# Patient Record
Sex: FEMALE | Race: WHITE | NOT HISPANIC OR LATINO | Employment: PART TIME | URBAN - NONMETROPOLITAN AREA
[De-identification: names, ages, dates, MRNs, and addresses within clinical notes are randomized per-mention and may not be internally consistent; named-entity substitution may affect disease eponyms.]

---

## 2017-01-18 ENCOUNTER — AMBULATORY - GICH (OUTPATIENT)
Dept: SCHEDULING | Facility: OTHER | Age: 37
End: 2017-01-18

## 2017-05-23 ENCOUNTER — AMBULATORY - GICH (OUTPATIENT)
Dept: OBGYN | Facility: OTHER | Age: 37
End: 2017-05-23

## 2017-05-23 DIAGNOSIS — R30.0 DYSURIA: ICD-10-CM

## 2017-05-24 ENCOUNTER — AMBULATORY - GICH (OUTPATIENT)
Dept: LAB | Facility: OTHER | Age: 37
End: 2017-05-24

## 2017-05-24 ENCOUNTER — COMMUNICATION - GICH (OUTPATIENT)
Dept: OBGYN | Facility: OTHER | Age: 37
End: 2017-05-24

## 2017-05-24 DIAGNOSIS — R30.0 DYSURIA: ICD-10-CM

## 2017-05-24 LAB
BILIRUB UR QL: NEGATIVE
CLARITY, URINE: CLEAR CLARITY
COLOR UR: YELLOW COLOR
GLUCOSE URINE: NEGATIVE MG/DL
KETONES UR QL: NEGATIVE MG/DL
LEUKOCYTE ESTERASE URINE: NEGATIVE
NITRITE UR QL STRIP: NEGATIVE
OCCULT BLOOD,URINE - HISTORICAL: NEGATIVE
PH UR: 5 [PH]
PROTEIN QUALITATIVE,URINE - HISTORICAL: NEGATIVE MG/DL
SP GR UR STRIP: <=1.005
UROBILINOGEN,QUALITATIVE - HISTORICAL: NORMAL EU/DL

## 2017-08-25 ENCOUNTER — COMMUNICATION - GICH (OUTPATIENT)
Dept: OBGYN | Facility: OTHER | Age: 37
End: 2017-08-25

## 2017-10-27 ENCOUNTER — AMBULATORY - GICH (OUTPATIENT)
Dept: FAMILY MEDICINE | Facility: OTHER | Age: 37
End: 2017-10-27

## 2017-10-27 DIAGNOSIS — Z23 ENCOUNTER FOR IMMUNIZATION: ICD-10-CM

## 2017-12-14 ENCOUNTER — HISTORY (OUTPATIENT)
Dept: FAMILY MEDICINE | Facility: OTHER | Age: 37
End: 2017-12-14

## 2017-12-14 ENCOUNTER — HOSPITAL ENCOUNTER (OUTPATIENT)
Dept: RADIOLOGY | Facility: OTHER | Age: 37
End: 2017-12-14
Attending: FAMILY MEDICINE

## 2017-12-14 ENCOUNTER — OFFICE VISIT - GICH (OUTPATIENT)
Dept: FAMILY MEDICINE | Facility: OTHER | Age: 37
End: 2017-12-14

## 2017-12-14 DIAGNOSIS — Z13.1 ENCOUNTER FOR SCREENING FOR DIABETES MELLITUS: ICD-10-CM

## 2017-12-14 DIAGNOSIS — E03.9 HYPOTHYROIDISM: ICD-10-CM

## 2017-12-14 DIAGNOSIS — Z12.4 ENCOUNTER FOR SCREENING FOR MALIGNANT NEOPLASM OF CERVIX: ICD-10-CM

## 2017-12-14 DIAGNOSIS — M51.369 OTHER INTERVERTEBRAL DISC DEGENERATION, LUMBAR REGION: ICD-10-CM

## 2017-12-14 DIAGNOSIS — Z13.220 ENCOUNTER FOR SCREENING FOR LIPOID DISORDERS: ICD-10-CM

## 2017-12-14 DIAGNOSIS — Z00.00 ENCOUNTER FOR GENERAL ADULT MEDICAL EXAMINATION WITHOUT ABNORMAL FINDINGS: ICD-10-CM

## 2017-12-14 LAB
CHOL/HDL RATIO - HISTORICAL: 3.3
CHOLESTEROL TOTAL: 165 MG/DL
GLUCOSE SERPL-MCNC: 85 MG/DL (ref 70–105)
HCG UR QL: NEGATIVE
HDLC SERPL-MCNC: 50 MG/DL (ref 23–92)
LDLC SERPL CALC-MCNC: 103 MG/DL
NON-HDL CHOLESTEROL - HISTORICAL: 115 MG/DL
PROVIDER ORDERDED STATUS - HISTORICAL: ABNORMAL
TRIGL SERPL-MCNC: 58 MG/DL
TSH - HISTORICAL: 1.81 UIU/ML (ref 0.34–5.6)

## 2017-12-20 ENCOUNTER — COMMUNICATION - GICH (OUTPATIENT)
Dept: FAMILY MEDICINE | Facility: OTHER | Age: 37
End: 2017-12-20

## 2017-12-20 DIAGNOSIS — M54.9 DORSALGIA: ICD-10-CM

## 2017-12-20 DIAGNOSIS — M54.10 RADICULOPATHY: ICD-10-CM

## 2017-12-28 NOTE — TELEPHONE ENCOUNTER
Patient Information     Patient Name MRN Maricarmen Arteaga [id number] Female 1980      Telephone Encounter by Mony De La Fuente RN at 2017 11:38 AM     Author:  Mony De La Fuente RN Service:  (none) Author Type:  NURS- Registered Nurse     Filed:  2017 11:39 AM Encounter Date:  2017 Status:  Signed     :  Mony De La Fuente RN (NURS- Registered Nurse)            Patient was calling to ask if Dr Woodrow Hurtado MD, FACOG takes on patients as a primary provider. Patient was informed that Dr Woodrow Hurtado MD, FACOG does not manage acute illnesses or chronic issues outside of OB/Gyn issues generally. Patient informed that she can continue to be seen for an annual physical exam by Dr Woodrow Hurtado MD, FACOG however.     Patient states understanding of this.  Mony De La Fuente RN............. 2017 11:39 AM

## 2017-12-28 NOTE — TELEPHONE ENCOUNTER
Patient Information     Patient Name MRN Maricarmen Arteaga [id number] Female 1980      Telephone Encounter by Mony De La Fuente RN at 2017  9:58 AM     Author:  Mony De La Fuente RN Service:  (none) Author Type:  NURS- Registered Nurse     Filed:  2017 10:01 AM Encounter Date:  2017 Status:  Signed     :  Mony De La Fuente RN (NURS- Registered Nurse)            Left message to call back  ....................Mony De La Fuente RN  2017   10:01 AM

## 2018-01-02 LAB — HPV RESULTS - HISTORICAL: NEGATIVE

## 2018-01-05 NOTE — TELEPHONE ENCOUNTER
Patient Information     Patient Name MRN Sex Maricarmen Hoffmann [id number] Female 1980      Telephone Encounter by Fitz Mooney LPN at 2017  1:14 PM     Author:  Fitz Mooney LPN Service:  (none) Author Type:  NURS- Licensed Practical Nurse     Filed:  2017  1:14 PM Encounter Date:  2017 Status:  Signed     :  Fitz Mooney LPN (NURS- Licensed Practical Nurse)            Called patient with results.  Fitz Mooney LPN ..............2017 1:13 PM

## 2018-01-05 NOTE — TELEPHONE ENCOUNTER
Patient Information     Patient Name MRN Maricarmen Arteaga [id number] Female 1980      Telephone Encounter by Fitz Mooney LPN at 2017  1:14 PM     Author:  Fitz Mooney LPN Service:  (none) Author Type:  NURS- Licensed Practical Nurse     Filed:  2017  1:14 PM Encounter Date:  2017 Status:  Signed     :  Fitz Mooney LPN (NURS- Licensed Practical Nurse)            ----- Message from Woodrow Hurtado MD sent at 2017 11:58 AM CDT -----  Urine is normal

## 2018-01-07 ENCOUNTER — COMMUNICATION - GICH (OUTPATIENT)
Dept: FAMILY MEDICINE | Facility: OTHER | Age: 38
End: 2018-01-07

## 2018-01-07 DIAGNOSIS — E03.9 HYPOTHYROIDISM: ICD-10-CM

## 2018-01-12 ENCOUNTER — AMBULATORY - GICH (OUTPATIENT)
Dept: SCHEDULING | Facility: OTHER | Age: 38
End: 2018-01-12

## 2018-02-02 ENCOUNTER — DOCUMENTATION ONLY (OUTPATIENT)
Dept: FAMILY MEDICINE | Facility: OTHER | Age: 38
End: 2018-02-02

## 2018-02-02 PROBLEM — F98.8 ATTENTION DEFICIT DISORDER: Status: ACTIVE | Noted: 2018-02-02

## 2018-02-02 RX ORDER — LEVOTHYROXINE SODIUM 75 UG/1
TABLET ORAL
COMMUNITY
Start: 2018-01-09 | End: 2019-01-02

## 2018-02-06 ENCOUNTER — HISTORY (OUTPATIENT)
Dept: FAMILY MEDICINE | Facility: OTHER | Age: 38
End: 2018-02-06

## 2018-02-06 ENCOUNTER — PRENATAL OFFICE VISIT - GICH (OUTPATIENT)
Dept: FAMILY MEDICINE | Facility: OTHER | Age: 38
End: 2018-02-06

## 2018-02-06 DIAGNOSIS — E03.9 HYPOTHYROIDISM: ICD-10-CM

## 2018-02-06 DIAGNOSIS — M47.817 SPONDYLOSIS OF LUMBOSACRAL REGION WITHOUT MYELOPATHY OR RADICULOPATHY: ICD-10-CM

## 2018-02-06 DIAGNOSIS — O09.529 SUPERVISION OF HIGH-RISK PREGNANCY OF ELDERLY MULTIGRAVIDA: Primary | ICD-10-CM

## 2018-02-06 DIAGNOSIS — O09.529 SUPERVISION OF ELDERLY MULTIGRAVIDA: ICD-10-CM

## 2018-02-06 LAB
BILIRUB UR QL: NEGATIVE
CLARITY, URINE: CLEAR CLARITY
COLOR UR: YELLOW COLOR
GLUCOSE URINE: NEGATIVE MG/DL
KETONES UR QL: NEGATIVE MG/DL
LEUKOCYTE ESTERASE URINE: NEGATIVE
NITRITE UR QL STRIP: NEGATIVE
OCCULT BLOOD,URINE - HISTORICAL: NEGATIVE
PH UR: 6.5 [PH]
PROTEIN QUALITATIVE,URINE - HISTORICAL: NEGATIVE MG/DL
SP GR UR STRIP: 1.01
UROBILINOGEN,QUALITATIVE - HISTORICAL: NORMAL EU/DL

## 2018-02-07 DIAGNOSIS — O09.529 ENCOUNTER FOR SUPERVISION OF HIGH-RISK PREGNANCY WITH ELDERLY MULTIGRAVIDA: Primary | ICD-10-CM

## 2018-02-07 DIAGNOSIS — M47.817 LUMBOSACRAL SPONDYLOSIS WITHOUT MYELOPATHY: ICD-10-CM

## 2018-02-07 LAB — CULTURE - HISTORICAL: NORMAL

## 2018-02-09 VITALS
BODY MASS INDEX: 27.29 KG/M2 | HEART RATE: 76 BPM | WEIGHT: 169.8 LBS | SYSTOLIC BLOOD PRESSURE: 124 MMHG | DIASTOLIC BLOOD PRESSURE: 90 MMHG | HEIGHT: 66 IN

## 2018-02-09 VITALS
WEIGHT: 172 LBS | HEIGHT: 66 IN | SYSTOLIC BLOOD PRESSURE: 112 MMHG | BODY MASS INDEX: 27.64 KG/M2 | HEART RATE: 68 BPM | DIASTOLIC BLOOD PRESSURE: 84 MMHG

## 2018-02-11 ASSESSMENT — PATIENT HEALTH QUESTIONNAIRE - PHQ9: SUM OF ALL RESPONSES TO PHQ QUESTIONS 1-9: 11

## 2018-02-12 NOTE — TELEPHONE ENCOUNTER
Patient Information     Patient Name MRMaricarmen Gallegos [id number] Female 1980      Telephone Encounter by Airam Espinosa at 2017  1:07 PM     Author:  Airam Espinosa Service:  (none) Author Type:  (none)     Filed:  2017  1:15 PM Encounter Date:  2017 Status:  Signed     :  Airam Espinosa is wanting a referral to see Dr. Minor here is [place]. Please place referral.   Airam Espinosa LPN............................... 2017 1:14 PM

## 2018-02-12 NOTE — TELEPHONE ENCOUNTER
Patient Information     Patient Name MRMaricarmen Gallegos [id number] Female 1980      Telephone Encounter by Airam Espinosa at 2017  2:19 PM     Author:  Airam Espinosa Service:  (none) Author Type:  (none)     Filed:  2017  2:22 PM Encounter Date:  2017 Status:  Signed     :  Airam Espinosa            Left message for Maricarmen to return call. Wondering where she would like referral to PT for.  Airam Espinosa LPN............................... 2017 2:20 PM

## 2018-02-12 NOTE — NURSING NOTE
Patient Information     Patient Name MRMaricarmen Gallegos [id number] Female 1980      Nursing Note by Airam Espinosa at 2017  7:45 AM     Author:  Airam Espinosa Service:  (none) Author Type:  (none)     Filed:  2017  8:11 AM Encounter Date:  2017 Status:  Signed     :  Airam Espinosa            Patient presents today for physical.  Airam Espinosa LPN............................... 2017 7:59 AM

## 2018-02-12 NOTE — ADDENDUM NOTE
Patient Information     Patient Name MRN Maricarmen Arteaga [id number] Female 1980      Addendum Note by Avril Meyers DO at 2017 11:06 AM     Author:  Avril Meyers DO Service:  (none) Author Type:  PHYS- Osteopathic     Filed:  2017 11:06 AM Encounter Date:  2017 Status:  Signed     :  Avril Meyers DO (PHYS- Osteopathic)       Addended by: AVRIL MEYERS on: 2017 11:06 AM        Modules accepted: Orders

## 2018-02-12 NOTE — ADDENDUM NOTE
Patient Information     Patient Name MRN Maricarmen Arteaga [id number] Female 1980      Addendum Note by Vicki Wood at 2017 12:19 PM     Author:  Vicki Wood Service:  (none) Author Type:  (none)     Filed:  2017 12:19 PM Encounter Date:  2017 Status:  Signed     :  Vicki Wood       Addended by: VICKI WOOD on: 2017 12:19 PM        Modules accepted: Orders

## 2018-02-12 NOTE — PROGRESS NOTES
Patient Information     Patient Name MRN Sex Maricarmen Hoffmann [id number] Female 1980      Progress Notes by Avril Sanders DO at 2017  7:45 AM     Author:  Avril Sanders DO  Service:  (none) Author Type:  PHYS- Osteopathic     Filed:  2017 11:06 AM  Encounter Date:  2017 Status:  Addendum     :  Avril Sanders DO (PHYS- Osteopathic)        Related Notes: Original Note by Avril Sanders DO (PHYS- Osteopathic) filed at 2017 10:12 AM            Nursing Notes:   Airam Espinosa  2017  8:11 AM  Signed  Patient presents today for physical.  Airam Espinosa LPN............................... 2017 7:59 AM      ANNUAL PHYSICAL - FEMALE    HPI: Maricarmen Ornelas is a 37 y.o. female who presents for a yearly exam.  Concerns include:   1. Arthritis.  She has had problems with her lumbar spine for a few years.  Started in ~ after she was crouched down and had a ~4 year old topple over her from the side.  She completed Chiro treatments, and eventually did okay.  She had an xray at that time, showing DDD lumbar disc and facet joints.  She is interested in a new one to compare.  She does aerobic and weight training.  Tries to stay active; but having some numbness/tingling in L lower lateral leg.  Would not want much for intervention; but would like to know if anything has progressed in back.  Also having some dull achy pain radiating out from knees after she has been active; notices more if she is laying in bed.  Same with ankles.    No LMP recorded.   Contraception: OCPs  Risk for STI?: No  Last pap:  ([facility]), normal.  Any hx of abnormal paps:  No  FH of early CA?: No  Cholesterol/DM concerns/screening: Due  Tobacco?: No  Calcium intake: No  DEXA: NA  Last mammo: NA  Colonoscopy: NA  Immunizations: Tdap 2014, flu 10/27/2017.    Patient Active Problem List      Diagnosis Date Noted     Lumbosacral spondylosis without myelopathy 03/10/2015      METHICILLIN RESISTANT STAPHYLOCOCCUS AUREUS INFECTION 2011     ABSCESS, VULVA 2011     ARTHRALGIA 2011     FATIGUE 2011     ABSCESS 2011     ENC FOR INSERTION INTRAUTERINE CONTRACEPT DEVICE 2011     CYSTITIS 2010     HYPOTHYROIDISM 2010     ADD      Past Medical History:     Diagnosis  Date     Hx of pregnancy           Thyroid disorder     hypo      Past Surgical History:      Procedure  Laterality Date     APPENDECTOMY       Social History     Social History        Marital status:       Spouse name: N/A     Number of children:  N/A     Years of education:  N/A     Occupational History      Not on file.     Social History Main Topics        Smoking status:  Never Smoker     Smokeless tobacco:  Never Used     Alcohol use  1.2 oz/week     2 Cans of beer per week      Drug use:  No     Sexual activity:  Not on file     Other Topics  Concern     Not on file      Social History Narrative         ~Ex- Kevan Maynard    ~Sons: Franco Linn    ~Father: Jose;  Mother: Bronwyn    ~Siblings: Sister Nydia, younger brother     Family History       Problem   Relation Age of Onset     Alcohol/Drug  Maternal Grandmother      Alcohol abuse       Cancer  Maternal Grandmother       lung cancer       Other  Maternal Grandfather      Alzheimer's disease       Diabetes  Paternal Grandmother      type 1       Diabetes  Maternal Uncle      type 2       Heart Disease  No Family History        Current Outpatient Prescriptions       Medication  Sig Dispense Refill     levothyroxine (SYNTHROID) 75 mcg tablet Take 1 tablet by mouth once daily.  0     No current facility-administered medications for this visit.      Medications have been reviewed by me and are current to the best of my knowledge and ability.     REVIEW OF SYSTEMS:  Refer to HPI; all other systems reviewed and negative.    PHYSICAL EXAM:  /90 (Cuff Site: Right Arm, Position: Sitting, Cuff Size:  "Adult Regular)  Pulse 76  Ht 1.67 m (5' 5.75\")  Wt 77 kg (169 lb 12.8 oz)  BMI 27.62 kg/m2  CONSTITUTIONAL:  Alert, cooperative, NAD.  EYES: No scleral icterus.  PERRLA.  Conjunctiva clear.  ENT/MOUTH: External ears and nose normal.  TMs normal.  Moist mucous membranes. Oropharynx clear.    ENDO: No thyromegaly or thyroid nodules.  LYMPH:  No cervical or supraclavicular LA.    BREASTS: No skin abnormalities, no erythema.  No discrete masses.  No nipple discharge, no axillary, supra- or infraclavicular LA.   CARDIOVASCULAR: Regular, S1, S2.  No S3 or S4.  No murmur/gallop/rub.  No peripheral edema.  RESPIRATORY: CTA bilaterally, no wheezes, rhonchi or rales.  GI: Bowel sounds wnl.  Soft, nontender, nondistended.  No masses or HSM.  No rebound or guarding.  : Vulva: normal, no lesions or discharge  Urethral meatus: normal size and location, no lesions or discharge  Urethra: no tenderness or masses  Bladder: no fullness or tenderness  Vagina: normal appearance, no abnormal discharge; mild spotting noted from os, no lesions.  No evidence of cystocele or rectocele.  Cervix: nabothian cyst at 2-3:00; no abnormal discharge, no cervical motion tenderness  Uterus: normal size and position, mobile, non-tender  Adnexa: no palpable masses bilaterally. No cervical motion tenderness.  Pap smear obtained: yes  MSKEL: Grossly normal ROM.  No clubbing.  Knees have normal ligamentous exam b/l.  No effusion, bruising.  Mild crepitance in L knee with ROM.  INTEGUMENTARY:  Warm, dry.  No rash noted on exposed skin.  NEUROLOGIC: Facies symmetric.  Grossly normal movement and tone.  No tremor.  PSYCHIATRIC: Affect normal.  Speech fluent.      PHQ Depression Screening 12/29/2016 12/14/2017   Date of PHQ exam (doc flow) 12/29/2016 12/14/2017   1. Lack of interest/pleasure 0 - Not at all 0 - Not at all   2. Feeling down/depressed 0 - Not at all 0 - Not at all   PHQ-2 TOTAL SCORE 0 0   3. Trouble sleeping - -   4. Decreased energy - - "   5. Appetite change - -   6. Feelings of failure - -   7. Trouble concentrating - -   8. Activity level - -   9. Hurting yourself - -   PHQ-9 TOTAL SCORE - -   PHQ-9 Severity Level - -   Functional Impairment - -   Some recent data might be hidden       Results for orders placed or performed in visit on 12/14/17      Urine pregnancy test (HCG), qualitative      Result  Value Ref Range    PREGNANCY,URINE           Negative Negative   LIPID PANEL      Result  Value Ref Range    CHOLESTEROL,TOTAL 165 <200 mg/dL    TRIGLYCERIDES 58 <150 mg/dL    HDL CHOLESTEROL 50 23 - 92 mg/dL    NON-HDL CHOLESTEROL 115 <145 mg/dl    CHOL/HDL RATIO            3.30 <4.50                    LDL CHOLESTEROL 103 (H) <100 mg/dL    PROVIDER ORDERED STATUS RANDOM    GLUCOSE, FASTING      Result  Value Ref Range    GLUCOSE 85 70 - 105 mg/dL   TSH      Result  Value Ref Range    TSH 1.81 0.34 - 5.60 uIU/mL       ASSESSMENT/PLAN:    ICD-10-CM    1. Annual physical exam Z00.00 Urine pregnancy test (HCG), qualitative      Urine pregnancy test (HCG), qualitative      GYN THIN PREP PAP SCREEN IMAGED   2. Hypothyroidism, unspecified type E03.9 TSH      TSH   3. DDD (degenerative disc disease), lumbar M51.36 XR LUMBAR SPINE W OBLIQUES   4. Lipid screening Z13.220 LIPID PANEL      LIPID PANEL   5. Diabetes mellitus screening Z13.1 GLUCOSE, FASTING      GLUCOSE, FASTING   6. Cervical cancer screening Z12.4 GYN THIN PREP PAP SCREEN IMAGED     Relevant cancer screening discussed.    Counseled on healthy diet, Calcium and vitamin D intake, and exercise.    DDD, lumbar, chronic, waxes and wanes:  Repeat Xrays today - show worsening disc disease from 2015.  Patient will be notified.  She would like u preg first as had a condom break ~1 week ago; and due to get period today or tomorrow.  Discussed lifestyle modifications of pool exercise, elliptical, etc. Compared to running/jumping exercises;  Reviewed NSAID use along with ice for anti-inflammatory  effects.    Hypothyroidism, chronic: due to check TSH level today.  Aware that if it remains in the 2-3 range, we may consider a dosage increase.  If not, will continue with levothyroxine 75mcg daily.    Screening labs obtained as ordered above.    NONA MEYERS, DO

## 2018-02-13 NOTE — NURSING NOTE
Patient Information     Patient Name MRN Maricarmen Arteaga [id number] Female 1980      Nursing Note by Mary Jones at 2018  2:00 PM     Author:  Mary Jones Service:  (none) Author Type:  (none)     Filed:  2018  2:27 PM Encounter Date:  2018 Status:  Signed     :  Mary Jones            Patient presents to the clinic today for her initial ob px. Her lmp is either 17 or 17.    Mary Jones LPN.................. 2018 2:17 PM

## 2018-02-13 NOTE — PROGRESS NOTES
Patient Information     Patient Name MRN Sex     Maricarmen Ornelas [id number] Female 1980      Progress Notes by Analy Dawson MD at 2018  2:22 PM     Author:  Analy Dawson MD Service:  (none) Author Type:  Physician     Filed:  2018  3:08 AM Encounter Date:  2018 Status:  Signed     :  Analy Dawson MD (Physician)              PRENATAL EXAM - OB    Maricarmen Ornelas is a 37 y.o. female, G 3, P 2, here today for a prenatal exam.    Has hypothyroidism and had recent TSH done -   TSH (uIU/mL)    Date Value   2017 1.81      :  1980  Race/Ethnicity:  /White        Marital Status:   Caodaism:  None  Occupation:  outside work - part time   -   [facility]:  [facility]    's Provider:  provider selected, [facility] or [facility]      Education (last grade completed):  College     /Father of baby:  Jeffrey Watkins   Partner's Occupation:  [employer]   Contact Phone #:  [phone]    MENSTRUAL HISTORY  LMP:  Patient's last menstrual period was 2017.  Cycle Regularity:  regular, every 28-30 days  Flow:  moderate      Date Reliability:  definite  Had negative UPT in December     MEDICAL HISTORY  OB History                    Para  Term     AB  Living     3   2         2     SAB   TAB  Ectopic  Multiple   Live Births                                         # Outcome Date  GA  Lbr Renny/2nd  Weight Sex  Delivery Anes PTL Lv   3 Current                2 Para 10/16/10       M        1 Para                                     Past Medical History:     Diagnosis  Date     Encounter for supervision of high-risk pregnancy with elderly multigravida 2018     Hx of pregnancy           Thyroid disorder     hypo        Past Surgical History:      Procedure  Laterality Date     APPENDECTOMY         Family History:  Family History       Problem   Relation Age of Onset     Alcohol/Drug   "Maternal Grandmother      Alcohol abuse       Cancer  Maternal Grandmother       lung cancer       Other  Maternal Grandfather      Alzheimer's disease       Diabetes  Paternal Grandmother      type 1       Diabetes  Maternal Uncle      type 2       No Known Problems  Sister      No Known Problems  Son      No Known Problems  Son      Heart Disease  No Family History        Social History:  Social History      Substance Use Topics        Smoking status:  Never Smoker     Smokeless tobacco:  Never Used     Alcohol use  1.2 oz/week     2 Cans of beer per week        RISK FACTORS  Exercise Times/wk:  3  Seat Belt Use: 100%  Alcohol/day: once since LMP  Meds/Drugs/ETOH Since LMP:  No  Birth Control Method: condoms  High Risk Behavior: none    INFECTION HISTORY  Current Drug Use:  none  HIV Risk Evaluation:  low risk  Hepatitis B Risk Evaluation: low risk  Hepatitis B Immunized: yes  TB Exposure: yes    Personal History of Genital Herpes: yes  Partner History of Genital Herpes: yes  Rash/Viral Illness Since LMP: No  History of STD: no history of STD's    REVIEW OF SYSTEMS   Intense mood symptoms since finding out she was pregnant with negative self talk, anxiety.  Has been seeing a counselor.  States that \"on paper\" her life is good.   supportive.  This was not planned pregnancy.    PHYSICAL EXAM  /84 (Cuff Site: Right Arm, Position: Sitting, Cuff Size: Adult Regular)  Pulse 68  Ht 1.67 m (5' 5.75\")  Wt 78 kg (172 lb)  LMP 12/18/2017  Breastfeeding? No  BMI 27.97 kg/m2  General Appearance:  Alert, appropriate appearance for age. No acute distress  HEENT Exam:  Grossly normal.  Neck / Thyroid Exam:  Supple, no masses, nodes or enlargement.  Chest/Respiratory Exam: Normal chest wall and respirations. Clear to auscultation.  Breast Exam: No dimpling, nipple retraction or discharge. No masses or nodes.  Cardiovascular Exam: Regular rate and rhythm. S1, S2, no murmur, click, gallop, or " rubs.  Gastrointestinal Exam: Soft, non-tender, no masses or organomegaly.  Lymphatic Exam: Non-palpable nodes in neck, clavicular, axillary, or inguinal regions.  Musculoskeletal Exam: Back is straight and non-tender, full ROM of upper and lower extremities.  Skin: no rash or abnormalities  Neurologic Exam: Normal gait and speech, no tremor.  Psychiatric Exam: tearful    ASSESSMENT    1. Encounter for supervision of high-risk pregnancy with elderly multigravida    2. Lumbosacral spondylosis without myelopathy    3. Hypothyroidism, unspecified type        PLAN  1.  Ultrasound ordered.  2.  Discussed first trimester screening due to AMA - she accepts.  3.  PT for ongoing back pain symptoms.  4.  TSH testing each trimester.  5.  Discussed exercise, stress factors, mental health follow up.  Pt has declined medications.      Follow up next month with labs being done today.  Analy Holbrook MD

## 2018-02-14 ENCOUNTER — TELEPHONE (OUTPATIENT)
Dept: FAMILY MEDICINE | Facility: OTHER | Age: 38
End: 2018-02-14

## 2018-02-14 ENCOUNTER — HISTORY (OUTPATIENT)
Dept: OBGYN | Facility: OTHER | Age: 38
End: 2018-02-14

## 2018-02-14 NOTE — TELEPHONE ENCOUNTER
Patient is calling as she is wondering about which vitamin helps with nausea in pregnancy. Patient advised she can try Vitamin B6 25 mg four times daily with unisom at night to help with nausea. Also advised patient to eat small meal prior to getting out of bed in the morning. Patient agrees to try this and will follow up if nausea continues.  Mony De La Fuente RN............. 2/14/2018 3:37 PM

## 2018-02-16 ENCOUNTER — HOSPITAL ENCOUNTER (OUTPATIENT)
Dept: ULTRASOUND IMAGING | Facility: OTHER | Age: 38
Discharge: HOME | End: 2018-02-16
Attending: FAMILY MEDICINE | Admitting: FAMILY MEDICINE
Payer: COMMERCIAL

## 2018-02-16 DIAGNOSIS — O09.529 ENCOUNTER FOR SUPERVISION OF HIGH-RISK PREGNANCY WITH ELDERLY MULTIGRAVIDA: ICD-10-CM

## 2018-02-16 PROBLEM — O30.009 TWIN PREGNANCY: Status: ACTIVE | Noted: 2018-01-01

## 2018-02-16 PROCEDURE — 76802 OB US < 14 WKS ADDL FETUS: CPT

## 2018-02-26 ENCOUNTER — TELEPHONE (OUTPATIENT)
Dept: FAMILY MEDICINE | Facility: OTHER | Age: 38
End: 2018-02-26

## 2018-02-26 ENCOUNTER — HOSPITAL ENCOUNTER (OUTPATIENT)
Dept: PHYSICAL THERAPY | Facility: OTHER | Age: 38
Setting detail: THERAPIES SERIES
End: 2018-02-26
Attending: FAMILY MEDICINE
Payer: COMMERCIAL

## 2018-02-26 PROCEDURE — 97112 NEUROMUSCULAR REEDUCATION: CPT | Mod: GP

## 2018-02-26 PROCEDURE — 97110 THERAPEUTIC EXERCISES: CPT | Mod: GP

## 2018-02-26 PROCEDURE — 40000185 ZZHC STATISTIC PT OUTPT VISIT

## 2018-02-26 PROCEDURE — 97161 PT EVAL LOW COMPLEX 20 MIN: CPT | Mod: GP

## 2018-02-26 NOTE — TELEPHONE ENCOUNTER
It looks like she is seeing Roxana Holbrook MD on 3/5/18 for OB visit.  Did you mean to send this message to her?  Marietta Cartagena

## 2018-02-26 NOTE — PROGRESS NOTES
02/26/18 1400   General Information   Type of Visit Initial OP Ortho PT Evaluation   Start of Care Date 02/26/18   Referring Physician Roxana Holbrook MD   Orders Evaluate and Treat   Orders Comment high risk pregnancy due to AMA   Insurance Type [employer]   Medical Diagnosis lumbosacral spondylosis   Surgical/Medical history reviewed Yes   Precautions/Limitations other (see comments)  (twin pregnancy)   Presentation and Etiology   Pertinent history of current problem (include personal factors and/or comorbidities that impact the POC) Chronic history of back pain. Recent films showed worsening of DDD especially at L4-5. Was seeing another PT locally; declined to treat her further after learning she was pregnant. Wants to continue with exercise and would like guidance about what is safe. Prefers to do impact aerobics, jumping jacks, run 1-2 miles per week.    Impairments A. Pain;D. Decreased ROM;F. Decreased strength and endurance   Functional Limitations perform desired leisure / sports activities   Symptom Location low back   Chronicity Chronic   Pain rating (0-10 point scale) (did not rate pain)   Pain/symptoms exacerbated by M. Other   Pain exacerbation comment exercise, movement   Pain/symptoms eased by K. Other   Pain eased by comment exercise, movement   Current / Previous Interventions   Diagnostic Tests: (see EHR for xrays)   Prior Level of Function   Prior Level of Function-Mobility pain with exercise, movement   Current Level of Function   Patient role/employment history A. Employed   Fall Risk Screen   Fall screen completed by PT   Have you fallen 2 or more times in the past year? No   Have you fallen and had an injury in the past year? No   Is patient a fall risk? No   Lumbar Spine/SI Objective Findings   Observation limited trunk rotation wiht gait.   Posture increased lordosis   Flexion ROM 90, discomfort   Pelvic Screen + FFT left, left superior pubic shear, +JANNETH left, FRS left L5, FRS  left T12   Hamstring Flexibility left tight   Hip Flexor Flexibility iliopsoas tight left   Lumbar/SI Special Tests Comments oblique MMT left 3+/5, right 4/5   Palpation limited tissue loading through left pelvis, right shoulder. General listening to anterior left pelvis   Planned Therapy Interventions   Planned Therapy Interventions joint mobilization;manual therapy;neuromuscular re-education;strengthening;stretching   Planned Modality Interventions   Planned Modality Interventions Cryotherapy   Clinical Impression   Criteria for Skilled Therapeutic Interventions Met yes, treatment indicated   PT Diagnosis chronic low back pain, pubic shear, lumbar segmental dysfunctions, myofascial tightness   Functional limitations due to impairments loss of motion, muscle weakness, pain   Clinical Presentation Stable/Uncomplicated   Clinical Decision Making (Complexity) Low complexity   Therapy Frequency 2 times/Week   Predicted Duration of Therapy Intervention (days/wks) 6 months   Risk & Benefits of therapy have been explained Yes   Patient, Family & other staff in agreement with plan of care Yes   ORTHO GOALS   PT Ortho Eval Goals 1;2;3   Ortho Goal 1   Goal Identifier MMT   Goal Description improve facilitation of abdominal muscle to stabilize lumbar spine and pelvis during mobility   Target Date 08/26/18   Ortho Goal 2   Goal Identifier joint mechanics   Goal Description maintain normal joint mechanics of lumbar spine and pelvis during pregnancy to reduce back pain   Target Date 08/26/18   Ortho Goal 3   Goal Identifier exercise   Goal Description Patient to continue with and tolerated exercise during course of pregnancy   Target Date 08/26/18   Total Evaluation Time   Total Evaluation Time 20

## 2018-02-26 NOTE — TELEPHONE ENCOUNTER
Maricarmen Ornelas has an outpatient lab appointment this Friday 3/2/18. Lab has no orders for this appointment. Would Dr. Cartagena like to place lab orders for this patient's lab only appointment?  Thank you

## 2018-02-27 ENCOUNTER — TELEPHONE (OUTPATIENT)
Dept: FAMILY MEDICINE | Facility: OTHER | Age: 38
End: 2018-02-27

## 2018-02-27 NOTE — TELEPHONE ENCOUNTER
Let patient know that this test is a send out about 3-5 days for results to be done. Dia Bedoya LPN .......................2/27/2018  9:03 AM

## 2018-02-27 NOTE — TELEPHONE ENCOUNTER
I put the harmony order back in my outbox for you to bring to Roxana Holbrook MD.  :)  Marietta Cartagena

## 2018-02-28 ENCOUNTER — HOSPITAL ENCOUNTER (OUTPATIENT)
Dept: PHYSICAL THERAPY | Facility: OTHER | Age: 38
Setting detail: THERAPIES SERIES
End: 2018-02-28
Attending: FAMILY MEDICINE
Payer: COMMERCIAL

## 2018-02-28 PROCEDURE — 97110 THERAPEUTIC EXERCISES: CPT | Mod: GP

## 2018-02-28 PROCEDURE — 40000185 ZZHC STATISTIC PT OUTPT VISIT

## 2018-03-02 DIAGNOSIS — O09.529 SUPERVISION OF HIGH-RISK PREGNANCY OF ELDERLY MULTIGRAVIDA: ICD-10-CM

## 2018-03-02 DIAGNOSIS — Z33.1 PREGNANT STATE, INCIDENTAL: Primary | ICD-10-CM

## 2018-03-02 LAB
BASOPHILS # BLD AUTO: 0.1 10E9/L (ref 0–0.2)
BASOPHILS NFR BLD AUTO: 0.7 %
DIFFERENTIAL METHOD BLD: NORMAL
EOSINOPHIL # BLD AUTO: 0.1 10E9/L (ref 0–0.7)
EOSINOPHIL NFR BLD AUTO: 1.2 %
ERYTHROCYTE [DISTWIDTH] IN BLOOD BY AUTOMATED COUNT: 12.8 % (ref 10–15)
HCT VFR BLD AUTO: 37.9 % (ref 35–47)
HGB BLD-MCNC: 13 G/DL (ref 11.7–15.7)
IMM GRANULOCYTES # BLD: 0 10E9/L (ref 0–0.4)
IMM GRANULOCYTES NFR BLD: 0.4 %
LYMPHOCYTES # BLD AUTO: 1.9 10E9/L (ref 0.8–5.3)
LYMPHOCYTES NFR BLD AUTO: 23.1 %
MCH RBC QN AUTO: 29.8 PG (ref 26.5–33)
MCHC RBC AUTO-ENTMCNC: 34.3 G/DL (ref 31.5–36.5)
MCV RBC AUTO: 87 FL (ref 78–100)
MONOCYTES # BLD AUTO: 0.5 10E9/L (ref 0–1.3)
MONOCYTES NFR BLD AUTO: 5.7 %
NEUTROPHILS # BLD AUTO: 5.7 10E9/L (ref 1.6–8.3)
NEUTROPHILS NFR BLD AUTO: 68.9 %
PLATELET # BLD AUTO: 344 10E9/L (ref 150–450)
RBC # BLD AUTO: 4.36 10E12/L (ref 3.8–5.2)
WBC # BLD AUTO: 8.2 10E9/L (ref 4–11)

## 2018-03-02 PROCEDURE — 87340 HEPATITIS B SURFACE AG IA: CPT | Performed by: FAMILY MEDICINE

## 2018-03-02 PROCEDURE — 36415 COLL VENOUS BLD VENIPUNCTURE: CPT | Performed by: FAMILY MEDICINE

## 2018-03-02 PROCEDURE — 86780 TREPONEMA PALLIDUM: CPT | Performed by: FAMILY MEDICINE

## 2018-03-02 PROCEDURE — 85025 COMPLETE CBC W/AUTO DIFF WBC: CPT | Performed by: FAMILY MEDICINE

## 2018-03-02 PROCEDURE — 86762 RUBELLA ANTIBODY: CPT | Performed by: FAMILY MEDICINE

## 2018-03-02 RX ORDER — BUPROPION HYDROCHLORIDE 150 MG/1
TABLET ORAL
Refills: 2 | COMMUNITY
Start: 2017-04-07 | End: 2018-03-05

## 2018-03-03 LAB — T PALLIDUM IGG+IGM SER QL: NEGATIVE

## 2018-03-05 ENCOUNTER — PRENATAL OFFICE VISIT (OUTPATIENT)
Dept: FAMILY MEDICINE | Facility: OTHER | Age: 38
End: 2018-03-05
Attending: FAMILY MEDICINE
Payer: COMMERCIAL

## 2018-03-05 VITALS
HEIGHT: 66 IN | HEART RATE: 72 BPM | DIASTOLIC BLOOD PRESSURE: 72 MMHG | BODY MASS INDEX: 28.28 KG/M2 | WEIGHT: 176 LBS | SYSTOLIC BLOOD PRESSURE: 114 MMHG

## 2018-03-05 DIAGNOSIS — O30.001 TWIN GESTATION IN FIRST TRIMESTER, UNSPECIFIED MULTIPLE GESTATION TYPE: Primary | ICD-10-CM

## 2018-03-05 DIAGNOSIS — E03.9 HYPOTHYROIDISM, UNSPECIFIED TYPE: ICD-10-CM

## 2018-03-05 LAB
HBV SURFACE AG SERPL QL IA: NONREACTIVE
RUBV IGG SERPL IA-ACNC: 30 IU/ML

## 2018-03-05 PROCEDURE — 99207 ZZC OB VISIT-NO CHARGE - GICH ONLY: CPT | Performed by: FAMILY MEDICINE

## 2018-03-05 ASSESSMENT — PAIN SCALES - GENERAL: PAINLEVEL: NO PAIN (0)

## 2018-03-05 NOTE — PROGRESS NOTES
Patient risk factors:  Age over 35   New father of baby   Twin gestation   Thyroid disease - last TSH  was within normal limits   Datin weeks ultrasound   Breast feeding  Blood type:  Labs done 3/2 - uncertain if all OB labs completed then.    Patient in with  with questions regarding:   Canones panel - wish to have this done.   Follow up if Canones panel results abnormal   Follow up with OBGYN   Nutrition   Activity   Route/method of delivery   Procedure if PTL/delivery.  Follow up referral to OBGYN for high risk co-management.  Discussed nutrition and weight gain with twin pregnancy.  TSH each trimester.  Discussed vaginal delivery if appropriate.  Briefly discussed follow-up ultrasounds, biophysical profiles, and third trimester management.  Discussed transfer to a center with NICU if less than 36 weeks gestation.  No activity restrictions indicated at this time.    Follow up in a month.  Analy Holbrook MD

## 2018-03-05 NOTE — NURSING NOTE
Patient presents to the clinic today for a ob check.    Mary Jones LPN.................. 3/5/2018 10:03 AM

## 2018-03-05 NOTE — MR AVS SNAPSHOT
After Visit Summary   3/5/2018    Maricarmen Ornelas    MRN: [id number]           Patient Information     Date Of Birth          1980        Visit Information        Provider Department      3/5/2018 10:00 AM Analy Trimble MD [facility]        Today's Diagnoses     Twin gestation in first trimester, unspecified multiple gestation type    -  1    Hypothyroidism, unspecified type           Follow-ups after your visit        Additional Services     OB/GYN REFERRAL       Your provider has referred you to:  Woodrow Hurtado MD     Please be aware that coverage of these services is subject to the terms and limitations of your health insurance plan.  Call member services at your health plan with any benefit or coverage questions.      Please bring the following with you to your appointment:    (1) Any X-Rays, CTs or MRIs which have been performed.  Contact the facility where they were done to arrange for  prior to your scheduled appointment.   (2) List of current medications   (3) This referral request   (4) Any documents/labs given to you for this referral                  Your next 10 appointments already scheduled     Mar 12, 2018 10:15 AM CDT   Treatment with Rose L Iketad, PT   [facility] ([facility])    [address] [address]   [phone]            Mar 14, 2018 10:00 AM CDT   (Arrive by 9:45 AM)   Treatment with Rose L Orstad, [facility] ([facility])    [address] [address]   [phone]            Mar 19, 2018 10:15 AM CDT   Treatment with Rose L Iketad, PT   [facility] ([facility])    [address] [address]   [phone]            Apr 04, 2018 10:30 AM CDT   ESTABLISHED PRENATAL with Analy Bang MD   [facility]  "([facility])    [address]  [place] [address]   [phone]            2018 11:15 AM CDT   [facility] Prenatal with Woodrow [facility]ado [facility]   [facility]    [address]  [place] [address]   [phone]              Future tests that were ordered for you today     Open Future Orders        Priority Expected Expires Ordered    US OB > 14 Weeks Complete, Multiple Routine  3/5/2019 3/5/2018            Who to contact     If you have questions or need follow up information about today's clinic visit or your schedule please contact [facility] directly at [phone].  Normal or non-critical lab and imaging results will be communicated to you by CorasWorkshart, letter or phone within 4 business days after the clinic has received the results. If you do not hear from us within 7 days, please contact the clinic through CorasWorkshart or phone. If you have a critical or abnormal lab result, we will notify you by phone as soon as possible.  Submit refill requests through ozuke or call your pharmacy and they will forward the refill request to us. Please allow 3 business days for your refill to be completed.          Additional Information About Your Visit        ozuke Information     ozuke lets you send messages to your doctor, view your test results, renew your prescriptions, schedule appointments and more. To sign up, go to [email or web address] . Click on \"Log in\" on the left side of the screen, which will take you to the Welcome page. Then click on \"Sign up Now\" on the right side of the page.     You will be asked to enter the access code listed below, as well as some personal information. Please follow the directions to create your username and password.     Your access code is: [email or web address]  Expires: 6/3/2018 12:39 PM     Your access code will  in 90 days. If you need help or a new code, " "please call your [facility] clinic or [phone].        Care EveryWhere ID     This is your Care EveryWhere ID. This could be used by other organizations to access your [facility] medical records  [id number]        Your Vitals Were     Pulse Height Last Period Breastfeeding? BMI (Body Mass Index)       72 5' 6\" (1.676 m) 12/17/2017 No 28.41 kg/m2        Blood Pressure from Last 3 Encounters:   03/05/18 114/72   02/06/18 112/84   12/14/17 124/90    Weight from Last 3 Encounters:   03/05/18 176 lb (79.8 kg)   02/06/18 172 lb (78 kg)   12/14/17 169 lb 12.8 oz (77 kg)              We Performed the Following     OB/GYN REFERRAL          Today's Medication Changes          These changes are accurate as of 3/5/18 12:39 PM.  If you have any questions, ask your nurse or doctor.               Stop taking these medicines if you haven't already. Please contact your care team if you have questions.     buPROPion 150 MG 24 hr tablet   Commonly known as:  WELLBUTRIN XL   Stopped by:  Analy Trimble MD                    Primary Care Provider Fax #    Physician No Ref-Primary [phone]       No address on file        Equal Access to Services     HAYDEE NICE : Cassi Clifford, waaxda lumckennaadaha, qaybta kaalmada adeej, joey amin. So [facility] [phone].    ATENCIÓN: Si habla español, tiene a jiang disposición servicios gratuitos de asistencia lingüística. Llame al [phone].    We comply with applicable federal civil rights laws and [place] laws. We do not discriminate on the basis of race, color, national origin, age, disability, sex, sexual orientation, or gender identity.            Thank you!     Thank you for choosing [facility] AND [facility]  for your care. Our goal is always to provide you with excellent care. Hearing back from our patients is one way we can continue to improve our services. Please take a few minutes to complete the written survey that " you may receive in the mail after your visit with us. Thank you!             Your Updated Medication List - Protect others around you: Learn how to safely use, store and throw away your medicines at www.disposemymeds.org.          This list is accurate as of 3/5/18 12:39 PM.  Always use your most recent med list.                   Brand Name Dispense Instructions for use Diagnosis    [facility] PRENATAL 28-0.8 MG Tabs      Take 1 tablet by mouth        SYNTHROID 75 MCG tablet   Generic drug:  levothyroxine

## 2018-03-12 ENCOUNTER — TELEPHONE (OUTPATIENT)
Dept: FAMILY MEDICINE | Facility: OTHER | Age: 38
End: 2018-03-12

## 2018-03-12 ENCOUNTER — HOSPITAL ENCOUNTER (OUTPATIENT)
Dept: PHYSICAL THERAPY | Facility: OTHER | Age: 38
Setting detail: THERAPIES SERIES
End: 2018-03-12
Attending: FAMILY MEDICINE
Payer: COMMERCIAL

## 2018-03-12 PROCEDURE — 97110 THERAPEUTIC EXERCISES: CPT | Mod: GP

## 2018-03-12 PROCEDURE — 40000185 ZZHC STATISTIC PT OUTPT VISIT

## 2018-03-12 NOTE — TELEPHONE ENCOUNTER
Patient is calling requesting her [place] lab results. She is very anxious, and has not heard anything back yet. There is 2 scanned in docs, one says negative for NTD, down syndrome, and trisomy 18. On the scanned doc it said to call and notify patient. She is wondering if this is the harmony panel that was done? She states it should also state fetal sex but, I am not seeing that. She is requesting another doc to review results.     Mary Jones LPN.................. 3/12/2018 2:11 PM

## 2018-03-12 NOTE — TELEPHONE ENCOUNTER
Test negative for down syndrome and other tested conditions. Unable to determine fetal sex when pregnant with twins. Pily Rich

## 2018-03-14 ENCOUNTER — HOSPITAL ENCOUNTER (OUTPATIENT)
Dept: PHYSICAL THERAPY | Facility: OTHER | Age: 38
Setting detail: THERAPIES SERIES
End: 2018-03-14
Attending: FAMILY MEDICINE
Payer: COMMERCIAL

## 2018-03-14 PROCEDURE — 97112 NEUROMUSCULAR REEDUCATION: CPT | Mod: GP

## 2018-03-14 PROCEDURE — 40000185 ZZHC STATISTIC PT OUTPT VISIT

## 2018-03-19 ENCOUNTER — HOSPITAL ENCOUNTER (OUTPATIENT)
Dept: PHYSICAL THERAPY | Facility: OTHER | Age: 38
Setting detail: THERAPIES SERIES
End: 2018-03-19
Attending: FAMILY MEDICINE
Payer: COMMERCIAL

## 2018-03-19 PROCEDURE — 97112 NEUROMUSCULAR REEDUCATION: CPT | Mod: GP

## 2018-03-19 PROCEDURE — 40000185 ZZHC STATISTIC PT OUTPT VISIT

## 2018-03-19 PROCEDURE — 97110 THERAPEUTIC EXERCISES: CPT | Mod: GP

## 2018-03-27 ENCOUNTER — NURSE TRIAGE (OUTPATIENT)
Dept: FAMILY MEDICINE | Facility: OTHER | Age: 38
End: 2018-03-27

## 2018-03-27 ENCOUNTER — PRENATAL OFFICE VISIT (OUTPATIENT)
Dept: OBGYN | Facility: OTHER | Age: 38
End: 2018-03-27
Attending: OBSTETRICS & GYNECOLOGY
Payer: COMMERCIAL

## 2018-03-27 ENCOUNTER — HOSPITAL ENCOUNTER (OUTPATIENT)
Dept: ULTRASOUND IMAGING | Facility: OTHER | Age: 38
Discharge: HOME | End: 2018-03-27
Attending: OBSTETRICS & GYNECOLOGY | Admitting: OBSTETRICS & GYNECOLOGY
Payer: COMMERCIAL

## 2018-03-27 VITALS
BODY MASS INDEX: 29.04 KG/M2 | HEART RATE: 80 BPM | WEIGHT: 180.7 LBS | DIASTOLIC BLOOD PRESSURE: 70 MMHG | HEIGHT: 66 IN | SYSTOLIC BLOOD PRESSURE: 110 MMHG

## 2018-03-27 DIAGNOSIS — R10.9 CRAMPING COMPLICATING PREGNANCY, ANTEPARTUM: ICD-10-CM

## 2018-03-27 DIAGNOSIS — E03.9 HYPOTHYROID IN PREGNANCY, ANTEPARTUM, SECOND TRIMESTER: Primary | ICD-10-CM

## 2018-03-27 DIAGNOSIS — O99.282 HYPOTHYROID IN PREGNANCY, ANTEPARTUM, SECOND TRIMESTER: Primary | ICD-10-CM

## 2018-03-27 DIAGNOSIS — O30.042 DICHORIONIC DIAMNIOTIC TWIN PREGNANCY IN SECOND TRIMESTER: ICD-10-CM

## 2018-03-27 DIAGNOSIS — O26.899 CRAMPING COMPLICATING PREGNANCY, ANTEPARTUM: ICD-10-CM

## 2018-03-27 LAB
ABO + RH BLD: NORMAL
ABO + RH BLD: NORMAL
ALBUMIN UR-MCNC: NEGATIVE MG/DL
APPEARANCE UR: CLEAR
BILIRUB UR QL STRIP: NEGATIVE
BLD GP AB SCN SERPL QL: NORMAL
BLOOD BANK CMNT PATIENT-IMP: NORMAL
COLOR UR AUTO: YELLOW
GLUCOSE UR STRIP-MCNC: NEGATIVE MG/DL
HGB UR QL STRIP: NEGATIVE
KETONES UR STRIP-MCNC: NEGATIVE MG/DL
LEUKOCYTE ESTERASE UR QL STRIP: NEGATIVE
NITRATE UR QL: NEGATIVE
PH UR STRIP: 6 PH (ref 5–7)
SOURCE: NORMAL
SP GR UR STRIP: <1.005 (ref 1–1.03)
SPECIMEN EXP DATE BLD: NORMAL
TSH SERPL DL<=0.05 MIU/L-ACNC: 1.78 IU/ML (ref 0.34–5.6)
UROBILINOGEN UR STRIP-ACNC: 0.2 EU/DL (ref 0.2–1)

## 2018-03-27 PROCEDURE — 81003 URINALYSIS AUTO W/O SCOPE: CPT | Performed by: OBSTETRICS & GYNECOLOGY

## 2018-03-27 PROCEDURE — 86901 BLOOD TYPING SEROLOGIC RH(D): CPT | Performed by: OBSTETRICS & GYNECOLOGY

## 2018-03-27 PROCEDURE — 86900 BLOOD TYPING SEROLOGIC ABO: CPT | Performed by: OBSTETRICS & GYNECOLOGY

## 2018-03-27 PROCEDURE — 87389 HIV-1 AG W/HIV-1&-2 AB AG IA: CPT | Performed by: OBSTETRICS & GYNECOLOGY

## 2018-03-27 PROCEDURE — 99203 OFFICE O/P NEW LOW 30 MIN: CPT | Performed by: OBSTETRICS & GYNECOLOGY

## 2018-03-27 PROCEDURE — 86850 RBC ANTIBODY SCREEN: CPT | Performed by: OBSTETRICS & GYNECOLOGY

## 2018-03-27 PROCEDURE — 76801 OB US < 14 WKS SINGLE FETUS: CPT

## 2018-03-27 PROCEDURE — 84443 ASSAY THYROID STIM HORMONE: CPT | Performed by: OBSTETRICS & GYNECOLOGY

## 2018-03-27 PROCEDURE — 36415 COLL VENOUS BLD VENIPUNCTURE: CPT | Performed by: OBSTETRICS & GYNECOLOGY

## 2018-03-27 ASSESSMENT — PAIN SCALES - GENERAL: PAINLEVEL: NO PAIN (0)

## 2018-03-27 NOTE — PROGRESS NOTES
"OB Clinic Visit:    Problem list:  1.   2. Current Di/Di twin pregnancy  3. AMA, normal [place] screen  4. Hypothyroid, on Synthroid 75 mcg daily  5. H/O ADD    Estimated Date of Delivery: Sep 26, 2018  13w6d    S/ Maricarmen c/o extreme fatique and pelvic cramping.  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  Starting , she noted some pelvic cramping but no bleeding.  She denies any gushes of fluid but has some discharge.  She is planning a family trip to [place] by air tomorrow and wanted to make sure everything is ok.    O/ /70 (BP Location: Right arm, Patient Position: Sitting, Cuff Size: Adult Large)  Pulse 80  Ht 1.676 m (5' 6\")  Wt 82 kg (180 lb 11.2 oz)  LMP 2017  BMI 29.17 kg/m2   Pelvic deferred for now    A/ 36 y/o  with di/di twin pregnancy at 13w 6d and pelvic cramping.  Discussed increased risks assoc. with twin pregnancies.  Advised less strenuous exercise with decreased pelvic strain.    P/ UA, TSH with reflex T4, vaginal u/s for cervical length today.  We'll contact her with the results.     "

## 2018-03-27 NOTE — NURSING NOTE
"Patient presents to the clinic for some cramping and discharge. Patient states she has felt \"rotten\" and not good for the last 3-4 days, and that is when the cramping started, patient denies any bleeding. Patient is 13w6d with twins.  Fitz Mooney ..............3/27/2018 9:44 AM    "

## 2018-03-27 NOTE — TELEPHONE ENCOUNTER
"Patient calls today complaining of \"menstrual-like\" cramps for the past few days. She also notes increased thick, white vaginal discharge that \"may have an odor.\" Patient advised to be seen and accepted appointment with Dr. Venkatesh Burton MD, FACOG at 0930 today.    Kell Gibson RN...................3/27/2018 8:17 AM    Reason for Disposition    [1] Intermittent lower abdominal pain (e.g., cramping) AND [2] present > 24 hours    Unusual vaginal discharge (e.g., bad smelling, yellow, green, or foamy-white)    Additional Information    Negative: Passed out (i.e., lost consciousness, collapsed and was not responding)    Negative: Shock suspected (e.g., cold/pale/clammy skin, too weak to stand, low BP, rapid pulse)    Negative: Difficult to awaken or acting confused  (e.g., disoriented, slurred speech)    Negative: Sounds like a life-threatening emergency to the triager    Negative: Followed an abdomen (stomach) injury    Negative: [1] Abdominal pain AND [2] pregnant > 20 weeks    Negative: MODERATE-SEVERE abdominal pain (e.g., interferes with normal activities, awakens from sleep)    Negative: [1] SEVERE vaginal bleeding (i.e., soaking 2 pads / hour, large blood clots) AND [2] present 2 or more hours    Negative: [1] MODERATE vaginal bleeding (i.e., soaking 1 pad / hour; clots) AND [2] present > 6 hours    Negative: [1] MODERATE vaginal bleeding (i.e., soaking 1 pad / hour; clots) AND [2] pregnant > 12 weeks    Negative: Passed tissue (e.g., gray-white)    Negative: [1] Vomiting AND [2] contains red blood or black (\"coffee ground\") material  (Exception: few red streaks in vomit that only happened once)    Negative: Shoulder pain    Negative: Lightheadedness or dizziness (e.g., feels like passing out)    Negative: Patient sounds very sick or weak to the triager    Negative: [1] Constant abdominal pain AND [2] present > 2 hours    Negative: Blood in urine (red, pink, or tea-colored)    Negative: Fever > 100.4 " "F (38.0 C)    Negative: White of the eyes have turned yellow (i.e., jaundice)    Negative: Has IUD    Answer Assessment - Initial Assessment Questions  1. LOCATION: \"Where does it hurt?\"       Pelvic cramping  2. RADIATION: \"Does the pain shoot anywhere else?\" (e.g., chest, back, shoulder)      no  3. ONSET: \"When did the pain begin?\" (e.g., minutes, hours or days ago)       Past 3 days  4. ONSET: \"Gradual or sudden onset?\"      gradual  5. PATTERN \"Does the pain come and go, or has it been constant since it started?\"       Comes and goes  6. SEVERITY: \"How bad is the pain?\" \"What does it keep you from doing?\"  (e.g., Scale 1-10; mild, moderate, or severe)    - MILD (1-3): doesn't interfere with normal activities, abdomen soft and not tender to touch     - MODERATE (4-7): interferes with normal activities or awakens from sleep, tender to touch     - SEVERE (8-10): excruciating pain, doubled over, unable to do any normal activities      4/10 at worst  7. RECURRENT SYMPTOM: \"Have you ever had this type of abdominal pain before?\" If so, ask: \"When was the last time?\" and \"What happened that time?\"       unsure  8. CAUSE: \"What do you think is causing the abdominal pain?\"      unknown  9. RELIEVING/AGGRAVATING FACTORS: \"What makes it better or worse?\" (e.g., antacids, bowel movement, movement)      Nothing noted  10. OTHER SYMPTOMS: \"Has there been any vaginal bleeding, fever, vomiting, diarrhea, or urine problems?\"        Fatigue, increased discharge  11. BASILIO: \"What date are you expecting to deliver?\"        09/26/18    Protocols used: PREGNANCY - ABDOMINAL PAIN LESS THAN 20 WEEKS EGA-ADULT-AH    "

## 2018-03-28 LAB — HIV 1+2 AB+HIV1 P24 AG SERPL QL IA: NONREACTIVE

## 2018-04-04 ENCOUNTER — PRENATAL OFFICE VISIT (OUTPATIENT)
Dept: FAMILY MEDICINE | Facility: OTHER | Age: 38
End: 2018-04-04
Attending: FAMILY MEDICINE
Payer: COMMERCIAL

## 2018-04-04 VITALS
HEART RATE: 76 BPM | WEIGHT: 183 LBS | SYSTOLIC BLOOD PRESSURE: 124 MMHG | HEIGHT: 66 IN | BODY MASS INDEX: 29.41 KG/M2 | DIASTOLIC BLOOD PRESSURE: 70 MMHG

## 2018-04-04 DIAGNOSIS — F41.9 ANXIETY: ICD-10-CM

## 2018-04-04 DIAGNOSIS — O30.042 DICHORIONIC DIAMNIOTIC TWIN PREGNANCY IN SECOND TRIMESTER: Primary | ICD-10-CM

## 2018-04-04 PROCEDURE — 99207 ZZC OB VISIT-NO CHARGE - GICH ONLY: CPT | Performed by: FAMILY MEDICINE

## 2018-04-04 PROCEDURE — 99213 OFFICE O/P EST LOW 20 MIN: CPT | Performed by: FAMILY MEDICINE

## 2018-04-04 ASSESSMENT — PAIN SCALES - GENERAL: PAINLEVEL: NO PAIN (0)

## 2018-04-04 NOTE — PROGRESS NOTES
"Patient in with her  today. Her main concern is anxiety. She had been exercising to help these symptoms, stopped last week cramping symptoms.  She's concerned that she's going to do something to \"mess up someone's life\".  She says because of this, she now feels afraid to move much at all.  She is having thoughts again of not wanting to have been pregnant, concerns about parenting her children if she needs to go on bedrest.  She states her ultrasound last week was reassuring.  Starting to feel movement.  Twin pregnancy, Di/DI.  Normal [place] Panel.  Last TSH within normal limits.  Next recheck at about 28 weeks.  Reviewed OBGYN consultation.  URI symptoms today - discussed symptomatic care.  Follow up one week.  Analy Holbrook MD       "

## 2018-04-04 NOTE — NURSING NOTE
Patient presents to the clinic today for a ob check, she is 15w0. She states the was having some cramping, which has now subsided. She does complain of head cold x2 weeks, and not sleeping well. Patient is tearful, and states she has been feeling depressed. 2 baby step coupons given.     Mary Jones LPN.................. 4/4/2018 10:40 AM

## 2018-04-04 NOTE — MR AVS SNAPSHOT
After Visit Summary   4/4/2018    Maricarmen Ornelas    MRN: [id number]           Patient Information     Date Of Birth          1980        Visit Information        Provider Department      4/4/2018 10:30 AM Analy Trimble MD [facility]        Today's Diagnoses     Dichorionic diamniotic twin pregnancy in second trimester    -  1    Anxiety          Care Instructions    Trust your arms.              Follow-ups after your visit        Your next 10 appointments already scheduled     Apr 13, 2018  9:30 AM CDT   [facility]    [address]  [place] [address]   [phone]            Apr 19, 2018 12:45 PM CDT   New Prenatal with Woodrow Hurtado MD   [facility]    [address]  [place] [address]   [phone]            May 02, 2018 11:00 AM CDT   ESTABLISHED PRENATAL with Analy Bang MD   [facility]    [address]  [place] [address]   [phone]            May 09, 2018  8:30 AM CDT   (Arrive by 8:15 AM)   US OB 2/3 TRIMESTER COMP TWINS with GHUS2   [facility]    [address]  [place] [address]   [phone]           Please bring a list of your medicines (including vitamins, minerals and over-the-counter drugs). Also, tell your doctor about any allergies you may have. Wear comfortable clothes and leave your valuables at home.  If you re less than 20 weeks drink four 8-ounce glasses of fluid an hour before your exam. If you need to empty your bladder before your exam, try to release only a little urine. Then, drink another glass of fluid.  You may have up to two family members in the exam room. If you bring a  "small child, an adult must be there to care for him or her.  Please call the Imaging Department at your exam site with any questions.              Who to contact     If you have questions or need follow up information about today's clinic visit or your schedule please contact [facility] AND [facility] directly at [phone].  Normal or non-critical lab and imaging results will be communicated to you by MyChart, letter or phone within 4 business days after the clinic has received the results. If you do not hear from us within 7 days, please contact the clinic through Subtechhart or phone. If you have a critical or abnormal lab result, we will notify you by phone as soon as possible.  Submit refill requests through VesselVanguard or call your pharmacy and they will forward the refill request to us. Please allow 3 business days for your refill to be completed.          Additional Information About Your Visit        MyChart Information     VesselVanguard lets you send messages to your doctor, view your test results, renew your prescriptions, schedule appointments and more. To sign up, go to [email or web address] . Click on \"Log in\" on the left side of the screen, which will take you to the Welcome page. Then click on \"Sign up Now\" on the right side of the page.     You will be asked to enter the access code listed below, as well as some personal information. Please follow the directions to create your username and password.     Your access code is: [email or web address]  Expires: 6/3/2018  1:39 PM     Your access code will  in 90 days. If you need help or a new code, please call your [facility] clinic or [phone].        Care EveryWhere ID     This is your Care EveryWhere ID. This could be used by other organizations to access your [facility] medical records  [id number]        Your Vitals Were     Pulse Height Last Period Breastfeeding? BMI (Body Mass Index)       76 5' 6\" (1.676 m) 2017 No 29.54 kg/m2        Blood " Pressure from Last 3 Encounters:   04/04/18 124/70   03/27/18 110/70   03/05/18 114/72    Weight from Last 3 Encounters:   04/04/18 183 lb (83 kg)   03/27/18 180 lb 11.2 oz (82 kg)   03/05/18 176 lb (79.8 kg)              Today, you had the following     No orders found for display       Primary Care Provider Fax #    Physician No Ref-Primary [phone]       No address on file        Equal Access to Services     HAYDEE NICE : Hadii aad ku hadasho Soomaali, waaxda luqadaha, qaybta kaalmada adeegyada, waxay maryin bekah chavez . So [facility] [phone].    ATENCIÓN: Si habla español, tiene a jiang disposición servicios gratuitos de asistencia lingüística. LlVan [facility] [phone].    We comply with applicable federal civil rights laws and [place] laws. We do not discriminate on the basis of race, color, national origin, age, disability, sex, sexual orientation, or gender identity.            Thank you!     Thank you for choosing [facility] AND [facility]  for your care. Our goal is always to provide you with excellent care. Hearing back from our patients is one way we can continue to improve our services. Please take a few minutes to complete the written survey that you may receive in the mail after your visit with us. Thank you!             Your Updated Medication List - Protect others around you: Learn how to safely use, store and throw away your medicines at www.disposemymeds.org.          This list is accurate as of 4/4/18  1:56 PM.  Always use your most recent med list.                   Brand Name Dispense Instructions for use Diagnosis    CVS PRENATAL 28-0.8 MG Tabs      Take 1 tablet by mouth        SYNTHROID 75 MCG tablet   Generic drug:  levothyroxine

## 2018-04-13 ENCOUNTER — PRENATAL OFFICE VISIT (OUTPATIENT)
Dept: FAMILY MEDICINE | Facility: OTHER | Age: 38
End: 2018-04-13
Attending: FAMILY MEDICINE
Payer: COMMERCIAL

## 2018-04-13 VITALS
WEIGHT: 186.4 LBS | HEART RATE: 88 BPM | SYSTOLIC BLOOD PRESSURE: 124 MMHG | DIASTOLIC BLOOD PRESSURE: 78 MMHG | BODY MASS INDEX: 30.09 KG/M2

## 2018-04-13 DIAGNOSIS — F41.9 ANXIETY: ICD-10-CM

## 2018-04-13 DIAGNOSIS — E03.9 ACQUIRED HYPOTHYROIDISM: ICD-10-CM

## 2018-04-13 DIAGNOSIS — O30.042 DICHORIONIC DIAMNIOTIC TWIN PREGNANCY IN SECOND TRIMESTER: Primary | ICD-10-CM

## 2018-04-13 PROCEDURE — 99207 ZZC OB VISIT-NO CHARGE - GICH ONLY: CPT | Performed by: FAMILY MEDICINE

## 2018-04-13 ASSESSMENT — ANXIETY QUESTIONNAIRES
5. BEING SO RESTLESS THAT IT IS HARD TO SIT STILL: NOT AT ALL
2. NOT BEING ABLE TO STOP OR CONTROL WORRYING: SEVERAL DAYS
4. TROUBLE RELAXING: SEVERAL DAYS
GAD7 TOTAL SCORE: 5
3. WORRYING TOO MUCH ABOUT DIFFERENT THINGS: SEVERAL DAYS
6. BECOMING EASILY ANNOYED OR IRRITABLE: SEVERAL DAYS
7. FEELING AFRAID AS IF SOMETHING AWFUL MIGHT HAPPEN: NOT AT ALL
IF YOU CHECKED OFF ANY PROBLEMS ON THIS QUESTIONNAIRE, HOW DIFFICULT HAVE THESE PROBLEMS MADE IT FOR YOU TO DO YOUR WORK, TAKE CARE OF THINGS AT HOME, OR GET ALONG WITH OTHER PEOPLE: SOMEWHAT DIFFICULT
1. FEELING NERVOUS, ANXIOUS, OR ON EDGE: SEVERAL DAYS

## 2018-04-13 ASSESSMENT — PAIN SCALES - GENERAL: PAINLEVEL: NO PAIN (0)

## 2018-04-13 NOTE — PROGRESS NOTES
Follow up to anxiety concerns of last week.  This has improved.  She will still find herself seeming to change her mind.  Also will feel irritable - almost as if she intentionally wants to pick a fight with her .    Has been swimming most every day since last visit.  That has helped her physically and emotionally feel better.    +FM  Gets headaches when she bends over/forward.  Has URI last week.    Has OB follow up next week.      ICD-10-CM    1. Dichorionic diamniotic twin pregnancy in second trimester O30.042 US OB > 14 Weeks Complete, Multiple   2. Anxiety F41.9    3. Acquired hypothyroidism E03.9      1.  Follow up ultrasound is scheduled.  2.  OBGYN consult next week.  At this visit, anticipate establishing plan for future visits and ultrasound monitoring.  3.  TSH each trimester  4.  Anxiety improved with patient finding exercise that is tolerable.    Analy Holbrook MD

## 2018-04-13 NOTE — MR AVS SNAPSHOT
After Visit Summary   4/13/2018    Maricarmen Ornelas    MRN: [id number]           Patient Information     Date Of Birth          1980        Visit Information        Provider Department      4/13/2018 1:30 PM Analy Trimble MD [facility]        Today's Diagnoses     Dichorionic diamniotic twin pregnancy in second trimester    -  1    Anxiety        Acquired hypothyroidism           Follow-ups after your visit        Your next 10 appointments already scheduled     Apr 18, 2018  1:45 PM CDT   (Arrive by 1:30 PM)   US OB 2/3 TRIMESTER COMP TWINS with GHUS2   [facility]    [address]  [place] [address]   [phone]           Please bring a list of your medicines (including vitamins, minerals and over-the-counter drugs). Also, tell your doctor about any allergies you may have. Wear comfortable clothes and leave your valuables at home.  If you re less than 20 weeks drink four 8-ounce glasses of fluid an hour before your exam. If you need to empty your bladder before your exam, try to release only a little urine. Then, drink another glass of fluid.  You may have up to two family members in the exam room. If you bring a small child, an adult must be there to care for him or her.  Please call the Imaging Department at your exam site with any questions.            Apr 19, 2018 12:45 PM CDT   New Prenatal with Woodrow Hurtado MD   [facility]    [address]  [place] [address]   [phone]            May 02, 2018 11:00 AM CDT   ESTABLISHED PRENATAL with Analy Bang MD   [facility] ([facility]    [address]  [place] [address]   [phone]            May 11, 2018 12:00 PM CDT   (Arrive by 11:45 AM)    OB 2/3 TRIMESTER COMP TWINS with GHUSSP   [facility]    [address]  [place] [id number] [phone]           Please bring a list of your medicines (including vitamins, minerals and over-the-counter drugs). Also, tell your doctor about any allergies you may have. Wear comfortable clothes and leave your valuables at home.  If you re less than 20 weeks drink four 8-ounce glasses of fluid an hour before your exam. If you need to empty your bladder before your exam, try to release only a little urine. Then, drink another glass of fluid.  You may have up to two family members in the exam room. If you bring a small child, an adult must be there to care for him or her.  Please call the Imaging Department at your exam site with any questions.              Future tests that were ordered for you today     Open Future Orders        Priority Expected Expires Ordered    US OB > 14 Weeks Complete, Multiple Routine  4/13/2019 4/13/2018            Who to contact     If you have questions or need follow up information about today's clinic visit or your schedule please contact [facility] directly at [phone].  Normal or non-critical lab and imaging results will be communicated to you by [employer]hart, letter or phone within 4 business days after the clinic has received the results. If you do not hear from us within 7 days, please contact the clinic through [employer] or phone. If you have a critical or abnormal lab result, we will notify you by phone as soon as possible.  Submit refill requests through [employer] or call your pharmacy and they will forward the refill request to us. Please allow 3 business days for your refill to be completed.          Additional Information About Your Visit        [employer] Information     [employer] lets you send messages to your doctor, view your test results, renew your prescriptions, schedule appointments and more. To sign up, go to [email or web address] .  "Click on \"Log in\" on the left side of the screen, which will take you to the Welcome page. Then click on \"Sign up Now\" on the right side of the page.     You will be asked to enter the access code listed below, as well as some personal information. Please follow the directions to create your username and password.     Your access code is: [email or web address]  Expires: 6/3/2018  1:39 PM     Your access code will  in 90 days. If you need help or a new code, please call your [place] clinic or [phone].        Care EveryWhere ID     This is your Care EveryWhere ID. This could be used by other organizations to access your [place] medical records  [id number]        Your Vitals Were     Pulse Last Period Breastfeeding? BMI (Body Mass Index)          88 2017 No 30.09 kg/m2         Blood Pressure from Last 3 Encounters:   18 124/78   18 124/70   18 110/70    Weight from Last 3 Encounters:   18 186 lb 6.4 oz (84.6 kg)   18 183 lb (83 kg)   18 180 lb 11.2 oz (82 kg)               Primary Care Provider Fax #    Physician No Ref-Primary [phone]       No address on file        Equal Access to Services     HAYDEE NICE : Hadii aad ku hadasho Soomaali, waaxda luqadaha, qaybta kaalmada adeegyada, joey chavez . So [facility] [phone].    ATENCIÓN: Si habla español, tiene a jiang disposición servicios gratuitos de asistencia lingüística. Llame al [phone].    We comply with applicable federal civil rights laws and [place] laws. We do not discriminate on the basis of race, color, national origin, age, disability, sex, sexual orientation, or gender identity.            Thank you!     Thank you for choosing [facility] AND [facility]  for your care. Our goal is always to provide you with excellent care. Hearing back from our patients is one way we can continue to improve our services. Please take a few minutes to complete the written survey that you " may receive in the mail after your visit with us. Thank you!             Your Updated Medication List - Protect others around you: Learn how to safely use, store and throw away your medicines at www.disposemymeds.org.          This list is accurate as of 4/13/18  3:40 PM.  Always use your most recent med list.                   Brand Name Dispense Instructions for use Diagnosis    CVS PRENATAL 28-0.8 MG Tabs      Take 1 tablet by mouth        SYNTHROID 75 MCG tablet   Generic drug:  levothyroxine

## 2018-04-14 ASSESSMENT — ANXIETY QUESTIONNAIRES: GAD7 TOTAL SCORE: 5

## 2018-04-18 ENCOUNTER — HOSPITAL ENCOUNTER (OUTPATIENT)
Dept: ULTRASOUND IMAGING | Facility: OTHER | Age: 38
Discharge: HOME | End: 2018-04-18
Attending: FAMILY MEDICINE | Admitting: FAMILY MEDICINE
Payer: COMMERCIAL

## 2018-04-18 DIAGNOSIS — O30.042 DICHORIONIC DIAMNIOTIC TWIN PREGNANCY IN SECOND TRIMESTER: ICD-10-CM

## 2018-04-18 PROCEDURE — 76815 OB US LIMITED FETUS(S): CPT

## 2018-04-19 ENCOUNTER — PRENATAL OFFICE VISIT (OUTPATIENT)
Dept: OBGYN | Facility: OTHER | Age: 38
End: 2018-04-19
Attending: FAMILY MEDICINE
Payer: COMMERCIAL

## 2018-04-19 VITALS
BODY MASS INDEX: 31.1 KG/M2 | DIASTOLIC BLOOD PRESSURE: 76 MMHG | HEART RATE: 86 BPM | WEIGHT: 192.7 LBS | SYSTOLIC BLOOD PRESSURE: 124 MMHG

## 2018-04-19 DIAGNOSIS — E03.9 ACQUIRED HYPOTHYROIDISM: ICD-10-CM

## 2018-04-19 DIAGNOSIS — O09.522 ELDERLY MULTIGRAVIDA IN SECOND TRIMESTER: ICD-10-CM

## 2018-04-19 DIAGNOSIS — O30.042 DICHORIONIC DIAMNIOTIC TWIN PREGNANCY IN SECOND TRIMESTER: Primary | ICD-10-CM

## 2018-04-19 PROCEDURE — 99207 ZZC OB VISIT-NO CHARGE - GICH ONLY: CPT | Performed by: OBSTETRICS & GYNECOLOGY

## 2018-04-19 ASSESSMENT — PAIN SCALES - GENERAL: PAINLEVEL: NO PAIN (0)

## 2018-04-19 NOTE — MR AVS SNAPSHOT
After Visit Summary   4/19/2018    Maricarmen Ornelas    MRN: [id number]           Patient Information     Date Of Birth          1980        Visit Information        Provider Department      4/19/2018 12:45 PM Woodrow Hurtado MD [facility]        Today's Diagnoses     Dichorionic diamniotic twin pregnancy in second trimester    -  1    Acquired hypothyroidism        Elderly multigravida in second trimester           Follow-ups after your visit        Additional Services     PERINATOLOGY REFERRAL       Your provider has referred you to Perinatology Services (please use [facility] Referral if referring to [facility]).    Please be aware that coverage of these services is subject to the terms and limitations of your health insurance plan.  Call member services at your health plan with any benefit or coverage questions.      Please bring the following with you to your appointment:    (1) Any X-Rays, CTs or MRIs which have been performed.  Contact the facility where they were done to arrange for  prior to your scheduled appointment.  Any new CT, MRI or other procedures ordered by your specialist must be performed at a [facility] facility or coordinated by your clinic's referral office.    (2) List of current medications   (3) This referral request   (4) Any documents/labs given to you for this referral                  Follow-up notes from your care team     Return in about 4 weeks (around 5/17/2018).      Your next 10 appointments already scheduled     May 02, 2018 11:00 AM CDT   ESTABLISHED PRENATAL with Analy Bang MD   [facility]    [address]  [facility] [facility] [address]   [phone]            May 11, 2018 12:00 PM CDT   (Arrive by 11:45 AM)   US OB 2/3 TRIMESTER COMP TWINS with NORA   [facility]    [address]  [place] [address] [phone]           Please bring a list of your medicines (including vitamins, minerals and over-the-counter drugs). Also, tell your doctor about any allergies you may have. Wear comfortable clothes and leave your valuables at home.  If you re less than 20 weeks drink four 8-ounce glasses of fluid an hour before your exam. If you need to empty your bladder before your exam, try to release only a little urine. Then, drink another glass of fluid.  You may have up to two family members in the exam room. If you bring a small child, an adult must be there to care for him or her.  Please call the Imaging Department at your exam site with any questions.            May 17, 2018  1:30 PM CDT   ESTABLISHED PRENATAL with Woodrow Hurtado MD   [facility]    [address]  [place] [id number] [phone]              Future tests that were ordered for you today     Open Future Orders        Priority Expected Expires Ordered    Thyrotropin GH Routine  4/19/2019 4/19/2018    US OB Limited One Or More Fetuses Routine  4/13/2019 4/13/2018            Who to contact     If you have questions or need follow up information about today's clinic visit or your schedule please contact [facility] directly at [phone].  Normal or non-critical lab and imaging results will be communicated to you by MyChart, letter or phone within 4 business days after the clinic has received the results. If you do not hear from us within 7 days, please contact the clinic through MyChart or phone. If you have a critical or abnormal lab result, we will notify you by phone as soon as possible.  Submit refill requests through [employer] or call your pharmacy and they will forward the refill request to us. Please allow 3 business days for your refill to be completed.          Additional Information About Your Visit        [employer]  "Information     [employer] lets you send messages to your doctor, view your test results, renew your prescriptions, schedule appointments and more. To sign up, go to [email or web address] . Click on \"Log in\" on the left side of the screen, which will take you to the Welcome page. Then click on \"Sign up Now\" on the right side of the page.     You will be asked to enter the access code listed below, as well as some personal information. Please follow the directions to create your username and password.     Your access code is: [email or web address]  Expires: 6/3/2018  1:39 PM     Your access code will  in 90 days. If you need help or a new code, please call your [place] clinic or [phone].        Care EveryWhere ID     This is your Care EveryWhere ID. This could be used by other organizations to access your [facility] medical records  [id number]        Your Vitals Were     Pulse Last Period Breastfeeding? BMI (Body Mass Index)          86 2017 No 31.1 kg/m2         Blood Pressure from Last 3 Encounters:   18 124/76   18 124/78   18 124/70    Weight from Last 3 Encounters:   18 87.4 kg (192 lb 11.2 oz)   18 84.6 kg (186 lb 6.4 oz)   18 83 kg (183 lb)              We Performed the Following     PERINATOLOGY REFERRAL        Primary Care Provider Fax #    Physician No Ref-Primary [phone]       No address on file        Equal Access to Services     [facility] : Hadii aad ku hadasho Soomaali, waaxda luqadaha, qaybta kaalmada adeegyada, joey chavez . So [facility] [phone].    ATENCIÓN: Si habla español, tiene a jiang disposición servicios gratuitos de asistencia lingüística. Llame al [phone].    We comply with applicable federal civil rights laws and [place] laws. We do not discriminate on the basis of race, color, national origin, age, disability, sex, sexual orientation, or gender identity.            Thank you!     Thank you for choosing " [facility] AND [facility]  for your care. Our goal is always to provide you with excellent care. Hearing back from our patients is one way we can continue to improve our services. Please take a few minutes to complete the written survey that you may receive in the mail after your visit with us. Thank you!             Your Updated Medication List - Protect others around you: Learn how to safely use, store and throw away your medicines at www.disposemymeds.org.          This list is accurate as of 4/19/18  3:01 PM.  Always use your most recent med list.                   Brand Name Dispense Instructions for use Diagnosis    CVS PRENATAL 28-0.8 MG Tabs      Take 1 tablet by mouth        SYNTHROID 75 MCG tablet   Generic drug:  levothyroxine

## 2018-04-19 NOTE — PROGRESS NOTES
CC: Recheck OB visit at 17w1d    HPI: Maricarmen Ornelas presents for a routine OB visit now at 17w1d  She is pregnant with dichorionic twins, spontaneously conceived. She is mildly hypothyroid on synthroid at 75 mcg daily. She is feeling some soreness with exercise. She is anxious with this pregnancy. She is 38 years old. She denies bleeding or leaking fluid today. She is feeling fetal movement.    Obstetric History       T2      L2     SAB0   TAB0   Ectopic0   Multiple0   Live Births2       # Outcome Date GA Lbr Renny/2nd Weight Sex Delivery Anes PTL Lv   3 Current            2 Term 10/16/10 40w0d   M  EPI N SMOOTH      Name: William   1 Term  40w0d   M  None N SMOOTH      Name: Kaveh        Current Outpatient Prescriptions   Medication     levothyroxine (SYNTHROID) 75 MCG tablet     Prenatal Vit-Fe Fumarate-FA (CVS PRENATAL) 28-0.8 MG TABS     No current facility-administered medications for this visit.        O: /76 (BP Location: Right arm)  Pulse 86  Wt 87.4 kg (192 lb 11.2 oz)  LMP 2017  Breastfeeding? No  BMI 31.1 kg/m2  Body mass index is 31.1 kg/(m^2).  See OB flow sheet  EXAM:  NAD  Abdomen: Gravid, 24 weeks size.  Bedside US demonstrates a viable twin dichorionic pregnancy. One boy and one girl. The girl is currently Twin A.  Normal AFV for both babies.      Results for orders placed or performed during the hospital encounter of 18   US OB Limited One Or More Fetuses    Narrative    MULTIPLE OB ULTRASOUND REPORT     Clinical:  Follow-up twin pregnancy  Gestation Number:  2   Membrane:  Yes  Presentation:    A  Cephalic, and to the left    B  Cephalic, and to  the right  Lie:    A  Transverse  B  Transverse  Cardiac Activity:      A  Regular, 144 bpm  B  Regular, 169 bpm  Placenta:    A  Anterior    B  Posterior  Adnexa:    Not Visualized  Cervix:    4.7 cm in length  Amniotic Fluid:    A  Normal, deepest pocket 5.5 cm  B  Normal,  deepest pocket 4.7  cm  Movement:    A  Yes  B  Yes    Estimated gestational age by LMP: 17 weeks 0 days      Impression    Impression: Dichorionic diamniotic twin pregnancy with normal amniotic  fluid and fetal heart rate in both twins.    DEWAYNE BROOKS MD       A/P: (O30.042) Dichorionic diamniotic twin pregnancy in second trimester  (primary encounter diagnosis)  Comment:   Plan: Discussed appropriate weight gain, route of delivery, increased risks of PTD, hypertension, diabetes, poor fetal outcome in comparison to cheng pregnancy, increased risk of congenital heart disease from twinning.  Plan for targeted US and MFM consult in [place], recheck in one month, plan on monthly US for growth and fluid.  Delivery 37-38 weeks. Discussed possible vaginal delivery vs planned .    (E03.9) Acquired hypothyroidism  Comment:   Plan: Recheck TSH at a month from her last per patient request.    Problem List: Dichorionic Twins, AMA    Recheck in 4 weeks    Woodrow Hurtado MD FACOG  12:58 PM 2018

## 2018-04-20 ENCOUNTER — TELEPHONE (OUTPATIENT)
Dept: FAMILY MEDICINE | Facility: OTHER | Age: 38
End: 2018-04-20

## 2018-05-02 ENCOUNTER — PRENATAL OFFICE VISIT (OUTPATIENT)
Dept: FAMILY MEDICINE | Facility: OTHER | Age: 38
End: 2018-05-02
Attending: FAMILY MEDICINE
Payer: COMMERCIAL

## 2018-05-02 VITALS
SYSTOLIC BLOOD PRESSURE: 136 MMHG | DIASTOLIC BLOOD PRESSURE: 78 MMHG | HEART RATE: 76 BPM | HEIGHT: 66 IN | WEIGHT: 193 LBS | BODY MASS INDEX: 31.02 KG/M2

## 2018-05-02 DIAGNOSIS — O30.042 DICHORIONIC DIAMNIOTIC TWIN PREGNANCY IN SECOND TRIMESTER: Primary | ICD-10-CM

## 2018-05-02 DIAGNOSIS — E03.9 ACQUIRED HYPOTHYROIDISM: ICD-10-CM

## 2018-05-02 PROBLEM — O30.009 TWIN PREGNANCY: Status: RESOLVED | Noted: 2018-01-01 | Resolved: 2018-05-02

## 2018-05-02 PROCEDURE — 99207 ZZC OB VISIT-NO CHARGE - GICH ONLY: CPT | Performed by: FAMILY MEDICINE

## 2018-05-02 ASSESSMENT — PAIN SCALES - GENERAL: PAINLEVEL: NO PAIN (0)

## 2018-05-02 NOTE — MR AVS SNAPSHOT
After Visit Summary   5/2/2018    Maricarmen Ornelas    MRN: [id number]           Patient Information     Date Of Birth          1980        Visit Information        Provider Department      5/2/2018 11:00 AM Analy Trimble MD [facility]        Today's Diagnoses     Dichorionic diamniotic twin pregnancy in second trimester    -  1    Acquired hypothyroidism           Follow-ups after your visit        Your next 10 appointments already scheduled     May 17, 2018  1:00 PM CDT   LAB with  LAB   [facility]    [address] [facility] [address]   [phone]           Please do not eat 10-12 hours before your appointment if you are coming in fasting for labs on lipids, cholesterol, or glucose (sugar). This does not apply to pregnant women. Water, hot tea and black coffee (with nothing added) are okay. Do not drink other fluids, diet soda or chew gum.            May 17, 2018  1:30 PM CDT   ESTABLISHED PRENATAL with Woodrow Hurtado MD   [facility]    [address]  [place] [address]   [phone]              Who to contact     If you have questions or need follow up information about today's clinic visit or your schedule please contact [facility] directly at [phone].  Normal or non-critical lab and imaging results will be communicated to you by MyChart, letter or phone within 4 business days after the clinic has received the results. If you do not hear from us within 7 days, please contact the clinic through MyChart or phone. If you have a critical or abnormal lab result, we will notify you by phone as soon as possible.  Submit refill requests through [employer] or call your pharmacy and they will forward the refill request to us. Please allow 3 business days for your refill to be completed.           "Additional Information About Your Visit        MyChart Information     [employer] lets you send messages to your doctor, view your test results, renew your prescriptions, schedule appointments and more. To sign up, go to [email or web address] . Click on \"Log in\" on the left side of the screen, which will take you to the Welcome page. Then click on \"Sign up Now\" on the right side of the page.     You will be asked to enter the access code listed below, as well as some personal information. Please follow the directions to create your username and password.     Your access code is: [email or web address]  Expires: 6/3/2018  1:39 PM     Your access code will  in 90 days. If you need help or a new code, please call your [facility] clinic or [phone].        Care EveryWhere ID     This is your Care EveryWhere ID. This could be used by other organizations to access your [facility] medical records  [id number]        Your Vitals Were     Pulse Height Last Period Breastfeeding? BMI (Body Mass Index)       76 5' 6\" (1.676 m) 2017 No 31.15 kg/m2        Blood Pressure from Last 3 Encounters:   18 136/78   18 124/76   18 124/78    Weight from Last 3 Encounters:   18 193 lb (87.5 kg)   18 192 lb 11.2 oz (87.4 kg)   18 186 lb 6.4 oz (84.6 kg)              Today, you had the following     No orders found for display       Primary Care Provider Fax #    Physician No Ref-Primary [phone]       No address on file        Equal Access to Services     HAYDEE NICE : Hadii lizbeth Clifford, sarah lewis, joey mccray. So [facility] [phone].    ATENCIÓN: Si habla español, tiene a jiang disposición servicios gratuitos de asistencia lingüística. Llame al [phone].    We comply with applicable federal civil rights laws and [place] laws. We do not discriminate on the basis of race, color, national origin, age, disability, sex, sexual " orientation, or gender identity.            Thank you!     Thank you for choosing [facility] AND [facility]  for your care. Our goal is always to provide you with excellent care. Hearing back from our patients is one way we can continue to improve our services. Please take a few minutes to complete the written survey that you may receive in the mail after your visit with us. Thank you!             Your Updated Medication List - Protect others around you: Learn how to safely use, store and throw away your medicines at www.disposemymeds.org.          This list is accurate as of 5/2/18  1:49 PM.  Always use your most recent med list.                   Brand Name Dispense Instructions for use Diagnosis    CVS PRENATAL 28-0.8 MG Tabs      Take 1 tablet by mouth        SYNTHROID 75 MCG tablet   Generic drug:  levothyroxine

## 2018-05-02 NOTE — PROGRESS NOTES
PROBLEM LIST:  AMA    Di/Di twin pregnancy    Hypothyroidism - TSH qtrimester    Anxiety disorder - improved  Swimming most every day now.  Has MFM consult on May 14th and then follow up with Woodrow Hurtado MD on the 17th.    Has TSH recheck scheduled in 2 weeks.    +FM.  Anxiety and sleep improved.    Some right hip/LBP.  Analy Holbrook MD

## 2018-05-02 NOTE — NURSING NOTE
Patient presents to the clinic today for a ob check. She is 19w0d.  Mary Jones LPN.................. 5/2/2018 11:08 AM

## 2018-05-14 ENCOUNTER — TRANSFERRED RECORDS (OUTPATIENT)
Dept: HEALTH INFORMATION MANAGEMENT | Facility: OTHER | Age: 38
End: 2018-05-14

## 2018-05-16 ENCOUNTER — HOSPITAL ENCOUNTER (OUTPATIENT)
Dept: PHYSICAL THERAPY | Facility: OTHER | Age: 38
Setting detail: THERAPIES SERIES
End: 2018-05-16
Attending: FAMILY MEDICINE
Payer: COMMERCIAL

## 2018-05-16 PROCEDURE — 40000185 ZZHC STATISTIC PT OUTPT VISIT

## 2018-05-16 PROCEDURE — 97112 NEUROMUSCULAR REEDUCATION: CPT | Mod: GP

## 2018-05-17 DIAGNOSIS — E03.9 ACQUIRED HYPOTHYROIDISM: ICD-10-CM

## 2018-05-17 LAB — TSH SERPL DL<=0.05 MIU/L-ACNC: 2.21 IU/ML (ref 0.34–5.6)

## 2018-05-17 PROCEDURE — 84443 ASSAY THYROID STIM HORMONE: CPT | Performed by: OBSTETRICS & GYNECOLOGY

## 2018-05-17 PROCEDURE — 36415 COLL VENOUS BLD VENIPUNCTURE: CPT | Performed by: OBSTETRICS & GYNECOLOGY

## 2018-05-18 ENCOUNTER — PRENATAL OFFICE VISIT (OUTPATIENT)
Dept: FAMILY MEDICINE | Facility: OTHER | Age: 38
End: 2018-05-18
Attending: FAMILY MEDICINE
Payer: COMMERCIAL

## 2018-05-18 VITALS
DIASTOLIC BLOOD PRESSURE: 88 MMHG | HEART RATE: 80 BPM | HEIGHT: 66 IN | WEIGHT: 200.2 LBS | SYSTOLIC BLOOD PRESSURE: 128 MMHG | BODY MASS INDEX: 32.17 KG/M2

## 2018-05-18 DIAGNOSIS — E03.9 ACQUIRED HYPOTHYROIDISM: ICD-10-CM

## 2018-05-18 DIAGNOSIS — O30.042 DICHORIONIC DIAMNIOTIC TWIN PREGNANCY IN SECOND TRIMESTER: Primary | ICD-10-CM

## 2018-05-18 PROCEDURE — 99207 ZZC OB VISIT-NO CHARGE - GICH ONLY: CPT | Performed by: FAMILY MEDICINE

## 2018-05-18 NOTE — MR AVS SNAPSHOT
"              After Visit Summary   5/18/2018    Maricarmen Ornelas    MRN: [id number]           Patient Information     Date Of Birth          1980        Visit Information        Provider Department      5/18/2018 3:15 PM Analy Trimble MD [facility]        Today's Diagnoses     Dichorionic diamniotic twin pregnancy in second trimester    -  1    Acquired hypothyroidism          Care Instructions    High:  Above 140/90    Low blood pressure that can cause symptoms - typically under 90/40              Follow-ups after your visit        Who to contact     If you have questions or need follow up information about today's clinic visit or your schedule please contact [facility] directly at [phone].  Normal or non-critical lab and imaging results will be communicated to you by Tracsishart, letter or phone within 4 business days after the clinic has received the results. If you do not hear from us within 7 days, please contact the clinic through Tracsishart or phone. If you have a critical or abnormal lab result, we will notify you by phone as soon as possible.  Submit refill requests through Corso12 or call your pharmacy and they will forward the refill request to us. Please allow 3 business days for your refill to be completed.          Additional Information About Your Visit        MyChart Information     Corso12 lets you send messages to your doctor, view your test results, renew your prescriptions, schedule appointments and more. To sign up, go to [email or web address] . Click on \"Log in\" on the left side of the screen, which will take you to the Welcome page. Then click on \"Sign up Now\" on the right side of the page.     You will be asked to enter the access code listed below, as well as some personal information. Please follow the directions to create your username and password.     Your access code is: [email or web address]  Expires: 6/3/2018  1:39 PM     Your access " "code will  in 90 days. If you need help or a new code, please call your [place] clinic or [phone].        Care EveryWhere ID     This is your Care EveryWhere ID. This could be used by other organizations to access your [place] medical records  [id number]        Your Vitals Were     Pulse Height Last Period Breastfeeding? BMI (Body Mass Index)       80 5' 6\" (1.676 m) 2017 No 32.31 kg/m2        Blood Pressure from Last 3 Encounters:   18 128/88   18 136/78   18 124/76    Weight from Last 3 Encounters:   18 200 lb 3.2 oz (90.8 kg)   18 193 lb (87.5 kg)   18 192 lb 11.2 oz (87.4 kg)              Today, you had the following     No orders found for display         Today's Medication Changes          These changes are accurate as of 18  4:43 PM.  If you have any questions, ask your nurse or doctor.               Start taking these medicines.        Dose/Directions    order for DME   Used for:  Dichorionic diamniotic twin pregnancy in second trimester   Started by:  Analy Trimble MD        Equipment being ordered: Blood pressure cuff   Quantity:  1 Device   Refills:  0            Where to get your medicines      Some of these will need a paper prescription and others can be bought over the counter.  Ask your nurse if you have questions.     Bring a paper prescription for each of these medications     order for DME                Primary Care Provider Fax #    Physician No Ref-Primary [phone]       No address on file        Equal Access to Services     DOV NICE AH: Hadii lizbeth ku hadasho Soomaali, waaxda luqadaha, qaybta kaalmada adeegyada, joey amin. So [facility] [phone].    ATENCIÓN: Si habla español, tiene a jiang disposición servicios gratuitos de asistencia lingüística. Llame al [phone].    We comply with applicable federal civil rights laws and [place] laws. We do not discriminate on the basis of race, " color, national origin, age, disability, sex, sexual orientation, or gender identity.            Thank you!     Thank you for choosing [facility] AND [facility]  for your care. Our goal is always to provide you with excellent care. Hearing back from our patients is one way we can continue to improve our services. Please take a few minutes to complete the written survey that you may receive in the mail after your visit with us. Thank you!             Your Updated Medication List - Protect others around you: Learn how to safely use, store and throw away your medicines at www.disposemymeds.org.          This list is accurate as of 5/18/18  4:43 PM.  Always use your most recent med list.                   Brand Name Dispense Instructions for use Diagnosis    CVS PRENATAL 28-0.8 MG Tabs      Take 1 tablet by mouth        order for DME     1 Device    Equipment being ordered: Blood pressure cuff    Dichorionic diamniotic twin pregnancy in second trimester       SYNTHROID 75 MCG tablet   Generic drug:  levothyroxine

## 2018-05-18 NOTE — NURSING NOTE
Patient presents to the clinic today for a ob check, she is 21w2d. She complains of headaches.     Mary Jones LPN.................. 5/18/2018 3:16 PM

## 2018-05-18 NOTE — PROGRESS NOTES
1.  She's been waking up in the middle of the night with headache in the back of her head.  This is different than her sinus symptoms.  Her neck doesn't feel great, but nothing our of the ordinary with her symptoms. Able to go back to sleep.  No nausea.  2.  No vision change.  No edema.  Having CTS bilaterally.    3.  Reviewed normal physiologic changes in mom, self cares.  4.  Discussed current development.  5.  GTT and hgb at 24-28 weeks.  6.    Lab Results   Component Value Date    ABO A 03/27/2018    RH Pos 03/27/2018       ICD-10-CM    1. Dichorionic diamniotic twin pregnancy in second trimester O30.042 order for DME   2. Acquired hypothyroidism E03.9      Patient is concerned that her headaches are related to preeclampsia.  Given current blood pressure findings, I do not think that is the case.  She does wish to have blood pressure cuff at home.  We discussed indications for which to use this.    Patient had thyroid testing done yesterday, TSH 2.2.  Recheck TSH at 28 weeks gestational age.    She has follow-up at [facility] in 2 weeks for completion of her anomaly screen.  She is then to have follow-up growth ultrasounds done every 4 weeks.    Analy Holbrook MD, FAAFP  5/18/2018

## 2018-05-25 ENCOUNTER — PRENATAL OFFICE VISIT (OUTPATIENT)
Dept: FAMILY MEDICINE | Facility: OTHER | Age: 38
End: 2018-05-25
Attending: OBSTETRICS & GYNECOLOGY
Payer: COMMERCIAL

## 2018-05-25 ENCOUNTER — HOSPITAL ENCOUNTER (OUTPATIENT)
Dept: ULTRASOUND IMAGING | Facility: OTHER | Age: 38
Discharge: HOME | End: 2018-05-25
Attending: FAMILY MEDICINE | Admitting: FAMILY MEDICINE
Payer: COMMERCIAL

## 2018-05-25 ENCOUNTER — TELEPHONE (OUTPATIENT)
Dept: FAMILY MEDICINE | Facility: OTHER | Age: 38
End: 2018-05-25

## 2018-05-25 VITALS
HEIGHT: 66 IN | BODY MASS INDEX: 32.11 KG/M2 | HEART RATE: 76 BPM | WEIGHT: 199.8 LBS | SYSTOLIC BLOOD PRESSURE: 120 MMHG | DIASTOLIC BLOOD PRESSURE: 78 MMHG

## 2018-05-25 DIAGNOSIS — O30.042 DICHORIONIC DIAMNIOTIC TWIN PREGNANCY IN SECOND TRIMESTER: Primary | ICD-10-CM

## 2018-05-25 DIAGNOSIS — R00.2 AWARENESS OF HEART BEAT: ICD-10-CM

## 2018-05-25 DIAGNOSIS — O30.042 DICHORIONIC DIAMNIOTIC TWIN PREGNANCY IN SECOND TRIMESTER: ICD-10-CM

## 2018-05-25 DIAGNOSIS — O99.012 ANEMIA AFFECTING PREGNANCY IN SECOND TRIMESTER: ICD-10-CM

## 2018-05-25 LAB — HGB BLD-MCNC: 10.8 G/DL (ref 11.7–15.7)

## 2018-05-25 PROCEDURE — 93000 ELECTROCARDIOGRAM COMPLETE: CPT | Performed by: INTERNAL MEDICINE

## 2018-05-25 PROCEDURE — 99207 ZZC OB VISIT-NO CHARGE - GICH ONLY: CPT | Performed by: FAMILY MEDICINE

## 2018-05-25 PROCEDURE — 36415 COLL VENOUS BLD VENIPUNCTURE: CPT | Performed by: FAMILY MEDICINE

## 2018-05-25 PROCEDURE — 99213 OFFICE O/P EST LOW 20 MIN: CPT | Mod: 25 | Performed by: FAMILY MEDICINE

## 2018-05-25 PROCEDURE — 76819 FETAL BIOPHYS PROFIL W/O NST: CPT | Mod: 59

## 2018-05-25 PROCEDURE — 85018 HEMOGLOBIN: CPT | Performed by: FAMILY MEDICINE

## 2018-05-25 RX ORDER — FERROUS GLUCONATE 324(38)MG
324 TABLET ORAL
Qty: 100 TABLET | Refills: 3 | Status: SHIPPED | OUTPATIENT
Start: 2018-05-25 | End: 2018-06-29

## 2018-05-25 ASSESSMENT — PAIN SCALES - GENERAL: PAINLEVEL: NO PAIN (0)

## 2018-05-25 NOTE — MR AVS SNAPSHOT
After Visit Summary   5/25/2018    Maricarmen Ornelas    MRN: [id number]           Patient Information     Date Of Birth          1980        Visit Information        Provider Department      5/25/2018 3:00 PM Analy Trimble MD [facility]        Today's Diagnoses     Dichorionic diamniotic twin pregnancy in second trimester    -  1    Awareness of heart beat        Anemia affecting pregnancy in second trimester           Follow-ups after your visit        Your next 10 appointments already scheduled     May 31, 2018  4:00 PM CDT   [facility]    [address]  [place] [address]   [phone]            Jun 11, 2018  3:45 PM CDT   ESTABLISHED PRENATAL with Wood[facility]ado [facility]   [facility]    [address]  [place] [address]   [phone]            Jun 28, 2018  9:15 AM CDT   ESTABLISHED PRENATAL with [facility]ado [facility]   [facility]    [address]  [place] [address]   [phone]            Jul 09, 2018  8:30 AM CDT   ESTABLISHED PRENATAL with Analy Bang MD   [facility]    [address]  [place] [address]   [phone]              Future tests that were ordered for you today     Open Future Orders        Priority Expected Expires Ordered    Echocardiogram Complete Routine  5/26/2019 5/26/2018    US OB Fetal Biophy Prf wo NonStress Twins Twins Routine  5/25/2019 5/25/2018            Who to contact     If you have questions or need follow up information about today's clinic visit or your schedule please contact [facility] directly at [phone].  Normal or non-critical lab and imaging results will be  "communicated to you by MyChart, letter or phone within 4 business days after the clinic has received the results. If you do not hear from us within 7 days, please contact the clinic through [employer] or phone. If you have a critical or abnormal lab result, we will notify you by phone as soon as possible.  Submit refill requests through HItviews or call your pharmacy and they will forward the refill request to us. Please allow 3 business days for your refill to be completed.          Additional Information About Your Visit        HItviews Information     HItviews lets you send messages to your doctor, view your test results, renew your prescriptions, schedule appointments and more. To sign up, go to [email or web address] . Click on \"Log in\" on the left side of the screen, which will take you to the Welcome page. Then click on \"Sign up Now\" on the right side of the page.     You will be asked to enter the access code listed below, as well as some personal information. Please follow the directions to create your username and password.     Your access code is: [email or web address]  Expires: 6/3/2018  1:39 PM     Your access code will  in 90 days. If you need help or a new code, please call your [place] clinic or [phone].        Care EveryWhere ID     This is your Care EveryWhere ID. This could be used by other organizations to access your [place] medical records  [id number]        Your Vitals Were     Pulse Height Last Period BMI (Body Mass Index)          76 1.676 m (5' 6\") 2017 32.25 kg/m2         Blood Pressure from Last 3 Encounters:   18 120/78   18 128/88   18 136/78    Weight from Last 3 Encounters:   18 90.6 kg (199 lb 12.8 oz)   18 90.8 kg (200 lb 3.2 oz)   18 87.5 kg (193 lb)              We Performed the Following     EKG 12-lead, tracing only     Hemoglobin          Today's Medication Changes          These changes are accurate as of 18 11:59 PM.  If you " have any questions, ask your nurse or doctor.               Start taking these medicines.        Dose/Directions    ferrous gluconate 324 (38 Fe) MG tablet   Commonly known as:  FERGON   Used for:  Anemia affecting pregnancy in second trimester   Started by:  Analy Trimble MD        Dose:  324 mg   Take 1 tablet (324 mg) by mouth daily (with breakfast)   Quantity:  100 tablet   Refills:  3            Where to get your medicines      These medications were sent to [id number] - [place], [place] - [address]  [address], [facility] [address]     Phone:  [phone]     ferrous gluconate 324 (38 Fe) MG tablet                Primary Care Provider Fax #    Physician No Ref-Primary [phone]       No address on file        Equal Access to Services     HAYDEE NICE : Cassi jovel Sojuliette, waaxda luqadaha, qaybta kaalmada adegeorginayagraeme, joey chavez . So [facility] [phone].    ATENCIÓN: Si habla español, tiene a jiang disposición servicios gratuitos de asistencia lingüística. Llame al [phone].    We comply with applicable federal civil rights laws and [place] laws. We do not discriminate on the basis of race, color, national origin, age, disability, sex, sexual orientation, or gender identity.            Thank you!     Thank you for choosing [facility] AND [facility]  for your care. Our goal is always to provide you with excellent care. Hearing back from our patients is one way we can continue to improve our services. Please take a few minutes to complete the written survey that you may receive in the mail after your visit with us. Thank you!             Your Updated Medication List - Protect others around you: Learn how to safely use, store and throw away your medicines at www.disposemymeds.org.          This list is accurate as of 5/25/18 11:59 PM.  Always use your most recent med list.                   Brand  Name Dispense Instructions for use Diagnosis    CVS PRENATAL 28-0.8 MG Tabs      Take 1 tablet by mouth        ferrous gluconate 324 (38 Fe) MG tablet    FERGON    100 tablet    Take 1 tablet (324 mg) by mouth daily (with breakfast)    Anemia affecting pregnancy in second trimester       order for DME     1 Device    Equipment being ordered: Blood pressure cuff    Dichorionic diamniotic twin pregnancy in second trimester       SYNTHROID 75 MCG tablet   Generic drug:  levothyroxine

## 2018-05-25 NOTE — TELEPHONE ENCOUNTER
Patient called stating he is having some issues and would like to speak with [facility]'s nurse.  She is currently pregnant with twins.     Denita Rodriguez on 5/25/2018 at 12:58 PM

## 2018-05-25 NOTE — PROGRESS NOTES
"She is getting a thumping type of chest pain.  Onset last weekend.  Feels like when you stand too close to a speaker.  It happens often.  She has felt more lightheaded over the past 4 days.  Her center of gravity has been off too.  Concerned that twin A isn't moving as much.  She has been having anxious thoughts.  Recent TSH normal.  /78 (BP Location: Right arm, Patient Position: Sitting, Cuff Size: Adult Regular)  Pulse 76  Ht 1.676 m (5' 6\")  Wt 90.6 kg (199 lb 12.8 oz)  LMP 12/17/2017  BMI 32.25 kg/m2   BPP - 6/8 with no fetal breathing  CV:  Increased HR with inspiration, no murmur    EKG - NSR  Results for orders placed or performed in visit on 05/25/18   Hemoglobin   Result Value Ref Range    Hemoglobin 10.8 (L) 11.7 - 15.7 g/dL       ICD-10-CM    1. Dichorionic diamniotic twin pregnancy in second trimester O30.042 Hemoglobin   2. Awareness of heart beat R00.2 Hemoglobin     EKG 12-lead, tracing only   3. Anemia affecting pregnancy in second trimester O99.012 ferrous gluconate (FERGON) 324 (38 Fe) MG tablet     Start iron replacement.  Increased awareness of HR likely combination of anemia plus physiologic changes of pregnancy.  Echocardiogram is ordered too.    Follow up pending that.  Analy Holbrook MD   "

## 2018-05-25 NOTE — TELEPHONE ENCOUNTER
Notified Maricarmen and she stated that she will be coming in at 3:05 pm today for an ultrasound.     Philip Interiano, CHETAN 05/25/18 2:21 PM

## 2018-05-25 NOTE — TELEPHONE ENCOUNTER
She has been having a racing heart for about 3 days now.  Thumping and weird feeling, she has been staying away from the caffeine and has been staying hydrated. Her sister had checked her over once and told her to stay away from caffeine to drink a lot of fluid. Also has noticed a decrease movement from the Girl (Left Baby) which she could have moved too. But is nervous and would like like some advise on what to do.   Magali Arciniega LPN ..........5/25/2018 1:10 PM

## 2018-05-25 NOTE — NURSING NOTE
Patient presents to the clinic today for prenatal care.    Mary Jones LPN.................. 5/25/2018 3:46 PM

## 2018-05-29 ENCOUNTER — TRANSFERRED RECORDS (OUTPATIENT)
Dept: HEALTH INFORMATION MANAGEMENT | Facility: OTHER | Age: 38
End: 2018-05-29

## 2018-05-31 ENCOUNTER — PRENATAL OFFICE VISIT (OUTPATIENT)
Dept: OBGYN | Facility: OTHER | Age: 38
End: 2018-05-31
Attending: OBSTETRICS & GYNECOLOGY
Payer: COMMERCIAL

## 2018-05-31 VITALS
BODY MASS INDEX: 32.67 KG/M2 | SYSTOLIC BLOOD PRESSURE: 118 MMHG | WEIGHT: 202.4 LBS | HEART RATE: 72 BPM | DIASTOLIC BLOOD PRESSURE: 68 MMHG

## 2018-05-31 DIAGNOSIS — O09.522 ELDERLY MULTIGRAVIDA IN SECOND TRIMESTER: ICD-10-CM

## 2018-05-31 DIAGNOSIS — E03.9 ACQUIRED HYPOTHYROIDISM: ICD-10-CM

## 2018-05-31 DIAGNOSIS — O30.042 DICHORIONIC DIAMNIOTIC TWIN PREGNANCY IN SECOND TRIMESTER: Primary | ICD-10-CM

## 2018-05-31 PROCEDURE — 99207 ZZC OB VISIT-NO CHARGE - GICH ONLY: CPT | Performed by: OBSTETRICS & GYNECOLOGY

## 2018-05-31 ASSESSMENT — PAIN SCALES - GENERAL: PAINLEVEL: NO PAIN (0)

## 2018-05-31 NOTE — PROGRESS NOTES
CC: Recheck OB visit at 23w1d    HPI: Maricarmen Ornelas presents for a routine OB visit now at 23w1d  She has no concerns. Denies cramping, bleeding, normal fetal movement. She was in last week with decreased fetal movement. US showed continued viability. MFM US last week shows no known fetal anomalies. She has increased her iron intake since last week with noted improvement in her energy levels. She had a TSH last month that was normal.    Obstetric History       T2      L2     SAB0   TAB0   Ectopic0   Multiple0   Live Births2       # Outcome Date GA Lbr Renny/2nd Weight Sex Delivery Anes PTL Lv   3 Current            2 Term 10/16/10 40w0d   M  EPI N SMOOTH      Name: William   1 Term  40w0d   M  None N SMOOTH      Name: Kaveh        Current Outpatient Prescriptions   Medication     ferrous gluconate (FERGON) 324 (38 Fe) MG tablet     levothyroxine (SYNTHROID) 75 MCG tablet     order for DME     Prenatal Vit-Fe Fumarate-FA (CVS PRENATAL) 28-0.8 MG TABS     No current facility-administered medications for this visit.          O: /68 (BP Location: Right arm)  Pulse 72  Wt 91.8 kg (202 lb 6.4 oz)  LMP 2017  BMI 32.67 kg/m2  Body mass index is 32.67 kg/(m^2).  See OB flow sheet  EXAM:  NAD      A/P: (O30.042) Dichorionic diamniotic twin pregnancy in second trimester  (primary encounter diagnosis)  Comment:   Plan: US OB Limited One Or More Fetuses            (E03.9) Acquired hypothyroidism  Comment:   Plan:     Recheck in 2 weeks, Growth US monthly (mid- next), TSH in two weeks, weekly NST's starting at 32 weeks.    Problem List:   Dichorionic Twins,   AMA    Woodrow Hurtado MD FACOG  4:02 PM 2018

## 2018-05-31 NOTE — MR AVS SNAPSHOT
After Visit Summary   5/31/2018    Maricarmen Ornelas    MRN: [id number]           Patient Information     Date Of Birth          1980        Visit Information        Provider Department      5/31/2018 3:45 PM Woodrow Hurtado MD [facility]        Today's Diagnoses     Dichorionic diamniotic twin pregnancy in second trimester    -  1    Acquired hypothyroidism        Elderly multigravida in second trimester           Follow-ups after your visit        Follow-up notes from your care team     Return in about 2 weeks (around 6/14/2018).      Your next 10 appointments already scheduled     Jun 11, 2018  3:45 PM CDT   [facility]    [address]  [place] [address]   [phone]            Jun 28, 2018  9:15 AM CDT   ESTABLISHED PRENATAL with Woodrow [facility]ado [facility]   [facility]    [address]  [place] [address]   [phone]            Jul 09, 2018  8:30 AM CDT   ESTABL[facility] PRENATAL with Analy Bang [facility]   [facility]    [address]  [place] [address]   [phone]              Future tests that were ordered for you today     Open Future Orders        Priority Expected Expires Ordered    TSH Routine 6/14/2018 6/28/2018 5/31/2018    US OB Limited One Or More Fetuses Routine 6/14/2018 5/31/2019 5/31/2018            Who to contact     If you have questions or need follow up information about today's clinic visit or your schedule please contact [facility] directly at [phone].  Normal or non-critical lab and imaging results will be communicated to you by MyChart, letter or phone within 4 business days after the clinic has received the results. If you do not hear from us within 7 days, please  "contact the clinic through Airu or phone. If you have a critical or abnormal lab result, we will notify you by phone as soon as possible.  Submit refill requests through Airu or call your pharmacy and they will forward the refill request to us. Please allow 3 business days for your refill to be completed.          Additional Information About Your Visit        BetterDoctorharXlumena Information     Airu lets you send messages to your doctor, view your test results, renew your prescriptions, schedule appointments and more. To sign up, go to [email or web address] . Click on \"Log in\" on the left side of the screen, which will take you to the Welcome page. Then click on \"Sign up Now\" on the right side of the page.     You will be asked to enter the access code listed below, as well as some personal information. Please follow the directions to create your username and password.     Your access code is: [email or web address]  Expires: 6/3/2018  1:39 PM     Your access code will  in 90 days. If you need help or a new code, please call your [place] clinic or [phone].        Care EveryWhere ID     This is your Care EveryWhere ID. This could be used by other organizations to access your [place] medical records  [id number]        Your Vitals Were     Pulse Last Period BMI (Body Mass Index)             72 2017 32.67 kg/m2          Blood Pressure from Last 3 Encounters:   18 118/68   18 120/78   18 128/88    Weight from Last 3 Encounters:   18 91.8 kg (202 lb 6.4 oz)   18 90.6 kg (199 lb 12.8 oz)   18 90.8 kg (200 lb 3.2 oz)               Primary Care Provider Fax #    Physician No Ref-Primary [phone]       No address on file        Equal Access to Services     HAYDEE NICE : Cassi Clifford, wadaniel lewis, qaybta kaalmagraeme arriola, joey amin. So [facility] [place] [phone].    ATENCIÓN: Si habkalpesh ann, tiene a jiang disposición servicios " efrem de asistencia lingüística. Cheng evans [phone].    We comply with applicable federal civil rights laws and [place] laws. We do not discriminate on the basis of race, color, national origin, age, disability, sex, sexual orientation, or gender identity.            Thank you!     Thank you for choosing [facility] [place] AND [facility]  for your care. Our goal is always to provide you with excellent care. Hearing back from our patients is one way we can continue to improve our services. Please take a few minutes to complete the written survey that you may receive in the mail after your visit with us. Thank you!             Your Updated Medication List - Protect others around you: Learn how to safely use, store and throw away your medicines at www.disposemymeds.org.          This list is accurate as of 5/31/18  5:00 PM.  Always use your most recent med list.                   Brand Name Dispense Instructions for use Diagnosis    CVS PRENATAL 28-0.8 MG Tabs      Take 1 tablet by mouth        ferrous gluconate 324 (38 Fe) MG tablet    FERGON    100 tablet    Take 1 tablet (324 mg) by mouth daily (with breakfast)    Anemia affecting pregnancy in second trimester       order for DME     1 Device    Equipment being ordered: Blood pressure cuff    Dichorionic diamniotic twin pregnancy in second trimester       SYNTHROID 75 MCG tablet   Generic drug:  levothyroxine

## 2018-06-04 ENCOUNTER — TELEPHONE (OUTPATIENT)
Dept: FAMILY MEDICINE | Facility: OTHER | Age: 38
End: 2018-06-04

## 2018-06-04 DIAGNOSIS — O30.042 DICHORIONIC DIAMNIOTIC TWIN PREGNANCY IN SECOND TRIMESTER: ICD-10-CM

## 2018-06-04 DIAGNOSIS — O09.522 ELDERLY MULTIGRAVIDA IN SECOND TRIMESTER: Primary | ICD-10-CM

## 2018-06-08 ENCOUNTER — HOSPITAL ENCOUNTER (OUTPATIENT)
Dept: PHYSICAL THERAPY | Facility: OTHER | Age: 38
Setting detail: THERAPIES SERIES
End: 2018-06-08
Attending: FAMILY MEDICINE
Payer: COMMERCIAL

## 2018-06-08 PROCEDURE — 97110 THERAPEUTIC EXERCISES: CPT | Mod: GP

## 2018-06-08 PROCEDURE — 97140 MANUAL THERAPY 1/> REGIONS: CPT | Mod: GP

## 2018-06-08 PROCEDURE — 40000185 ZZHC STATISTIC PT OUTPT VISIT

## 2018-06-11 ENCOUNTER — PRENATAL OFFICE VISIT (OUTPATIENT)
Dept: OBGYN | Facility: OTHER | Age: 38
End: 2018-06-11
Attending: FAMILY MEDICINE
Payer: COMMERCIAL

## 2018-06-11 ENCOUNTER — HOSPITAL ENCOUNTER (OUTPATIENT)
Dept: ULTRASOUND IMAGING | Facility: OTHER | Age: 38
Discharge: HOME | End: 2018-06-11
Attending: OBSTETRICS & GYNECOLOGY | Admitting: OBSTETRICS & GYNECOLOGY
Payer: COMMERCIAL

## 2018-06-11 VITALS
BODY MASS INDEX: 33.27 KG/M2 | DIASTOLIC BLOOD PRESSURE: 78 MMHG | WEIGHT: 206.1 LBS | HEART RATE: 82 BPM | SYSTOLIC BLOOD PRESSURE: 108 MMHG

## 2018-06-11 DIAGNOSIS — R73.09 IMPAIRED GLUCOSE TOLERANCE TEST: Primary | ICD-10-CM

## 2018-06-11 DIAGNOSIS — O30.042 DICHORIONIC DIAMNIOTIC TWIN PREGNANCY IN SECOND TRIMESTER: Primary | ICD-10-CM

## 2018-06-11 DIAGNOSIS — G56.00 CARPAL TUNNEL SYNDROME DURING PREGNANCY: ICD-10-CM

## 2018-06-11 DIAGNOSIS — O26.899 CARPAL TUNNEL SYNDROME DURING PREGNANCY: ICD-10-CM

## 2018-06-11 DIAGNOSIS — O30.042 DICHORIONIC DIAMNIOTIC TWIN PREGNANCY IN SECOND TRIMESTER: ICD-10-CM

## 2018-06-11 LAB — TSH SERPL DL<=0.05 MIU/L-ACNC: 2.08 IU/ML (ref 0.34–5.6)

## 2018-06-11 PROCEDURE — 84443 ASSAY THYROID STIM HORMONE: CPT | Performed by: OBSTETRICS & GYNECOLOGY

## 2018-06-11 PROCEDURE — 76816 OB US FOLLOW-UP PER FETUS: CPT

## 2018-06-11 PROCEDURE — 99207 ZZC OB VISIT-NO CHARGE - GICH ONLY: CPT | Performed by: OBSTETRICS & GYNECOLOGY

## 2018-06-11 PROCEDURE — 36415 COLL VENOUS BLD VENIPUNCTURE: CPT | Performed by: OBSTETRICS & GYNECOLOGY

## 2018-06-11 ASSESSMENT — PAIN SCALES - GENERAL: PAINLEVEL: NO PAIN (0)

## 2018-06-11 NOTE — PROGRESS NOTES
CC: Recheck OB visit at 24w5d    HPI: Maricarmen Ornelas presents for a routine OB visit now at 24w5d  She has issues with her hands going numb at night and with activity. US was done today and shows good growth and fluid. Denies contractions, bleeding, leaking fluid. She has several questions regarding benefits of vaginal versus  birth for twins. She was present with her  for this discussion as well.    Obstetric History       T2      L2     SAB0   TAB0   Ectopic0   Multiple0   Live Births2       # Outcome Date GA Lbr Renny/2nd Weight Sex Delivery Anes PTL Lv   3 Current            2 Term 10/16/10 40w0d   M  EPI N SMOOTH      Name: William   1 Term  40w0d   M  None N SMOOTH      Name: Kaveh        Current Outpatient Prescriptions   Medication     ferrous gluconate (FERGON) 324 (38 Fe) MG tablet     levothyroxine (SYNTHROID) 75 MCG tablet     order for DME     order for DME     Prenatal Vit-Fe Fumarate-FA ([facility] PRENATAL) 28-0.8 MG TABS     No current facility-administered medications for this visit.          O: LMP 2017  There is no height or weight on file to calculate BMI.  See OB flow sheet  EXAM:  NAD  Pos Phalen's and Tinel's tests bilaterally    Results for orders placed or performed during the hospital encounter of 18   US OB Ltd One Or More Fetus FU/Repeat    Narrative    MULTIPLE OB ULTRASOUND REPORT     Clinical:  Twin pregnancy. Evaluate estimated fetal weight and ZAINAB.  Gestation Number:  2   Membrane:  Yes  Presentation:    A  Cephalic    B  Variable  Lie:      Position:   A  Longitudinal    Left  B  Oblique variable    Right  Cardiac Activity:      Heart rate:   A  Regular    146 BPM  B  Regular    139 BPM  Placenta:  2  A  Anterior    B  Posterior  Adnexa:    Not Visualized  Cervix:    5.4 cm in length  Amniotic Fluid:      Deepest pocket:   A  Normal    5.2 cm.  B  Normal    5.9 cm.  Movement:    A  Yes  B  Yes      Baby A  Baby B  Measurements       BPD  24 weeks 4 days   25 weeks 0 days   HC  24 weeks 3 days  24 weeks 2 days   AC  24 weeks 6 days  25 weeks 5 days  FL  24 weeks 4 days  24 weeks 3 days  Estimated Fetal Weight  719 grams  760 grams  HC/AC  1.10  1.05  Gestational age by current study   24 weeks 5 days  24 weeks 6 days  Gestational Age by LMP/first ultrasound:  EDC by LMP/first ultrasound:   24 weeks 5 days    2018  24 weeks 5 days    2018  US EDC (Current Study)  2018  % WT for EGA  (Based on First Study)  51 %  58 %    Structural Survey:      Head  Not assessed  Not assessed  Spine  Not assessed  Not assessed  Stomach  Unremarkable  Unremarkable  Kidneys  Unremarkable  Unremarkable  Bladder  Unremarkable  Unremarkable  3 Vessel Cord  Unremarkable  Unremarkable  Cord Insertion  Not assessed  Not assessed  4 Chamber Heart  Unremarkable  Unremarkable  Ant. Abd Wall  Not assessed  Not assessed  Diaphragm  Not assessed  Not assessed  Situs  Not assessed  Not assessed      Impression    Impression: Twin living intrauterine pregnancy. Each of the fetuses  demonstrates satisfactory interval growth. Based on the current  measurements, the estimated fetal weight of fetus a is at the 51st  percentile and the estimated fetal weight of fetus B is at the 58th  percentile.    Amniotic fluid volume appears to be within normal limits.    NILSON ARZATE MD       A/P: (O30.042) Dichorionic diamniotic twin pregnancy in second trimester  (primary encounter diagnosis)  Comment:   Plan: Hemoglobin, Glucose tolerance, gest screen, 1         hour, Treponema Abs w Reflex to RPR and Titer,         CANCELED: Glucose tolerance, gest screen, 1         hour, CANCELED: Hemoglobin, CANCELED: Treponema        Abs w Reflex to RPR and Titer            (O26.899,  G56.00) Carpal tunnel syndrome during pregnancy  Comment:   Plan: WRIST COCK-UP NON-MOLDED          Recheck in 2 weeks  Discusses current literature and risk of about 40-45% for  if  labor is tried. 60% of dichorionic twins deliver by . Further discussion will be had but she is leaning toward a scheduled .  Discussed weight gain and ways to prevent excessive gain with goal of 35 lbs for her pregnancy. She is supplementing iron and folate. Recommend extra calcium and Vitamin D as well. She has history of DDD.  Screen for GDM ordered including hgb and treponemal test. Tdap after 27 weeks.    Problem List:   Dichorionic Twins, monthly growth scans  AMA- weekly NST's after 32 weeks.    Woodrow Hurtado MD FACOG  3:43 PM 2018

## 2018-06-11 NOTE — MR AVS SNAPSHOT
After Visit Summary   6/11/2018    Maricarmen Ornelas    MRN: [id number]           Patient Information     Date Of Birth          1980        Visit Information        Provider Department      6/11/2018 3:45 PM Woodrow Hurtado MD [facility]        Today's Diagnoses     Dichorionic diamniotic twin pregnancy in second trimester    -  1    Carpal tunnel syndrome during pregnancy           Follow-ups after your visit        Follow-up notes from your care team     Return in about 2 weeks (around 6/25/2018).      Your next 10 appointments already scheduled     Jun 20, 2018  1:00 PM CDT   Treatment with Rsoe Avalos, ANH   [facility] ([facility]    [address]  [place] [address]   [phone]            Jun 27, 2018  2:00 PM CDT   ESTABLISHED PRENATAL with Analy Bang MD   [facility] ([facility])    [address]  [place] [address]   [phone]            Jul 09, 2018  8:30 AM CDT   ESTABLISHED PRENATAL with Analy Bang MD   [facility] ([facility])    [address]  [place] [address]   [phone]            Jul 12, 2018  1:00 PM CDT   (Arrive by 12:45 PM)   US OB LIMITED ONE OR MORE FETUSES with GHUSSP   [facility] ([facility])    [address]  [place] [address]   [phone]           Please bring a list of your medicines (including vitamins, minerals and over-the-counter drugs). Also, tell your doctor about any allergies you may have. Wear comfortable clothes and leave your valuables at home.  If you re less than 20 weeks drink four 8-ounce glasses of fluid an hour before your exam. If you need to empty your bladder before your exam, try to release only a little urine. Then, drink another glass of fluid.  You may  have up to two family members in the exam room. If you bring a small child, an adult must be there to care for him or her.  Please call the Imaging Department at your exam site with any questions.              Future tests that were ordered for you today     Open Standing Orders        Priority Remaining Interval Expires Ordered    US OB Twins Follow Up Repeat Routine 3/3 monthly 6/11/2019 6/11/2018          Open Future Orders        Priority Expected Expires Ordered    Hemoglobin Routine  6/11/2019 6/11/2018    Glucose tolerance, gest screen, 1 hour Routine  6/11/2019 6/11/2018    Treponema Abs w Reflex to RPR and Titer Routine  6/11/2019 6/11/2018    US OB Ltd One Or More Fetus FU/Repeat Routine 6/14/2018 5/31/2019 5/31/2018            Who to contact     If you have questions or need follow up information about today's clinic visit or your schedule please contact [facility] directly at [phone].  Normal or non-critical lab and imaging results will be communicated to you by MyChart, letter or phone within 4 business days after the clinic has received the results. If you do not hear from us within 7 days, please contact the clinic through [employer] or phone. If you have a critical or abnormal lab result, we will notify you by phone as soon as possible.  Submit refill requests through op5 or call your pharmacy and they will forward the refill request to us. Please allow 3 business days for your refill to be completed.          Additional Information About Your Visit        Care EveryWhere ID     This is your Care EveryWhere ID. This could be used by other organizations to access your [place] medical records  [id number]        Your Vitals Were     Pulse Last Period BMI (Body Mass Index)             82 12/17/2017 33.27 kg/m2          Blood Pressure from Last 3 Encounters:   06/11/18 108/78   05/31/18 118/68   05/25/18 120/78    Weight from Last 3 Encounters:   06/11/18 93.5 kg (206 lb 1.6  oz)   05/31/18 91.8 kg (202 lb 6.4 oz)   05/25/18 90.6 kg (199 lb 12.8 oz)              We Performed the Following     WRIST COCK-UP NON-MOLDED        Primary Care Provider Fax #    Physician No Ref-Primary [phone]       No address on file        Equal Access to Services     HAYDEE NICE : Hadii aad ku hadasho Soomaali, waaxda luqadaha, qaybta kaalmada adegeorginayada, waxbhavesh jesús pbjasmina jamesgeorgina funez la'maxwelljasmina . So [facility] [phone].    ATENCIÓN: Si habla español, tiene a jiang disposición servicios gratuitos de asistencia lingüística. Llame al [phone].    We comply with applicable federal civil rights laws and [place] laws. We do not discriminate on the basis of race, color, national origin, age, disability, sex, sexual orientation, or gender identity.            Thank you!     Thank you for choosing [facility]  for your care. Our goal is always to provide you with excellent care. Hearing back from our patients is one way we can continue to improve our services. Please take a few minutes to complete the written survey that you may receive in the mail after your visit with us. Thank you!             Your Updated Medication List - Protect others around you: Learn how to safely use, store and throw away your medicines at www.disposemymeds.org.          This list is accurate as of 6/11/18  4:42 PM.  Always use your most recent med list.                   Brand Name Dispense Instructions for use Diagnosis    [facility]S PRENATAL 28-0.8 MG Tabs      Take 1 tablet by mouth        ferrous gluconate 324 (38 Fe) MG tablet    FERGON    100 tablet    Take 1 tablet (324 mg) by mouth daily (with breakfast)    Anemia affecting pregnancy in second trimester       order for DME     1 Device    Equipment being ordered: Blood pressure cuff    Dichorionic diamniotic twin pregnancy in second trimester       order for DME     1 Device    Equipment being ordered: maternity support belt    Elderly multigravida in second  trimester, Dichorionic diamniotic twin pregnancy in second trimester       SYNTHROID 75 MCG tablet   Generic drug:  levothyroxine

## 2018-06-20 ENCOUNTER — HOSPITAL ENCOUNTER (OUTPATIENT)
Dept: PHYSICAL THERAPY | Facility: OTHER | Age: 38
Setting detail: THERAPIES SERIES
End: 2018-06-20
Attending: FAMILY MEDICINE
Payer: COMMERCIAL

## 2018-06-20 PROCEDURE — 40000185 ZZHC STATISTIC PT OUTPT VISIT

## 2018-06-20 PROCEDURE — 97140 MANUAL THERAPY 1/> REGIONS: CPT | Mod: GP

## 2018-06-27 ENCOUNTER — PRENATAL OFFICE VISIT (OUTPATIENT)
Dept: FAMILY MEDICINE | Facility: OTHER | Age: 38
End: 2018-06-27
Attending: FAMILY MEDICINE
Payer: COMMERCIAL

## 2018-06-27 VITALS
HEART RATE: 80 BPM | HEIGHT: 66 IN | BODY MASS INDEX: 33.91 KG/M2 | WEIGHT: 211 LBS | DIASTOLIC BLOOD PRESSURE: 64 MMHG | SYSTOLIC BLOOD PRESSURE: 130 MMHG

## 2018-06-27 DIAGNOSIS — E03.9 ACQUIRED HYPOTHYROIDISM: ICD-10-CM

## 2018-06-27 DIAGNOSIS — R73.09 ABNORMAL GLUCOSE TOLERANCE TEST: ICD-10-CM

## 2018-06-27 DIAGNOSIS — G56.03 BILATERAL CARPAL TUNNEL SYNDROME: ICD-10-CM

## 2018-06-27 DIAGNOSIS — O30.042 DICHORIONIC DIAMNIOTIC TWIN PREGNANCY IN SECOND TRIMESTER: Primary | ICD-10-CM

## 2018-06-27 DIAGNOSIS — F43.22 ADJUSTMENT DISORDER WITH ANXIOUS MOOD: ICD-10-CM

## 2018-06-27 DIAGNOSIS — O09.522 ELDERLY MULTIGRAVIDA IN SECOND TRIMESTER: ICD-10-CM

## 2018-06-27 LAB
CREAT UR-MCNC: 21 MG/DL
GLUCOSE 1H P 50 G GLC PO SERPL-MCNC: 144 MG/DL (ref 60–129)
HGB BLD-MCNC: 10.5 G/DL (ref 11.7–15.7)

## 2018-06-27 PROCEDURE — 86780 TREPONEMA PALLIDUM: CPT | Performed by: OBSTETRICS & GYNECOLOGY

## 2018-06-27 PROCEDURE — 82950 GLUCOSE TEST: CPT | Performed by: OBSTETRICS & GYNECOLOGY

## 2018-06-27 PROCEDURE — 90715 TDAP VACCINE 7 YRS/> IM: CPT | Performed by: FAMILY MEDICINE

## 2018-06-27 PROCEDURE — 90471 IMMUNIZATION ADMIN: CPT | Performed by: FAMILY MEDICINE

## 2018-06-27 PROCEDURE — 84156 ASSAY OF PROTEIN URINE: CPT | Performed by: FAMILY MEDICINE

## 2018-06-27 PROCEDURE — 99213 OFFICE O/P EST LOW 20 MIN: CPT | Mod: 25 | Performed by: FAMILY MEDICINE

## 2018-06-27 PROCEDURE — 99207 ZZC OB VISIT-NO CHARGE - GICH ONLY: CPT | Performed by: FAMILY MEDICINE

## 2018-06-27 PROCEDURE — 85018 HEMOGLOBIN: CPT | Performed by: OBSTETRICS & GYNECOLOGY

## 2018-06-27 PROCEDURE — 36415 COLL VENOUS BLD VENIPUNCTURE: CPT | Performed by: OBSTETRICS & GYNECOLOGY

## 2018-06-27 ASSESSMENT — PAIN SCALES - GENERAL: PAINLEVEL: NO PAIN (0)

## 2018-06-27 NOTE — NURSING NOTE
Patient presents to the clinic today for a ob check, she is 27w0d    Mary Jones LPN.................. 6/27/2018 2:01 PM

## 2018-06-27 NOTE — MR AVS SNAPSHOT
After Visit Summary   6/27/2018    Maricarmen Ornelas    MRN: [id number]           Patient Information     Date Of Birth          1980        Visit Information        Provider Department      6/27/2018 2:00 PM Analy Trimble MD [facility]        Today's Diagnoses     Dichorionic diamniotic twin pregnancy in second trimester    -  1    Elderly multigravida in second trimester        Acquired hypothyroidism        Bilateral carpal tunnel syndrome        Adjustment disorder with anxious mood        Abnormal glucose tolerance test           Follow-ups after your visit        Your next 10 appointments already scheduled     Jul 03, 2018  7:00 AM CDT   LAB with [facility] LAB   [facility]    [address] [facility] [address]   [phone]           Please do not eat 10-12 hours before your appointment if you are coming in fasting for labs on lipids, cholesterol, or glucose (sugar). This does not apply to pregnant women. Water, hot tea and black coffee (with nothing added) are okay. Do not drink other fluids, diet soda or chew gum.            Jul 09, 2018  8:30 AM CDT   ESTABLISHED PRENATAL with Analy Bang MD   [facility]    [address]  [place] [address]   [phone]            Jul 12, 2018  1:00 PM CDT   (Arrive by 12:45 PM)   US OB LIMITED ONE OR MORE FETUSES with GHUSSP   [facility]    [address]  [place] [address]   [phone]           Please bring a list of your medicines (including vitamins, minerals and over-the-counter drugs). Also, tell your doctor about any allergies you may have. Wear comfortable clothes and leave your valuables at home.  If you re less than 20 weeks drink four 8-ounce glasses of fluid an hour before your exam. If you  need to empty your bladder before your exam, try to release only a little urine. Then, drink another glass of fluid.  You may have up to two family members in the exam room. If you bring a small child, an adult must be there to care for him or her.  Please call the Imaging Department at your exam site with any questions.            Jul 23, 2018  9:30 AM CDT   ESTABLISHED PRENATAL with [facility]    [address]  [facility] [address]   [phone]            Aug 10, 2018 12:45 PM CDT   (Arrive by 12:30 PM)   US OB TWINS FOLLOW UP REPEAT with [address] ([facility])    [address] [facility] [address] [phone]           Please bring a list of your medicines (including vitamins, minerals and over-the-counter drugs). Also, tell your doctor about any allergies you may have. Wear comfortable clothes and leave your valuables at home.  If you re less than 20 weeks drink four 8-ounce glasses of fluid an hour before your exam. If you need to empty your bladder before your exam, try to release only a little urine. Then, drink another glass of fluid.  You may have up to two family members in the exam room. If you bring a small child, an adult must be there to care for him or her.  Please call the Imaging Department at your exam site with any questions.              Who to contact     If you have questions or need follow up information about today's clinic visit or your schedule please contact [facility] directly at [phone].  Normal or non-critical lab and imaging results will be communicated to you by MyChart, letter or phone within 4 business days after the clinic has received the results. If you do not hear from us within 7 days, please contact the clinic through MyChart or phone. If you have a critical or abnormal lab result, we will  "notify you by phone as soon as possible.  Submit refill requests through [employer] or call your pharmacy and they will forward the refill request to us. Please allow 3 business days for your refill to be completed.          Additional Information About Your Visit        Care EveryWhere ID     This is your Care EveryWhere ID. This could be used by other organizations to access your [place] medical records  [id number]        Your Vitals Were     Pulse Height Last Period Breastfeeding? BMI (Body Mass Index)       80 5' 6\" (1.676 m) 12/17/2017 No 34.06 kg/m2        Blood Pressure from Last 3 Encounters:   06/27/18 130/64   06/11/18 108/78   05/31/18 118/68    Weight from Last 3 Encounters:   06/27/18 211 lb (95.7 kg)   06/11/18 206 lb 1.6 oz (93.5 kg)   05/31/18 202 lb 6.4 oz (91.8 kg)              We Performed the Following     Creatinine urine calculation only     GH IMM-  TDAP VACCINE (BOOSTRIX )     Glucose tolerance, gest screen, 1 hour     Glucose tolerance, gest std 100 gm, 3 hr     Hemoglobin     Protein Random Urine with Creat Ratio GH     Treponema Abs w Reflex to RPR and Titer        Primary Care Provider Fax #    Physician No Ref-Primary [phone]       No address on file        Equal Access to Services     HAYDEE NICE : Hadii lizbeth ku hadasho Soomaali, waaxda luqadaha, qaybta kaalmada adeegyada, waxay jesús amin. So [facility] [phone].    ATENCIÓN: Si habla español, tiene a jiang disposición servicios gratuitos de asistencia lingüística. [email or web address] al [phone].    We comply with applicable federal civil rights laws and [place] laws. We do not discriminate on the basis of race, color, national origin, age, disability, sex, sexual orientation, or gender identity.            Thank you!     Thank you for choosing [facility]  for your care. Our goal is always to provide you with excellent care. Hearing back from our patients is one way we can continue to improve our " services. Please take a few minutes to complete the written survey that you may receive in the mail after your visit with us. Thank you!             Your Updated Medication List - Protect others around you: Learn how to safely use, store and throw away your medicines at www.disposemymeds.org.          This list is accurate as of 6/27/18 11:59 PM.  Always use your most recent med list.                   Brand Name Dispense Instructions for use Diagnosis    CVS PRENATAL 28-0.8 MG Tabs      Take 1 tablet by mouth        order for DME     1 Device    Equipment being ordered: Blood pressure cuff    Dichorionic diamniotic twin pregnancy in second trimester       order for DME     1 Device    Equipment being ordered: maternity support belt    Elderly multigravida in second trimester, Dichorionic diamniotic twin pregnancy in second trimester       SYNTHROID 75 MCG tablet   Generic drug:  levothyroxine

## 2018-06-27 NOTE — PROGRESS NOTES
Problem list:  Patient with di/di twin pregnancy.  Advanced maternal age.  Hypothyroidism  Anxiety disorder  Carpal Tunnel    Has noted decreased movement the past 3 days.  NST obtained today.    Bilateral arm/hand pain at night.  She has braces available.  1-3rd fingers go numb 60% of the time.  She is concerned that she will develop permanent disability in her hands.  Increased blood pressure is noted today.  This is not accompanied by headaches.  She is due today for Boostrix, diabetes testing.  She had a recent follow-up TSH, this is normal.  Voices negative thoughts about pregnancy, babies, physical condition, parenting.  Feels very overwhelmed.  Not connecting with her current therapist. Discussion with and without her  present regarding safety for herself over the next 10+ weeks.    Results for orders placed or performed in visit on 06/27/18   Hemoglobin   Result Value Ref Range    Hemoglobin 10.5 (L) 11.7 - 15.7 g/dL   Glucose tolerance, gest screen, 1 hour   Result Value Ref Range    Glu Gest Screen 1hr 50g 144 (H) 60 - 129 mg/dL   Treponema Abs w Reflex to RPR and Titer   Result Value Ref Range    Treponema Antibodies Nonreactive NR^Nonreactive   Creatinine urine calculation only   Result Value Ref Range    Creatinine Urine 21 mg/dL   Protein Random Urine with Creat Ratio GH   Result Value Ref Range    Total Protein Random Urine 6 1 - 14 mg/dL    Protein Total UR MG/G CR 0.29 mg/g[creat]       Analy Holbrook MD    Glucose test results - 144.  Scheduled for 3 hour testing.

## 2018-06-28 LAB
ALBUMIN MFR UR ELPH: 6 MG/DL (ref 1–14)
PROT/CREAT 24H UR: 0.29 MG/G{CREAT}

## 2018-06-29 ENCOUNTER — TELEPHONE (OUTPATIENT)
Dept: OBGYN | Facility: OTHER | Age: 38
End: 2018-06-29

## 2018-06-29 DIAGNOSIS — O99.012 ANEMIA AFFECTING PREGNANCY IN SECOND TRIMESTER: ICD-10-CM

## 2018-06-29 LAB — T PALLIDUM AB SER QL: NONREACTIVE

## 2018-06-29 RX ORDER — FERROUS GLUCONATE 324(38)MG
324 TABLET ORAL 2 TIMES DAILY WITH MEALS
Qty: 100 TABLET | Refills: 3 | Status: SHIPPED | OUTPATIENT
Start: 2018-06-29 | End: 2018-10-02

## 2018-06-29 NOTE — TELEPHONE ENCOUNTER
This RN returned pt's telephone call.  Pt is requesting an additional Rx for iron due to Dr. FRANTZ Hurtado wanting her to increase her frequency to BID.  This RN reordered ferrous gluconate 324 mg po BID with meals.  Prescription approved per [facility] Refill Protocol. Tracey Oconnell RN on 6/29/2018 at 11:32 AM

## 2018-07-01 PROBLEM — R73.09 ABNORMAL GLUCOSE TOLERANCE TEST: Status: ACTIVE | Noted: 2018-07-01

## 2018-07-01 PROBLEM — F43.22 ADJUSTMENT DISORDER WITH ANXIOUS MOOD: Status: ACTIVE | Noted: 2018-07-01

## 2018-07-01 PROBLEM — G56.03 BILATERAL CARPAL TUNNEL SYNDROME: Status: ACTIVE | Noted: 2018-07-01

## 2018-07-03 DIAGNOSIS — R73.09 IMPAIRED GLUCOSE TOLERANCE TEST: ICD-10-CM

## 2018-07-03 LAB
GLUCOSE 1H P 100 G GLC PO SERPL-MCNC: 166 MG/DL (ref 60–179)
GLUCOSE 2H P 100 G GLC PO SERPL-MCNC: 137 MG/DL (ref 60–154)
GLUCOSE 3H P 100 G GLC PO SERPL-MCNC: 93 MG/DL (ref 60–139)
GLUCOSE P FAST SERPL-MCNC: 85 MG/DL (ref 60–94)

## 2018-07-03 PROCEDURE — 82952 GTT-ADDED SAMPLES: CPT | Performed by: OBSTETRICS & GYNECOLOGY

## 2018-07-03 PROCEDURE — 82951 GLUCOSE TOLERANCE TEST (GTT): CPT | Performed by: OBSTETRICS & GYNECOLOGY

## 2018-07-03 PROCEDURE — 36415 COLL VENOUS BLD VENIPUNCTURE: CPT | Performed by: OBSTETRICS & GYNECOLOGY

## 2018-07-05 ENCOUNTER — TELEPHONE (OUTPATIENT)
Dept: OBGYN | Facility: OTHER | Age: 38
End: 2018-07-05

## 2018-07-09 ENCOUNTER — PRENATAL OFFICE VISIT (OUTPATIENT)
Dept: FAMILY MEDICINE | Facility: OTHER | Age: 38
End: 2018-07-09
Payer: COMMERCIAL

## 2018-07-09 VITALS
WEIGHT: 216 LBS | HEIGHT: 66 IN | DIASTOLIC BLOOD PRESSURE: 80 MMHG | BODY MASS INDEX: 34.72 KG/M2 | SYSTOLIC BLOOD PRESSURE: 132 MMHG | HEART RATE: 84 BPM

## 2018-07-09 DIAGNOSIS — O09.523 ELDERLY MULTIGRAVIDA, THIRD TRIMESTER: Primary | ICD-10-CM

## 2018-07-09 DIAGNOSIS — O30.043 DICHORIONIC DIAMNIOTIC TWIN PREGNANCY IN THIRD TRIMESTER: ICD-10-CM

## 2018-07-09 DIAGNOSIS — G56.03 BILATERAL CARPAL TUNNEL SYNDROME: ICD-10-CM

## 2018-07-09 PROBLEM — O09.522 ELDERLY MULTIGRAVIDA IN SECOND TRIMESTER: Status: RESOLVED | Noted: 2018-05-31 | Resolved: 2018-07-09

## 2018-07-09 PROCEDURE — 99207 ZZC OB VISIT-NO CHARGE - GICH ONLY: CPT | Performed by: FAMILY MEDICINE

## 2018-07-09 ASSESSMENT — PAIN SCALES - GENERAL: PAINLEVEL: NO PAIN (0)

## 2018-07-09 NOTE — NURSING NOTE
Patient presents to the clinic today for a ob check, she is 28w5d. 2 babystep coupons given.     Mary Jones LPN.................. 7/9/2018 8:30 AM

## 2018-07-09 NOTE — PROGRESS NOTES
Three hour glucose test normal.  Has started iron replacement.  Hand paresthesias - sharp stinging in the morning.    Using unisom for sleep.  Mood better/stable.  Has ultrasound scheduled this week.    Problem list:  DI/Di twins   Advanced maternal age   Anxiety disorder   Hypothyroidism   Anxiety disorder   CTS/hand paresthesias    Growth ultrasound is scheduled.  NST starting at 32 weeks.  Continue with OBGYN follow up in particular in regards to delivery planning.  Analy Holbrook MD

## 2018-07-09 NOTE — MR AVS SNAPSHOT
After Visit Summary   7/9/2018    Maricarmen Ornelas    MRN: [id number]           Patient Information     Date Of Birth          1980        Visit Information        Provider Department      7/9/2018 8:30 AM Analy Trimble MD [facility]        Today's Diagnoses     Elderly multigravida, third trimester    -  1    Bilateral carpal tunnel syndrome        Dichorionic diamniotic twin pregnancy in third trimester           Follow-ups after your visit        Your next 10 appointments already scheduled     Jul 10, 2018  3:00 PM CDT   Treatment with Rose Avalos, PT   [facility] ([facility])    [address]  [place] [address]   [phone]            Jul 12, 2018 10:15 AM CDT   (Arrive by 10:00 AM)   US OB LIMITED ONE OR MORE FETUSES with NORA   [facility] ([facility]    [address]  [place] [address]   [phone]           Please bring a list of your medicines (including vitamins, minerals and over-the-counter drugs). Also, tell your doctor about any allergies you may have. Wear comfortable clothes and leave your valuables at home.  If you re less than 20 weeks drink four 8-ounce glasses of fluid an hour before your exam. If you need to empty your bladder before your exam, try to release only a little urine. Then, drink another glass of fluid.  You may have up to two family members in the exam room. If you bring a small child, an adult must be there to care for him or her.  Please call the Imaging Department at your exam site with any questions.            Jul 23, 2018  9:30 AM CDT   ESTABLISHED PRENATAL with Woodrow Hurtado MD   [facility]    [address]  [place] [address] [phone]            Jul 25, 2018  1:00 PM CDT   Treatment with Rose Avalos, ANH   [facility] ([facility])    [address]   [place] [address]   [phone]            Aug 06, 2018  1:45 PM CDT   Treatment with Rose Avalos, ANH   [facility] ([facility])    [address]   [place] [address]   [phone]            Aug 10, 2018 12:45 PM CDT   (Arrive by 12:30 PM)   US OB TWINS FOLLOW UP REPEAT with GHUS1   [facility]    [address]  [place] [address]   [phone]           Please bring a list of your medicines (including vitamins, minerals and over-the-counter drugs). Also, tell your doctor about any allergies you may have. Wear comfortable clothes and leave your valuables at home.  If you re less than 20 weeks drink four 8-ounce glasses of fluid an hour before your exam. If you need to empty your bladder before your exam, try to release only a little urine. Then, drink another glass of fluid.  You may have up to two family members in the exam room. If you bring a small child, an adult must be there to care for him or her.  Please call the Imaging Department at your exam site with any questions.              Who to contact     If you have questions or need follow up information about today's clinic visit or your schedule please contact [facility] directly at [phone].  Normal or non-critical lab and imaging results will be communicated to you by MyChart, letter or phone within 4 business days after the clinic has received the results. If you do not hear from us within 7 days, please contact the clinic through MyChart or phone. If you have a critical or abnormal lab result, we will notify you by phone as soon as possible.  Submit refill requests through [employer] or call your pharmacy and they will forward the refill request to us. Please allow 3 business days for your refill to be completed.     "      Additional Information About Your Visit        Care EveryWhere ID     This is your Care EveryWhere ID. This could be used by other organizations to access your [place] medical records  [id number]        Your Vitals Were     Pulse Height Last Period Breastfeeding? BMI (Body Mass Index)       84 5' 6\" (1.676 m) 12/17/2017 No 34.86 kg/m2        Blood Pressure from Last 3 Encounters:   07/09/18 132/80   06/27/18 130/64   06/11/18 108/78    Weight from Last 3 Encounters:   07/09/18 216 lb (98 kg)   06/27/18 211 lb (95.7 kg)   06/11/18 206 lb 1.6 oz (93.5 kg)              Today, you had the following     No orders found for display       Primary Care Provider Fax #    Physician No Ref-Primary [phone]       No address on file        Equal Access to Services     DOV NICE : Hadwally Clifford, sarah lewis, veronique arriola, joey chavez . So [facility] [phone].    ATENCIÓN: Si habla español, tiene a jiang disposición servicios gratuitos de asistencia lingüística. Cheng al [phone].    We comply with applicable federal civil rights laws and [place] laws. We do not discriminate on the basis of race, color, national origin, age, disability, sex, sexual orientation, or gender identity.            Thank you!     Thank you for choosing [facility] [place] AND [facility]  for your care. Our goal is always to provide you with excellent care. Hearing back from our patients is one way we can continue to improve our services. Please take a few minutes to complete the written survey that you may receive in the mail after your visit with us. Thank you!             Your Updated Medication List - Protect others around you: Learn how to safely use, store and throw away your medicines at www.disposemymeds.org.          This list is accurate as of 7/9/18 10:24 AM.  Always use your most recent med list.                   Brand Name Dispense Instructions for use Diagnosis    " [facility] PRENATAL 28-0.8 MG Tabs      Take 1 tablet by mouth        ferrous gluconate 324 (38 Fe) MG tablet    FERGON    100 tablet    Take 1 tablet (324 mg) by mouth 2 times daily (with meals)    Anemia affecting pregnancy in second trimester       order for DME     1 Device    Equipment being ordered: Blood pressure cuff    Dichorionic diamniotic twin pregnancy in second trimester       order for DME     1 Device    Equipment being ordered: maternity support belt    Elderly multigravida in second trimester, Dichorionic diamniotic twin pregnancy in second trimester       SYNTHROID 75 MCG tablet   Generic drug:  levothyroxine

## 2018-07-10 ENCOUNTER — HOSPITAL ENCOUNTER (OUTPATIENT)
Dept: PHYSICAL THERAPY | Facility: OTHER | Age: 38
Setting detail: THERAPIES SERIES
End: 2018-07-10
Attending: FAMILY MEDICINE
Payer: COMMERCIAL

## 2018-07-10 PROCEDURE — 40000185 ZZHC STATISTIC PT OUTPT VISIT

## 2018-07-10 PROCEDURE — 97110 THERAPEUTIC EXERCISES: CPT | Mod: GP

## 2018-07-10 PROCEDURE — 97140 MANUAL THERAPY 1/> REGIONS: CPT | Mod: GP

## 2018-07-12 ENCOUNTER — HOSPITAL ENCOUNTER (OUTPATIENT)
Dept: ULTRASOUND IMAGING | Facility: OTHER | Age: 38
Discharge: HOME | End: 2018-07-12
Attending: OBSTETRICS & GYNECOLOGY | Admitting: OBSTETRICS & GYNECOLOGY
Payer: COMMERCIAL

## 2018-07-12 DIAGNOSIS — O30.042 DICHORIONIC DIAMNIOTIC TWIN PREGNANCY IN SECOND TRIMESTER: ICD-10-CM

## 2018-07-12 PROCEDURE — 76816 OB US FOLLOW-UP PER FETUS: CPT

## 2018-07-16 ENCOUNTER — TELEPHONE (OUTPATIENT)
Dept: OBGYN | Facility: OTHER | Age: 38
End: 2018-07-16

## 2018-07-16 ENCOUNTER — NURSE TRIAGE (OUTPATIENT)
Dept: OBGYN | Facility: OTHER | Age: 38
End: 2018-07-16

## 2018-07-16 NOTE — TELEPHONE ENCOUNTER
"Pt telephoned this RN with the following S&S.  Reports for two months she has had pain, numbness, and tingling to bilateral hands, wrists, and arms.  Reports S&S have worsened over the past week, and \"swelling\" is now present to her entire body.  Reports S&S have now spread to her bilateral feet.  Reports she is unable to hold objects, and is unable to write.  Reports she is receiving PT Q week, is doing the prescribed exercises, and is wearing wrist braces at [facility].  Reports she believes above are not helping with the S&S. Pt advised and instructed of care advice.  Pt verbalizes understanding.  States she has taken Tylenol for pain, yet it is ineffective.  Pt given appt for tomorrow with Dr. MOMO Holbrook.  Tracey Oconnell RN on 7/16/2018 at 9:24 AM    Reason for Disposition    [1] MODERATE pain (e.g., interferes with normal activities) AND [2] present > 3 days    Additional Information    Negative: Shock suspected (e.g., cold/pale/clammy skin, too weak to stand, low BP, rapid pulse)    Negative: Difficult to awaken or acting confused  (e.g., disoriented, slurred speech)    Negative: Sounds like a life-threatening emergency to the triager    Negative: Chest pain    Negative: Arm pains with exertion (e.g., walking)    Negative: Muscle aches from influenza (flu) suspected    Negative: Sickle cell pain crisis (sickle cell attack) suspected    Negative: Muscle aches from heat exposure suspected    Negative: Lyme disease suspected (e.g., bull's eye rash or tick bite / exposure in past month)    Negative: Pain only in back    Negative: Pain in one arm OR arm pains caused by recent vigorous activity (e.g., sports, lifting, overuse)    Negative: Pain in one leg OR leg pains caused by recent vigorous activity (e.g. sports, lifting, overuse)    Negative: Rash over large area or most of the body (widespread or generalized)    Negative: Dark (cola colored) or red-colored urine    Negative: [1] Drinking very little AND [2] " "dehydration suspected (e.g., no urine > 12 hours, very dry mouth, very lightheaded)    Negative: Patient sounds very sick or weak to the triager    Negative: [1] SEVERE pain (e.g., excruciating, unable to do any normal activities) AND [2] not improved 2 hours after pain medicine    Negative: [1] SEVERE pain AND [2] taking a statin medicine (a lipid or cholesterol lowering drug)    Negative: Fever > 104 F (40 C)    Negative: [1] Fever > 101 F (38.3 C) AND [2] age > 60    Negative: [1] Fever > 101 F (38.3 C) AND [2] bedridden (e.g., nursing home patient, CVA, chronic illness, recovering from surgery)    Negative: [1] Fever > 100.5 F (38.1 C) AND [2] indwelling urinary catheter (e.g., Walsh, Coude)    Negative: [1] Fever > 100.5 F (38.1 C) AND [2] diabetes mellitus or weak immune system (e.g., HIV positive, cancer chemo, splenectomy, chronic steroids)    Negative: Fever present > 3 days (72 hours)    Negative: [1] Muscle aches are unexplained AND [2] occur within 1 month of a tick bite    Negative: Diabetes mellitus or weak immune system (e.g., HIV positive, cancer chemo, splenectomy, chronic steroids)    Answer Assessment - Initial Assessment Questions  1. ONSET: \"When did the muscle aches or body pains start?\"       Reports onset of numbness,tingling, and pain to bilateral hands, wrists, and arms began approx. 2 months prior.  Reports S&S have worsened in the past week.  2. LOCATION: \"What part of your body is hurting?\" (e.g., entire body, arms, legs)      Bilateral hands, wrists, and arms.  3. SEVERITY: \"How bad is the pain?\" (Scale 1-10; or mild, moderate, severe)    - MILD (1-3): doesn't interfere with normal activities     - MODERATE (4-7): interferes with normal activities or awakens from sleep     - SEVERE (8-10):  excruciating pain, unable to do any normal activities       Reports pain at 7/10.    4. CAUSE: \"What do you think is causing the pains?\"      Reports she believes the pain is caused by a pinched " "nerve in her neck.  5. FEVER: \"Have you been having fever?\"      No  6. OTHER SYMPTOMS: \"Do you have any other symptoms?\" (e.g., chest pain, weakness, rash, cold or flu symptoms, weight loss)      Reports \"swelling all over\".  7. PREGNANCY: \"Is there any chance you are pregnant?\" \"When was your last menstrual period?\"      Yes, [facility] 9-24-18.  8. TRAVEL: \"Have you traveled out of the country in the last month?\" (e.g., travel history, exposures)      Denies.    Protocols used: MUSCLE ACHES AND BODY PAIN-ADULT-AH    "

## 2018-07-16 NOTE — TELEPHONE ENCOUNTER
Pt 30 wks pregnant, states pain and numbness in arms is getting worse, hurts to do anything, it is affecting her work.

## 2018-07-17 ENCOUNTER — PRENATAL OFFICE VISIT (OUTPATIENT)
Dept: FAMILY MEDICINE | Facility: OTHER | Age: 38
End: 2018-07-17
Attending: FAMILY MEDICINE
Payer: COMMERCIAL

## 2018-07-17 VITALS
DIASTOLIC BLOOD PRESSURE: 80 MMHG | WEIGHT: 217 LBS | BODY MASS INDEX: 34.87 KG/M2 | SYSTOLIC BLOOD PRESSURE: 124 MMHG | HEIGHT: 66 IN | HEART RATE: 88 BPM

## 2018-07-17 DIAGNOSIS — R26.89 BALANCE PROBLEMS: ICD-10-CM

## 2018-07-17 DIAGNOSIS — G56.03 BILATERAL CARPAL TUNNEL SYNDROME: ICD-10-CM

## 2018-07-17 DIAGNOSIS — F43.22 ADJUSTMENT DISORDER WITH ANXIOUS MOOD: ICD-10-CM

## 2018-07-17 DIAGNOSIS — M54.50 ACUTE BILATERAL LOW BACK PAIN WITHOUT SCIATICA: ICD-10-CM

## 2018-07-17 DIAGNOSIS — O30.043 TWIN PREGNANCY, DICHORIONIC/DIAMNIOTIC, THIRD TRIMESTER: Primary | ICD-10-CM

## 2018-07-17 DIAGNOSIS — E03.9 ACQUIRED HYPOTHYROIDISM: ICD-10-CM

## 2018-07-17 PROBLEM — R73.09 ABNORMAL GLUCOSE TOLERANCE TEST: Status: RESOLVED | Noted: 2018-07-01 | Resolved: 2018-07-17

## 2018-07-17 PROCEDURE — 99207 ZZC OB VISIT-NO CHARGE - GICH ONLY: CPT | Performed by: FAMILY MEDICINE

## 2018-07-17 PROCEDURE — 99212 OFFICE O/P EST SF 10 MIN: CPT | Performed by: FAMILY MEDICINE

## 2018-07-17 NOTE — MR AVS SNAPSHOT
After Visit Summary   7/17/2018    Maricarmen Ornelas    MRN: [id number]           Patient Information     Date Of Birth          1980        Visit Information        Provider Department      7/17/2018 3:30 PM Analy Trimble MD [facility]        Today's Diagnoses     Twin pregnancy, dichorionic/diamniotic, third trimester    -  1    Bilateral carpal tunnel syndrome        Adjustment disorder with anxious mood        Balance problems        Acute bilateral low back pain without sciatica        Acquired hypothyroidism           Follow-ups after your visit        Additional Services     ORTHOPEDICS ADULT REFERRAL       Please schedule with Dr Jiménez this week if possible for carpal tunnel injection    Please bring the following to your appointment:    >>   Any x-rays, CTs or MRIs which have been performed.  Contact the facility where they were done to arrange for  prior to your scheduled appointment.    >>   List of current medications   >>   This referral request   >>   Any documents/labs given to you for this referral            PHYSICAL THERAPY REFERRAL       *This therapy referral will be filtered to a centralized scheduling office at [facility] and the patient will receive a call to schedule an appointment at a [facility] location most convenient for them. *     [facility] provides Physical Therapy evaluation and treatment and many specialty services across the [facility] system.  If requesting a specialty program, please choose from the list below.    If you have not heard from the scheduling office within 2 business days, please call [phone] for all locations, with the exception of [place], please call [phone] and [facility], please call [phone]  Treatment: Evaluation & Treatment  Special Instructions/Modalities:   Special Programs: requests Mony Avalos    Please be aware that coverage of these  "services is subject to the terms and limitations of your health insurance plan.  Call member services at your health plan with any benefit or coverage questions.      **Note to Provider:  If you are referring outside of [place] for the therapy appointment, please list the name of the location in the \"special instructions\" above, print the referral and give to the patient to schedule the appointment.                  Your next 10 appointments already scheduled     Jul 23, 2018  9:30 AM CDT   ESTABLISHED PRENATAL with Woodrow Hurtado MD   [facility] ([facility])    [address] [facility] [address]   [phone]            Jul 25, 2018  1:00 PM CDT   Treatment with Rose L Orstad, PT   [facility] ([facility])    [address] [place] [address]   [phone]            Aug 06, 2018  1:45 PM CDT   Treatment with Rose L Orstad, PT   [facility] ([facility])    [address] [address]   [phone]            Aug 10, 2018 12:45 PM CDT   (Arrive by 12:30 PM)   US OB TWINS FOLLOW UP REPEAT with US1   [facility] ([facility])    [address] [facility] [address]   [phone]           Please bring a list of your medicines (including vitamins, minerals and over-the-counter drugs). Also, tell your doctor about any allergies you may have. Wear comfortable clothes and leave your valuables at home.  Drink four 8-ounce glasses of fluid an hour before your exam. If you need to empty your bladder before your exam, try to release only a little urine. Then, drink another glass of fluid.  You may have up to two family members in the exam room. If you bring a small child, an adult must be there to care for him or her. No video or camera photography during the procedure.  Please call the " "Imaging Department at your exam site with any questions.              Who to contact     If you have questions or need follow up information about today's clinic visit or your schedule please contact [facility] directly at [phone].  Normal or non-critical lab and imaging results will be communicated to you by MyChart, letter or phone within 4 business days after the clinic has received the results. If you do not hear from us within 7 days, please contact the clinic through MyChart or phone. If you have a critical or abnormal lab result, we will notify you by phone as soon as possible.  Submit refill requests through [employer] or call your pharmacy and they will forward the refill request to us. Please allow 3 business days for your refill to be completed.          Additional Information About Your Visit        Care EveryWhere ID     This is your Care EveryWhere ID. This could be used by other organizations to access your [place] medical records  [id number]        Your Vitals Were     Pulse Height Last Period Breastfeeding? BMI (Body Mass Index)       88 5' 6\" (1.676 m) 12/17/2017 No 35.02 kg/m2        Blood Pressure from Last 3 Encounters:   07/17/18 124/80   07/09/18 132/80   06/27/18 130/64    Weight from Last 3 Encounters:   07/17/18 217 lb (98.4 kg)   07/09/18 216 lb (98 kg)   06/27/18 211 lb (95.7 kg)              We Performed the Following     ORTHOPEDICS ADULT REFERRAL     PHYSICAL THERAPY REFERRAL        Primary Care Provider Fax #    Physician No Ref-Primary [phone]       No address on file        Equal Access to Services     HAYDEE NICE : Hadii aad ku hadasho Sosouleymaneali, waaxda luqadaha, qaybta kaalmada flako, joey chavez . So [facility] [phone].    ATENCIÓN: Si habla español, tiene a jiang disposición servicios gratuitos de asistencia lingüística. Llame al [phone].    We comply with applicable federal civil rights laws and [place] laws. " We do not discriminate on the basis of race, color, national origin, age, disability, sex, sexual orientation, or gender identity.            Thank you!     Thank you for choosing [facility] AND [facility]  for your care. Our goal is always to provide you with excellent care. Hearing back from our patients is one way we can continue to improve our services. Please take a few minutes to complete the written survey that you may receive in the mail after your visit with us. Thank you!             Your Updated Medication List - Protect others around you: Learn how to safely use, store and throw away your medicines at www.disposemymeds.org.          This list is accurate as of 7/17/18  5:38 PM.  Always use your most recent med list.                   Brand Name Dispense Instructions for use Diagnosis    [facility]S PRENATAL 28-0.8 MG Tabs      Take 1 tablet by mouth        ferrous gluconate 324 (38 Fe) MG tablet    FERGON    100 tablet    Take 1 tablet (324 mg) by mouth 2 times daily (with meals)    Anemia affecting pregnancy in second trimester       order for DME     1 Device    Equipment being ordered: Blood pressure cuff    Dichorionic diamniotic twin pregnancy in second trimester       order for DME     1 Device    Equipment being ordered: maternity support belt    Elderly multigravida in second trimester, Dichorionic diamniotic twin pregnancy in second trimester       SYNTHROID 75 MCG tablet   Generic drug:  levothyroxine

## 2018-07-17 NOTE — NURSING NOTE
Patient presents to the clinic today for a ob check, she is 29w6d. She complains of bilateral numbness in her hands and arms.    Mary Jones LPN.................. 7/17/2018 3:35 PM

## 2018-07-17 NOTE — PROGRESS NOTES
Problem:  Di/Di twins   AMA   Anxiety disorder   Hypothyroidism    CTS/hand paresthesias      Nursing Notes:   HenriettabrockMary, LPN  7/17/2018  3:35 PM  Signed  Patient presents to the clinic today for a ob check, she is 29w6d. She complains of bilateral numbness in her hands and arms.    Mary Jones CHETAN.................. 7/17/2018 3:35 PM      Her main concern is for her CTS - bilateral.  At night this will wake her up.  She has taken to sleeping in a recliner, but that makes her back feel worse.  With driving, has to do this one handed due to hand numbness and pain.  With trying to paint, she either needs to stop due to numbness.    She is also noticing more problems with lower back pain.  She has more problems with balance.  She is not trusting herself with activity such as going downstairs.  At times, she even questions her safety with driving with her hands going to sleep.    Babies have been more active.  She states with her last ultrasound, baby A was breech and baby B was transverse.  She is scheduled for weekly nonstress tests starting in 2 weeks.    We discussed injections for carpal tunnel.  There is limited data in regards to efficacy in pregnancy.  However, there does not seem to be an increased incidence of side effects.  Therefore, patient is very interested in proceeding with this.  She has been consistent with wearing her splints.    Patient is also interested in referral to physical therapy for her back pain and balance, body mechanics assessment.    Analy Holbrook MD

## 2018-07-20 ENCOUNTER — TRANSFERRED RECORDS (OUTPATIENT)
Dept: HEALTH INFORMATION MANAGEMENT | Facility: OTHER | Age: 38
End: 2018-07-20

## 2018-07-20 ENCOUNTER — TELEPHONE (OUTPATIENT)
Dept: FAMILY MEDICINE | Facility: OTHER | Age: 38
End: 2018-07-20

## 2018-07-20 ENCOUNTER — HOSPITAL ENCOUNTER (OUTPATIENT)
Facility: OTHER | Age: 38
Discharge: HOME | End: 2018-07-20
Attending: OBSTETRICS & GYNECOLOGY | Admitting: OBSTETRICS & GYNECOLOGY
Payer: COMMERCIAL

## 2018-07-20 VITALS — DIASTOLIC BLOOD PRESSURE: 84 MMHG | TEMPERATURE: 98 F | SYSTOLIC BLOOD PRESSURE: 129 MMHG | RESPIRATION RATE: 18 BRPM

## 2018-07-20 PROBLEM — Z36.89 ENCOUNTER FOR TRIAGE IN PREGNANT PATIENT: Status: ACTIVE | Noted: 2018-07-20

## 2018-07-20 LAB
ALBUMIN SERPL-MCNC: 3.2 G/DL (ref 3.5–5.7)
ALP SERPL-CCNC: 103 U/L (ref 34–104)
ALT SERPL W P-5'-P-CCNC: 15 U/L (ref 7–52)
AST SERPL W P-5'-P-CCNC: 18 U/L (ref 13–39)
BASOPHILS # BLD AUTO: 0 10E9/L (ref 0–0.2)
BASOPHILS NFR BLD AUTO: 0.3 %
BILIRUB DIRECT SERPL-MCNC: 0.1 MG/DL (ref 0–0.2)
BILIRUB SERPL-MCNC: 0.2 MG/DL (ref 0.3–1)
CREAT SERPL-MCNC: 0.62 MG/DL (ref 0.6–1.2)
DIFFERENTIAL METHOD BLD: ABNORMAL
EOSINOPHIL # BLD AUTO: 0.2 10E9/L (ref 0–0.7)
EOSINOPHIL NFR BLD AUTO: 1.8 %
ERYTHROCYTE [DISTWIDTH] IN BLOOD BY AUTOMATED COUNT: 17.3 % (ref 10–15)
FIBRONECTIN FETAL VAG QL: NEGATIVE
GFR SERPL CREATININE-BSD FRML MDRD: >90 ML/MIN/1.7M2
HCT VFR BLD AUTO: 35.7 % (ref 35–47)
HGB BLD-MCNC: 11.3 G/DL (ref 11.7–15.7)
IMM GRANULOCYTES # BLD: 0.1 10E9/L (ref 0–0.4)
IMM GRANULOCYTES NFR BLD: 0.7 %
LYMPHOCYTES # BLD AUTO: 1.7 10E9/L (ref 0.8–5.3)
LYMPHOCYTES NFR BLD AUTO: 18.2 %
MCH RBC QN AUTO: 27.6 PG (ref 26.5–33)
MCHC RBC AUTO-ENTMCNC: 31.7 G/DL (ref 31.5–36.5)
MCV RBC AUTO: 87 FL (ref 78–100)
MONOCYTES # BLD AUTO: 0.7 10E9/L (ref 0–1.3)
MONOCYTES NFR BLD AUTO: 8 %
NEUTROPHILS # BLD AUTO: 6.5 10E9/L (ref 1.6–8.3)
NEUTROPHILS NFR BLD AUTO: 71 %
PLATELET # BLD AUTO: 281 10E9/L (ref 150–450)
PROT SERPL-MCNC: 6.4 G/DL (ref 6.4–8.9)
RBC # BLD AUTO: 4.09 10E12/L (ref 3.8–5.2)
WBC # BLD AUTO: 9.1 10E9/L (ref 4–11)

## 2018-07-20 PROCEDURE — 36415 COLL VENOUS BLD VENIPUNCTURE: CPT | Performed by: OBSTETRICS & GYNECOLOGY

## 2018-07-20 PROCEDURE — 84156 ASSAY OF PROTEIN URINE: CPT | Performed by: OBSTETRICS & GYNECOLOGY

## 2018-07-20 PROCEDURE — 82731 ASSAY OF FETAL FIBRONECTIN: CPT | Performed by: OBSTETRICS & GYNECOLOGY

## 2018-07-20 PROCEDURE — 82565 ASSAY OF CREATININE: CPT | Performed by: OBSTETRICS & GYNECOLOGY

## 2018-07-20 PROCEDURE — 85025 COMPLETE CBC W/AUTO DIFF WBC: CPT | Performed by: OBSTETRICS & GYNECOLOGY

## 2018-07-20 PROCEDURE — G0463 HOSPITAL OUTPT CLINIC VISIT: HCPCS | Mod: 25

## 2018-07-20 PROCEDURE — 80076 HEPATIC FUNCTION PANEL: CPT | Performed by: OBSTETRICS & GYNECOLOGY

## 2018-07-20 PROCEDURE — G0463 HOSPITAL OUTPT CLINIC VISIT: HCPCS

## 2018-07-20 PROCEDURE — 99214 OFFICE O/P EST MOD 30 MIN: CPT | Performed by: OBSTETRICS & GYNECOLOGY

## 2018-07-20 NOTE — IP AVS SNAPSHOT
MRN:[id number]                      After Visit Summary   7/20/2018    Maricarmen Ornelas    MRN: [id number]           Thank you!     Thank you for choosing [facility] for your care. Our goal is always to provide you with excellent care. Hearing back from our patients is one way we can continue to improve our services. Please take a few minutes to complete the written survey that you may receive in the mail after you visit with us. Thank you!        Patient Information     Date Of Birth          1980        About your hospital stay     You were admitted on:  July 20, 2018 You last received care in the:  [facility]    You were discharged on:  July 20, 2018       Who to Call     For medical emergencies, please call 911.  For non-urgent questions about your medical care, please call your primary care provider or clinic, None          Attending Provider     Provider Specialty    Woodrow Hurtado MD OB/Gyn       Primary Care Provider Fax #    Physician No Ref-Primary [phone]      Your next 10 appointments already scheduled     Jul 23, 2018  9:30 AM CDT   ESTABL[facility]ENAT[facility] with Woodrow [facility]ado [facility]   [facility]    [address]  [place] [address]   [phone]            Jul 25, 2018  1:00 PM CDT   Treatment with Rose L Orstad, PT   [facility]    [address] [address]   [phone]            Aug 06, 2018  1:45 PM CDT   Treatment with Rose L Orstad, PT   [facility]    [address] [address]   [phone]            Aug 10, 2018 12:45 PM CDT   (Arrive by 12:30 PM)    OB TWINS FOLLOW UP REPEAT with [address] ([facility])    [address]  [place] [address]   [phone]            Please bring a list of your medicines (including vitamins, minerals and over-the-counter drugs). Also, tell your doctor about any allergies you may have. Wear comfortable clothes and leave your valuables at home.  Drink four 8-ounce glasses of fluid an hour before your exam. If you need to empty your bladder before your exam, try to release only a little urine. Then, drink another glass of fluid.  You may have up to two family members in the exam room. If you bring a small child, an adult must be there to care for him or her. No video or camera photography during the procedure.  Please call the Imaging Department at your exam site with any questions.              Further instructions from your care team       Discharge Instructions for Undelivered Patients    Diet:    Drink 8 to 12 glasses of liquids (milk, juice, water) every day.    You may eat meals and snacks..    Activity:    Rest the pelvic area. No sex. Do not stimulate breasts or nipples.    Stay on bed rest or partial bed rest. This means: No exercising, lay low everyday    Call your doctor if your baby is moving less than usual.    Call your provider if you notice:    Swelling in your face or increased swelling in your hands or legs.    Headaches that are not relieved by Tylenol (acetaminophen).    Changes in your vision (blurring; seeing spots or stars).    Nausea (sick to your stomach) and vomiting (throwing up).    Weight gain of 5 pounds per week.    Heartburn that doesn't go away.    Signs of bladder infection: pain when you urinate (use the toilet), needing to go more often or more urgently.    The bag of lee (membranes) breaks, or you notice leaking in your underwear.    Bright red blood in your underwear.    Abdominal (lower belly) or stomach pain.    For first baby: Contractions (tightenings) less than 5 minutes apart for one hour or more.    For Second (plus) baby: Contractions (tightenings) less than 10 minutes apart and getting  stronger.    Increase or change in vaginal discharge (note the color and amount).    Other:     Follow up with your provider:  Keep scheduled appointments      Pending Results     Date and Time Order Name Status Description    7/20/2018 1727 Protein Random Urine with Creat Ratio GH In process             Admission Information     Date & Time Provider Department Dept. Phone    7/20/2018 Woodrow Hurtado MD [facility] [phone]      Your Vitals Were     Blood Pressure Respirations Last Period             126/84 18 12/17/2017         Care EveryWhere ID     This is your Care EveryWhere ID. This could be used by other organizations to access your [facility] medical records  [id number]        Equal Access to Services     [facility] : Cassi Clifford, sarah lewis, veronique arriola, joey chavez . So [facility] [phone].    ATENCIÓN: Si habla español, tiene a jiang disposición servicios gratuitos de asistencia lingüística. Llame al [phone].    We comply with applicable federal civil rights laws and [place] laws. We do not discriminate on the basis of race, color, national origin, age, disability, sex, sexual orientation, or gender identity.               Review of your medicines      UNREVIEWED medicines. Ask your doctor about these medicines        Dose / Directions    [facility] PRENATAL 28-0.8 MG Tabs        Dose:  1 tablet   Take 1 tablet by mouth   Refills:  0       ferrous gluconate 324 (38 Fe) MG tablet   Commonly known as:  FERGON   Used for:  Anemia affecting pregnancy in second trimester        Dose:  324 mg   Take 1 tablet (324 mg) by mouth 2 times daily (with meals)   Quantity:  100 tablet   Refills:  3       SYNTHROID 75 MCG tablet   Generic drug:  levothyroxine        Refills:  0         CONTINUE these medicines which have NOT CHANGED        Dose / Directions    order for DME   Used for:  Dichorionic diamniotic twin pregnancy in second  trimester        Equipment being ordered: Blood pressure cuff   Quantity:  1 Device   Refills:  0       order for DME   Used for:  Elderly multigravida in second trimester, Dichorionic diamniotic twin pregnancy in second trimester        Equipment being ordered: maternity support belt   Quantity:  1 Device   Refills:  0                Protect others around you: Learn how to safely use, store and throw away your medicines at www.disposemymeds.org.             Medication List: This is a list of all your medications and when to take them. Check marks below indicate your daily home schedule. Keep this list as a reference.      Medications           Morning Afternoon Evening Bedtime As Needed    [facility] PRENATAL 28-0.8 MG Tabs   Take 1 tablet by mouth                                ferrous gluconate 324 (38 Fe) MG tablet   Commonly known as:  FERGON   Take 1 tablet (324 mg) by mouth 2 times daily (with meals)                                order for DME   Equipment being ordered: Blood pressure cuff                                order for DME   Equipment being ordered: maternity support belt                                SYNTHROID 75 MCG tablet   Generic drug:  levothyroxine                                          More Information        Understanding Preeclampsia  Preeclampsia is pregnancy-related hypertension that develops after 20 weeks' gestation. It can lead to health risks for you and your baby. No one knows what causes preeclampsia. But it is known that the only cure is delivery.     Your blood pressure will be monitored regularly throughout your pregnancy to help check for preeclampsia.   Signs and symptoms  A common sign of preeclampsia is high blood pressure. Other signs and symptoms may include:    Rapid weight gain    Protein in your urine    Headache    Abdominal pain on your right side    Vision problems (flashes or spots)    Edema (swelling) in your face or hands (this also commonly happens near the end of  normal pregnancies, even without preeclampsia)  Tests you may have  Your healthcare provider will want to check your blood pressure throughout your pregnancy. If your blood pressure is high, you may have the following tests:    Urine tests to look for protein    Blood tests to confirm preeclampsia    Fetal monitoring to ensure that your baby is healthy  Treating preeclampsia  A daily low dose of aspirin may be prescribed to those at risk for preeclampsia. Preeclampsia almost always ends soon after you give birth. Until then, your healthcare provider can help manage your condition. If your symptoms are mild, you may need bed rest at home. If your symptoms are severe, you will be hospitalized. Hospital treatment includes:    Complete bed rest to help control blood pressure    Magnesium IV (intravenous) drip during labor to prevent seizures    Induced labor or surgical delivery by  section  When to call your healthcare provider  Call your healthcare provider if swelling, weight gain, or other symptoms come on quickly or are severe. Some cases of preeclampsia are more severe than others. Your signs and symptoms also may change or worsen as you get closer to your due date.  Who s at risk?  Preeclampsia can happen in any pregnant woman. Factors that increase the risk include:    Previous pregnancies. Preeclampsia, intrauterine growth retardation (IUGR),  birth, placental abruption, or fetal death    Medical history of mother. Diabetes, high blood pressure, obesity, kidney disease, autoimmune disease (for example lupus), or family history of preeclampsia    Current pregnancy. First pregnancy, multiple fetuses, over the age of 40 years, or in vitro fertilization  Dangers of preeclampsia  If not treated, preeclampsia can cause problems for you and your baby. The placenta (organ that nourishes your baby) may tear away from the uterine wall. This can lead to fetal distress (the baby is at risk for health  problems) and premature delivery. Preeclampsia can also cause these health problems:    Kidney failure or other organ damage    Seizures    Stroke  Once you give birth  In most cases, preeclampsia goes away on its own soon after you give birth. Within days of delivery, your blood pressure, swelling, and other signs should decrease.  Date Last Reviewed: 6/1/2016 2000-2017 [employer]. [address] [place] [address]. All rights reserved. This information is not intended as a substitute for professional medical care. Always follow your healthcare professional's instructions.                Pregnancy: Your Third Trimester Changes  As the baby grows, your body changes too. You may also see signs that your body is getting ready for labor. Be patient. Within a few more weeks, your baby will be born.    How you are changing  Your body is preparing for the birth of your baby. Some of the most common changes are listed below. If you have any questions or concerns, ask your healthcare provider:    You ll gain more weight from fluids, extra blood, and fat deposits.    Your breasts will grow as your body gets ready to feed the baby. They may be more tender. You may also notice a slight yellow or white discharge from the nipples.    Discharge from your vagina may increase. This is normal.    You might see some skin color changes on your forehead, cheeks, or nose. Most of these will go away after you deliver.  How your baby is growing    Month 7  Your baby can open and close his or her eyes, and weighs around 4 pounds. If born prematurely (too early), your baby would likely survive with special care.   Month 8  Your baby is building up body fat, and weighs around 6 pounds.    Month 9  Your baby weighs nearly 7 pounds and is about 18 to 20 inches long. In other words, any day now...   Date Last Reviewed: 8/16/2015 2000-2017 [employer]. [address] [place] [id number]. All  rights reserved. This information is not intended as a substitute for professional medical care. Always follow your healthcare professional's instructions.                Understanding  Labor  Going into labor before your 37th week of pregnancy is called  labor.  labor can cause your baby to be born too soon. This can lead to a number of health problems that may affect your baby.     Before labor, the cervix is thick and closed.       In  labor, the cervix begins to efface (thin) and dilate (open).      Symptoms of  labor  If you believe you re having  labor, get medical help right away. Contractions alone don t mean you re in  labor. What matters more are changes in your cervix (the lower end of the uterus). Symptoms of  labor include:    Four or more contractions per hour    Strong contractions    Constant menstrual-like cramping    Low-back pain    Mucous or bloody vaginal discharge    Bleeding or spotting in the second or third trimester  Evaluating  labor  Your healthcare provider will try to find out whether you re in  labor or whether you re just having contractions. He or she may watch you for a few hours. The following tests may be done:    Pelvic exam to see if your cervix has effaced (thinned) and dilated (opened)    Uterine activity monitoring to detect contractions    Fetal monitoring to check the health of your baby    Ultrasound to check your baby s size and position    Amniocentesis to check how mature your baby s lungs are  Caring for yourself at home  If you have  contractions, but your cervix is still thick and closed, your healthcare provider may ask you to do the following at home:    Drink plenty of water.    Do fewer activities.    Rest in bed on your side.    Avoid intercourse and nipple stimulation.  When to call your healthcare provider  Call your healthcare provider if you notice any of these:    Four or more  contractions per hour    Bag of water breaks    Bleeding or spotting   If you need hospital care   labor often requires that you have hospital care and complete bed rest. You may have an IV (intravenous) line to get fluids. You may be given pills or injections to help prevent contractions. Finally, you may receive medicine (corticosteroids) that helps your baby s lungs mature more rapidly.  Are you at risk?  Any pregnant woman can have  labor. It may start for no reason. But these risk factors can increase your chances:    Past  labor or past early birth    Smoking, drug, or alcohol use during pregnancy    Multiple fetuses (twins or more)    Problems with the shape of the uterus    Bleeding during the pregnancy  The dangers of  birth  A baby born too soon may have health problems. This is because the baby didn t have enough time to mature. Some of the risks for your baby include:    Not breastfeeding or feeding well    Having immature lungs    Bleeding in the brain    Dying  Reaching term  Your goal is to get as close to term as you can before giving birth. The closer you get to term, the higher your chance of having a healthy baby. Work with your healthcare provider. Together, you can take steps that may keep you from giving birth too early.  Date Last Reviewed: 2016-2017 [employer]. [address] [place] [address], [place] [address]. All rights reserved. This information is not intended as a substitute for professional medical care. Always follow your healthcare professional's instructions.                Discharge Instructions for High Blood Pressure (Hypertension)  You have been diagnosed with high blood pressure (also called hypertension). This means the force of blood against your artery walls is too strong. It also means your heart is working hard to move blood. High blood pressure usually has no symptoms, but over time, it can cause serious health problems. High  blood pressure raises your risk for heart attack, stroke, heart failure, kidney disease, and blindness. With help from your doctor, you can manage your blood pressure and protect your health.  Blood pressure measurements are given as 2 numbers. Systolic blood pressure is the upper number. This is the pressure when the heart contracts. Diastolic blood pressure is the lower number. This is the pressure when the heart relaxes between beats.  Blood pressure is categorized as normal, elevated, or stage 1 or stage 2 high blood pressure:    Normal blood pressure is systolic of less than 120 and diastolic of less than 80 (120/80)    Elevated blood pressure is systolic of 120 to 129 and diastolic less than 80    Stage 1 high blood pressure is systolic is 130 to 139 or diastolic between 80 to 89    Stage 2 high blood pressure is when systolic is 140 or higher or the diastolic is 90 or higher  Taking medicine    Learn to take your own blood pressure. Keep a record of your results. Ask your doctor which readings mean that you need medical attention.    Take your blood pressure medicine exactly as directed. Don t skip doses. Missing doses can cause your blood pressure to get out of control.    If you do miss a dose (or doses) check with your healthcare provider about what to do.    Don't take medicines that contain heart stimulants, including over-the-counter medicines. Check for warnings about high blood pressure on the label. Ask the pharmacist before purchasing something you haven't used before    Check with your doctor or pharmacist before taking a decongestant. Some decongestants can worsen high blood pressure.  Lifestyle changes    Maintain a healthy weight. Get help to lose any extra pounds.    Cut back on salt.  ? Limit canned, dried, packaged, and fast foods.  ? Don t add salt to your food at the table.  ? Season foods with herbs instead of salt when you cook.  ? Request no added salt when you go to a restaurant.  ? The  "American Heart Association (AHA) says the \"ideal\" amount of sodium is no more than 1,500 mg a day.  But because Americans eat so much salt, you can make a positive change by cutting back to even 2,400 mg of sodium a day.     Follow the DASH (Dietary Approaches to Stop Hypertension) eating plan. This plan recommends vegetables, fruits, whole gains, and other heart healthy foods.    Begin an exercise program. Ask your healthcare provider how to get started. The AHA recommends aerobic exercise 3 to 4 times a week for an average of 40 minutes at a time, with your provider's approval. Simple activities like walking or gardening can help.    Break the smoking habit. Enroll in a stop-smoking program to improve your chances of success. Ask your healthcare provider about programs and medicines to help you stop smoking.    Limit drinks that contain caffeine such as coffee, black or green tea, and cola to 2 per day.    Never take stimulants such as amphetamines or cocaine. These drugs can be deadly for someone with high blood pressure.    Control your stress. Learn ways to manage stress.    Limit alcohol to no more than 1 drink a day for women and 2 drinks a day for men.  Follow-up care  Make a follow-up appointment as directed.  When to call your healthcare provider  Call your healthcare provider right away if you have any of the following:    Chest pain or shortness of breath (call 911)    Moderate to severe headache    Weakness in the muscles of your face, arms, or legs    Trouble speaking    Extreme drowsiness    Confusion    Fainting or dizziness    Pulsating or rushing sound in your ears    Unexplained nosebleed    Weakness, tingling, or numbness of your face, arms, or legs    Change in vision    Blood pressure measured at home that is greater than 180/110   Date Last Reviewed: 4/27/2016 2000-2017 [employer]. [address] [place] [address], [place] [address]. All rights reserved. This information is not " intended as a substitute for professional medical care. Always follow your healthcare professional's instructions.

## 2018-07-20 NOTE — IP AVS SNAPSHOT
[facility]    [address]    [place] [address]    Phone:  [phone]    Fax:  [phone]                                       After Visit Summary   7/20/2018    Maricarmen Ornelas    MRN: [id number]           After Visit Summary Signature Page     I have received my discharge instructions, and my questions have been answered. I have discussed any challenges I see with this plan with the nurse or doctor.    ..........................................................................................................................................  Patient/Patient Representative Signature      ..........................................................................................................................................  Patient Representative Print Name and Relationship to Patient    ..................................................               ................................................  Date                                            Time    ..........................................................................................................................................  Reviewed by Signature/Title    ...................................................              ..............................................  Date                                                            Time

## 2018-07-20 NOTE — TELEPHONE ENCOUNTER
----- Message from Analy Bang MD sent at 7/17/2018  5:38 PM CDT -----  Please call Maricarmen - I couldn't get her in to see Dr Ballesteros for injections for over a week, so I sent a referral for her to see Dr. Jiménez, hopefully this week.  Analy Holbrook MD

## 2018-07-20 NOTE — PROGRESS NOTES
38 year old  at 30 2/7 weeks gestation with a twin pregnancy to [facility]. Patient states that she was getting cortisone injections in her wrists. Her BP was elevated after the procedure so she came to OB to be evaluated. EFM/EUM applied will continue to monitor and notify Dr. Hurtado.

## 2018-07-20 NOTE — TELEPHONE ENCOUNTER
Patient notified of below. She states she is seeing Dr Jiménez today.    Mary Jones LPN.................. 7/20/2018 2:31 PM

## 2018-07-20 NOTE — TELEPHONE ENCOUNTER
Patient received injections today. Her BP was 157/97.    Mary Jones LPN.................. 7/20/2018 4:13 PM

## 2018-07-21 NOTE — H&P
[facility]    History and Physical  Obstetrics and Gynecology     Date of Admission:  2018    Assessment & Plan   Maricarmen Ornelas is a 38 year old female who presents with contractions and high blood pressure with dichorionic twins at 30w2d    ASSESSMENT:   IUP @ 30w2d for observation.  NST reactive.  Category  I for both twins    PLAN:   Observation  Home rest, recheck BP on Monday in clinic.  Return to hospital with painful contractions, signs of labor, bleeding, leaking, signs of severe preeclampsia including headache, RUQ pain, or seizure.  Likely mild preeclampsia.  Woodrow Hurtado    History of Present Illness   Maricarmen Ornelas is a 38 year old female  30w2d  Estimated Date of Delivery: Sep 26, 2018 is calculated from Patient's last menstrual period was 2017. [facility]. She was noted to have elevated BP in clinic when getting a shot for carpel tunnel. She has dichorionic twins. She had normalized BP's at rest in [facility]. Lab workup shows some proteinuria, but normal plts, liver panel, and creatinine. She had also noted some right side pain, better at rest.    PRENATAL COURSE  Prenatal course was complicated by Twins and AMA.      Recent Labs   Lab Test  18   1025   ABO  A   RH  Pos   AS  Neg     Rhogam not indicated   Recent Labs   Lab Test  18   1025  18   0838   HEPBANG   --   Nonreactive   HIAGAB  Nonreactive   --    RUQIGG   --   30       Past Medical History    I have reviewed this patient's medical history and updated it with pertinent information if needed.   Past Medical History:   Diagnosis Date     Disorder of thyroid     hypo     Supervision of elderly multigravida     2018     Twin pregnancy 2018    EDC 2018       Past Surgical History   I have reviewed this patient's surgical history and updated it with pertinent information if needed.  Past Surgical History:   Procedure Laterality Date     APPENDECTOMY  OPEN      9/03       Prior to Admission Medications   Prior to Admission Medications   Prescriptions Last Dose Informant Patient Reported? Taking?   Prenatal Vit-Fe Fumarate-FA ([facility] PR[facility]) 28-0.8 MG TABS 7/19/2018 at 1200  Yes Yes   Sig: Take 1 tablet by mouth   ferrous gluconate (FERGON) 324 (38 Fe) MG tablet 7/20/2018 at 0900  No Yes   Sig: Take 1 tablet (324 mg) by mouth 2 times daily (with meals)   levothyroxine (SYNTHROID) 75 MCG tablet 7/19/2018 at 2200  Yes Yes   order for DME More than a month at Unknown time  No No   Sig: Equipment being ordered: Blood pressure cuff   order for DME   No No   Sig: Equipment being ordered: maternity support belt      Facility-Administered Medications: None     Allergies   No Known Allergies    Social History   I have reviewed this patient's social history and updated it with pertinent information if needed. Maricarmen Ornelas  reports that she has never smoked. She has never used smokeless tobacco. She reports that she drinks about 1.2 oz of alcohol per week  She reports that she does not use illicit drugs.    Family History   I have reviewed this patient's family history and updated it with pertinent information if needed.   Family History   Problem Relation Age of Onset     Substance Abuse Maternal Grandmother      Alcohol/Drug,Alcohol abuse     Cancer Maternal Grandmother      Cancer, lung cancer     Other - See Comments Maternal Grandfather      Alzheimer's disease     Diabetes Paternal Grandmother      Diabetes,type 1     No Known Problems Sister      No Known Problems Son      No Known Problems Son      Diabetes Maternal Uncle      Diabetes,type 2     HEART DISEASE No family hx of      Heart Disease       Immunization History   Immunizations are up to date    Physical Exam       BP: 126/84     Resp: 18        Vital Signs with Ranges  Resp:  [18] 18  BP: (122-143)/(71-84) 126/84    Abdomen: gravid, NT  Cervical Exam: Closed, long and high.     Fetal Heart Tones: 140's-150  baseline, moderate variablility, + accels, no decels and Category I for both twins.  TOCO:   external monitor and frequency q 3-5 minutes, mild.    Constitutional: healthy, alert, active and no distress   Respiratory: No increased work of breathing, good air exchange, clear to auscultation bilaterally, no crackles or wheezing  Cardiovascular: Normal apical impulse, regular rate and rhythm, normal S1 and S2, no S3 or S4, and no murmur noted

## 2018-07-21 NOTE — PROGRESS NOTES
Patient is resting in bed, drinking fluids well and keeping bladder empty. She is feeling the tightening, but has no pain at all. She reports that she has had contractions for last 2 months and some that have really been painful, but she is having no pain now.

## 2018-07-21 NOTE — PROGRESS NOTES
Patient discharged to home on modified bedrest and informed she needs to lay low, No exercising and running around. Discharge instructions reviewed with patient by Dr. Hurtado and nurse. Gave her information on Pre-eclampsia, HTN and  labor. Questions answered. She will follow-up on Monday as already planned. Will call or come in if needed

## 2018-07-21 NOTE — DISCHARGE INSTRUCTIONS
Discharge Instructions for Undelivered Patients    Diet:    Drink 8 to 12 glasses of liquids (milk, juice, water) every day.    You may eat meals and snacks..    Activity:    Rest the pelvic area. No sex. Do not stimulate breasts or nipples.    Stay on bed rest or partial bed rest. This means: No exercising, lay low everyday    Call your doctor if your baby is moving less than usual.    Call your provider if you notice:    Swelling in your face or increased swelling in your hands or legs.    Headaches that are not relieved by Tylenol (acetaminophen).    Changes in your vision (blurring; seeing spots or stars).    Nausea (sick to your stomach) and vomiting (throwing up).    Weight gain of 5 pounds per week.    Heartburn that doesn't go away.    Signs of bladder infection: pain when you urinate (use the toilet), needing to go more often or more urgently.    The bag of lee (membranes) breaks, or you notice leaking in your underwear.    Bright red blood in your underwear.    Abdominal (lower belly) or stomach pain.    For first baby: Contractions (tightenings) less than 5 minutes apart for one hour or more.    For Second (plus) baby: Contractions (tightenings) less than 10 minutes apart and getting stronger.    Increase or change in vaginal discharge (note the color and amount).    Other:     Follow up with your provider:  Keep scheduled appointments

## 2018-07-23 ENCOUNTER — PRENATAL OFFICE VISIT (OUTPATIENT)
Dept: OBGYN | Facility: OTHER | Age: 38
End: 2018-07-23
Attending: OBSTETRICS & GYNECOLOGY
Payer: COMMERCIAL

## 2018-07-23 VITALS
WEIGHT: 216.3 LBS | SYSTOLIC BLOOD PRESSURE: 126 MMHG | DIASTOLIC BLOOD PRESSURE: 74 MMHG | BODY MASS INDEX: 34.91 KG/M2 | HEART RATE: 98 BPM

## 2018-07-23 DIAGNOSIS — E03.9 HYPOTHYROIDISM, UNSPECIFIED TYPE: ICD-10-CM

## 2018-07-23 DIAGNOSIS — O16.3 HYPERTENSION AFFECTING PREGNANCY IN THIRD TRIMESTER: Primary | ICD-10-CM

## 2018-07-23 DIAGNOSIS — O30.043 DICHORIONIC DIAMNIOTIC TWIN PREGNANCY IN THIRD TRIMESTER: ICD-10-CM

## 2018-07-23 LAB
ALBUMIN MFR UR ELPH: 17 MG/DL (ref 1–14)
ALBUMIN MFR UR ELPH: 5 MG/DL (ref 1–14)
ALBUMIN UR-MCNC: NEGATIVE MG/DL
CREAT UR-MCNC: 40 MG/DL
CREAT UR-MCNC: 56 MG/DL
CREAT UR-MCNC: NORMAL MG/DL
PROT/CREAT 24H UR: 0.13 MG/G{CREAT}
PROT/CREAT 24H UR: 0.3 MG/G{CREAT}

## 2018-07-23 PROCEDURE — 99207 ZZC OB VISIT-NO CHARGE - GICH ONLY: CPT | Performed by: OBSTETRICS & GYNECOLOGY

## 2018-07-23 PROCEDURE — 82570 ASSAY OF URINE CREATININE: CPT | Performed by: OBSTETRICS & GYNECOLOGY

## 2018-07-23 PROCEDURE — 84156 ASSAY OF PROTEIN URINE: CPT | Performed by: OBSTETRICS & GYNECOLOGY

## 2018-07-23 PROCEDURE — 81003 URINALYSIS AUTO W/O SCOPE: CPT | Performed by: OBSTETRICS & GYNECOLOGY

## 2018-07-23 RX ORDER — LORATADINE 10 MG/1
10 TABLET ORAL DAILY
Qty: 90 TABLET | Refills: 3 | COMMUNITY
Start: 2018-07-23 | End: 2018-09-20

## 2018-07-23 ASSESSMENT — PAIN SCALES - GENERAL: PAINLEVEL: NO PAIN (0)

## 2018-07-23 NOTE — PROGRESS NOTES
CC: Recheck OB visit at 30w5d    HPI: Maricarmen Ornelas presents for a routine OB visit now at 30w5d  She has no concerns. Denies cramping, bleeding, normal fetal movement. No headache or RUQ pain. Contractions at times, but they go away with fluid and rest.    Obstetric History       T2      L2     SAB0   TAB0   Ectopic0   Multiple0   Live Births2       # Outcome Date GA Lbr Renny/2nd Weight Sex Delivery Anes PTL Lv   3 Current            2 Term 10/16/10 40w0d   M  EPI N SMOOTH      Name: William   1 Term  40w0d   M  None N SMOOTH      Name: Kaveh        Current Outpatient Prescriptions   Medication     ferrous gluconate (FERGON) 324 (38 Fe) MG tablet     levothyroxine (SYNTHROID) 75 MCG tablet     loratadine (CLARITIN) 10 MG tablet     order for DME     order for DME     Prenatal Vit-Fe Fumarate-FA ([facility] PRENATAL) 28-0.8 MG TABS     No current facility-administered medications for this visit.          O: /74 (BP Location: Right arm)  Pulse 98  Wt 98.1 kg (216 lb 4.8 oz)  LMP 2017  BMI 34.91 kg/m2  Body mass index is 34.91 kg/(m^2).  See OB flow sheet  EXAM:  NAD  BPP performed in office showing normal movement breathing and fluid and normal tone for both babies.        Results for orders placed or performed during the hospital encounter of 18   Protein Random Urine with Creat Ratio GH   Result Value Ref Range    Total Protein Random Urine 17 (H) 1 - 14 mg/dL    Protein Total UR MG/G CR PENDING mg/g[creat]   Creatinine urine calculation only   Result Value Ref Range    Creatinine Urine 56 mg/dL   CBC with platelets differential   Result Value Ref Range    WBC 9.1 4.0 - 11.0 10e9/L    RBC Count 4.09 3.8 - 5.2 10e12/L    Hemoglobin 11.3 (L) 11.7 - 15.7 g/dL    Hematocrit 35.7 35.0 - 47.0 %    MCV 87 78 - 100 fl    MCH 27.6 26.5 - 33.0 pg    MCHC 31.7 31.5 - 36.5 g/dL    RDW 17.3 (H) 10.0 - 15.0 %    Platelet Count 281 150 - 450 10e9/L    Diff Method Automated Method     %  Neutrophils 71.0 %    % Lymphocytes 18.2 %    % Monocytes 8.0 %    % Eosinophils 1.8 %    % Basophils 0.3 %    % Immature Granulocytes 0.7 %    Absolute Neutrophil 6.5 1.6 - 8.3 10e9/L    Absolute Lymphocytes 1.7 0.8 - 5.3 10e9/L    Absolute Monocytes 0.7 0.0 - 1.3 10e9/L    Absolute Eosinophils 0.2 0.0 - 0.7 10e9/L    Absolute Basophils 0.0 0.0 - 0.2 10e9/L    Abs Immature Granulocytes 0.1 0 - 0.4 10e9/L   Hepatic panel   Result Value Ref Range    Bilirubin Direct 0.1 0.0 - 0.2 mg/dL    Bilirubin Total 0.2 (L) 0.3 - 1.0 mg/dL    Albumin 3.2 (L) 3.5 - 5.7 g/dL    Protein Total 6.4 6.4 - 8.9 g/dL    Alkaline Phosphatase 103 34 - 104 U/L    ALT 15 7 - 52 U/L    AST 18 13 - 39 U/L   Creatinine   Result Value Ref Range    Creatinine 0.62 0.60 - 1.20 mg/dL    GFR Estimate >90 >60 mL/min/1.7m2    GFR Estimate If Black >90 >60 mL/min/1.7m2   Fetal fibronectin   Result Value Ref Range    Fetal Fibronectin Negative        A/P: (O16.3) Hypertension affecting pregnancy in third trimester  (primary encounter diagnosis)    (O30.043) Dichorionic diamniotic twin pregnancy in third trimester  Comment:   Plan: US OB Fetal Biophy Prf wo NonStress Twins Twins       Weekly BPP, deliver at 37 weeks or with severe features.      Recheck in 1 weeks      Problem List:   Dichorionic Twins, monthly growth scans  AMA- weekly NST's after 32 weeks.  Preeclampsia    Woodrow Hurtado MD FACOG  9:56 AM 7/23/2018

## 2018-07-23 NOTE — PROGRESS NOTES
Patient Information     Patient Name  Maricarmen Ornelas MRN  [id number] Sex  Female   1980      Letter by Avril Meyers DO at      Author:  Avril Meyers DO Service:  (none) Author Type:  (none)    Filed:   Encounter Date:  2017 Status:  (Other)           Maricarmen Ornelas  [address] [address]          January 3, 2018    Dear Ms. Ornelas:    The result from the Pap and HPV (Human Papilloma Virus) test(s) you had done at your recent clinic visit came back as normal.     We recommend that you have an adult physical exam each year; however your next pap smear would be due in 5 years.    If you have any further questions or concerns, please call [phone]. You may also contact us by using medical messaging if you have MyChart.    Thank you for choosing [facility] to participate in your healthcare needs.    Sincerely,    AVRIL MEYERS DO         The [place] [facility] Program is a statewide comprehensive breast and cervical cancer screening program that provides free screening and follow-up services (including colposcopy) to uninsured and underinsured women. For more information call toll free [phone] ([phone])

## 2018-07-23 NOTE — TELEPHONE ENCOUNTER
Original pt call came in on 7-16-18 regarding reporting pain and numbness in arms is getting worse, hurts to do anything, and is effecting her work.  Pt was examined by Dr. MOMO Holbrook regarding above on 7-17-18.  Tracey Oconnell RN on 7/23/2018 at 8:21 AM

## 2018-07-23 NOTE — MR AVS SNAPSHOT
After Visit Summary   7/23/2018    Maricarmen Ornelas    MRN: [id number]           Patient Information     Date Of Birth          1980        Visit Information        Provider Department      7/23/2018 9:30 AM Woodrow Hurtado MD [facility]        Today's Diagnoses     Hypertension affecting pregnancy in third trimester    -  1    Dichorionic diamniotic twin pregnancy in third trimester           Follow-ups after your visit        Your next 10 appointments already scheduled     Jul 25, 2018  1:00 PM CDT   Treatment with Rose Avalos, PT   [facility] ([facility] [place] [facility])    [address]  [place] [address]   [phone]            Jul 30, 2018  1:45 PM CDT   (Arrive by 1:30 PM)   US OB BIOPHYSICAL PROFILE WITHOUT NON STRESS TEST MULTIPLE with GHUS[facility]   [facility]    [address]  [place] [address]   [phone]           Please bring a list of your medicines (including vitamins, minerals and over-the-counter drugs). Also, tell your doctor about any allergies you may have. Wear comfortable clothes and leave your valuables at home.  Drink four 8-ounce glasses of fluid an hour before your exam. If you need to empty your bladder before your exam, try to release only a little urine. Then, drink another glass of fluid.  You may have up to two family members in the exam room. If you bring a small child, an adult must be there to care for him or her. No video or camera photography during the procedure.  Please call the Imaging Department at your exam site with any questions.            Jul 30, 2018  3:45 PM CDT   ESTABLISHED PRENATAL with Woodrow Hurtado MD   [facility]    [address]  [place] [address]   [phone]            Aug 06, 2018  1:45 PM CDT   (Arrive by 1:30 PM)   US OB BIOPHYSICAL  PROFILE WITHOUT NON STRESS TEST MULTIPLE with GHUS2   [facility]    [address] [facility] [address]   [phone]           Please bring a list of your medicines (including vitamins, minerals and over-the-counter drugs). Also, tell your doctor about any allergies you may have. Wear comfortable clothes and leave your valuables at home.  Drink four 8-ounce glasses of fluid an hour before your exam. If you need to empty your bladder before your exam, try to release only a little urine. Then, drink another glass of fluid.  You may have up to two family members in the exam room. If you bring a small child, an adult must be there to care for him or her. No video or camera photography during the procedure.  Please call the Imaging Department at your exam site with any questions.            Aug 06, 2018  3:30 PM CDT   ESTABLISHED PRENATAL with Woodrow Hurtado MD   [facility]    [address] [facility] [address]   [phone]            Aug 10, 2018 12:45 PM CDT   (Arrive by 12:30 PM)    OB TWINS FOLLOW UP REPEAT with [facility]   [facility]    [address] [facility] [address]   [phone]           Please bring a list of your medicines (including vitamins, minerals and over-the-counter drugs). Also, tell your doctor about any allergies you may have. Wear comfortable clothes and leave your valuables at home.  Drink four 8-ounce glasses of fluid an hour before your exam. If you need to empty your bladder before your exam, try to release only a little urine. Then, drink another glass of fluid.  You may have up to two family members in the exam room. If you bring a small child, an adult must be there to care for him or her. No video or camera photography during the procedure.  Please call the Imaging Department at  your exam site with any questions.            Aug 13, 2018  1:45 PM CDT   (Arrive by 1:30 PM)   US OB BIOPHYSICAL PROFILE WITHOUT NON STRESS TEST MULTIPLE with [address] ([facility])    [facility] [address]   [phone]           Please bring a list of your medicines (including vitamins, minerals and over-the-counter drugs). Also, tell your doctor about any allergies you may have. Wear comfortable clothes and leave your valuables at home.  Drink four 8-ounce glasses of fluid an hour before your exam. If you need to empty your bladder before your exam, try to release only a little urine. Then, drink another glass of fluid.  You may have up to two family members in the exam room. If you bring a small child, an adult must be there to care for him or her. No video or camera photography during the procedure.  Please call the Imaging Department at your exam site with any questions.            Aug 13, 2018  3:30 PM CDT   ESTABLISHED PRENATAL with Woodrow Hurtado MD   [facility]    [facility] [address]   [phone]            Aug 21, 2018  1:45 PM CDT   (Arrive by 1:30 PM)   US OB BIOPHYSICAL PROFILE WITHOUT NON STRESS TEST MULTIPLE with [address] ([facility])    [address] [place], [place] [address] [facility] [address]   [phone]           Please bring a list of your medicines (including vitamins, minerals and over-the-counter drugs). Also, tell your doctor about any allergies you may have. Wear comfortable clothes and leave your valuables at home.  Drink four 8-ounce glasses of fluid an hour before your exam. If you need to empty your bladder before your exam, try to release only a little urine. Then, drink another glass of fluid.  You may have up to two family members in the exam room. If you bring a small  child, an adult must be there to care for him or her. No video or camera photography during the procedure.  Please call the Imaging Department at your exam site with any questions.            Aug 21, 2018  3:15 PM CDT   ESTABLISHED PRENATAL with Crissy Bull MD   [facility] ([facility])    [address]  [place] [address]   [phone]              Future tests that were ordered for you today     Open Standing Orders        Priority Remaining Interval Expires Ordered    US OB Fetal Biophy Prf wo NonStress Twins Twins Routine 6/6 weekly 7/23/2019 7/23/2018          Open Future Orders        Priority Expected Expires Ordered    US OB Fetal Biophy Prf wo NonStress Twins Twins Routine  7/23/2019 7/23/2018    US OB Fetal Biophy Prf wo NonStress Twins Twins Routine  7/23/2019 7/23/2018    US OB Fetal Biophy Prf wo NonStress Twins Twins Routine  7/23/2019 7/23/2018    US OB Fetal Biophy Prf wo NonStress Twins Twins Routine  7/23/2019 7/23/2018    US OB Fetal Biophy Prf wo NonStress Twins Twins Routine  7/23/2019 7/23/2018            Who to contact     If you have questions or need follow up information about today's clinic visit or your schedule please contact [facility] directly at [phone].  Normal or non-critical lab and imaging results will be communicated to you by MyChart, letter or phone within 4 business days after the clinic has received the results. If you do not hear from us within 7 days, please contact the clinic through MyChart or phone. If you have a critical or abnormal lab result, we will notify you by phone as soon as possible.  Submit refill requests through [employer] or call your pharmacy and they will forward the refill request to us. Please allow 3 business days for your refill to be completed.          Additional Information About Your Visit        Care EveryWhere ID     This is your Care EveryWhere ID. This  could be used by other organizations to access your [place] medical records  [id number]        Your Vitals Were     Pulse Last Period BMI (Body Mass Index)             98 12/17/2017 34.91 kg/m2          Blood Pressure from Last 3 Encounters:   07/23/18 126/74   07/20/18 129/84   07/17/18 124/80    Weight from Last 3 Encounters:   07/23/18 98.1 kg (216 lb 4.8 oz)   07/17/18 98.4 kg (217 lb)   07/09/18 98 kg (216 lb)               Primary Care Provider Office Phone # Fax #    Analy CRAFT Siva Bang [phone]       [address] [facility] [address]        Equal Access to Services     HAYDEE NICE : Cassi genaoo Sojuliette, waaxda luqadaha, qaybta kaalmada adeegyagraeme, joey amin. So [facility] [phone].    ATENCIÓN: Si habla español, tiene a jiang disposición servicios gratuitos de asistencia lingüística. [facility] [phone].    We comply with applicable federal civil rights laws and [place] laws. We do not discriminate on the basis of race, color, national origin, age, disability, sex, sexual orientation, or gender identity.            Thank you!     Thank you for choosing [facility]  for your care. Our goal is always to provide you with excellent care. Hearing back from our patients is one way we can continue to improve our services. Please take a few minutes to complete the written survey that you may receive in the mail after your visit with us. Thank you!             Your Updated Medication List - Protect others around you: Learn how to safely use, store and throw away your medicines at www.disposemymeds.org.          This list is accurate as of 7/23/18 10:47 AM.  Always use your most recent med list.                   Brand Name Dispense Instructions for use Diagnosis    CLARITIN 10 MG tablet   Generic drug:  loratadine     90 tablet    Take 1 tablet (10 mg) by mouth daily        [facility] PR[facility] 28-0.8 MG Tabs      Take 1  tablet by mouth        ferrous gluconate 324 (38 Fe) MG tablet    FERGON    100 tablet    Take 1 tablet (324 mg) by mouth 2 times daily (with meals)    Anemia affecting pregnancy in second trimester       order for DME     1 Device    Equipment being ordered: Blood pressure cuff    Dichorionic diamniotic twin pregnancy in second trimester       order for DME     1 Device    Equipment being ordered: maternity support belt    Elderly multigravida in second trimester, Dichorionic diamniotic twin pregnancy in second trimester       SYNTHROID 75 MCG tablet   Generic drug:  levothyroxine

## 2018-07-29 ENCOUNTER — TELEPHONE (OUTPATIENT)
Dept: OBGYN | Facility: OTHER | Age: 38
End: 2018-07-29

## 2018-07-29 NOTE — TELEPHONE ENCOUNTER
Patient called into [facility] for concerns of an elevated BP.  She is a 39 y/o  with twin gestation who was recently diagnosed with pre-eclampsia.  Has been checking her BP at home intermittently.  She denies any HA, vision changes, RUQ abdominal pains.  Prior to checking her BP - she had been watching a movie for ~1-1.5 hours.  Proceeded to get up and walk around for a little while and then get a glass of water.  When she got back to sit down, she checked her BP - found it to be 151/91.    Recommendation:  Decrease stimulation - dark room, quiet, etc.  Lay down on left side for 20 min and recheck BP in 20-30 minutes.  Call back with results.  Avril Sanders D.O.  2018 5:00pm        ADDENDUM  Patient returns phone call at 5:24pm.  She reports a BP reading of 133/85.  Continues to deny symptoms of HA, vision changes, RUQ abdominal pains.    Has an appointment with Dr. Hurtado scheduled for tomorrow, 2018.  Avril Sanders, DO on 2018 at 5:45 PM

## 2018-07-30 ENCOUNTER — PRENATAL OFFICE VISIT (OUTPATIENT)
Dept: OBGYN | Facility: OTHER | Age: 38
End: 2018-07-30
Attending: OBSTETRICS & GYNECOLOGY
Payer: COMMERCIAL

## 2018-07-30 ENCOUNTER — HOSPITAL ENCOUNTER (OUTPATIENT)
Dept: ULTRASOUND IMAGING | Facility: OTHER | Age: 38
Discharge: HOME | End: 2018-07-30
Attending: OBSTETRICS & GYNECOLOGY | Admitting: OBSTETRICS & GYNECOLOGY
Payer: COMMERCIAL

## 2018-07-30 VITALS
SYSTOLIC BLOOD PRESSURE: 136 MMHG | HEART RATE: 92 BPM | DIASTOLIC BLOOD PRESSURE: 78 MMHG | WEIGHT: 217.2 LBS | BODY MASS INDEX: 35.06 KG/M2

## 2018-07-30 DIAGNOSIS — O30.043 DICHORIONIC DIAMNIOTIC TWIN PREGNANCY IN THIRD TRIMESTER: ICD-10-CM

## 2018-07-30 DIAGNOSIS — O09.523 ELDERLY MULTIGRAVIDA IN THIRD TRIMESTER: ICD-10-CM

## 2018-07-30 DIAGNOSIS — O16.3 HYPERTENSION AFFECTING PREGNANCY IN THIRD TRIMESTER: ICD-10-CM

## 2018-07-30 DIAGNOSIS — R30.0 DYSURIA: ICD-10-CM

## 2018-07-30 DIAGNOSIS — E03.9 HYPOTHYROIDISM, UNSPECIFIED TYPE: ICD-10-CM

## 2018-07-30 DIAGNOSIS — O14.93 PRE-ECLAMPSIA IN THIRD TRIMESTER: Primary | ICD-10-CM

## 2018-07-30 LAB
ALBUMIN MFR UR ELPH: 5 MG/DL (ref 1–14)
ALBUMIN SERPL-MCNC: 3.2 G/DL (ref 3.5–5.7)
ALBUMIN UR-MCNC: NEGATIVE MG/DL
ALP SERPL-CCNC: 106 U/L (ref 34–104)
ALT SERPL W P-5'-P-CCNC: 10 U/L (ref 7–52)
APPEARANCE UR: CLEAR
AST SERPL W P-5'-P-CCNC: 14 U/L (ref 13–39)
BILIRUB DIRECT SERPL-MCNC: 0.1 MG/DL (ref 0–0.2)
BILIRUB SERPL-MCNC: 0.1 MG/DL (ref 0.3–1)
BILIRUB UR QL STRIP: NEGATIVE
COLOR UR AUTO: YELLOW
CREAT UR-MCNC: 31 MG/DL
ERYTHROCYTE [DISTWIDTH] IN BLOOD BY AUTOMATED COUNT: 17.9 % (ref 10–15)
GLUCOSE UR STRIP-MCNC: NEGATIVE MG/DL
HCT VFR BLD AUTO: 36.8 % (ref 35–47)
HGB BLD-MCNC: 11.9 G/DL (ref 11.7–15.7)
HGB UR QL STRIP: NEGATIVE
KETONES UR STRIP-MCNC: NEGATIVE MG/DL
LEUKOCYTE ESTERASE UR QL STRIP: NEGATIVE
MCH RBC QN AUTO: 27.8 PG (ref 26.5–33)
MCHC RBC AUTO-ENTMCNC: 32.3 G/DL (ref 31.5–36.5)
MCV RBC AUTO: 86 FL (ref 78–100)
NITRATE UR QL: NEGATIVE
PH UR STRIP: 6 PH (ref 5–9)
PLATELET # BLD AUTO: 276 10E9/L (ref 150–450)
PROT SERPL-MCNC: 6.5 G/DL (ref 6.4–8.9)
PROT/CREAT 24H UR: 0.16 MG/G{CREAT}
RBC # BLD AUTO: 4.28 10E12/L (ref 3.8–5.2)
SOURCE: NORMAL
SP GR UR STRIP: <1.005 (ref 1–1.03)
TSH SERPL DL<=0.05 MIU/L-ACNC: 2.78 IU/ML (ref 0.34–5.6)
UROBILINOGEN UR STRIP-ACNC: 0.2 EU/DL (ref 0.2–1)
WBC # BLD AUTO: 9.1 10E9/L (ref 4–11)

## 2018-07-30 PROCEDURE — 36415 COLL VENOUS BLD VENIPUNCTURE: CPT | Performed by: OBSTETRICS & GYNECOLOGY

## 2018-07-30 PROCEDURE — 81003 URINALYSIS AUTO W/O SCOPE: CPT | Performed by: OBSTETRICS & GYNECOLOGY

## 2018-07-30 PROCEDURE — 84156 ASSAY OF PROTEIN URINE: CPT | Performed by: OBSTETRICS & GYNECOLOGY

## 2018-07-30 PROCEDURE — 99207 ZZC OB VISIT-NO CHARGE - GICH ONLY: CPT | Performed by: OBSTETRICS & GYNECOLOGY

## 2018-07-30 PROCEDURE — 84443 ASSAY THYROID STIM HORMONE: CPT | Performed by: OBSTETRICS & GYNECOLOGY

## 2018-07-30 PROCEDURE — 59025 FETAL NON-STRESS TEST: CPT | Performed by: OBSTETRICS & GYNECOLOGY

## 2018-07-30 PROCEDURE — 85027 COMPLETE CBC AUTOMATED: CPT | Performed by: OBSTETRICS & GYNECOLOGY

## 2018-07-30 PROCEDURE — 76819 FETAL BIOPHYS PROFIL W/O NST: CPT

## 2018-07-30 PROCEDURE — 80076 HEPATIC FUNCTION PANEL: CPT | Performed by: OBSTETRICS & GYNECOLOGY

## 2018-07-30 ASSESSMENT — PAIN SCALES - GENERAL: PAINLEVEL: NO PAIN (0)

## 2018-07-30 NOTE — MR AVS SNAPSHOT
After Visit Summary   7/30/2018    Maricarmen Ornelas    MRN: [id number]           Patient Information     Date Of Birth          1980        Visit Information        Provider Department      7/30/2018 3:15 PM Woodrow Hurtado MD [facility]        Today's Diagnoses     Pre-eclampsia in third trimester    -  1    Dysuria        Hypothyroidism, unspecified type        Elderly multigravida in third trimester           Follow-ups after your visit        Your next 10 appointments already scheduled     Aug 06, 2018  1:45 PM CDT   (Arrive by 1:30 PM)   US OB BIOPHYSICAL PROFILE WITHOUT NON STRESS TEST MULTIPLE with [address] ([facility])    [address] [facility] [id number] [phone]           Please bring a list of your medicines (including vitamins, minerals and over-the-counter drugs). Also, tell your doctor about any allergies you may have. Wear comfortable clothes and leave your valuables at home.  Drink four 8-ounce glasses of fluid an hour before your exam. If you need to empty your bladder before your exam, try to release only a little urine. Then, drink another glass of fluid.  You may have up to two family members in the exam room. If you bring a small child, an adult must be there to care for him or her. No video or camera photography during the procedure.  Please call the Imaging Department at your exam site with any questions.            Aug 06, 2018  3:30 PM CDT   ESTABLISHED PRENATAL with Woodrow Hurtado MD   [facility] ([facility])    [address] [facility] [id number] [phone]            Aug 10, 2018 12:45 PM CDT   (Arrive by 12:30 PM)   US OB TWINS FOLLOW UP REPEAT with [address] ([facility])    [address] [facility] [address] [phone]           Please bring a list  of your medicines (including vitamins, minerals and over-the-counter drugs). Also, tell your doctor about any allergies you may have. Wear comfortable clothes and leave your valuables at home.  Drink four 8-ounce glasses of fluid an hour before your exam. If you need to empty your bladder before your exam, try to release only a little urine. Then, drink another glass of fluid.  You may have up to two family members in the exam room. If you bring a small child, an adult must be there to care for him or her. No video or camera photography during the procedure.  Please call the Imaging Department at your exam site with any questions.            Aug 13, 2018  1:45 PM CDT   (Arrive by 1:30 PM)   US OB BIOPHYSICAL PROFILE WITHOUT NON STRESS TEST MULTIPLE with [address] ([facility])    [address] [facility] [address]   [phone]           Please bring a list of your medicines (including vitamins, minerals and over-the-counter drugs). Also, tell your doctor about any allergies you may have. Wear comfortable clothes and leave your valuables at home.  Drink four 8-ounce glasses of fluid an hour before your exam. If you need to empty your bladder before your exam, try to release only a little urine. Then, drink another glass of fluid.  You may have up to two family members in the exam room. If you bring a small child, an adult must be there to care for him or her. No video or camera photography during the procedure.  Please call the Imaging Department at your exam site with any questions.            Aug 13, 2018  3:30 PM CDT   ESTABLISHED PRENATAL with Woodrow Hurtado MD   [facility] ([facility])    [address]  [place] [address]   [phone]            Aug 21, 2018  1:45 PM CDT   (Arrive by 1:30 PM)   US OB BIOPHYSICAL PROFILE WITHOUT NON STRESS TEST MULTIPLE with [address]  [facility] ([facility])    [address] [facility] [address]   [phone]           Please bring a list of your medicines (including vitamins, minerals and over-the-counter drugs). Also, tell your doctor about any allergies you may have. Wear comfortable clothes and leave your valuables at home.  Drink four 8-ounce glasses of fluid an hour before your exam. If you need to empty your bladder before your exam, try to release only a little urine. Then, drink another glass of fluid.  You may have up to two family members in the exam room. If you bring a small child, an adult must be there to care for him or her. No video or camera photography during the procedure.  Please call the Imaging Department at your exam site with any questions.            Aug 21, 2018  3:15 PM CDT   ESTABLISHED PRENATAL with Crissy Bull MD   [facility] ([facility])    [address] [facility] [address]   [phone]            Aug 27, 2018  1:45 PM CDT   (Arrive by 1:30 PM)    OB BIOPHYSICAL PROFILE WITHOUT NON STRESS TEST MULTIPLE with GHUS2   [facility] ([facility])    [address] [facility] [address]   [phone]           Please bring a list of your medicines (including vitamins, minerals and over-the-counter drugs). Also, tell your doctor about any allergies you may have. Wear comfortable clothes and leave your valuables at home.  Drink four 8-ounce glasses of fluid an hour before your exam. If you need to empty your bladder before your exam, try to release only a little urine. Then, drink another glass of fluid.  You may have up to two family members in the exam room. If you bring a small child, an adult must be there to care for him or her. No video or camera photography during the procedure.  Please call the Imaging Department at your exam site with any questions.             Aug 27, 2018  3:15 PM CDT   ESTABLISHED PRENATAL with Woodrow Hurtado MD   [facility] ([facility])    [address]  [place] [address] [phone]            Sep 04, 2018 11:00 AM CDT   (Arrive by 10:45 AM)   US OB BIOPHYSICAL PROFILE WITHOUT NON STRESS TEST MULTIPLE with GHUSSP   [facility] ([facility])    [address]  [place] [address]   [phone]           Please bring a list of your medicines (including vitamins, minerals and over-the-counter drugs). Also, tell your doctor about any allergies you may have. Wear comfortable clothes and leave your valuables at home.  Drink four 8-ounce glasses of fluid an hour before your exam. If you need to empty your bladder before your exam, try to release only a little urine. Then, drink another glass of fluid.  You may have up to two family members in the exam room. If you bring a small child, an adult must be there to care for him or her. No video or camera photography during the procedure.  Please call the Imaging Department at your exam site with any questions.              Future tests that were ordered for you today     Open Future Orders        Priority Expected Expires Ordered    Creatinine Routine  7/30/2019 7/30/2018            Who to contact     If you have questions or need follow up information about today's clinic visit or your schedule please contact [facility] [place] [facility] directly at [phone].  Normal or non-critical lab and imaging results will be communicated to you by MyChart, letter or phone within 4 business days after the clinic has received the results. If you do not hear from us within 7 days, please contact the clinic through [employer] or phone. If you have a critical or abnormal lab result, we will notify you by phone as soon as possible.  Submit refill requests through [employer] or call your pharmacy and they will  forward the refill request to us. Please allow 3 business days for your refill to be completed.          Additional Information About Your Visit        Care EveryWhere ID     This is your Care EveryWhere ID. This could be used by other organizations to access your [place] medical records  [id number]        Your Vitals Were     Pulse Last Period BMI (Body Mass Index)             92 12/17/2017 35.06 kg/m2          Blood Pressure from Last 3 Encounters:   07/30/18 136/78   07/23/18 126/74   07/20/18 129/84    Weight from Last 3 Encounters:   07/30/18 98.5 kg (217 lb 3.2 oz)   07/23/18 98.1 kg (216 lb 4.8 oz)   07/17/18 98.4 kg (217 lb)              We Performed the Following     CBC W PLT No Diff     Creatinine urine calculation only     FETAL NON-STRESS TEST     Hepatic Function Panel     Protein Random Urine     TSH     UA reflex to Microscopic and Culture        Primary Care Provider Office Phone # Fax #    Analy LUANA Bang [phone]       [address] [facility] [address]        Equal Access to Services     [facility]: Hadii lizbeth regalado hadasho Sojuliette, waaxda luqadaha, qaybta kaalmada flako, joey chavez . So [facility] [phone].    ATENCIÓN: Si habla español, tiene a jiang disposición servicios gratuitos de asistencia lingüística. Llame al [phone].    We comply with applicable federal civil rights laws and [place] laws. We do not discriminate on the basis of race, color, national origin, age, disability, sex, sexual orientation, or gender identity.            Thank you!     Thank you for choosing [facility] AND [facility]  for your care. Our goal is always to provide you with excellent care. Hearing back from our patients is one way we can continue to improve our services. Please take a few minutes to complete the written survey that you may receive in the mail after your visit with us. Thank you!             Your Updated  Medication List - Protect others around you: Learn how to safely use, store and throw away your medicines at www.disposemymeds.org.          This list is accurate as of 7/30/18  4:57 PM.  Always use your most recent med list.                   Brand Name Dispense Instructions for use Diagnosis    CLARITIN 10 MG tablet   Generic drug:  loratadine     90 tablet    Take 1 tablet (10 mg) by mouth daily        [facility] PRENATAL 28-0.8 MG Tabs      Take 1 tablet by mouth        ferrous gluconate 324 (38 Fe) MG tablet    FERGON    100 tablet    Take 1 tablet (324 mg) by mouth 2 times daily (with meals)    Anemia affecting pregnancy in second trimester       order for DME     1 Device    Equipment being ordered: Blood pressure cuff    Dichorionic diamniotic twin pregnancy in second trimester       order for DME     1 Device    Equipment being ordered: maternity support belt    Elderly multigravida in second trimester, Dichorionic diamniotic twin pregnancy in second trimester       SYNTHROID 75 MCG tablet   Generic drug:  levothyroxine           UNISOM 25 MG Tabs tablet   Generic drug:  doxylamine     14 each    Take 2 tablets (50 mg) by mouth nightly as needed

## 2018-07-30 NOTE — PROGRESS NOTES
CC: Recheck OB visit at 31w5d    HPI: Maricarmen Ornelas presents for a routine OB visit now at 31w5d  Pregnancy is complicated by a dichorionic diamniotic twin gestation, advanced maternal age, mild preeclampsia.  Denies cramping, bleeding, normal fetal movement.  She has noted a few more headaches as of late.  She denies right upper quadrant pain.  No worsening of her edema.  She has some contractions which are not regular at this time.    Obstetric History       T2      L2     SAB0   TAB0   Ectopic0   Multiple0   Live Births2       # Outcome Date GA Lbr Renny/2nd Weight Sex Delivery Anes PTL Lv   3 Current            2 Term 10/16/10 40w0d   M  EPI N SMOOTH      Name: William   1 Term  40w0d   M  None N SMOOTH      Name: Kaveh        Current Outpatient Prescriptions   Medication     doxylamine (UNISOM) 25 MG TABS tablet     ferrous gluconate (FERGON) 324 (38 Fe) MG tablet     levothyroxine (SYNTHROID) 75 MCG tablet     loratadine (CLARITIN) 10 MG tablet     order for DME     order for DME     Prenatal Vit-Fe Fumarate-FA ([facility] PRENATAL) 28-0.8 MG TABS     No current facility-administered medications for this visit.          O: /78 (BP Location: Right arm)  Pulse 92  Wt 98.5 kg (217 lb 3.2 oz)  LMP 2017  BMI 35.06 kg/m2  Body mass index is 35.06 kg/(m^2).  See OB flow sheet  EXAM:  NAD    NST doucmentation:    Indication: Advanced maternal age    Duration: 15:16-15:45  31w5d  FHR baseline: 140/140  Accelerations: y  Decelerations: n  Contractions: n    Category 1 for both twins    Results for orders placed or performed in visit on 18   UA reflex to Microscopic and Culture   Result Value Ref Range    Color Urine Yellow     Appearance Urine Clear     Glucose Urine Negative NEG^Negative mg/dL    Bilirubin Urine Negative NEG^Negative    Ketones Urine Negative NEG^Negative mg/dL    Specific Gravity Urine <1.005 1.000 - 1.030    Blood Urine Negative NEG^Negative    pH Urine 6.0 5.0 -  9.0 pH    Protein Albumin Urine Negative NEG^Negative mg/dL    Urobilinogen Urine 0.2 0.2 - 1.0 EU/dL    Nitrite Urine Negative NEG^Negative    Leukocyte Esterase Urine Negative NEG^Negative    Source Midstream Urine    Protein Random Urine   Result Value Ref Range    Total Protein Random Urine 5 1 - 14 mg/dL    Protein Total UR MG/G CR 0.16 mg/g[creat]   CBC W PLT No Diff   Result Value Ref Range    WBC 9.1 4.0 - 11.0 10e9/L    RBC Count 4.28 3.8 - 5.2 10e12/L    Hemoglobin 11.9 11.7 - 15.7 g/dL    Hematocrit 36.8 35.0 - 47.0 %    MCV 86 78 - 100 fl    MCH 27.8 26.5 - 33.0 pg    MCHC 32.3 31.5 - 36.5 g/dL    RDW 17.9 (H) 10.0 - 15.0 %    Platelet Count 276 150 - 450 10e9/L   Hepatic Function Panel   Result Value Ref Range    Bilirubin Direct 0.1 0.0 - 0.2 mg/dL    Bilirubin Total 0.1 (L) 0.3 - 1.0 mg/dL    Albumin 3.2 (L) 3.5 - 5.7 g/dL    Protein Total 6.5 6.4 - 8.9 g/dL    Alkaline Phosphatase 106 (H) 34 - 104 U/L    ALT 10 7 - 52 U/L    AST 14 13 - 39 U/L   Creatinine urine calculation only   Result Value Ref Range    Creatinine Urine 31 mg/dL         Results for orders placed or performed during the hospital encounter of 07/30/18   US OB Fetal Biophy Prf wo NonStress Twins Twins    Narrative    US OB BIOPHYSICAL PROFILE W/O NON STRESS TWINS    History: Hypertension affecting pregnancy in third trimester;  Dichorionic diamniotic twin pregnancy in third trimester.    Fetal Movement:  Score 2: At least 3 discrete body/limb movements in 30 minutes  Score 0: Up to 2 episodes of limb/body movements in 30 minutes                    FM = 2 and 2    Fetal Breathing movements:  Score 2: At least one episode, at least 30 seconds duration in 30  minutes of observation.  Score 0: Absent or no episodes of greater than 30 seconds    duration in 30 minutes observation.                    FBM = 2 and 2    Fetal Tone:  Score 2: At least one episode of active extension with return to     flexion of fetal limbs or trunk, opening  and closing of     hands considered normal tone.  Score 0: Absent or no episodes in 30 minutes of observation.                    FT = 2 and 2    Amniotic Fluid Volume:  Score 2: At least one pocket of amniotic fluid measuring at least    1 cm in two perpendicular planes.  Score 0: Either no amniotic fluid or a pocket less than 1 cm in    two perpendicular planes.                    AF = 2 and 2                        TOTAL = 8 and 8    BABY A    Deepest pocket: 6.6 cm  HRT Rate: 150 bpm  Placenta Location: Anterior  Placenta ndGndrndanddndend:nd nd2nd Position: Breech    Deepest pocket: 6.0 cm  HRT Rate: 134 bpm  Placenta Location: Posterior  Placenta ndGndrndanddndend:nd nd2nd Position: Transverse      Impression    IMPRESSION:    Normal biophysical profiles for both fetuses of a twin pregnancy.    SURY STODDARD MD       A/P: Preeclampsia, mild.  Twin gestation, dichorionic.  Continue weekly biophysical profiles.  We will do preeclampsia testing again today.      Recheck in 1 week    Problem List:   Dichorionic Twins, monthly growth scans  AMA- weekly NST's after 32 weeks.  Preeclampsia    Woodrow Hurtado MD FACOG  3:26 PM 7/30/2018

## 2018-08-06 ENCOUNTER — PRENATAL OFFICE VISIT (OUTPATIENT)
Dept: OBGYN | Facility: OTHER | Age: 38
End: 2018-08-06
Attending: OBSTETRICS & GYNECOLOGY
Payer: COMMERCIAL

## 2018-08-06 ENCOUNTER — HOSPITAL ENCOUNTER (OUTPATIENT)
Dept: ULTRASOUND IMAGING | Facility: OTHER | Age: 38
Discharge: HOME | End: 2018-08-06
Attending: OBSTETRICS & GYNECOLOGY | Admitting: OBSTETRICS & GYNECOLOGY
Payer: COMMERCIAL

## 2018-08-06 VITALS
BODY MASS INDEX: 35.35 KG/M2 | SYSTOLIC BLOOD PRESSURE: 128 MMHG | DIASTOLIC BLOOD PRESSURE: 76 MMHG | WEIGHT: 219 LBS | HEART RATE: 88 BPM

## 2018-08-06 DIAGNOSIS — O16.3 HYPERTENSION AFFECTING PREGNANCY IN THIRD TRIMESTER: Primary | ICD-10-CM

## 2018-08-06 DIAGNOSIS — O30.043 DICHORIONIC DIAMNIOTIC TWIN PREGNANCY IN THIRD TRIMESTER: ICD-10-CM

## 2018-08-06 DIAGNOSIS — O09.523 ELDERLY MULTIGRAVIDA IN THIRD TRIMESTER: ICD-10-CM

## 2018-08-06 DIAGNOSIS — O16.3 HYPERTENSION AFFECTING PREGNANCY IN THIRD TRIMESTER: ICD-10-CM

## 2018-08-06 LAB
ALBUMIN SERPL-MCNC: 3.1 G/DL (ref 3.5–5.7)
ALP SERPL-CCNC: 114 U/L (ref 34–104)
ALT SERPL W P-5'-P-CCNC: 20 U/L (ref 7–52)
ANION GAP SERPL CALCULATED.3IONS-SCNC: 9 MMOL/L (ref 3–14)
AST SERPL W P-5'-P-CCNC: 21 U/L (ref 13–39)
BASOPHILS # BLD AUTO: 0 10E9/L (ref 0–0.2)
BASOPHILS NFR BLD AUTO: 0.4 %
BILIRUB SERPL-MCNC: 0.2 MG/DL (ref 0.3–1)
BUN SERPL-MCNC: 9 MG/DL (ref 7–25)
CALCIUM SERPL-MCNC: 9 MG/DL (ref 8.6–10.3)
CHLORIDE SERPL-SCNC: 105 MMOL/L (ref 98–107)
CO2 SERPL-SCNC: 21 MMOL/L (ref 21–31)
CREAT SERPL-MCNC: 0.64 MG/DL (ref 0.6–1.2)
DIFFERENTIAL METHOD BLD: ABNORMAL
EOSINOPHIL # BLD AUTO: 0.1 10E9/L (ref 0–0.7)
EOSINOPHIL NFR BLD AUTO: 1.3 %
ERYTHROCYTE [DISTWIDTH] IN BLOOD BY AUTOMATED COUNT: 18.2 % (ref 10–15)
GFR SERPL CREATININE-BSD FRML MDRD: >90 ML/MIN/1.7M2
GLUCOSE SERPL-MCNC: 78 MG/DL (ref 70–105)
HCT VFR BLD AUTO: 36 % (ref 35–47)
HGB BLD-MCNC: 11.8 G/DL (ref 11.7–15.7)
IMM GRANULOCYTES # BLD: 0.1 10E9/L (ref 0–0.4)
IMM GRANULOCYTES NFR BLD: 0.7 %
LYMPHOCYTES # BLD AUTO: 1.4 10E9/L (ref 0.8–5.3)
LYMPHOCYTES NFR BLD AUTO: 18.7 %
MCH RBC QN AUTO: 28.3 PG (ref 26.5–33)
MCHC RBC AUTO-ENTMCNC: 32.8 G/DL (ref 31.5–36.5)
MCV RBC AUTO: 86 FL (ref 78–100)
MONOCYTES # BLD AUTO: 0.6 10E9/L (ref 0–1.3)
MONOCYTES NFR BLD AUTO: 7.7 %
NEUTROPHILS # BLD AUTO: 5.3 10E9/L (ref 1.6–8.3)
NEUTROPHILS NFR BLD AUTO: 71.2 %
PLATELET # BLD AUTO: 261 10E9/L (ref 150–450)
POTASSIUM SERPL-SCNC: 3.9 MMOL/L (ref 3.5–5.1)
PROT SERPL-MCNC: 6.5 G/DL (ref 6.4–8.9)
RBC # BLD AUTO: 4.17 10E12/L (ref 3.8–5.2)
SODIUM SERPL-SCNC: 135 MMOL/L (ref 134–144)
WBC # BLD AUTO: 7.5 10E9/L (ref 4–11)

## 2018-08-06 PROCEDURE — 80053 COMPREHEN METABOLIC PANEL: CPT | Performed by: OBSTETRICS & GYNECOLOGY

## 2018-08-06 PROCEDURE — 76819 FETAL BIOPHYS PROFIL W/O NST: CPT

## 2018-08-06 PROCEDURE — 36415 COLL VENOUS BLD VENIPUNCTURE: CPT | Performed by: OBSTETRICS & GYNECOLOGY

## 2018-08-06 PROCEDURE — 99207 ZZC OB VISIT-NO CHARGE - GICH ONLY: CPT | Performed by: OBSTETRICS & GYNECOLOGY

## 2018-08-06 PROCEDURE — 59025 FETAL NON-STRESS TEST: CPT | Performed by: OBSTETRICS & GYNECOLOGY

## 2018-08-06 PROCEDURE — 85025 COMPLETE CBC W/AUTO DIFF WBC: CPT | Performed by: OBSTETRICS & GYNECOLOGY

## 2018-08-06 ASSESSMENT — PAIN SCALES - GENERAL: PAINLEVEL: NO PAIN (0)

## 2018-08-06 NOTE — PROGRESS NOTES
CC: Recheck OB visit at 32w5d    HPI: Maricarmen Ornelas presents for a routine OB visit now at 32w5d  Has fatigue, pelvic pressure, occasional headache. Elevated BP at home. She has mostly 130-140 systolic with one 160 noted. When at rest they normalize. Normal in office today. Active babies with BPP 10/10 for both babies.    Obstetric History       T2      L2     SAB0   TAB0   Ectopic0   Multiple0   Live Births2       # Outcome Date GA Lbr Renny/2nd Weight Sex Delivery Anes PTL Lv   3 Current            2 Term 10/16/10 40w0d   M  EPI N SMOOTH      Name: William   1 Term  40w0d   M  None N SMOOTH      Name: Kaveh        Current Outpatient Prescriptions   Medication     doxylamine (UNISOM) 25 MG TABS tablet     ferrous gluconate (FERGON) 324 (38 Fe) MG tablet     levothyroxine (SYNTHROID) 75 MCG tablet     loratadine (CLARITIN) 10 MG tablet     order for DME     order for DME     Prenatal Vit-Fe Fumarate-FA ([facility] PRENAT[facility]) 28-0.8 MG TABS     No current facility-administered medications for this visit.          O: /76 (BP Location: Right arm, Patient Position: Sitting, Cuff Size: Adult Large)  Pulse 88  Wt 99.3 kg (219 lb)  LMP 2017  Breastfeeding? No  BMI 35.35 kg/m2  Body mass index is 35.35 kg/(m^2).  See OB flow sheet  EXAM:  NAD  cx closed, firm, high.    NST doucmentation:    Indication: (O16.3) Hypertension affecting pregnancy in third trimester  (primary encounter diagnosis)  Comment:   Plan: CBC and Differential, Comprehensive Metabolic         Panel, Creatinine            Duration: 3575-0458  32w5d  FHR baseline: 130/140  Accelerations: y  Decelerations: n   Contractions: n    Category 1 for both twins      Results for orders placed or performed during the hospital encounter of 18   US OB Fetal Biophy Prf wo NonStress Twins Twins    Narrative    US OB BIOPHYSICAL PROFILE W/O NON STRESS TWINS    History: Hypertension affecting pregnancy in third trimester;  Dichorionic  diamniotic twin pregnancy in third trimester.    Fetal Movement:  Score 2: At least 3 discrete body/limb movements in 30 minutes  Score 0: Up to 2 episodes of limb/body movements in 30 minutes                    FM = 2 and 2    Fetal Breathing movements:  Score 2: At least one episode, at least 30 seconds duration in 30  minutes of observation.  Score 0: Absent or no episodes of greater than 30 seconds    duration in 30 minutes observation.                    FBM = 2 and 2    Fetal Tone:  Score 2: At least one episode of active extension with return to     flexion of fetal limbs or trunk, opening and closing of     hands considered normal tone.  Score 0: Absent or no episodes in 30 minutes of observation.                    FT = 2 and 2    Amniotic Fluid Volume:  Score 2: At least one pocket of amniotic fluid measuring at least    1 cm in two perpendicular planes.  Score 0: Either no amniotic fluid or a pocket less than 1 cm in    two perpendicular planes.                    AF = 2 and 2                        TOTAL = 8 and 8    BABY A    Deepest pocket: 3.6 cm  HRT Rate: 142 bpm  Placenta Location: Anterior  Placenta ndGndrndanddndend:nd nd2nd Position: Cephalic    Deepest pocket: 6.9 cm  HRT Rate: 130 bpm  Placenta Location: Posterior  Placenta ndGndrndanddndend:nd nd2nd Position: Cephalic      Impression    IMPRESSION:    Normal biophysical profiles for both fetuses of a twin pregnancy.    SURY STODDARD MD       A/P: (O16.3) Hypertension affecting pregnancy in third trimester  (primary encounter diagnosis)  Comment:   Plan: CBC and Differential, Comprehensive Metabolic         Panel, Creatinine          Recommend modified home rest as much as possible. Transfer if her HTN becomes severe or lab abnormalities suggesting HELLP syndrome. Delivery at 37 weeks if she remains stable.      Recheck in 1 week    Problem List:   Dichorionic Twins, monthly growth scans  AMA- weekly NST's after 32 weeks.  Preeclampsia    Woodrow Hurtado MD FACOG  2:54  PM 8/6/2018

## 2018-08-06 NOTE — MR AVS SNAPSHOT
After Visit Summary   8/6/2018    Maricarmen Ornelas    MRN: [id number]           Patient Information     Date Of Birth          1980        Visit Information        Provider Department      8/6/2018 3:30 PM Woodrow Hurtado MD [facility]        Today's Diagnoses     Hypertension affecting pregnancy in third trimester    -  1    Dichorionic diamniotic twin pregnancy in third trimester           Follow-ups after your visit        Your next 10 appointments already scheduled     Aug 06, 2018  3:30 PM CDT   ESTABLISHED PRENATAL with Woodrow Hurtado MD   [facility] ([facility])    [address] [facility] [address]   [phone]            Aug 10, 2018 12:45 PM CDT   (Arrive by 12:30 PM)   US OB TWINS FOLLOW UP REPEAT with [address] ([facility])    [address] [facility] [address]   [phone]           Please bring a list of your medicines (including vitamins, minerals and over-the-counter drugs). Also, tell your doctor about any allergies you may have. Wear comfortable clothes and leave your valuables at home.  Drink four 8-ounce glasses of fluid an hour before your exam. If you need to empty your bladder before your exam, try to release only a little urine. Then, drink another glass of fluid.  You may have up to two family members in the exam room. If you bring a small child, an adult must be there to care for him or her. No video or camera photography during the procedure.  Please call the Imaging Department at your exam site with any questions.            Aug 13, 2018  1:45 PM CDT   (Arrive by 1:30 PM)   US OB BIOPHYSICAL PROFILE WITHOUT NON STRESS TEST MULTIPLE with [address] ([facility])    [address] [facility] [address]   [phone]           Please bring a list of your medicines  (including vitamins, minerals and over-the-counter drugs). Also, tell your doctor about any allergies you may have. Wear comfortable clothes and leave your valuables at home.  Drink four 8-ounce glasses of fluid an hour before your exam. If you need to empty your bladder before your exam, try to release only a little urine. Then, drink another glass of fluid.  You may have up to two family members in the exam room. If you bring a small child, an adult must be there to care for him or her. No video or camera photography during the procedure.  Please call the Imaging Department at your exam site with any questions.            Aug 13, 2018  3:30 PM CDT   ESTABLISHED PRENATAL with Woodrow Hurtado MD   [facility] ([facility])    [address]  [place] [address]   [phone]            Aug 21, 2018  1:45 PM CDT   (Arrive by 1:30 PM)    OB BIOPHYSICAL PROFILE WITHOUT NON STRESS TEST MULTIPLE with GHUS2   [facility] ([facility])    [address]  [place] [address]   [phone]           Please bring a list of your medicines (including vitamins, minerals and over-the-counter drugs). Also, tell your doctor about any allergies you may have. Wear comfortable clothes and leave your valuables at home.  Drink four 8-ounce glasses of fluid an hour before your exam. If you need to empty your bladder before your exam, try to release only a little urine. Then, drink another glass of fluid.  You may have up to two family members in the exam room. If you bring a small child, an adult must be there to care for him or her. No video or camera photography during the procedure.  Please call the Imaging Department at your exam site with any questions.            Aug 21, 2018  3:15 PM CDT   ESTABLISHED PRENATAL with Crissy Bull MD   [facility] ([facility])    [facility] [email or web address] [facility] [place] [address]   [phone]            Aug 27, 2018  1:45 PM CDT   (Arrive by 1:30 PM)   US OB BIOPHYSICAL PROFILE WITHOUT NON STRESS TEST MULTIPLE with GHUS2   [facility]    [address]  [place] [address]   [phone]           Please bring a list of your medicines (including vitamins, minerals and over-the-counter drugs). Also, tell your doctor about any allergies you may have. Wear comfortable clothes and leave your valuables at home.  Drink four 8-ounce glasses of fluid an hour before your exam. If you need to empty your bladder before your exam, try to release only a little urine. Then, drink another glass of fluid.  You may have up to two family members in the exam room. If you bring a small child, an adult must be there to care for him or her. No video or camera photography during the procedure.  Please call the Imaging Department at your exam site with any questions.            Aug 27, 2018  3:15 PM CDT   ESTABLISHED PRENATAL with Woodrow Hurtado MD   [facility]    [address]  [place] [address]   [phone]            Sep 04, 2018 11:00 AM CDT   (Arrive by 10:45 AM)   US OB BIOPHYSICAL PROFILE WITHOUT NON STRESS TEST MULTIPLE with GHUSSP   [facility]    [address] [address]   [phone]           Please bring a list of your medicines (including vitamins, minerals and over-the-counter drugs). Also, tell your doctor about any allergies you may have. Wear comfortable clothes and leave your valuables at home.  Drink four 8-ounce glasses of fluid an hour before your exam. If you need to empty your bladder before your exam, try to release only a little urine. Then, drink another glass of fluid.  You may have up to two family members in the exam room. If  you bring a small child, an adult must be there to care for him or her. No video or camera photography during the procedure.  Please call the Imaging Department at your exam site with any questions.            Sep 04, 2018 12:45 PM CDT   ESTABLISHED PRENATAL with Woodrow Hurtado MD   [facility] ([facility])    [address]  [place] [address] [phone]              Who to contact     If you have questions or need follow up information about today's clinic visit or your schedule please contact [facility] directly at [phone].  Normal or non-critical lab and imaging results will be communicated to you by MyChart, letter or phone within 4 business days after the clinic has received the results. If you do not hear from us within 7 days, please contact the clinic through MyChart or phone. If you have a critical or abnormal lab result, we will notify you by phone as soon as possible.  Submit refill requests through [facility] [facility] or call your pharmacy and they will forward the refill request to us. Please allow 3 business days for your refill to be completed.          Additional Information About Your Visit        Care EveryWhere ID     This is your Care EveryWhere ID. This could be used by other organizations to access your [place] medical records  [id number]        Your Vitals Were     Pulse Last Period Breastfeeding? BMI (Body Mass Index)          88 12/17/2017 No 35.35 kg/m2         Blood Pressure from Last 3 Encounters:   08/06/18 128/76   07/30/18 136/78   07/23/18 126/74    Weight from Last 3 Encounters:   08/06/18 99.3 kg (219 lb)   07/30/18 98.5 kg (217 lb 3.2 oz)   07/23/18 98.1 kg (216 lb 4.8 oz)              We Performed the Following     CBC and Differential     Comprehensive Metabolic Panel        Primary Care Provider Office Phone # Fax #    Analy Bang [phone]       [address]  [place]   [facility] [address]        Equal Access to Services     [facility] ARLET : Hadii lizbeth regalado chadwickabiola Tatoali, waaaliyahda luqadaha, qaybta kajanessajoey santana. So [facility] [phone].    ATENCIÓN: Si habla español, tiene a jiang disposición servicios gratuitos de asistencia lingüística. Llame al [phone].    We comply with applicable federal civil rights laws and [place] laws. We do not discriminate on the basis of race, color, national origin, age, disability, sex, sexual orientation, or gender identity.            Thank you!     Thank you for choosing [facility]  for your care. Our goal is always to provide you with excellent care. Hearing back from our patients is one way we can continue to improve our services. Please take a few minutes to complete the written survey that you may receive in the mail after your visit with us. Thank you!             Your Updated Medication List - Protect others around you: Learn how to safely use, store and throw away your medicines at www.disposemymeds.org.          This list is accurate as of 8/6/18  3:18 PM.  Always use your most recent med list.                   Brand Name Dispense Instructions for use Diagnosis    CLARITIN 10 MG tablet   Generic drug:  loratadine     90 tablet    Take 1 tablet (10 mg) by mouth daily        [facility] PRENATAL 28-0.8 MG Tabs      Take 1 tablet by mouth        ferrous gluconate 324 (38 Fe) MG tablet    FERGON    100 tablet    Take 1 tablet (324 mg) by mouth 2 times daily (with meals)    Anemia affecting pregnancy in second trimester       order for DME     1 Device    Equipment being ordered: Blood pressure cuff    Dichorionic diamniotic twin pregnancy in second trimester       order for DME     1 Device    Equipment being ordered: maternity support belt    Elderly multigravida in second trimester, Dichorionic diamniotic twin pregnancy in second trimester       SYNTHROID 75 MCG tablet    Generic drug:  levothyroxine           UNISOM 25 MG Tabs tablet   Generic drug:  doxylamine     14 each    Take 2 tablets (50 mg) by mouth nightly as needed

## 2018-08-06 NOTE — NURSING NOTE
Patient presents for 32W5D prenatal visit.  Has had elevated BP of 162/91.  Also increased contractions.   Magali Nelson LPN........................8/6/2018  2:32 PM

## 2018-08-10 ENCOUNTER — NURSE TRIAGE (OUTPATIENT)
Dept: OBGYN | Facility: OTHER | Age: 38
End: 2018-08-10

## 2018-08-10 ENCOUNTER — HOSPITAL ENCOUNTER (OUTPATIENT)
Dept: ULTRASOUND IMAGING | Facility: OTHER | Age: 38
End: 2018-08-10
Attending: OBSTETRICS & GYNECOLOGY
Payer: COMMERCIAL

## 2018-08-10 ENCOUNTER — HOSPITAL ENCOUNTER (OUTPATIENT)
Facility: OTHER | Age: 38
Discharge: HOME | End: 2018-08-10
Attending: OBSTETRICS & GYNECOLOGY | Admitting: OBSTETRICS & GYNECOLOGY
Payer: COMMERCIAL

## 2018-08-10 VITALS — SYSTOLIC BLOOD PRESSURE: 109 MMHG | DIASTOLIC BLOOD PRESSURE: 63 MMHG | TEMPERATURE: 97 F | HEART RATE: 71 BPM

## 2018-08-10 DIAGNOSIS — O30.042 DICHORIONIC DIAMNIOTIC TWIN PREGNANCY IN SECOND TRIMESTER: ICD-10-CM

## 2018-08-10 PROCEDURE — 76816 OB US FOLLOW-UP PER FETUS: CPT

## 2018-08-10 PROCEDURE — G0463 HOSPITAL OUTPT CLINIC VISIT: HCPCS

## 2018-08-10 NOTE — PROGRESS NOTES
[facility] DISCHARGE NOTE    Patient discharged to Ultrasound at 12:40 PM via ambulation. Accompanied by spouse and staff. Discharge instructions reviewed with patient, opportunity offered to ask questions. Prescriptions - None ordered for discharge. All belongings sent with patient.    Cheri Riley

## 2018-08-10 NOTE — PROGRESS NOTES
38 year old  33 2/7 weeks pregnant with twins to [facility] from home. Patient reports that her baby boy had what she think was a seizure in utero at 0645 this AM. EFM/EUM applied will notify Dr. HALINA Bull and continue to monitor.

## 2018-08-10 NOTE — IP AVS SNAPSHOT
MRN:[id number]                      After Visit Summary   8/10/2018    Maricarmen Ornelas    MRN: [id number]           Thank you!     Thank you for choosing [facility] for your care. Our goal is always to provide you with excellent care. Hearing back from our patients is one way we can continue to improve our services. Please take a few minutes to complete the written survey that you may receive in the mail after you visit with us. Thank you!        Patient Information     Date Of Birth          1980        About your hospital stay     You were admitted on:  August 10, 2018 You last received care in the:  [facility]    You were discharged on:  August 10, 2018       Who to Call     For medical emergencies, please call 911.  For non-urgent questions about your medical care, please call your primary care provider or clinic, [phone]          Attending Provider     Provider Crissy Hanna MD OB/Gyn       Primary Care Provider Office Phone # Fax #    Analy CRAFT Siva Bang [phone]      Your next 10 appointments already scheduled     Aug 10, 2018 12:45 PM CDT   (Arrive by 12:30 PM)    OB TWINS FOLLOW UP REPEAT with GHUS1   [facility] ([facility])    [address]  [place] [address] [phone]           Please bring a list of your medicines (including vitamins, minerals and over-the-counter drugs). Also, tell your doctor about any allergies you may have. Wear comfortable clothes and leave your valuables at home.  Drink four 8-ounce glasses of fluid an hour before your exam. If you need to empty your bladder before your exam, try to release only a little urine. Then, drink another glass of fluid.  You may have up to two family members in the exam room. If you bring a small child, an adult must be there to care for him or her. No video or camera photography during the  procedure.  Please call the Imaging Department at your exam site with any questions.            Aug 13, 2018  1:45 PM CDT   (Arrive by 1:30 PM)   US OB BIOPHYSICAL PROFILE WITHOUT NON STRESS TEST MULTIPLE with [address] ([facility])    [address] [facility] [address]   [phone]           Please bring a list of your medicines (including vitamins, minerals and over-the-counter drugs). Also, tell your doctor about any allergies you may have. Wear comfortable clothes and leave your valuables at home.  Drink four 8-ounce glasses of fluid an hour before your exam. If you need to empty your bladder before your exam, try to release only a little urine. Then, drink another glass of fluid.  You may have up to two family members in the exam room. If you bring a small child, an adult must be there to care for him or her. No video or camera photography during the procedure.  Please call the Imaging Department at your exam site with any questions.            Aug 13, 2018  3:30 PM CDT   ESTABLISHED PRENATAL with Woodrow Hurtado MD   [facility] ([facility]    [address] [facility] [address]   [phone]            Aug 21, 2018  1:45 PM CDT   (Arrive by 1:30 PM)   US OB BIOPHYSICAL PROFILE WITHOUT NON STRESS TEST MULTIPLE with [address] ([facility])    [address] [facility] [address]   [phone]           Please bring a list of your medicines (including vitamins, minerals and over-the-counter drugs). Also, tell your doctor about any allergies you may have. Wear comfortable clothes and leave your valuables at home.  Drink four 8-ounce glasses of fluid an hour before your exam. If you need to empty your bladder before your exam, try to release only a little urine. Then, drink another glass of fluid.  You may have up to two family  members in the exam room. If you bring a small child, an adult must be there to care for him or her. No video or camera photography during the procedure.  Please call the Imaging Department at your exam site with any questions.            Aug 21, 2018  3:15 PM CDT   ESTABLISHED PRENATAL with Crissy Bull MD   [facility] ([facility])    [address]  [place] [address]   [phone]            Aug 27, 2018  1:45 PM CDT   (Arrive by 1:30 PM)   US OB BIOPHYSICAL PROFILE WITHOUT NON STRESS TEST MULTIPLE with GHUS2   [facility] ([facility])    [address] [facility] [address]   [phone]           Please bring a list of your medicines (including vitamins, minerals and over-the-counter drugs). Also, tell your doctor about any allergies you may have. Wear comfortable clothes and leave your valuables at home.  Drink four 8-ounce glasses of fluid an hour before your exam. If you need to empty your bladder before your exam, try to release only a little urine. Then, drink another glass of fluid.  You may have up to two family members in the exam room. If you bring a small child, an adult must be there to care for him or her. No video or camera photography during the procedure.  Please call the Imaging Department at your exam site with any questions.            Aug 27, 2018  3:15 PM CDT   ESTABLISHED PRENATAL with Woodrow Hurtado MD   [facility] ([facility])    [address] [facility] [address]   [phone]            Sep 04, 2018 11:00 AM CDT   (Arrive by 10:45 AM)   US OB BIOPHYSICAL PROFILE WITHOUT NON STRESS TEST MULTIPLE with GHUSSP   [facility] ([facility])    [address] [facility] [address]   [phone]           Please bring a list of your medicines (including vitamins,  minerals and over-the-counter drugs). Also, tell your doctor about any allergies you may have. Wear comfortable clothes and leave your valuables at home.  Drink four 8-ounce glasses of fluid an hour before your exam. If you need to empty your bladder before your exam, try to release only a little urine. Then, drink another glass of fluid.  You may have up to two family members in the exam room. If you bring a small child, an adult must be there to care for him or her. No video or camera photography during the procedure.  Please call the Imaging Department at your exam site with any questions.            Sep 04, 2018 12:45 PM CDT   ESTABLISHED PRENATAL with Woodrow Hurtado MD   [facility] ([facility])    [address]  [facility] [address] [phone]              Further instructions from your care team       Please call Dr. Hurtado at [phone] or [facility] [phone] if your symptoms return. Feel free to call us with any questions or concerns.     Pending Results     No orders found from 8/8/2018 to 8/11/2018.            Admission Information     Date & Time Provider Department Dept. Phone    8/10/2018 Crissy Bull MD [facility] [place] [facility] [phone]      Your Vitals Were     Last Period                   12/17/2017           Care EveryWhere ID     This is your Care EveryWhere ID. This could be used by other organizations to access your [place] medical records  [id number]        Equal Access to Services     HAYDEE NICE AH: Hadii aad ku hadasho Sosouleymaneali, waaxda luqadaha, qaybta kaalmada adeegyada, joey amin. So [facility] [phone].    ATENCIÓN: Si habla español, tiene a jiang disposición servicios gratuitos de asistencia lingüística. Llame al [phone].    We comply with applicable federal civil rights laws and [place] laws. We do not discriminate on the basis of race, color, national origin, age, disability, sex,  sexual orientation, or gender identity.               Review of your medicines      UNREVIEWED medicines. Ask your doctor about these medicines        Dose / Directions    CLARITIN 10 MG tablet   Generic drug:  loratadine        Dose:  10 mg   Take 1 tablet (10 mg) by mouth daily   Quantity:  90 tablet   Refills:  3       [facility]S PRENATAL 28-0.8 MG Tabs        Dose:  1 tablet   Take 1 tablet by mouth   Refills:  0       ferrous gluconate 324 (38 Fe) MG tablet   Commonly known as:  FERGON   Used for:  Anemia affecting pregnancy in second trimester        Dose:  324 mg   Take 1 tablet (324 mg) by mouth 2 times daily (with meals)   Quantity:  100 tablet   Refills:  3       SYNTHROID 75 MCG tablet   Generic drug:  levothyroxine        Refills:  0       UNISOM 25 MG Tabs tablet   Generic drug:  doxylamine        Dose:  50 mg   Take 2 tablets (50 mg) by mouth nightly as needed   Quantity:  14 each   Refills:  0         CONTINUE these medicines which have NOT CHANGED        Dose / Directions    order for DME   Used for:  Dichorionic diamniotic twin pregnancy in second trimester        Equipment being ordered: Blood pressure cuff   Quantity:  1 Device   Refills:  0       order for DME   Used for:  Elderly multigravida in second trimester, Dichorionic diamniotic twin pregnancy in second trimester        Equipment being ordered: maternity support belt   Quantity:  1 Device   Refills:  0                Protect others around you: Learn how to safely use, store and throw away your medicines at www.disposemymeds.org.             Medication List: This is a list of all your medications and when to take them. Check marks below indicate your daily home schedule. Keep this list as a reference.      Medications           Morning Afternoon Evening Bedtime As Needed    CLARITIN 10 MG tablet   Take 1 tablet (10 mg) by mouth daily   Generic drug:  loratadine                                [facility]S PRENATAL 28-0.8 MG Tabs   Take 1 tablet by mouth                                 ferrous gluconate 324 (38 Fe) MG tablet   Commonly known as:  FERGON   Take 1 tablet (324 mg) by mouth 2 times daily (with meals)                                order for DME   Equipment being ordered: Blood pressure cuff                                order for DME   Equipment being ordered: maternity support belt                                SYNTHROID 75 MCG tablet   Generic drug:  levothyroxine                                UNISOM 25 MG Tabs tablet   Take 2 tablets (50 mg) by mouth nightly as needed   Generic drug:  doxylamine

## 2018-08-10 NOTE — IP AVS SNAPSHOT
[facility]    [address]    [place] [address]    Phone:  [phone]    Fax:  [phone]                                       After Visit Summary   8/10/2018    Maricarmen Ornelas    MRN: [id number]           After Visit Summary Signature Page     I have received my discharge instructions, and my questions have been answered. I have discussed any challenges I see with this plan with the nurse or doctor.    ..........................................................................................................................................  Patient/Patient Representative Signature      ..........................................................................................................................................  Patient Representative Print Name and Relationship to Patient    ..................................................               ................................................  Date                                            Time    ..........................................................................................................................................  Reviewed by Signature/Title    ...................................................              ..............................................  Date                                                            Time

## 2018-08-10 NOTE — DISCHARGE INSTRUCTIONS
Please call Dr. Hurtado at [phone] or [facility] [phone] if your symptoms return. Feel free to call us with any questions or concerns.

## 2018-08-10 NOTE — TELEPHONE ENCOUNTER
"This RN returned pt's telephone call.  Pt reports \"intense\" contractions Q 10 min or more frequent causing her to have lose stools, lower back pain, one of the babies is having tremors (not hiccups and not kicking) with decreased fetal movement to the same baby.  Reports good fetal movement to the other baby.  Denies vaginal bleeding or leakage of fluid.  Advised and instructed pt the importance of presenting to the [facility] to be assessed and examined, as well as having someone else drive her.  Pt verbalizes understanding, stating it will take her 30 minutes to arrive to the hospital.  This RN telephoned [facility] to inform them of expected pt arrival.  Tracey Oconnell RN on 8/10/2018 at 9:39 AM     "

## 2018-08-13 ENCOUNTER — HOSPITAL ENCOUNTER (OUTPATIENT)
Dept: ULTRASOUND IMAGING | Facility: OTHER | Age: 38
Discharge: HOME | End: 2018-08-13
Attending: OBSTETRICS & GYNECOLOGY | Admitting: OBSTETRICS & GYNECOLOGY
Payer: COMMERCIAL

## 2018-08-13 ENCOUNTER — PRENATAL OFFICE VISIT (OUTPATIENT)
Dept: OBGYN | Facility: OTHER | Age: 38
End: 2018-08-13
Attending: OBSTETRICS & GYNECOLOGY
Payer: COMMERCIAL

## 2018-08-13 VITALS
DIASTOLIC BLOOD PRESSURE: 86 MMHG | SYSTOLIC BLOOD PRESSURE: 136 MMHG | HEART RATE: 88 BPM | WEIGHT: 220 LBS | BODY MASS INDEX: 35.51 KG/M2

## 2018-08-13 DIAGNOSIS — O16.3 HYPERTENSION AFFECTING PREGNANCY IN THIRD TRIMESTER: Primary | ICD-10-CM

## 2018-08-13 DIAGNOSIS — O30.043 DICHORIONIC DIAMNIOTIC TWIN PREGNANCY IN THIRD TRIMESTER: ICD-10-CM

## 2018-08-13 DIAGNOSIS — R19.7 DIARRHEA OF PRESUMED INFECTIOUS ORIGIN: ICD-10-CM

## 2018-08-13 DIAGNOSIS — E03.9 HYPOTHYROIDISM, UNSPECIFIED TYPE: ICD-10-CM

## 2018-08-13 DIAGNOSIS — O16.3 HYPERTENSION AFFECTING PREGNANCY IN THIRD TRIMESTER: ICD-10-CM

## 2018-08-13 LAB
ALBUMIN SERPL-MCNC: 3.1 G/DL (ref 3.5–5.7)
ALP SERPL-CCNC: 117 U/L (ref 34–104)
ALT SERPL W P-5'-P-CCNC: 24 U/L (ref 7–52)
AST SERPL W P-5'-P-CCNC: 23 U/L (ref 13–39)
BILIRUB DIRECT SERPL-MCNC: 0.1 MG/DL (ref 0–0.2)
BILIRUB SERPL-MCNC: 0.1 MG/DL (ref 0.3–1)
CREAT SERPL-MCNC: 0.66 MG/DL (ref 0.6–1.2)
ERYTHROCYTE [DISTWIDTH] IN BLOOD BY AUTOMATED COUNT: 18.2 % (ref 10–15)
GFR SERPL CREATININE-BSD FRML MDRD: >90 ML/MIN/1.7M2
GLUCOSE SERPL-MCNC: 80 MG/DL (ref 70–105)
HCT VFR BLD AUTO: 36.6 % (ref 35–47)
HGB BLD-MCNC: 12 G/DL (ref 11.7–15.7)
MCH RBC QN AUTO: 27.8 PG (ref 26.5–33)
MCHC RBC AUTO-ENTMCNC: 32.8 G/DL (ref 31.5–36.5)
MCV RBC AUTO: 85 FL (ref 78–100)
PLATELET # BLD AUTO: 247 10E9/L (ref 150–450)
PROT SERPL-MCNC: 6.5 G/DL (ref 6.4–8.9)
RBC # BLD AUTO: 4.32 10E12/L (ref 3.8–5.2)
TSH SERPL DL<=0.05 MIU/L-ACNC: 2.96 IU/ML (ref 0.34–5.6)
WBC # BLD AUTO: 7.4 10E9/L (ref 4–11)

## 2018-08-13 PROCEDURE — 80076 HEPATIC FUNCTION PANEL: CPT | Performed by: OBSTETRICS & GYNECOLOGY

## 2018-08-13 PROCEDURE — 82565 ASSAY OF CREATININE: CPT | Performed by: OBSTETRICS & GYNECOLOGY

## 2018-08-13 PROCEDURE — 36415 COLL VENOUS BLD VENIPUNCTURE: CPT | Performed by: OBSTETRICS & GYNECOLOGY

## 2018-08-13 PROCEDURE — 85027 COMPLETE CBC AUTOMATED: CPT | Performed by: OBSTETRICS & GYNECOLOGY

## 2018-08-13 PROCEDURE — 84443 ASSAY THYROID STIM HORMONE: CPT | Performed by: OBSTETRICS & GYNECOLOGY

## 2018-08-13 PROCEDURE — 99207 ZZC OB VISIT-NO CHARGE - GICH ONLY: CPT | Performed by: OBSTETRICS & GYNECOLOGY

## 2018-08-13 PROCEDURE — 76819 FETAL BIOPHYS PROFIL W/O NST: CPT | Mod: 59

## 2018-08-13 PROCEDURE — 82947 ASSAY GLUCOSE BLOOD QUANT: CPT | Performed by: OBSTETRICS & GYNECOLOGY

## 2018-08-13 ASSESSMENT — PAIN SCALES - GENERAL: PAINLEVEL: NO PAIN (0)

## 2018-08-13 NOTE — PROGRESS NOTES
CC: Recheck OB visit at 33w5d    HPI: Maricarmen Ornelas presents for a routine OB visit now at 33w5d  She has concerns of cramps. She is worried about labor and delivery prior to or at 36 weeks and risks of needing to transfer the babies to NICU without her. She had an episode last week where she awoke with a vibration type sensation on her abdomen. She was worried that her baby was having a seizure. Her  was present but didn't see the patient have any odd signs. No seizure or LOC at home. She had monitoring and US that day which were normal. BP is OK today.  She has had more headaches this week. She notes diarrhea four times daily since last Tuesday, but denies fever or blood in her stools. Her  had some stomach issues after eating out last week as well raising the question of food poisoning.    Obstetric History       T2      L2     SAB0   TAB0   Ectopic0   Multiple0   Live Births2       # Outcome Date GA Lbr Renny/2nd Weight Sex Delivery Anes PTL Lv   3 Current            2 Term 10/16/10 40w0d   M  EPI N SMOOTH      Name: William   1 Term  40w0d   M  None N SMOOTH      Name: Kaveh        Current Outpatient Prescriptions   Medication     doxylamine (UNISOM) 25 MG TABS tablet     ferrous gluconate (FERGON) 324 (38 Fe) MG tablet     levothyroxine (SYNTHROID) 75 MCG tablet     loratadine (CLARITIN) 10 MG tablet     order for DME     order for DME     Prenatal Vit-Fe Fumarate-FA ([facility] PRENATAL) 28-0.8 MG TABS     No current facility-administered medications for this visit.          O: /86 (BP Location: Right arm)  Pulse 88  Wt 99.8 kg (220 lb)  LMP 2017  BMI 35.51 kg/m2  Body mass index is 35.51 kg/(m^2).  See OB flow sheet  EXAM:  NAD    NST doucmentation:    Indication: AMA    Duration: 30 min  33w5d  FHR baseline: 140,  130  Accelerations: y  Decelerations: n  Contractions: n    Category 1 for both twins      Results for orders placed or performed in visit on 18    CBC W PLT No Diff   Result Value Ref Range    WBC 7.4 4.0 - 11.0 10e9/L    RBC Count 4.32 3.8 - 5.2 10e12/L    Hemoglobin 12.0 11.7 - 15.7 g/dL    Hematocrit 36.6 35.0 - 47.0 %    MCV 85 78 - 100 fl    MCH 27.8 26.5 - 33.0 pg    MCHC 32.8 31.5 - 36.5 g/dL    RDW 18.2 (H) 10.0 - 15.0 %    Platelet Count 247 150 - 450 10e9/L   Hepatic Function Panel   Result Value Ref Range    Bilirubin Direct 0.1 0.0 - 0.2 mg/dL    Bilirubin Total 0.1 (L) 0.3 - 1.0 mg/dL    Albumin 3.1 (L) 3.5 - 5.7 g/dL    Protein Total 6.5 6.4 - 8.9 g/dL    Alkaline Phosphatase 117 (H) 34 - 104 U/L    ALT 24 7 - 52 U/L    AST 23 13 - 39 U/L   Creatinine   Result Value Ref Range    Creatinine 0.66 0.60 - 1.20 mg/dL    GFR Estimate >90 >60 mL/min/1.7m2    GFR Estimate If Black >90 >60 mL/min/1.7m2   Glucose   Result Value Ref Range    Glucose 80 70 - 105 mg/dL       A/P: (O16.3) Hypertension affecting pregnancy in third trimester  (primary encounter diagnosis)    (O30.043) Dichorionic diamniotic twin pregnancy in third trimester    (A09) Diarrhea of presumed infectious origin    (E03.9) Hypothyroidism, unspecified type    Recheck [facility] labs, stool culture and SHAMIKA for infectious diarrhea, supportive cares at this point.  Recheck next week.   Scheduled for 37 weeks  if not indicated sooner.  She would like a contact at [facility] should she need sooner delivery and was given the names of Dr. Chilel and Dr. Marylou Harris in 1 week    Problem List:   Dichorionic Twins, monthly growth scans  AMA- weekly NST's after 32 weeks.  Preeclampsia vs orthostatic proteinuria.    Woodrow Hurtado MD FACOG  4:31 PM 2018

## 2018-08-13 NOTE — MR AVS SNAPSHOT
After Visit Summary   8/13/2018    Maricarmen Ornelas    MRN: [id number]           Patient Information     Date Of Birth          1980        Visit Information        Provider Department      8/13/2018 3:30 PM Woodrow Hurtado MD [facility]        Today's Diagnoses     Hypertension affecting pregnancy in third trimester    -  1    Dichorionic diamniotic twin pregnancy in third trimester        Diarrhea of presumed infectious origin        Hypothyroidism, unspecified type           Follow-ups after your visit        Your next 10 appointments already scheduled     Aug 21, 2018  1:45 PM CDT   (Arrive by 1:30 PM)   US OB BIOPHYSICAL PROFILE WITHOUT NON STRESS TEST MULTIPLE with [address] ([facility])    [address]  [place] [address]   [phone]           Please bring a list of your medicines (including vitamins, minerals and over-the-counter drugs). Also, tell your doctor about any allergies you may have. Wear comfortable clothes and leave your valuables at home.  Drink four 8-ounce glasses of fluid an hour before your exam. If you need to empty your bladder before your exam, try to release only a little urine. Then, drink another glass of fluid.  You may have up to two family members in the exam room. If you bring a small child, an adult must be there to care for him or her. No video or camera photography during the procedure.  Please call the Imaging Department at your exam site with any questions.            Aug 21, 2018  3:15 PM CDT   ESTABLISHED PRENATAL with Crissy Bull MD   [facility] ([facility])    [address]  [place] [address]   [phone]            Aug 27, 2018  1:45 PM CDT   (Arrive by 1:30 PM)   US OB BIOPHYSICAL PROFILE WITHOUT NON STRESS TEST MULTIPLE with [address] ([facility])    [address] [facility] [address]   [phone]           Please bring a list of your medicines (including vitamins, minerals and over-the-counter drugs). Also, tell your doctor about any allergies you may have. Wear comfortable clothes and leave your valuables at home.  Drink four 8-ounce glasses of fluid an hour before your exam. If you need to empty your bladder before your exam, try to release only a little urine. Then, drink another glass of fluid.  You may have up to two family members in the exam room. If you bring a small child, an adult must be there to care for him or her. No video or camera photography during the procedure.  Please call the Imaging Department at your exam site with any questions.            Aug 27, 2018  3:15 PM CDT   ESTABLISHED PRENATAL with Woodrow Hurtado MD   [facility]    [address] [facility] [address]   [phone]            Sep 04, 2018 11:00 AM CDT   (Arrive by 10:45 AM)    OB BIOPHYSICAL PROFILE WITHOUT NON STRESS TEST MULTIPLE with GHUSSP   [facility] ([facility])    [address] [facility] [address]   [phone]           Please bring a list of your medicines (including vitamins, minerals and over-the-counter drugs). Also, tell your doctor about any allergies you may have. Wear comfortable clothes and leave your valuables at home.  Drink four 8-ounce glasses of fluid an hour before your exam. If you need to empty your bladder before your exam, try to release only a little urine. Then, drink another glass of fluid.  You may have up to two family members in the exam room. If you bring a small child, an adult must be there to care for him or her. No video or camera photography during the procedure.  Please call the Imaging Department at your exam site with any questions.            Sep 04, 2018 12:45 PM CDT    ESTABLISHED PRENATAL with Woodrow Hurtado MD   [facility] ([facility])    [address]  [place] [address] [phone]            Sep 20, 2018  9:30 AM CDT   SHORT with Woodrow Hurtado MD   [facility]    [address]  [place] [address]   [phone]            Oct 18, 2018  9:15 AM CDT   Post Partum with Woodrow Hurtado MD   [facility] ([facility])    [address]  [place] [address] [phone]              Future tests that were ordered for you today     Open Future Orders        Priority Expected Expires Ordered    Stool culture Routine  8/13/2019 8/13/2018    Giardia antigen Routine  8/13/2019 8/13/2018            Who to contact     If you have questions or need follow up information about today's clinic visit or your schedule please contact [facility] directly at [phone].  Normal or non-critical lab and imaging results will be communicated to you by MyChart, letter or phone within 4 business days after the clinic has received the results. If you do not hear from us within 7 days, please contact the clinic through MyChart or phone. If you have a critical or abnormal lab result, we will notify you by phone as soon as possible.  Submit refill requests through [employer] or call your pharmacy and they will forward the refill request to us. Please allow 3 business days for your refill to be completed.          Additional Information About Your Visit        Care EveryWhere ID     This is your Care EveryWhere ID. This could be used by other organizations to access your [place] medical records  [id number]        Your Vitals Were     Pulse Last Period BMI (Body Mass Index)             88 12/17/2017 35.51 kg/m2          Blood Pressure from Last 3 Encounters:   08/13/18 136/86   08/10/18 109/63    08/06/18 128/76    Weight from Last 3 Encounters:   08/13/18 99.8 kg (220 lb)   08/06/18 99.3 kg (219 lb)   07/30/18 98.5 kg (217 lb 3.2 oz)              We Performed the Following     CBC W PLT No Diff     Creatinine     Glucose     Hepatic Function Panel     TSH        Primary Care Provider Office Phone # Fax #    Analy CRAFT Siva Bang [phone] [address]   [facility] [address]        Equal Access to Services     DOV [facility] HospitalDA[facility] : Hadii aad ku hadasho Soomaali, waaxda luqadaha, qaybta kaalmada adeegyada, joey espinosa hayrosie chavez . So [facility] [phone].    ATENCIÓN: Si habla español, tiene a jiang disposición servicios gratuitos de asistencia lingüística. [facility] [phone].    We comply with applicable federal civil rights laws and [place] laws. We do not discriminate on the basis of race, color, national origin, age, disability, sex, sexual orientation, or gender identity.            Thank you!     Thank you for choosing [facility] AND [facility]  for your care. Our goal is always to provide you with excellent care. Hearing back from our patients is one way we can continue to improve our services. Please take a few minutes to complete the written survey that you may receive in the mail after your visit with us. Thank you!             Your Updated Medication List - Protect others around you: Learn how to safely use, store and throw away your medicines at www.disposemymeds.org.          This list is accurate as of 8/13/18  4:39 PM.  Always use your most recent med list.                   Brand Name Dispense Instructions for use Diagnosis    CLARITIN 10 MG tablet   Generic drug:  loratadine     90 tablet    Take 1 tablet (10 mg) by mouth daily        [facility] PRENATAL 28-0.8 MG Tabs      Take 1 tablet by mouth        ferrous gluconate 324 (38 Fe) MG tablet    FERGON    100 tablet    Take 1 tablet (324 mg) by mouth 2 times daily (with meals)    Anemia  affecting pregnancy in second trimester       order for DME     1 Device    Equipment being ordered: Blood pressure cuff    Dichorionic diamniotic twin pregnancy in second trimester       order for DME     1 Device    Equipment being ordered: maternity support belt    Elderly multigravida in second trimester, Dichorionic diamniotic twin pregnancy in second trimester       SYNTHROID 75 MCG tablet   Generic drug:  levothyroxine           UNISOM 25 MG Tabs tablet   Generic drug:  doxylamine     14 each    Take 2 tablets (50 mg) by mouth nightly as needed

## 2018-08-15 ENCOUNTER — TELEPHONE (OUTPATIENT)
Dept: OBGYN | Facility: OTHER | Age: 38
End: 2018-08-15

## 2018-08-15 NOTE — TELEPHONE ENCOUNTER
Patient advised she should still bring in samples in case her bowel symptoms do not fully resolve or become worse again. Patient will do this.    Kell Gibson RN...................8/15/2018 8:55 AM

## 2018-08-16 DIAGNOSIS — R19.7 DIARRHEA OF PRESUMED INFECTIOUS ORIGIN: ICD-10-CM

## 2018-08-16 PROCEDURE — 87045 FECES CULTURE AEROBIC BACT: CPT | Performed by: OBSTETRICS & GYNECOLOGY

## 2018-08-16 PROCEDURE — 87046 STOOL CULTR AEROBIC BACT EA: CPT | Performed by: OBSTETRICS & GYNECOLOGY

## 2018-08-16 PROCEDURE — 87329 GIARDIA AG IA: CPT | Performed by: OBSTETRICS & GYNECOLOGY

## 2018-08-16 PROCEDURE — 87899 AGENT NOS ASSAY W/OPTIC: CPT | Performed by: OBSTETRICS & GYNECOLOGY

## 2018-08-18 LAB
BACTERIA SPEC CULT: NORMAL
E COLI SXT1+2 STL IA: NORMAL
E COLI SXT1+2 STL IA: NORMAL
G LAMBLIA AG STL QL IA: NORMAL
SPECIMEN SOURCE: NORMAL
SPECIMEN SOURCE: NORMAL

## 2018-08-20 ENCOUNTER — TRANSFERRED RECORDS (OUTPATIENT)
Dept: HEALTH INFORMATION MANAGEMENT | Facility: OTHER | Age: 38
End: 2018-08-20

## 2018-08-24 ENCOUNTER — HOSPITAL ENCOUNTER (OUTPATIENT)
Facility: OTHER | Age: 38
Setting detail: OBSERVATION
LOS: 1 days | Discharge: HOME | End: 2018-08-24
Attending: OBSTETRICS & GYNECOLOGY | Admitting: OBSTETRICS & GYNECOLOGY
Payer: COMMERCIAL

## 2018-08-24 ENCOUNTER — TELEPHONE (OUTPATIENT)
Dept: OBGYN | Facility: OTHER | Age: 38
End: 2018-08-24

## 2018-08-24 VITALS — TEMPERATURE: 97.3 F | SYSTOLIC BLOOD PRESSURE: 129 MMHG | DIASTOLIC BLOOD PRESSURE: 85 MMHG

## 2018-08-24 PROBLEM — Z3A.35 35 WEEKS GESTATION OF PREGNANCY: Status: ACTIVE | Noted: 2018-08-24

## 2018-08-24 LAB
ALBUMIN MFR UR ELPH: 4 MG/DL (ref 1–14)
ALBUMIN UR-MCNC: NEGATIVE MG/DL
APPEARANCE UR: CLEAR
AST SERPL W P-5'-P-CCNC: 19 U/L (ref 13–39)
BILIRUB UR QL STRIP: NEGATIVE
COLOR UR AUTO: YELLOW
CREAT UR-MCNC: 10 MG/DL
ERYTHROCYTE [DISTWIDTH] IN BLOOD BY AUTOMATED COUNT: 18.5 % (ref 10–15)
GLUCOSE UR STRIP-MCNC: NEGATIVE MG/DL
HCT VFR BLD AUTO: 38.8 % (ref 35–47)
HGB BLD-MCNC: 12.9 G/DL (ref 11.7–15.7)
HGB UR QL STRIP: NEGATIVE
KETONES UR STRIP-MCNC: NEGATIVE MG/DL
LEUKOCYTE ESTERASE UR QL STRIP: NEGATIVE
MCH RBC QN AUTO: 28.7 PG (ref 26.5–33)
MCHC RBC AUTO-ENTMCNC: 33.2 G/DL (ref 31.5–36.5)
MCV RBC AUTO: 86 FL (ref 78–100)
NITRATE UR QL: NEGATIVE
PH UR STRIP: 6 PH (ref 5–9)
PLATELET # BLD AUTO: 236 10E9/L (ref 150–450)
PROT/CREAT 24H UR: 0.4 MG/G{CREAT}
RBC # BLD AUTO: 4.49 10E12/L (ref 3.8–5.2)
SOURCE: NORMAL
SP GR UR STRIP: <1.005 (ref 1–1.03)
UROBILINOGEN UR STRIP-ACNC: 0.2 EU/DL (ref 0.2–1)
WBC # BLD AUTO: 7 10E9/L (ref 4–11)

## 2018-08-24 PROCEDURE — 99218 ZZC INITIAL OBSERVATION CARE,LEVL I: CPT | Performed by: OBSTETRICS & GYNECOLOGY

## 2018-08-24 PROCEDURE — 81003 URINALYSIS AUTO W/O SCOPE: CPT | Performed by: FAMILY MEDICINE

## 2018-08-24 PROCEDURE — 85027 COMPLETE CBC AUTOMATED: CPT | Performed by: OBSTETRICS & GYNECOLOGY

## 2018-08-24 PROCEDURE — 84450 TRANSFERASE (AST) (SGOT): CPT | Performed by: OBSTETRICS & GYNECOLOGY

## 2018-08-24 PROCEDURE — 36415 COLL VENOUS BLD VENIPUNCTURE: CPT | Performed by: OBSTETRICS & GYNECOLOGY

## 2018-08-24 PROCEDURE — 84156 ASSAY OF PROTEIN URINE: CPT | Performed by: OBSTETRICS & GYNECOLOGY

## 2018-08-24 PROCEDURE — G0463 HOSPITAL OUTPT CLINIC VISIT: HCPCS

## 2018-08-24 PROCEDURE — G0378 HOSPITAL OBSERVATION PER HR: HCPCS

## 2018-08-24 RX ORDER — ONDANSETRON 2 MG/ML
4 INJECTION INTRAMUSCULAR; INTRAVENOUS EVERY 6 HOURS PRN
Status: DISCONTINUED | OUTPATIENT
Start: 2018-08-24 | End: 2018-08-24 | Stop reason: HOSPADM

## 2018-08-24 NOTE — PROGRESS NOTES
Asked to evaluate 39 y/o  Di-Di twin pregnancy of Dr. Hurtado's at 35 weeks 2 days.  Has had borderline BP during this pregnancy.  Hasn't felt well past 24 hours.  Mild HA.  BP up at home.  Tired from taking care of her two sons ages 7 and 9.  Has noticed mild ctx up to every 6-8 minutes.  No bleeding or ROM.    History otherwise as per prenatal    BP here 120-140/80s  FHTs reactive x two  Mild ctx on monitor q 6-8 minutes.  Cx thick posterior with vtx on infant A out of pelvis.    A: 35 weeks twin gestation, mild elevation of BP, uterine irritability without cervical change  P: Oral hydration, check CBC, AST and urine protein/creatnine ratio  If stable over next couple hours and labs normal will discharge home to rest.  Has f/u Monday scheduled

## 2018-08-24 NOTE — TELEPHONE ENCOUNTER
"Patient called stating that she had a high BP this morning and it resolved after lying down for a bit.  (165/102 down to 131/93).  However she is feeling shaky and jittery and more exhausted today after feeling \"more normal\" the last two weeks.  Feeling pressure in her head but denies headache. Babies are active.  Unpainful contractions since last night.  Babies are active.  Recommended that she come in for evaluation due to elevated bp, increased swelling and general \"feeling unwell.\"  Patient states she is going to lay down and will call back in one hour.      Called back and is coming in shortly.    "

## 2018-08-24 NOTE — IP AVS SNAPSHOT
[facility]    [address]    [place] [address]    Phone:  [phone]    Fax:  [phone]                                       After Visit Summary   8/24/2018    Maricarmen Ornelas    MRN: [id number]           After Visit Summary Signature Page     I have received my discharge instructions, and my questions have been answered. I have discussed any challenges I see with this plan with the nurse or doctor.    ..........................................................................................................................................  Patient/Patient Representative Signature      ..........................................................................................................................................  Patient Representative Print Name and Relationship to Patient    ..................................................               ................................................  Date                                            Time    ..........................................................................................................................................  Reviewed by Signature/Title    ...................................................              ..............................................  Date                                                            Time          22EPIC Rev 08/18

## 2018-08-24 NOTE — PROGRESS NOTES
Maricarmen Ornelas is a 38 year old  and 35w2d patient came in complaining of  Labor    Patient Active Problem List   Diagnosis     Attention deficit disorder     Lumbosacral spondylosis without myelopathy     Other staphylococcus infection in conditions classified elsewhere and of unspecified site     Twin pregnancy     Dichorionic diamniotic twin pregnancy in third trimester     Acquired hypothyroidism     Bilateral carpal tunnel syndrome     Adjustment disorder with anxious mood     Encounter for triage in pregnant patient     35 weeks gestation of pregnancy       Pt discharged to home at 3:32 PM and encouraged to rest and drink plenty of fluids.  Pt told to call/return if bleeding more than spotting, water breaks, contractions 3-5 minutes apart that she has to breath through.     Nursing education on  labor provided. Self-management instructions reviewed. New AVS given and signed. All questions answered and patient verbalizes understanding.    /85  Temp 97.3  F (36.3  C)  LMP 2017    Cervical status: closed  Fetal Assessment: Reactive    Discharge support:  Family/Friend Support    Obstetric History       T2      L2     SAB0   TAB0   Ectopic0   Multiple0   Live Births2       # Outcome Date GA Lbr Renny/2nd Weight Sex Delivery Anes PTL Lv   3 Current            2 Term 10/16/10 40w0d   M  EPI N SMOOTH      Name: Kishoretramaine   1 Term  40w0d   M  None N SMOOTH      Name: Kaveh Carson RN BSN PHN

## 2018-08-24 NOTE — PROGRESS NOTES
Patient 35 2/7 weeks twin gestation here from home stating that she is just not feeing well.  Had a couple elevated BPs at home. Feeling weak and dizzy.  Eating and drinking regular diet.  BP elevated on arrival. UA sent.  MD ALEXIS Bull notified of patient arrival.  Denies leaking of fluid. Babies are active. States she has been angela nonpainful q 5 minutes.  No clonus. Normal reflexes.  Mild swelling.  MD cervical exam closed.  Category I tracing for both babes.  Awaiting labs now.  Taking oral fluids.

## 2018-08-24 NOTE — PROGRESS NOTES
Lab results and strip reviewed with MD. CALDERÓN to discharge and keep regularly scheduled appointment.

## 2018-08-24 NOTE — IP AVS SNAPSHOT
MRN:[id number]                      After Visit Summary   8/24/2018    Maricarmen Ornelas    MRN: [id number]           Thank you!     Thank you for choosing [facility] for your care. Our goal is always to provide you with excellent care. Hearing back from our patients is one way we can continue to improve our services. Please take a few minutes to complete the written survey that you may receive in the mail after you visit with us. Thank you!        Patient Information     Date Of Birth          1980        About your hospital stay     You were admitted on:  August 24, 2018 You last received care in the:  [facility]    You were discharged on:  August 24, 2018       Who to Call     For medical emergencies, please call 911.  For non-urgent questions about your medical care, please call your primary care provider or clinic, [phone]          Attending Provider     Provider Specialty    Woodrow Hurtado MD OB/Gyn       Primary Care Provider Office Phone # Fax #    Analy LUANA Bang [phone]      Your next 10 appointments already scheduled     Aug 27, 2018  1:45 PM CDT   (Arrive by 1:30 PM)    OB TWINS FOLLOW UP REPEAT with [address] [place] [address] and Hospital ([facility])    [address]  [facility] [address] [phone]           Please bring a list of your medicines (including vitamins, minerals and over-the-counter drugs). Also, tell your doctor about any allergies you may have. Wear comfortable clothes and leave your valuables at home.  Drink four 8-ounce glasses of fluid an hour before your exam. If you need to empty your bladder before your exam, try to release only a little urine. Then, drink another glass of fluid.  You may have up to two family members in the exam room. If you bring a small child, an adult must be there to care for him or her. No video or camera photography during the procedure.   Please call the Imaging Department at your exam site with any questions.            Aug 27, 2018  3:15 PM CDT   ESTABLISHED PRENATAL with Woodrow Hurtado MD   [facility]    [address]  [place] [address]   [phone]            Sep 04, 2018 11:00 AM CDT   (Arrive by 10:45 AM)   US OB BIOPHYSICAL PROFILE WITHOUT NON STRESS TEST MULTIPLE with GHUSSP   [facility]    [address]  [place] [address]   [phone]           Please bring a list of your medicines (including vitamins, minerals and over-the-counter drugs). Also, tell your doctor about any allergies you may have. Wear comfortable clothes and leave your valuables at home.  Drink four 8-ounce glasses of fluid an hour before your exam. If you need to empty your bladder before your exam, try to release only a little urine. Then, drink another glass of fluid.  You may have up to two family members in the exam room. If you bring a small child, an adult must be there to care for him or her. No video or camera photography during the procedure.  Please call the Imaging Department at your exam site with any questions.            Sep 04, 2018 12:45 PM CDT   ESTABLISHED PRENATAL with Woodrow Hurtado MD   [facility]    [address]  [place] [address]   [phone]            Sep 05, 2018   Procedure with Woodrow Hurtado MD   [facility]    [address]  [place] [address]   [phone]            Sep 20, 2018  9:30 AM CDT   SHORT with Woodrow Hurtado MD   [facility]    [address]  [place] [address]   [phone]            Oct 18, 2018  9:15 AM CDT   Post Partum with Woodrow Hurtado MD   [facility]  [facility] ([facility])    [address]  [place] [address]   [phone]              Pending Results     No orders found from 8/22/2018 to 8/25/2018.            Admission Information     Date & Time Provider Department Dept. Phone    8/24/2018 Woodrow Hurtado MD [facility] [phone]      Your Vitals Were     Blood Pressure Temperature Last Period             129/85 97.3  F (36.3  C) 12/17/2017         Care EveryWhere ID     This is your Care EveryWhere ID. This could be used by other organizations to access your [place] medical records  [id number]        Equal Access to Services     HAYDEE NICE : Cassi Clifford, sarah lewis, veronique arriola, joey amin. So [facility] [phone].    ATENCIÓN: Si habla español, tiene a jiang disposición servicios gratuitos de asistencia lingüística. Llame al [phone].    We comply with applicable federal civil rights laws and [place] laws. We do not discriminate on the basis of race, color, national origin, age, disability, sex, sexual orientation, or gender identity.               Review of your medicines      UNREVIEWED medicines. Ask your doctor about these medicines        Dose / Directions    CLARITIN 10 MG tablet   Generic drug:  loratadine        Dose:  10 mg   Take 1 tablet (10 mg) by mouth daily   Quantity:  90 tablet   Refills:  3       [facility] PRENATAL 28-0.8 MG Tabs        Dose:  1 tablet   Take 1 tablet by mouth   Refills:  0       ferrous gluconate 324 (38 Fe) MG tablet   Commonly known as:  FERGON   Used for:  Anemia affecting pregnancy in second trimester        Dose:  324 mg   Take 1 tablet (324 mg) by mouth 2 times daily (with meals)   Quantity:  100 tablet   Refills:  3       ranitidine 150 MG/10ML syrup   Commonly known as:  Zantac   Indication:  Heartburn        Dose:  4 mg/kg/day   Take 4 mg/kg/day by mouth 2 times daily   Refills:  0        SYNTHROID 75 MCG tablet   Generic drug:  levothyroxine        Refills:  0       UNISOM 25 MG Tabs tablet   Generic drug:  doxylamine        Dose:  50 mg   Take 2 tablets (50 mg) by mouth nightly as needed   Quantity:  14 each   Refills:  0         CONTINUE these medicines which have NOT CHANGED        Dose / Directions    order for DME   Used for:  Dichorionic diamniotic twin pregnancy in second trimester        Equipment being ordered: Blood pressure cuff   Quantity:  1 Device   Refills:  0       order for DME   Used for:  Elderly multigravida in second trimester, Dichorionic diamniotic twin pregnancy in second trimester        Equipment being ordered: maternity support belt   Quantity:  1 Device   Refills:  0                Protect others around you: Learn how to safely use, store and throw away your medicines at www.disposemymeds.org.             Medication List: This is a list of all your medications and when to take them. Check marks below indicate your daily home schedule. Keep this list as a reference.      Medications           Morning Afternoon Evening Bedtime As Needed    CLARITIN 10 MG tablet   Take 1 tablet (10 mg) by mouth daily   Generic drug:  loratadine                                [employer]S PRENATAL 28-0.8 MG Tabs   Take 1 tablet by mouth                                ferrous gluconate 324 (38 Fe) MG tablet   Commonly known as:  FERGON   Take 1 tablet (324 mg) by mouth 2 times daily (with meals)                                order for DME   Equipment being ordered: Blood pressure cuff                                order for DME   Equipment being ordered: maternity support belt                                ranitidine 150 MG/10ML syrup   Commonly known as:  Zantac   Take 4 mg/kg/day by mouth 2 times daily                                SYNTHROID 75 MCG tablet   Generic drug:  levothyroxine                                UNISOM 25 MG Tabs tablet   Take 2 tablets (50 mg) by mouth nightly as needed    Generic drug:  doxylamine

## 2018-08-27 ENCOUNTER — HOSPITAL ENCOUNTER (OUTPATIENT)
Dept: ULTRASOUND IMAGING | Facility: OTHER | Age: 38
Discharge: HOME | End: 2018-08-27
Attending: OBSTETRICS & GYNECOLOGY | Admitting: OBSTETRICS & GYNECOLOGY
Payer: COMMERCIAL

## 2018-08-27 ENCOUNTER — PRENATAL OFFICE VISIT (OUTPATIENT)
Dept: OBGYN | Facility: OTHER | Age: 38
End: 2018-08-27
Attending: OBSTETRICS & GYNECOLOGY
Payer: COMMERCIAL

## 2018-08-27 VITALS
DIASTOLIC BLOOD PRESSURE: 92 MMHG | WEIGHT: 224 LBS | HEART RATE: 92 BPM | SYSTOLIC BLOOD PRESSURE: 146 MMHG | BODY MASS INDEX: 36.15 KG/M2

## 2018-08-27 DIAGNOSIS — O13.3 PREGNANCY-INDUCED HYPERTENSION IN THIRD TRIMESTER: Primary | ICD-10-CM

## 2018-08-27 DIAGNOSIS — O30.043 DICHORIONIC DIAMNIOTIC TWIN PREGNANCY IN THIRD TRIMESTER: ICD-10-CM

## 2018-08-27 DIAGNOSIS — O09.523 ELDERLY MULTIGRAVIDA IN THIRD TRIMESTER: ICD-10-CM

## 2018-08-27 DIAGNOSIS — O30.042 DICHORIONIC DIAMNIOTIC TWIN PREGNANCY IN SECOND TRIMESTER: ICD-10-CM

## 2018-08-27 LAB
ALBUMIN SERPL-MCNC: 3.3 G/DL (ref 3.5–5.7)
ALP SERPL-CCNC: 146 U/L (ref 34–104)
ALT SERPL W P-5'-P-CCNC: 14 U/L (ref 7–52)
AST SERPL W P-5'-P-CCNC: 17 U/L (ref 13–39)
BASOPHILS # BLD AUTO: 0 10E9/L (ref 0–0.2)
BASOPHILS NFR BLD AUTO: 0.6 %
BILIRUB DIRECT SERPL-MCNC: 0.1 MG/DL (ref 0–0.2)
BILIRUB SERPL-MCNC: 0.2 MG/DL (ref 0.3–1)
CREAT SERPL-MCNC: 0.75 MG/DL (ref 0.6–1.2)
DIFFERENTIAL METHOD BLD: ABNORMAL
EOSINOPHIL # BLD AUTO: 0.1 10E9/L (ref 0–0.7)
EOSINOPHIL NFR BLD AUTO: 1.8 %
ERYTHROCYTE [DISTWIDTH] IN BLOOD BY AUTOMATED COUNT: 18.2 % (ref 10–15)
GFR SERPL CREATININE-BSD FRML MDRD: 87 ML/MIN/1.7M2
HCT VFR BLD AUTO: 37.5 % (ref 35–47)
HGB BLD-MCNC: 12.7 G/DL (ref 11.7–15.7)
IMM GRANULOCYTES # BLD: 0.1 10E9/L (ref 0–0.4)
IMM GRANULOCYTES NFR BLD: 0.8 %
LYMPHOCYTES # BLD AUTO: 1.6 10E9/L (ref 0.8–5.3)
LYMPHOCYTES NFR BLD AUTO: 23.9 %
MCH RBC QN AUTO: 29.1 PG (ref 26.5–33)
MCHC RBC AUTO-ENTMCNC: 33.9 G/DL (ref 31.5–36.5)
MCV RBC AUTO: 86 FL (ref 78–100)
MONOCYTES # BLD AUTO: 0.6 10E9/L (ref 0–1.3)
MONOCYTES NFR BLD AUTO: 9.1 %
NEUTROPHILS # BLD AUTO: 4.2 10E9/L (ref 1.6–8.3)
NEUTROPHILS NFR BLD AUTO: 63.8 %
PLATELET # BLD AUTO: 229 10E9/L (ref 150–450)
PROT SERPL-MCNC: 6.3 G/DL (ref 6.4–8.9)
RBC # BLD AUTO: 4.37 10E12/L (ref 3.8–5.2)
WBC # BLD AUTO: 6.6 10E9/L (ref 4–11)

## 2018-08-27 PROCEDURE — 96372 THER/PROPH/DIAG INJ SC/IM: CPT | Performed by: OBSTETRICS & GYNECOLOGY

## 2018-08-27 PROCEDURE — 36415 COLL VENOUS BLD VENIPUNCTURE: CPT | Performed by: OBSTETRICS & GYNECOLOGY

## 2018-08-27 PROCEDURE — 99207 ZZC OB VISIT-NO CHARGE - GICH ONLY: CPT | Performed by: OBSTETRICS & GYNECOLOGY

## 2018-08-27 PROCEDURE — 76819 FETAL BIOPHYS PROFIL W/O NST: CPT

## 2018-08-27 PROCEDURE — 80076 HEPATIC FUNCTION PANEL: CPT | Performed by: OBSTETRICS & GYNECOLOGY

## 2018-08-27 PROCEDURE — 59025 FETAL NON-STRESS TEST: CPT | Performed by: OBSTETRICS & GYNECOLOGY

## 2018-08-27 PROCEDURE — 82565 ASSAY OF CREATININE: CPT | Performed by: OBSTETRICS & GYNECOLOGY

## 2018-08-27 PROCEDURE — 85025 COMPLETE CBC W/AUTO DIFF WBC: CPT | Performed by: OBSTETRICS & GYNECOLOGY

## 2018-08-27 RX ORDER — BETAMETHASONE SODIUM PHOSPHATE AND BETAMETHASONE ACETATE 3; 3 MG/ML; MG/ML
12 INJECTION, SUSPENSION INTRA-ARTICULAR; INTRALESIONAL; INTRAMUSCULAR; SOFT TISSUE DAILY
Status: ACTIVE | OUTPATIENT
Start: 2018-08-27 | End: 2018-08-29

## 2018-08-27 RX ADMIN — BETAMETHASONE SODIUM PHOSPHATE AND BETAMETHASONE ACETATE 12 MG: 3; 3 INJECTION, SUSPENSION INTRA-ARTICULAR; INTRALESIONAL; INTRAMUSCULAR; SOFT TISSUE at 15:50

## 2018-08-27 ASSESSMENT — PAIN SCALES - GENERAL: PAINLEVEL: MILD PAIN (2)

## 2018-08-27 NOTE — MR AVS SNAPSHOT
After Visit Summary   8/27/2018    Maricarmen Ornelas    MRN: [id number]           Patient Information     Date Of Birth          1980        Visit Information        Provider Department      8/27/2018 3:15 PM Woodrow Hurtado MD [facility]        Today's Diagnoses     Pregnancy-induced hypertension in third trimester    -  1    Dichorionic diamniotic twin pregnancy in third trimester        Elderly multigravida in third trimester           Follow-ups after your visit        Your next 10 appointments already scheduled     Aug 28, 2018  3:45 PM CDT   Nurse Only with  Ob Nurse   [facility] ([facility])    [address] [facility] [address]   [phone]            Sep 04, 2018 11:00 AM CDT   (Arrive by 10:45 AM)   US OB BIOPHYSICAL PROFILE WITHOUT NON STRESS TEST MULTIPLE with GHUSSP   [facility] ([facility])    [address] [facility] [address]   [phone]           Please bring a list of your medicines (including vitamins, minerals and over-the-counter drugs). Also, tell your doctor about any allergies you may have. Wear comfortable clothes and leave your valuables at home.  Drink four 8-ounce glasses of fluid an hour before your exam. If you need to empty your bladder before your exam, try to release only a little urine. Then, drink another glass of fluid.  You may have up to two family members in the exam room. If you bring a small child, an adult must be there to care for him or her. No video or camera photography during the procedure.  Please call the Imaging Department at your exam site with any questions.            Sep 04, 2018 12:45 PM CDT   ESTABLISHED PRENATAL with Woodrow Hurtado MD   [facility] ([facility])    [address] [facility] [address]   [phone]            Sep 05, 2018   Procedure  with Woodrow Hurtado MD   [facility] ([facility])    [address]  [place] [address]   [phone]            Sep 20, 2018  9:30 AM CDT   SHORT with Woodrow Hurtado MD   [facility] ([facility])    [address]  [place] [address]   [phone]            Oct 18, 2018  9:15 AM CDT   Post Partum with Woodrow Hurtado MD   [facility] ([facility])    [address]  [place] [address]   [phone]              Future tests that were ordered for you today     Open Standing Orders        Priority Remaining Interval Expires Ordered    US OB Biophysical Profile w/o Non Stress Multiple Routine 1/2 6/11/2019 6/11/2018            Who to contact     If you have questions or need follow up information about today's clinic visit or your schedule please contact [facility] directly at [phone].  Normal or non-critical lab and imaging results will be communicated to you by MyChart, letter or phone within 4 business days after the clinic has received the results. If you do not hear from us within 7 days, please contact the clinic through MyChart or phone. If you have a critical or abnormal lab result, we will notify you by phone as soon as possible.  Submit refill requests through [employer] or call your pharmacy and they will forward the refill request to us. Please allow 3 business days for your refill to be completed.          Additional Information About Your Visit        Care EveryWhere ID     This is your Care EveryWhere ID. This could be used by other organizations to access your [place] medical records  [id number]        Your Vitals Were     Pulse Last Period BMI (Body Mass Index)             92 12/17/2017 36.15 kg/m2          Blood Pressure from Last 3 Encounters:   08/27/18 (!) 146/92   08/24/18 129/85   08/13/18 136/86     Weight from Last 3 Encounters:   08/27/18 101.6 kg (224 lb)   08/13/18 99.8 kg (220 lb)   08/06/18 99.3 kg (219 lb)              We Performed the Following     CBC and Differential     Creatinine     FETAL NON-STRESS TEST     Hepatic Function Panel        Primary Care Provider Office Phone # Fax #    Analy CRAFT Siva Bang [phone] [address]  [place] [address]        Equal Access to Services     DOV NICE : Hadii aad ku hadasho Soomaali, waaxda luqadaha, qaybta kaalmada adeegyada, waxay idiin hayaan adeeg bethanyaraabraham latricia . So [facility] [phone].    ATENCIÓN: Si habla español, tiene a jiang disposición servicios gratuitos de asistencia lingüística. [facility] [phone].    We comply with applicable federal civil rights laws and [place] laws. We do not discriminate on the basis of race, color, national origin, age, disability, sex, sexual orientation, or gender identity.            Thank you!     Thank you for choosing [facility] [place] [facility]  for your care. Our goal is always to provide you with excellent care. Hearing back from our patients is one way we can continue to improve our services. Please take a few minutes to complete the written survey that you may receive in the mail after your visit with us. Thank you!             Your Updated Medication List - Protect others around you: Learn how to safely use, store and throw away your medicines at www.disposemymeds.org.          This list is accurate as of 8/27/18  4:40 PM.  Always use your most recent med list.                   Brand Name Dispense Instructions for use Diagnosis    CLARITIN 10 MG tablet   Generic drug:  loratadine     90 tablet    Take 1 tablet (10 mg) by mouth daily        CVS PRENATAL 28-0.8 MG Tabs      Take 1 tablet by mouth        ferrous gluconate 324 (38 Fe) MG tablet    FERGON    100 tablet    Take 1 tablet (324 mg) by mouth 2 times daily (with meals)    Anemia affecting pregnancy in  second trimester       order for DME     1 Device    Equipment being ordered: Blood pressure cuff    Dichorionic diamniotic twin pregnancy in second trimester       order for DME     1 Device    Equipment being ordered: maternity support belt    Elderly multigravida in second trimester, Dichorionic diamniotic twin pregnancy in second trimester       ranitidine 150 MG/10ML syrup    Zantac     Take 4 mg/kg/day by mouth 2 times daily        SYNTHROID 75 MCG tablet   Generic drug:  levothyroxine           UNISOM 25 MG Tabs tablet   Generic drug:  doxylamine     14 each    Take 2 tablets (50 mg) by mouth nightly as needed

## 2018-08-27 NOTE — PROGRESS NOTES
CC: Recheck OB visit at 35w5d    HPI: Maricarmen Ornelas presents for a routine OB visit now at 35w5d  Pregnancy complicated by twins, dichorionic, hypertension in pregnancy and threatened  labor.  Fredy Chilel and Jorgito's assistance much appreciated. Patient not feeling well today. She has been angela through the weekend and has malaise today. Denies fever. She is not resting well and struggling with anxiety over the possibility of one or both babies needing to be transferred for lung immaturity in the 36th week. No bleeding or leaking.      Obstetric History       T2      L2     SAB0   TAB0   Ectopic0   Multiple0   Live Births2       # Outcome Date GA Lbr Renny/2nd Weight Sex Delivery Anes PTL Lv   3 Current            2 Term 10/16/10 40w0d   M  EPI N SMOOTH      Name: William   1 Term  40w0d   M  None N SMOOTH      Name: Kaveh        Current Outpatient Prescriptions   Medication     doxylamine (UNISOM) 25 MG TABS tablet     ferrous gluconate (FERGON) 324 (38 Fe) MG tablet     levothyroxine (SYNTHROID) 75 MCG tablet     loratadine (CLARITIN) 10 MG tablet     order for DME     order for DME     Prenatal Vit-Fe Fumarate-FA ([facility]S PRENATAL) 28-0.8 MG TABS     ranitidine (ZANTAC) 150 MG/10ML syrup     Current Facility-Administered Medications   Medication     betamethasone acet & sod phos (CELESTONE) injection 12 mg         O: BP (!) 146/92  Pulse 92  Wt 101.6 kg (224 lb)  LMP 2017  BMI 36.15 kg/m2  Body mass index is 36.15 kg/(m^2).  See OB flow sheet  EXAM:  NAD  Cx is closed, posterior, -2    NST doucmentation:    Indication: (O13.3) Pregnancy-induced hypertension in third trimester  (primary encounter diagnosis)    (O09.523) Elderly multigravida in third trimester      Duration: 0028-7374   35w5d  FHR baseline: 130/120  Accelerations: y  Decelerations: n  Contractions: n    Category 1 for both twins    Results for orders placed or performed during the hospital encounter of  08/27/18   US OB Biophysical Profile w/o Non Stress Multiple    Narrative    US OB BIOPHYSICAL PROFILE W/O NON STRESS TWINS    History: ; Dichorionic diamniotic twin pregnancy in second trimester    Fetal Movement:  Score 2: At least 3 discrete body/limb movements in 30 minutes  Score 0: Up to 2 episodes of limb/body movements in 30 minutes                    FM = 2 and 2    Fetal Breathing movements:  Score 2: At least one episode, at least 30 seconds duration in 30  minutes of observation.  Score 0: Absent or no episodes of greater than 30 seconds    duration in 30 minutes observation.                    FBM = 2 and 2    Fetal Tone:  Score 2: At least one episode of active extension with return to     flexion of fetal limbs or trunk, opening and closing of     hands considered normal tone.  Score 0: Absent or no episodes in 30 minutes of observation.                    FT = 2 and 2    Amniotic Fluid Volume:  Score 2: At least one pocket of amniotic fluid measuring at least    1 cm in two perpendicular planes.  Score 0: Either no amniotic fluid or a pocket less than 1 cm in    two perpendicular planes.                    AF = 2 and 2                        TOTAL = 8 and 8    BABY A    Deepest pocket: 5.5 cm  HRT Rate: 146 bpm  Placenta Location: Anterior  Placenta ndGndrndanddndend:nd nd2nd Position: Cephalic    Deepest pocket: 4.4 cm  HRT Rate: 159 bpm  Placenta Location: Posterior  Placenta ndGndrndanddndend:nd nd2nd Position: Transverse to maternal left      Impression    IMPRESSION:    Normal biophysical profiles for both fetuses of a twin pregnancy.    SURY STODDARD MD       A/P: (O13.3) Pregnancy-induced hypertension in third trimester  (primary encounter diagnosis)  Comment:   Plan: CBC and Differential, Creatinine, Hepatic         Function Panel            (O30.043) Dichorionic diamniotic twin pregnancy in third trimester  Comment:   Plan:     (O09.523) Elderly multigravida in third trimester  Comment:   Plan: FETAL NON-STRESS TEST           BMZ today and repeat tomorrow to lessen likelihood of lung immaturity and resultant complications  Reviewed route of birth (Vaginal vs scheduled vs emergent )  Plan is to keep her scheduled  due to gestational hypertension at 37 weeks gestation as per current ACOG guidelines regarding hypertension in pregnancy.      Recheck in 1 weeks    Problem List:   Dichorionic Twins, monthly growth scans  AMA- weekly NST's after 32 weeks.  Gestational hypertension- plan for 37 week delivery (currently planning )  BMZ today and tomorrow    Woodrow Hurtado MD FACOG  3:32 PM 2018

## 2018-08-28 ENCOUNTER — HOSPITAL ENCOUNTER (OUTPATIENT)
Facility: OTHER | Age: 38
Discharge: HOME | End: 2018-08-28
Attending: OBSTETRICS & GYNECOLOGY | Admitting: OBSTETRICS & GYNECOLOGY
Payer: COMMERCIAL

## 2018-08-28 ENCOUNTER — TELEPHONE (OUTPATIENT)
Dept: OBGYN | Facility: OTHER | Age: 38
End: 2018-08-28

## 2018-08-28 ENCOUNTER — NURSE TRIAGE (OUTPATIENT)
Dept: OBGYN | Facility: OTHER | Age: 38
End: 2018-08-28

## 2018-08-28 VITALS
WEIGHT: 224 LBS | BODY MASS INDEX: 36 KG/M2 | SYSTOLIC BLOOD PRESSURE: 119 MMHG | DIASTOLIC BLOOD PRESSURE: 87 MMHG | OXYGEN SATURATION: 97 % | HEART RATE: 88 BPM | HEIGHT: 66 IN | TEMPERATURE: 97.7 F | RESPIRATION RATE: 16 BRPM

## 2018-08-28 PROCEDURE — 96372 THER/PROPH/DIAG INJ SC/IM: CPT

## 2018-08-28 PROCEDURE — G0463 HOSPITAL OUTPT CLINIC VISIT: HCPCS | Mod: 25

## 2018-08-28 PROCEDURE — 25000128 H RX IP 250 OP 636: Performed by: OBSTETRICS & GYNECOLOGY

## 2018-08-28 RX ORDER — BETAMETHASONE SODIUM PHOSPHATE AND BETAMETHASONE ACETATE 3; 3 MG/ML; MG/ML
12 INJECTION, SUSPENSION INTRA-ARTICULAR; INTRALESIONAL; INTRAMUSCULAR; SOFT TISSUE ONCE
Status: COMPLETED | OUTPATIENT
Start: 2018-08-28 | End: 2018-08-28

## 2018-08-28 RX ADMIN — BETAMETHASONE SODIUM PHOSPHATE AND BETAMETHASONE ACETATE 12 MG: 3; 3 INJECTION, SUSPENSION INTRA-ARTICULAR; INTRALESIONAL; INTRAMUSCULAR at 15:05

## 2018-08-28 NOTE — TELEPHONE ENCOUNTER
"Pt. Calling.   States \"I've been having back spasms after an injection that I had\"  Questioning if this is normal or not.    "

## 2018-08-28 NOTE — PROGRESS NOTES
"appx 45 minutes spent answering patients questions and concerns. Patient states that she is\" just so nervous and has so many questions and worries going on in her mind.\" able to encourage and reassure patient. By the time patient left she was \"no longer angela and was feeling so much better.\" Patient understands that she needs to limit her activity and rest for the next week until her scheduled . Patient wheeled out and her  is understanding of patient needing to rest.  Carisa Bennett RN on 2018 at 3:48 PM    "

## 2018-08-28 NOTE — IP AVS SNAPSHOT
[facility] [place] [facility]    [address]    [place] [address]    Phone:  [phone]    Fax:  [phone]                                       After Visit Summary   8/28/2018    Maricarmen Ornelas    MRN: [id number]           After Visit Summary Signature Page     I have received my discharge instructions, and my questions have been answered. I have discussed any challenges I see with this plan with the nurse or doctor.    ..........................................................................................................................................  Patient/Patient Representative Signature      ..........................................................................................................................................  Patient Representative Print Name and Relationship to Patient    ..................................................               ................................................  Date                                            Time    ..........................................................................................................................................  Reviewed by Signature/Title    ...................................................              ..............................................  Date                                                            Time          22EPIC Rev 08/18

## 2018-08-28 NOTE — IP AVS SNAPSHOT
MRN:[id number]                      After Visit Summary   8/28/2018    Maricarmen Ornelas    MRN: [id number]           Thank you!     Thank you for choosing [facility] for your care. Our goal is always to provide you with excellent care. Hearing back from our patients is one way we can continue to improve our services. Please take a few minutes to complete the written survey that you may receive in the mail after you visit with us. Thank you!        Patient Information     Date Of Birth          1980        About your hospital stay     You were admitted on:  August 28, 2018 You last received care in the:  [facility]    You were discharged on:  August 28, 2018       Who to Call     For medical emergencies, please call 911.  For non-urgent questions about your medical care, please call your primary care provider or clinic, [phone]          Attending Provider     Provider Specialty    Woodrow Hurtado MD OB/Gyn       Primary Care Provider Office Phone # Fax #    Analy LUANA Bang [phone]      Your next 10 appointments already scheduled     Aug 28, 2018  3:45 PM CDT   Nurse Only with  Ob Nurse   [facility]    [address]  [place] [id number] [phone]            Sep 04, 2018 11:00 AM CDT   (Arrive by 10:45 AM)   US OB BIOPHYSICAL PROFILE WITHOUT NON STRESS TEST MULTIPLE with GHUSSP   [facility]    [address]  [place] [address] [phone]           Please bring a list of your medicines (including vitamins, minerals and over-the-counter drugs). Also, tell your doctor about any allergies you may have. Wear comfortable clothes and leave your valuables at home.  Drink four 8-ounce glasses of fluid an hour before your exam. If you need to empty your bladder before your exam, try to release only a  little urine. Then, drink another glass of fluid.  You may have up to two family members in the exam room. If you bring a small child, an adult must be there to care for him or her. No video or camera photography during the procedure.  Please call the Imaging Department at your exam site with any questions.            Sep 04, 2018 12:45 PM CDT   ESTABLISHED PRENATAL with Woodrow Hurtado MD   [facility]    [address]  [place] [address]   [phone]            Sep 05, 2018   Procedure with Woodrow Hurtado MD   [facility]    [address]  [place] [address]   [phone]            Sep 20, 2018  9:30 AM CDT   SHORT with Woodrow Hurtado MD   [facility]    [address]  [place] [address]   [phone]            Oct 18, 2018  9:15 AM CDT   Post Partum with Woodrow Hurtado MD   [facility]    [address]  [place] [address]   [phone]              Further instructions from your care team       Discharge Instructions for Undelivered Patients    Diet:    Drink 8 to 12 glasses of liquids (milk, juice, water) every day. Stay well hydrated.     You may eat meals and snacks..    Activity:    .Bed rest or partial bed rest. This means:____spoil yourself and rest a lot and let others take care of you for the next week.    Count fetal kicks every day. (See handout.)    Call your doctor if your baby is moving less than usual.    Medicines:    My care team has reviewed my medicines with me.    My care team has given me a list of my medicines.    My care team has prescribed a new medicine. They have either sent it home with me or ordered it from the pharmacy.    Call your provider if you notice:    Swelling in your face or increased swelling in  "your hands or legs.    Headaches that are not relieved by Tylenol (acetaminophen).    Changes in your vision (blurring; seeing spots or stars).    Nausea (sick to your stomach) and vomiting (throwing up).    Weight gain of 5 pounds per week.    Heartburn that doesn't go away.    Signs of bladder infection: pain when you urinate (use the toilet), needing to go more often or more urgently.    The bag of lee (membranes) breaks, or you notice leaking in your underwear.    Bright red blood in your underwear.    Abdominal (lower belly) or stomach pain.    For first baby: Contractions (tightenings) less than 5 minutes apart for one hour or more.    For Second (plus) baby: Contractions (tightenings) less than 10 minutes apart and getting stronger.    Increase or change in vaginal discharge (note the color and amount).    Other: keep other scheduled appts.     Follow up with your provider: ***       Pending Results     No orders found from 8/26/2018 to 8/29/2018.            Admission Information     Date & Time Provider Department Dept. Phone    8/28/2018 Woodrow Hurtado MD [facility] [phone]      Your Vitals Were     Blood Pressure Pulse Temperature Respirations Height Weight    119/87 88 97.7  F (36.5  C) (Oral) 16 1.676 m (5' 6\") 101.6 kg (224 lb)    Last Period Pulse Oximetry BMI (Body Mass Index)             12/17/2017 97% 36.15 kg/m2         Care EveryWhere ID     This is your Care EveryWhere ID. This could be used by other organizations to access your [facility] medical records  [id number]        Equal Access to Services     [facility] : Hadii lizbeth Clifford, waaxda luqadaha, qaybta kaaljoey goldstein. So [facility] [phone].    ATENCIÓN: Si habla español, tiene a jiang disposición servicios gratuitos de asistencia lingüística. Llame al [phone].    We comply with applicable federal civil rights laws and [place] laws. We do not " discriminate on the basis of race, color, national origin, age, disability, sex, sexual orientation, or gender identity.               Review of your medicines      UNREVIEWED medicines. Ask your doctor about these medicines        Dose / Directions    CLARITIN 10 MG tablet   Generic drug:  loratadine        Dose:  10 mg   Take 1 tablet (10 mg) by mouth daily   Quantity:  90 tablet   Refills:  3       [facility] PRENATAL 28-0.8 MG Tabs        Dose:  1 tablet   Take 1 tablet by mouth   Refills:  0       ferrous gluconate 324 (38 Fe) MG tablet   Commonly known as:  FERGON   Used for:  Anemia affecting pregnancy in second trimester        Dose:  324 mg   Take 1 tablet (324 mg) by mouth 2 times daily (with meals)   Quantity:  100 tablet   Refills:  3       ranitidine 150 MG/10ML syrup   Commonly known as:  Zantac   Indication:  Heartburn        Dose:  4 mg/kg/day   Take 4 mg/kg/day by mouth 2 times daily   Refills:  0       SYNTHROID 75 MCG tablet   Generic drug:  levothyroxine        Refills:  0       UNISOM 25 MG Tabs tablet   Generic drug:  doxylamine        Dose:  50 mg   Take 2 tablets (50 mg) by mouth nightly as needed   Quantity:  14 each   Refills:  0         CONTINUE these medicines which have NOT CHANGED        Dose / Directions    order for DME   Used for:  Dichorionic diamniotic twin pregnancy in second trimester        Equipment being ordered: Blood pressure cuff   Quantity:  1 Device   Refills:  0       order for DME   Used for:  Elderly multigravida in second trimester, Dichorionic diamniotic twin pregnancy in second trimester        Equipment being ordered: maternity support belt   Quantity:  1 Device   Refills:  0                Protect others around you: Learn how to safely use, store and throw away your medicines at www.disposemymeds.org.             Medication List: This is a list of all your medications and when to take them. Check marks below indicate your daily home schedule. Keep this list as a  reference.      Medications           Morning Afternoon Evening Bedtime As Needed    CLARITIN 10 MG tablet   Take 1 tablet (10 mg) by mouth daily   Generic drug:  loratadine                                [employer] PRENATAL 28-0.8 MG Tabs   Take 1 tablet by mouth                                ferrous gluconate 324 (38 Fe) MG tablet   Commonly known as:  FERGON   Take 1 tablet (324 mg) by mouth 2 times daily (with meals)                                order for DME   Equipment being ordered: Blood pressure cuff                                order for DME   Equipment being ordered: maternity support belt                                ranitidine 150 MG/10ML syrup   Commonly known as:  Zantac   Take 4 mg/kg/day by mouth 2 times daily                                SYNTHROID 75 MCG tablet   Generic drug:  levothyroxine                                UNISOM 25 MG Tabs tablet   Take 2 tablets (50 mg) by mouth nightly as needed   Generic drug:  doxylamine

## 2018-08-28 NOTE — PROGRESS NOTES
Dr. Hurtado notified that patient is here with complaints of some r sided back pain with occasional contractions at times 6-8 minutes a part.  Twin pregnancy with babyA and Baby B with catogory one tracings. Contractions palpate very mild. Patient states that they are not painful and her back does not pain her when  she is here and resting. States that it is only bothering her when she is up standing or walking. Contractions are irregular and have been every 6-10 minutes since arriving.Per Dr. Hurtado okay to send patient home at this time after giving second betamethasone injection. No need to do cervical check per Dr. Hurtado.  Carisa Bennett RN on 8/28/2018 at 2:47 PM

## 2018-08-28 NOTE — PROGRESS NOTES
Patient presented to [facility] at 1335 with report of ctx she is also feeling in her back.      KINGSLEY PILLAI RN on 8/28/2018 at 1:42 PM

## 2018-08-28 NOTE — DISCHARGE INSTRUCTIONS
Discharge Instructions for Undelivered Patients    Diet:    Drink 8 to 12 glasses of liquids (milk, juice, water) every day. Stay well hydrated.     You may eat meals and snacks..    Activity:    .Bed rest or partial bed rest. This means:____spoil yourself and rest a lot and let others take care of you for the next week.    Count fetal kicks every day. (See handout.)    Call your doctor if your baby is moving less than usual.    Medicines:    My care team has reviewed my medicines with me.    My care team has given me a list of my medicines.    My care team has prescribed a new medicine. They have either sent it home with me or ordered it from the pharmacy.    Call your provider if you notice:    Swelling in your face or increased swelling in your hands or legs.    Headaches that are not relieved by Tylenol (acetaminophen).    Changes in your vision (blurring; seeing spots or stars).    Nausea (sick to your stomach) and vomiting (throwing up).    Weight gain of 5 pounds per week.    Heartburn that doesn't go away.    Signs of bladder infection: pain when you urinate (use the toilet), needing to go more often or more urgently.    The bag of lee (membranes) breaks, or you notice leaking in your underwear.    Bright red blood in your underwear.    Abdominal (lower belly) or stomach pain.    For first baby: Contractions (tightenings) less than 5 minutes apart for one hour or more.    For Second (plus) baby: Contractions (tightenings) less than 10 minutes apart and getting stronger.    Increase or change in vaginal discharge (note the color and amount).    Other: keep other scheduled appts.     Follow up with your provider: ***

## 2018-08-28 NOTE — TELEPHONE ENCOUNTER
Patient states she is having contractions every 7 to 10 minutes with back pain. The back pain is new and started after BMZ injection. Patient advised that with history of contractions, she should present to [facility] to rule out labor vs irritation from injection. Patient verbalized understanding and will do so.    Kell Gibson RN...................8/28/2018 12:03 PM

## 2018-09-04 ENCOUNTER — HOSPITAL ENCOUNTER (OUTPATIENT)
Dept: ULTRASOUND IMAGING | Facility: OTHER | Age: 38
Discharge: HOME | End: 2018-09-04
Attending: OBSTETRICS & GYNECOLOGY | Admitting: OBSTETRICS & GYNECOLOGY
Payer: COMMERCIAL

## 2018-09-04 ENCOUNTER — PRENATAL OFFICE VISIT (OUTPATIENT)
Dept: OBGYN | Facility: OTHER | Age: 38
End: 2018-09-04
Attending: OBSTETRICS & GYNECOLOGY
Payer: COMMERCIAL

## 2018-09-04 ENCOUNTER — ANESTHESIA EVENT (OUTPATIENT)
Dept: SURGERY | Facility: OTHER | Age: 38
End: 2018-09-04
Payer: COMMERCIAL

## 2018-09-04 VITALS
DIASTOLIC BLOOD PRESSURE: 86 MMHG | WEIGHT: 225.4 LBS | TEMPERATURE: 98.3 F | BODY MASS INDEX: 36.38 KG/M2 | SYSTOLIC BLOOD PRESSURE: 128 MMHG

## 2018-09-04 DIAGNOSIS — O16.3 HYPERTENSION AFFECTING PREGNANCY IN THIRD TRIMESTER: ICD-10-CM

## 2018-09-04 DIAGNOSIS — O09.523 ELDERLY MULTIGRAVIDA IN THIRD TRIMESTER: Primary | ICD-10-CM

## 2018-09-04 DIAGNOSIS — O30.043 DICHORIONIC DIAMNIOTIC TWIN PREGNANCY IN THIRD TRIMESTER: ICD-10-CM

## 2018-09-04 PROBLEM — Z3A.35 35 WEEKS GESTATION OF PREGNANCY: Status: RESOLVED | Noted: 2018-08-24 | Resolved: 2018-09-04

## 2018-09-04 LAB
ALBUMIN UR-MCNC: NEGATIVE MG/DL
APPEARANCE UR: CLEAR
BILIRUB UR QL STRIP: NEGATIVE
COLOR UR AUTO: YELLOW
GLUCOSE UR STRIP-MCNC: NEGATIVE MG/DL
HGB UR QL STRIP: NEGATIVE
KETONES UR STRIP-MCNC: NEGATIVE MG/DL
LEUKOCYTE ESTERASE UR QL STRIP: NEGATIVE
NITRATE UR QL: NEGATIVE
PH UR STRIP: 6 PH (ref 5–9)
SOURCE: NORMAL
SP GR UR STRIP: <1.005 (ref 1–1.03)
UROBILINOGEN UR STRIP-ACNC: 0.2 EU/DL (ref 0.2–1)

## 2018-09-04 PROCEDURE — 99207 ZZC OB VISIT-NO CHARGE - GICH ONLY: CPT | Performed by: OBSTETRICS & GYNECOLOGY

## 2018-09-04 PROCEDURE — 81003 URINALYSIS AUTO W/O SCOPE: CPT | Performed by: OBSTETRICS & GYNECOLOGY

## 2018-09-04 PROCEDURE — 59025 FETAL NON-STRESS TEST: CPT | Performed by: OBSTETRICS & GYNECOLOGY

## 2018-09-04 PROCEDURE — 76819 FETAL BIOPHYS PROFIL W/O NST: CPT | Mod: 59

## 2018-09-04 ASSESSMENT — PAIN SCALES - GENERAL: PAINLEVEL: NO PAIN (0)

## 2018-09-04 NOTE — PROGRESS NOTES
CC: Recheck OB visit at 36w6d    HPI: Maricarmen Ornelas presents for a routine OB visit now at 36w6d  She has no concerns. Denies cramping, bleeding, normal fetal movement    Obstetric History       T2      L2     SAB0   TAB0   Ectopic0   Multiple0   Live Births2       # Outcome Date GA Lbr Renny/2nd Weight Sex Delivery Anes PTL Lv   3 Current            2 Term 10/16/10 40w0d   M  EPI N SMOOTH      Name: William   1 Term  40w0d   M  None N SMOOTH      Name: Kaveh        Current Outpatient Prescriptions   Medication     doxylamine (UNISOM) 25 MG TABS tablet     ferrous gluconate (FERGON) 324 (38 Fe) MG tablet     levothyroxine (SYNTHROID) 75 MCG tablet     loratadine (CLARITIN) 10 MG tablet     order for DME     order for DME     Prenatal Vit-Fe Fumarate-FA ([employer] PRENATAL) 28-0.8 MG TABS     ranitidine (ZANTAC) 150 MG/10ML syrup     No current facility-administered medications for this visit.          O: /86 (BP Location: Right arm)  Temp 98.3  F (36.8  C)  Wt 102.2 kg (225 lb 6.4 oz)  LMP 2017  BMI 36.38 kg/m2  Body mass index is 36.38 kg/(m^2).  See OB flow sheet  EXAM:  NAD    NST doucmentation:    Indication: AMA with twins, dichorionic    Duration: 30 min  36w6d  FHR baseline: 140/155  Accelerations: y  Decelerations: n  Contractions: n    Category 1      Results for orders placed or performed in visit on 18   UA reflex to Microscopic and Culture   Result Value Ref Range    Color Urine Yellow     Appearance Urine Clear     Glucose Urine Negative NEG^Negative mg/dL    Bilirubin Urine Negative NEG^Negative    Ketones Urine Negative NEG^Negative mg/dL    Specific Gravity Urine <1.005 1.000 - 1.030    Blood Urine Negative NEG^Negative    pH Urine 6.0 5.0 - 9.0 pH    Protein Albumin Urine Negative NEG^Negative mg/dL    Urobilinogen Urine 0.2 0.2 - 1.0 EU/dL    Nitrite Urine Negative NEG^Negative    Leukocyte Esterase Urine Negative NEG^Negative    Source Midstream Urine         A/P: 36w6d with twins, AMA   section tomorrow with gestational hypertension        Woodrow Hurtado MD FACOG  1:03 PM 2018

## 2018-09-04 NOTE — ADDENDUM NOTE
Encounter addended by: Rose Avalos, PT on: 9/4/2018  1:16 PM<BR>     Actions taken: Flowsheet accepted, Pend clinical note, Sign clinical note, Episode resolved

## 2018-09-04 NOTE — PROGRESS NOTES
Outpatient Physical Therapy Discharge Note     Patient: Maricarmen Ornelas  : 1980    Beginning/End Dates of Reporting Period:  18 to 7/10/2018    Referring Provider: Roxana Holbrook MD    Therapy Diagnosis: chronic low back pain, pubic shear, lumbar segmental dysfunctions, myofascial tightness     Client Self Report: Hands continue to be numb, constant. trying to stretch. Started sleeping in recliner, to reduce pain in morning in hands, hot pain, unbearable. Patient also called after this session and cancelled remaining PT feeling was just too pregnant for PT. Scheduled c- section on 18    Objective Measurements:  Objective Measure: joint mobility  Details: FRS left C7  Objective Measure: tissue loading  Details: limited through left shoulder     Goals:  Goal Identifier MMT   Goal Description improve facilitation of abdominal muscle to stabilize lumbar spine and pelvis during mobility   Target Date 18   Date Met      Progress:     Goal Identifier joint mechanics   Goal Description maintain normal joint mechanics of lumbar spine and pelvis during pregnancy to reduce back pain   Target Date 18   Date Met      Progress:     Goal Identifier exercise   Goal Description Patient to continue with and tolerated exercise during course of pregnancy   Target Date 18   Date Met      Progress:       Progress Toward Goals:   Progress this reporting period: limited progress with goals due to progressing twin pregnancy.           Plan:  Discharge from therapy.    Discharge:    Reason for Discharge: Patient chooses to discontinue therapy.    Equipment Issued: NA    Discharge Plan: Patient to continue home program.

## 2018-09-05 ENCOUNTER — HOSPITAL ENCOUNTER (INPATIENT)
Facility: OTHER | Age: 38
LOS: 3 days | Discharge: HOME | End: 2018-09-08
Attending: OBSTETRICS & GYNECOLOGY | Admitting: OBSTETRICS & GYNECOLOGY
Payer: COMMERCIAL

## 2018-09-05 ENCOUNTER — SURGERY (OUTPATIENT)
Age: 38
End: 2018-09-05

## 2018-09-05 ENCOUNTER — ANESTHESIA (OUTPATIENT)
Dept: SURGERY | Facility: OTHER | Age: 38
End: 2018-09-05
Payer: COMMERCIAL

## 2018-09-05 DIAGNOSIS — Z98.891 S/P CESAREAN SECTION: Primary | ICD-10-CM

## 2018-09-05 LAB
ABO + RH BLD: NORMAL
ABO + RH BLD: NORMAL
BASOPHILS # BLD AUTO: 0.1 10E9/L (ref 0–0.2)
BASOPHILS NFR BLD AUTO: 0.6 %
BLD GP AB SCN SERPL QL: NORMAL
BLOOD BANK CMNT PATIENT-IMP: NORMAL
BLOOD BANK CMNT PATIENT-IMP: NORMAL
DIFFERENTIAL METHOD BLD: ABNORMAL
EOSINOPHIL # BLD AUTO: 0.1 10E9/L (ref 0–0.7)
EOSINOPHIL NFR BLD AUTO: 0.8 %
ERYTHROCYTE [DISTWIDTH] IN BLOOD BY AUTOMATED COUNT: 18.3 % (ref 10–15)
HCT VFR BLD AUTO: 41.1 % (ref 35–47)
HGB BLD-MCNC: 13.5 G/DL (ref 11.7–15.7)
IMM GRANULOCYTES # BLD: 0.1 10E9/L (ref 0–0.4)
IMM GRANULOCYTES NFR BLD: 0.8 %
LYMPHOCYTES # BLD AUTO: 2.1 10E9/L (ref 0.8–5.3)
LYMPHOCYTES NFR BLD AUTO: 25.5 %
MCH RBC QN AUTO: 28.3 PG (ref 26.5–33)
MCHC RBC AUTO-ENTMCNC: 32.8 G/DL (ref 31.5–36.5)
MCV RBC AUTO: 86 FL (ref 78–100)
MONOCYTES # BLD AUTO: 0.7 10E9/L (ref 0–1.3)
MONOCYTES NFR BLD AUTO: 8.6 %
NEUTROPHILS # BLD AUTO: 5.3 10E9/L (ref 1.6–8.3)
NEUTROPHILS NFR BLD AUTO: 63.7 %
PLATELET # BLD AUTO: 204 10E9/L (ref 150–450)
RBC # BLD AUTO: 4.77 10E12/L (ref 3.8–5.2)
SPECIMEN EXP DATE BLD: NORMAL
WBC # BLD AUTO: 8.3 10E9/L (ref 4–11)

## 2018-09-05 PROCEDURE — 25000125 ZZHC RX 250: Performed by: NURSE ANESTHETIST, CERTIFIED REGISTERED

## 2018-09-05 PROCEDURE — 25000128 H RX IP 250 OP 636: Performed by: OBSTETRICS & GYNECOLOGY

## 2018-09-05 PROCEDURE — 25000128 H RX IP 250 OP 636: Performed by: ANESTHESIOLOGY

## 2018-09-05 PROCEDURE — 37000009 ZZH ANESTHESIA TECHNICAL FEE, EACH ADDTL 15 MIN: Performed by: OBSTETRICS & GYNECOLOGY

## 2018-09-05 PROCEDURE — 12000027 ZZH R&B OB

## 2018-09-05 PROCEDURE — 59510 CESAREAN DELIVERY: CPT | Performed by: OBSTETRICS & GYNECOLOGY

## 2018-09-05 PROCEDURE — 71000014 ZZH RECOVERY PHASE 1 LEVEL 2 FIRST HR: Performed by: OBSTETRICS & GYNECOLOGY

## 2018-09-05 PROCEDURE — 85025 COMPLETE CBC W/AUTO DIFF WBC: CPT | Performed by: OBSTETRICS & GYNECOLOGY

## 2018-09-05 PROCEDURE — 86901 BLOOD TYPING SEROLOGIC RH(D): CPT | Performed by: OBSTETRICS & GYNECOLOGY

## 2018-09-05 PROCEDURE — 59514 CESAREAN DELIVERY ONLY: CPT | Mod: 80 | Performed by: FAMILY MEDICINE

## 2018-09-05 PROCEDURE — 59510 CESAREAN DELIVERY: CPT | Performed by: NURSE ANESTHETIST, CERTIFIED REGISTERED

## 2018-09-05 PROCEDURE — 40000275 ZZH STATISTIC RCP TIME EA 10 MIN

## 2018-09-05 PROCEDURE — 36000056 ZZH SURGERY LEVEL 3 1ST 30 MIN: Performed by: OBSTETRICS & GYNECOLOGY

## 2018-09-05 PROCEDURE — 25000128 H RX IP 250 OP 636: Performed by: NURSE ANESTHETIST, CERTIFIED REGISTERED

## 2018-09-05 PROCEDURE — 86850 RBC ANTIBODY SCREEN: CPT | Performed by: OBSTETRICS & GYNECOLOGY

## 2018-09-05 PROCEDURE — 27210794 ZZH OR GENERAL SUPPLY STERILE: Performed by: OBSTETRICS & GYNECOLOGY

## 2018-09-05 PROCEDURE — 25800025 ZZH RX 258: Performed by: OBSTETRICS & GYNECOLOGY

## 2018-09-05 PROCEDURE — 36415 COLL VENOUS BLD VENIPUNCTURE: CPT | Performed by: OBSTETRICS & GYNECOLOGY

## 2018-09-05 PROCEDURE — 37000008 ZZH ANESTHESIA TECHNICAL FEE, 1ST 30 MIN: Performed by: OBSTETRICS & GYNECOLOGY

## 2018-09-05 PROCEDURE — 36000058 ZZH SURGERY LEVEL 3 EA 15 ADDTL MIN: Performed by: OBSTETRICS & GYNECOLOGY

## 2018-09-05 PROCEDURE — 86900 BLOOD TYPING SEROLOGIC ABO: CPT | Performed by: OBSTETRICS & GYNECOLOGY

## 2018-09-05 PROCEDURE — 86780 TREPONEMA PALLIDUM: CPT | Performed by: OBSTETRICS & GYNECOLOGY

## 2018-09-05 PROCEDURE — 25000132 ZZH RX MED GY IP 250 OP 250 PS 637: Performed by: OBSTETRICS & GYNECOLOGY

## 2018-09-05 RX ORDER — FENTANYL CITRATE 50 UG/ML
25-50 INJECTION, SOLUTION INTRAMUSCULAR; INTRAVENOUS EVERY 5 MIN PRN
Status: DISCONTINUED | OUTPATIENT
Start: 2018-09-05 | End: 2018-09-05 | Stop reason: HOSPADM

## 2018-09-05 RX ORDER — LEVOTHYROXINE SODIUM 75 UG/1
75 TABLET ORAL DAILY
Status: DISCONTINUED | OUTPATIENT
Start: 2018-09-05 | End: 2018-09-08 | Stop reason: HOSPADM

## 2018-09-05 RX ORDER — METHYLERGONOVINE MALEATE 0.2 MG/ML
200 INJECTION INTRAVENOUS
Status: DISCONTINUED | OUTPATIENT
Start: 2018-09-05 | End: 2018-09-08 | Stop reason: HOSPADM

## 2018-09-05 RX ORDER — CARBOPROST TROMETHAMINE 250 UG/ML
250 INJECTION, SOLUTION INTRAMUSCULAR
Status: DISCONTINUED | OUTPATIENT
Start: 2018-09-05 | End: 2018-09-08 | Stop reason: HOSPADM

## 2018-09-05 RX ORDER — MISOPROSTOL 100 UG/1
800 TABLET ORAL
Status: DISCONTINUED | OUTPATIENT
Start: 2018-09-05 | End: 2018-09-08 | Stop reason: HOSPADM

## 2018-09-05 RX ORDER — LIDOCAINE 40 MG/G
CREAM TOPICAL
Status: DISCONTINUED | OUTPATIENT
Start: 2018-09-05 | End: 2018-09-05 | Stop reason: HOSPADM

## 2018-09-05 RX ORDER — DIPHENHYDRAMINE HCL 25 MG
25 CAPSULE ORAL EVERY 6 HOURS PRN
Status: DISCONTINUED | OUTPATIENT
Start: 2018-09-05 | End: 2018-09-08 | Stop reason: HOSPADM

## 2018-09-05 RX ORDER — BUPIVACAINE HYDROCHLORIDE 7.5 MG/ML
INJECTION, SOLUTION INTRASPINAL PRN
Status: DISCONTINUED | OUTPATIENT
Start: 2018-09-05 | End: 2018-09-05

## 2018-09-05 RX ORDER — METOCLOPRAMIDE HYDROCHLORIDE 5 MG/ML
10 INJECTION INTRAMUSCULAR; INTRAVENOUS EVERY 6 HOURS PRN
Status: DISCONTINUED | OUTPATIENT
Start: 2018-09-05 | End: 2018-09-08 | Stop reason: HOSPADM

## 2018-09-05 RX ORDER — CEFAZOLIN SODIUM 2 G/100ML
2 INJECTION, SOLUTION INTRAVENOUS
Status: DISCONTINUED | OUTPATIENT
Start: 2018-09-05 | End: 2018-09-05 | Stop reason: HOSPADM

## 2018-09-05 RX ORDER — NALOXONE HYDROCHLORIDE 0.4 MG/ML
.1-.4 INJECTION, SOLUTION INTRAMUSCULAR; INTRAVENOUS; SUBCUTANEOUS
Status: DISCONTINUED | OUTPATIENT
Start: 2018-09-05 | End: 2018-09-08 | Stop reason: HOSPADM

## 2018-09-05 RX ORDER — ALBUTEROL SULFATE 0.83 MG/ML
2.5 SOLUTION RESPIRATORY (INHALATION) EVERY 4 HOURS PRN
Status: DISCONTINUED | OUTPATIENT
Start: 2018-09-05 | End: 2018-09-05 | Stop reason: HOSPADM

## 2018-09-05 RX ORDER — HYDRALAZINE HYDROCHLORIDE 20 MG/ML
2.5-5 INJECTION INTRAMUSCULAR; INTRAVENOUS EVERY 10 MIN PRN
Status: DISCONTINUED | OUTPATIENT
Start: 2018-09-05 | End: 2018-09-05 | Stop reason: HOSPADM

## 2018-09-05 RX ORDER — MORPHINE SULFATE 1 MG/ML
INJECTION, SOLUTION EPIDURAL; INTRATHECAL; INTRAVENOUS PRN
Status: DISCONTINUED | OUTPATIENT
Start: 2018-09-05 | End: 2018-09-05

## 2018-09-05 RX ORDER — HYDROCORTISONE 2.5 %
CREAM (GRAM) TOPICAL 3 TIMES DAILY PRN
Status: DISCONTINUED | OUTPATIENT
Start: 2018-09-05 | End: 2018-09-08 | Stop reason: HOSPADM

## 2018-09-05 RX ORDER — DIPHENHYDRAMINE HYDROCHLORIDE 50 MG/ML
25 INJECTION INTRAMUSCULAR; INTRAVENOUS EVERY 6 HOURS PRN
Status: DISCONTINUED | OUTPATIENT
Start: 2018-09-05 | End: 2018-09-08 | Stop reason: HOSPADM

## 2018-09-05 RX ORDER — LIDOCAINE 40 MG/G
CREAM TOPICAL
Status: DISCONTINUED | OUTPATIENT
Start: 2018-09-05 | End: 2018-09-08 | Stop reason: HOSPADM

## 2018-09-05 RX ORDER — BISACODYL 10 MG
10 SUPPOSITORY, RECTAL RECTAL DAILY PRN
Status: DISCONTINUED | OUTPATIENT
Start: 2018-09-07 | End: 2018-09-08 | Stop reason: HOSPADM

## 2018-09-05 RX ORDER — MAGNESIUM HYDROXIDE 1200 MG/15ML
LIQUID ORAL PRN
Status: DISCONTINUED | OUTPATIENT
Start: 2018-09-05 | End: 2018-09-05 | Stop reason: HOSPADM

## 2018-09-05 RX ORDER — OXYCODONE AND ACETAMINOPHEN 5; 325 MG/1; MG/1
1-2 TABLET ORAL EVERY 4 HOURS PRN
Status: DISCONTINUED | OUTPATIENT
Start: 2018-09-05 | End: 2018-09-08 | Stop reason: HOSPADM

## 2018-09-05 RX ORDER — HYDROMORPHONE HYDROCHLORIDE 1 MG/ML
.3-.5 INJECTION, SOLUTION INTRAMUSCULAR; INTRAVENOUS; SUBCUTANEOUS EVERY 10 MIN PRN
Status: DISCONTINUED | OUTPATIENT
Start: 2018-09-05 | End: 2018-09-06 | Stop reason: CLARIF

## 2018-09-05 RX ORDER — MEPERIDINE HYDROCHLORIDE 50 MG/ML
12.5 INJECTION INTRAMUSCULAR; INTRAVENOUS; SUBCUTANEOUS
Status: DISCONTINUED | OUTPATIENT
Start: 2018-09-05 | End: 2018-09-06 | Stop reason: CLARIF

## 2018-09-05 RX ORDER — OXYTOCIN 10 [USP'U]/ML
10 INJECTION, SOLUTION INTRAMUSCULAR; INTRAVENOUS
Status: DISCONTINUED | OUTPATIENT
Start: 2018-09-05 | End: 2018-09-06 | Stop reason: CLARIF

## 2018-09-05 RX ORDER — DEXTROSE, SODIUM CHLORIDE, SODIUM LACTATE, POTASSIUM CHLORIDE, AND CALCIUM CHLORIDE 5; .6; .31; .03; .02 G/100ML; G/100ML; G/100ML; G/100ML; G/100ML
INJECTION, SOLUTION INTRAVENOUS CONTINUOUS
Status: DISCONTINUED | OUTPATIENT
Start: 2018-09-05 | End: 2018-09-06 | Stop reason: CLARIF

## 2018-09-05 RX ORDER — CEFAZOLIN SODIUM 1 G/3ML
INJECTION, POWDER, FOR SOLUTION INTRAMUSCULAR; INTRAVENOUS PRN
Status: DISCONTINUED | OUTPATIENT
Start: 2018-09-05 | End: 2018-09-05

## 2018-09-05 RX ORDER — CEFAZOLIN SODIUM 1 G/50ML
1 INJECTION, SOLUTION INTRAVENOUS SEE ADMIN INSTRUCTIONS
Status: DISCONTINUED | OUTPATIENT
Start: 2018-09-05 | End: 2018-09-05 | Stop reason: HOSPADM

## 2018-09-05 RX ORDER — OXYCODONE HYDROCHLORIDE 5 MG/1
5 TABLET ORAL EVERY 4 HOURS PRN
Status: DISCONTINUED | OUTPATIENT
Start: 2018-09-05 | End: 2018-09-05 | Stop reason: ALTCHOICE

## 2018-09-05 RX ORDER — SODIUM CHLORIDE, SODIUM LACTATE, POTASSIUM CHLORIDE, CALCIUM CHLORIDE 600; 310; 30; 20 MG/100ML; MG/100ML; MG/100ML; MG/100ML
INJECTION, SOLUTION INTRAVENOUS CONTINUOUS
Status: DISCONTINUED | OUTPATIENT
Start: 2018-09-05 | End: 2018-09-06 | Stop reason: CLARIF

## 2018-09-05 RX ORDER — DOCUSATE SODIUM 100 MG/1
100 CAPSULE, LIQUID FILLED ORAL 2 TIMES DAILY
Status: DISCONTINUED | OUTPATIENT
Start: 2018-09-05 | End: 2018-09-08 | Stop reason: HOSPADM

## 2018-09-05 RX ORDER — SODIUM CHLORIDE, SODIUM LACTATE, POTASSIUM CHLORIDE, CALCIUM CHLORIDE 600; 310; 30; 20 MG/100ML; MG/100ML; MG/100ML; MG/100ML
INJECTION, SOLUTION INTRAVENOUS CONTINUOUS
Status: DISCONTINUED | OUTPATIENT
Start: 2018-09-05 | End: 2018-09-05 | Stop reason: HOSPADM

## 2018-09-05 RX ORDER — LANOLIN 100 %
OINTMENT (GRAM) TOPICAL
Status: DISCONTINUED | OUTPATIENT
Start: 2018-09-05 | End: 2018-09-08 | Stop reason: HOSPADM

## 2018-09-05 RX ORDER — LIDOCAINE HYDROCHLORIDE 10 MG/ML
INJECTION, SOLUTION INFILTRATION; PERINEURAL PRN
Status: DISCONTINUED | OUTPATIENT
Start: 2018-09-05 | End: 2018-09-05

## 2018-09-05 RX ORDER — FENTANYL CITRATE 50 UG/ML
50 INJECTION, SOLUTION INTRAMUSCULAR; INTRAVENOUS
Status: DISCONTINUED | OUTPATIENT
Start: 2018-09-05 | End: 2018-09-05 | Stop reason: HOSPADM

## 2018-09-05 RX ORDER — SIMETHICONE 80 MG
80 TABLET,CHEWABLE ORAL 4 TIMES DAILY PRN
Status: DISCONTINUED | OUTPATIENT
Start: 2018-09-05 | End: 2018-09-08 | Stop reason: HOSPADM

## 2018-09-05 RX ORDER — ONDANSETRON 2 MG/ML
4 INJECTION INTRAMUSCULAR; INTRAVENOUS EVERY 6 HOURS PRN
Status: DISCONTINUED | OUTPATIENT
Start: 2018-09-05 | End: 2018-09-06 | Stop reason: CLARIF

## 2018-09-05 RX ORDER — NALOXONE HYDROCHLORIDE 0.4 MG/ML
.1-.4 INJECTION, SOLUTION INTRAMUSCULAR; INTRAVENOUS; SUBCUTANEOUS
Status: DISCONTINUED | OUTPATIENT
Start: 2018-09-05 | End: 2018-09-05

## 2018-09-05 RX ORDER — CITRIC ACID/SODIUM CITRATE 334-500MG
30 SOLUTION, ORAL ORAL
Status: DISCONTINUED | OUTPATIENT
Start: 2018-09-05 | End: 2018-09-05 | Stop reason: HOSPADM

## 2018-09-05 RX ORDER — ONDANSETRON 2 MG/ML
4 INJECTION INTRAMUSCULAR; INTRAVENOUS EVERY 30 MIN PRN
Status: DISCONTINUED | OUTPATIENT
Start: 2018-09-05 | End: 2018-09-05

## 2018-09-05 RX ORDER — KETOROLAC TROMETHAMINE 30 MG/ML
30 INJECTION, SOLUTION INTRAMUSCULAR; INTRAVENOUS EVERY 6 HOURS
Status: COMPLETED | OUTPATIENT
Start: 2018-09-05 | End: 2018-09-06

## 2018-09-05 RX ORDER — IBUPROFEN 600 MG/1
600 TABLET, FILM COATED ORAL EVERY 6 HOURS PRN
Status: DISCONTINUED | OUTPATIENT
Start: 2018-09-05 | End: 2018-09-08 | Stop reason: HOSPADM

## 2018-09-05 RX ORDER — ONDANSETRON 4 MG/1
4 TABLET, ORALLY DISINTEGRATING ORAL EVERY 30 MIN PRN
Status: DISCONTINUED | OUTPATIENT
Start: 2018-09-05 | End: 2018-09-05

## 2018-09-05 RX ORDER — DEXAMETHASONE SODIUM PHOSPHATE 4 MG/ML
4 INJECTION, SOLUTION INTRA-ARTICULAR; INTRALESIONAL; INTRAMUSCULAR; INTRAVENOUS; SOFT TISSUE EVERY 10 MIN PRN
Status: DISCONTINUED | OUTPATIENT
Start: 2018-09-05 | End: 2018-09-08 | Stop reason: HOSPADM

## 2018-09-05 RX ADMIN — LIDOCAINE HYDROCHLORIDE 30 MG: 10 INJECTION, SOLUTION INFILTRATION; PERINEURAL at 07:40

## 2018-09-05 RX ADMIN — ONDANSETRON 4 MG: 2 INJECTION INTRAMUSCULAR; INTRAVENOUS at 09:01

## 2018-09-05 RX ADMIN — CEFAZOLIN 2 G: 1 INJECTION, POWDER, FOR SOLUTION INTRAMUSCULAR; INTRAVENOUS at 07:48

## 2018-09-05 RX ADMIN — SODIUM CHLORIDE 1000 ML: 900 IRRIGANT IRRIGATION at 08:00

## 2018-09-05 RX ADMIN — DOCUSATE SODIUM 100 MG: 100 CAPSULE, LIQUID FILLED ORAL at 21:28

## 2018-09-05 RX ADMIN — OXYCODONE HYDROCHLORIDE AND ACETAMINOPHEN 2 TABLET: 5; 325 TABLET ORAL at 21:28

## 2018-09-05 RX ADMIN — OXYCODONE HYDROCHLORIDE AND ACETAMINOPHEN 2 TABLET: 5; 325 TABLET ORAL at 18:16

## 2018-09-05 RX ADMIN — Medication: at 07:59

## 2018-09-05 RX ADMIN — MORPHINE SULFATE 100 MCG: 1 INJECTION, SOLUTION EPIDURAL; INTRATHECAL; INTRAVENOUS at 07:41

## 2018-09-05 RX ADMIN — SODIUM CHLORIDE, SODIUM LACTATE, POTASSIUM CHLORIDE, AND CALCIUM CHLORIDE: 600; 310; 30; 20 INJECTION, SOLUTION INTRAVENOUS at 08:28

## 2018-09-05 RX ADMIN — SODIUM CHLORIDE, SODIUM LACTATE, POTASSIUM CHLORIDE, AND CALCIUM CHLORIDE: 600; 310; 30; 20 INJECTION, SOLUTION INTRAVENOUS at 07:34

## 2018-09-05 RX ADMIN — KETOROLAC TROMETHAMINE 30 MG: 30 INJECTION, SOLUTION INTRAMUSCULAR at 16:57

## 2018-09-05 RX ADMIN — SODIUM CHLORIDE, SODIUM LACTATE, POTASSIUM CHLORIDE, CALCIUM CHLORIDE, AND DEXTROSE MONOHYDRATE 1000 ML: 600; 310; 30; 20; 5 INJECTION, SOLUTION INTRAVENOUS at 12:01

## 2018-09-05 RX ADMIN — DOCUSATE SODIUM 100 MG: 100 CAPSULE, LIQUID FILLED ORAL at 12:02

## 2018-09-05 RX ADMIN — KETOROLAC TROMETHAMINE 30 MG: 30 INJECTION, SOLUTION INTRAMUSCULAR at 12:02

## 2018-09-05 RX ADMIN — BUPIVACAINE HYDROCHLORIDE IN DEXTROSE 2 ML: 7.5 INJECTION, SOLUTION SUBARACHNOID at 07:41

## 2018-09-05 ASSESSMENT — ACTIVITIES OF DAILY LIVING (ADL)
DRESS: 0-->INDEPENDENT
AMBULATION: 0 - INDEPENDENT
SWALLOWING: 0-->SWALLOWS FOODS/LIQUIDS WITHOUT DIFFICULTY
RETIRED_COMMUNICATION: 0-->UNDERSTANDS/COMMUNICATES WITHOUT DIFFICULTY
COGNITION: 0 - NO COGNITION ISSUES REPORTED
TOILETING: 0 - INDEPENDENT
TRANSFERRING: 0-->INDEPENDENT
FALL_HISTORY_WITHIN_LAST_SIX_MONTHS: NO
BATHING: 0 - INDEPENDENT
TRANSFERRING: 0 - INDEPENDENT
TOILETING: 0-->INDEPENDENT
SWALLOWING: 0 - SWALLOWS FOODS/LIQUIDS WITHOUT DIFFICULTY
BATHING: 0-->INDEPENDENT
EATING: 0 - INDEPENDENT
DRESS: 0 - INDEPENDENT
COMMUNICATION: 0 - UNDERSTANDS/COMMUNICATES WITHOUT DIFFICULTY
AMBULATION: 0-->INDEPENDENT
RETIRED_EATING: 0-->INDEPENDENT

## 2018-09-05 NOTE — ANESTHESIA PREPROCEDURE EVALUATION
Anesthesia Evaluation     . Pt has had prior anesthetic. Type: Regional and General           ROS/MED HX    ENT/Pulmonary:  - neg pulmonary ROS     Neurologic:  - neg neurologic ROS     Cardiovascular:  - neg cardiovascular ROS       METS/Exercise Tolerance:     Hematologic:  - neg hematologic  ROS       Musculoskeletal:  - neg musculoskeletal ROS       GI/Hepatic:  - neg GI/hepatic ROS       Renal/Genitourinary:  - ROS Renal section negative       Endo:  - neg endo ROS       Psychiatric:  - neg psychiatric ROS       Infectious Disease:  - neg infectious disease ROS       Malignancy:      - no malignancy   Other:    (+) Possibly pregnant C-spine cleared: N/A, no H/O Chronic Pain,no other significant disability                    Physical Exam  Normal systems: cardiovascular, pulmonary and dental    Airway   Mallampati: II  TM distance: >3 FB  Neck ROM: full    Dental     Cardiovascular       Pulmonary                     Anesthesia Plan      History & Physical Review      ASA Status:  2 .    NPO Status:  > 6 hours    Plan for Spinal          Postoperative Care  Postoperative pain management:  Neuraxial analgesia.      Consents  Anesthetic plan, risks, benefits and alternatives discussed with:  Patient.  Use of blood products discussed: No .   .                          .

## 2018-09-05 NOTE — OR NURSING
PACU Transfer Note    Maricarmen Ornelas was transferred to OB care via bed.  Equipment used for transport:  none.      PACU Respiratory Event Documentation     1) Episodes of Apnea greater than or equal to 10 seconds: 0    2) Bradypnea - less than 8 breaths per minute: 0    3) Pain score on 0 to 10 scale: 0    4) Pain-sedation mismatch (yes or no): no    5) Repeated 02 desaturation less than 90% (yes or no): no    Anesthesia notified? (yes or no): na    Any of the above events occuring repeatedly in separate 30 minute intervals may be considered recurrent PACU respiratory events.    Patient stable and meets phase 1 discharge criteria for transport from PACU.  Report given to Cheri RAIN RN

## 2018-09-05 NOTE — ANESTHESIA CARE TRANSFER NOTE
Patient: Maricarmen Ornelas    Procedure(s):   Section - twins - Wound Class: II-Clean Contaminated    Diagnosis: current twin pregnancy  Diagnosis Additional Information: No value filed.    Anesthesia Type:   Spinal     Note:  Airway :Room Air  Patient transferred to:Labor and Delivery  Handoff Report: Identifed the Patient, Identified the Reponsible Provider, Reviewed the pertinent medical history, Discussed the surgical course, Reviewed Intra-OP anesthesia mangement and issues during anesthesia, Set expectations for post-procedure period and Allowed opportunity for questions and acknowledgement of understanding      Vitals: (Last set prior to Anesthesia Care Transfer)    CRNA VITALS  2018 0758 - 2018 0858      2018             NIBP: 108/56    NIBP Mean: 77    Resp Rate (set): 10                Electronically Signed By: FLAVIO Marquis CRNA  2018  9:03 AM

## 2018-09-05 NOTE — ANESTHESIA PROCEDURE NOTES
Peripheral nerve/Neuraxial procedure note : intrathecal  Pre-Procedure  Performed by  ANNABEL BUSH   Location: OR    Procedure Times:9/5/2018 7:37 AM and 9/5/2018 7:42 AM  Pre-Anesthestic Checklist: patient identified, IV checked, site marked, risks and benefits discussed, informed consent, monitors and equipment checked, pre-op evaluation, at physician/surgeon's request and post-op pain management    Timeout  Correct Patient: Yes   Correct Procedure: Yes   Correct Site: Yes   Correct Laterality: Yes   Correct Position: Yes   Site Marked: Yes   .   Procedure Documentation  ASA 2  .    Procedure:    Intrathecal.  Insertion Site:L2-3  (midline approach)      Patient Prep;chlorhexidine gluconate and isopropyl alcohol.  .  Needle: Cliff tip Spinal Needle (gauge): 25  Spinal/LP Needle Length (inches): 3.5 # of attempts: 1 and # of redirects:  Introducer used .       Assessment/Narrative  Paresthesias: No.  .  .  clear CSF fluid removed while sitting   .

## 2018-09-05 NOTE — LACTATION NOTE
This note was copied from a baby's chart.  INPATIENT LACTATION CONSULT      Consult with Maricarmen and niels regarding breastfeeding.  Maricarmen nursed her other 2 children with no problem.  Maricarmen states her mom was a nurse practitioner who specialized in breastfeeding so she gets information and help from her.  Maricarmen is nursing the  girl at present and there is obvious rooting with a strong latch observed this feeding session.  Rhythmic and aggressive suckling also noted.  Maricarmen states the  boy was also nursing with no problem just prior to her daughter.  Instructed Maricarmen on correct positioning and technique when latching babes on.  Maricarmen is independent with latching babes onto the breast.  Minimal assistance required.  Encouraged Maricarmen on the importance of frequent feedings throughout the day (at least 8-12 feedings in a 24 hour period) and skin to skin contact. Maricarmen demonstrated and states she understands all information given.    Viki Clement RN, IBCLC  Lactation Consultant  [facility]

## 2018-09-05 NOTE — H&P
[facility]    History and Physical  Obstetrics and Gynecology     Date of Admission:  2018    Assessment & Plan   Maricarmen Ornelas is a 38 year old female who presents with dichorionic twins and gestational hypertension  ASSESSMENT:   IUP @ 37w0d for  section with dichorionic twins and gestational hypertension.  NST reactive.  Category  I    PLAN:   Admit - see IP orders    Woodrow Hurtado    History of Present Illness   Maricarmen Ornelas is a 38 year old female  37w0d  Estimated Date of Delivery: Sep 26, 2018 is calculated from Patient's last menstrual period was 2017. [facility]    PRENATAL COURSE  Prenatal course was essentially uncomplicated      Recent Labs   Lab Test  18   1025   ABO  A   RH  Pos   AS  Neg     Rhogam not indicated   Recent Labs   Lab Test  18   1025  18   0838   HEPBANG   --   Nonreactive   HIAGAB  Nonreactive   --    RUQIGG   --   30       Past Medical History    I have reviewed this patient's medical history and updated it with pertinent information if needed.   Past Medical History:   Diagnosis Date     Dichorionic diamniotic twin pregnancy in third trimester 2018     Disorder of thyroid     hypo     Supervision of elderly multigravida     2018     Twin pregnancy 2018       Past Surgical History   I have reviewed this patient's surgical history and updated it with pertinent information if needed.  Past Surgical History:   Procedure Laterality Date     APPENDECTOMY OPEN             Prior to Admission Medications   Prior to Admission Medications   Prescriptions Last Dose Informant Patient Reported? Taking?   Prenatal Vit-Fe Fumarate-FA ([facility] PRENATAL) 28-0.8 MG TABS   Yes No   Sig: Take 1 tablet by mouth   doxylamine (UNISOM) 25 MG TABS tablet   Yes No   Sig: Take 2 tablets (50 mg) by mouth nightly as needed   ferrous gluconate (FERGON) 324 (38 Fe) MG tablet   No No   Sig: Take 1 tablet (324  mg) by mouth 2 times daily (with meals)   levothyroxine (SYNTHROID) 75 MCG tablet   Yes No   loratadine (CLARITIN) 10 MG tablet   Yes No   Sig: Take 1 tablet (10 mg) by mouth daily   order for DME   No No   Sig: Equipment being ordered: Blood pressure cuff   order for DME   No No   Sig: Equipment being ordered: maternity support belt   ranitidine (ZANTAC) 150 MG/10ML syrup   Yes No   Sig: Take 4 mg/kg/day by mouth 2 times daily      Facility-Administered Medications: None     Allergies   No Known Allergies    Social History   I have reviewed this patient's social history and updated it with pertinent information if needed. Maricarmen Ornelas  reports that she has never smoked. She has never used smokeless tobacco. She reports that she drinks about 1.2 oz of alcohol per week  She reports that she does not use illicit drugs.    Family History   I have reviewed this patient's family history and updated it with pertinent information if needed.   Family History   Problem Relation Age of Onset     Substance Abuse Maternal Grandmother      Alcohol/Drug,Alcohol abuse     Cancer Maternal Grandmother      Cancer, lung cancer     Other - See Comments Maternal Grandfather      Alzheimer's disease     Diabetes Paternal Grandmother      Diabetes,type 1     No Known Problems Sister      No Known Problems Son      No Known Problems Son      Diabetes Maternal Uncle      Diabetes,type 2     HEART DISEASE No family hx of      Heart Disease       Immunization History   Immunizations are up to date    Physical Exam   Temp: 97.7  F (36.5  C) Temp src: Oral BP: 123/81 Pulse: 96   Resp: 20 SpO2: 96 %      Vital Signs with Ranges  Temp:  [97.7  F (36.5  C)-98.3  F (36.8  C)] 97.7  F (36.5  C)  Pulse:  [96] 96  Resp:  [20] 20  BP: (123-128)/(81-86) 123/81  SpO2:  [96 %] 96 %    Abdomen: gravid, single vertex fetus, non-tender  Fetal Heart Tones: 130 baseline for both babies, moderate variablility, + accels, no decels and Category I  TOCO:    external monitor    Constitutional: healthy, alert, active and no distress   Respiratory: No increased work of breathing, good air exchange, clear to auscultation bilaterally, no crackles or wheezing  Cardiovascular: Normal apical impulse, regular rate and rhythm, normal S1 and S2, no S3 or S4, and no murmur noted  Skin/Extremites: no rashes and no lesions

## 2018-09-05 NOTE — OP NOTE
[facility] Gynecology Operative Note    Pre-operative diagnosis: 37w0d with dichorionic twins, gestational hypertension   Post-operative diagnosis: Same   Procedure:  section   Surgeon: Woodrow Hurtado MD   Assistant(s): I requested Dr. Holbrook to assist with this  section. Assistance was medically necessary in order to safely performthe procedure without increased blood loss or morbidity. Assistance was provided through positioning, instrumentation, and retraction of incisions for better visualization of underlying structures and cauterization for hemostasis, and use of additional expertise.       Anesthesia: Spinal anesthesia   Estimated blood loss: 800ml   Total IV fluids: (See anesthesia record)   Blood transfusion: No transfusion was given during surgery   Total urine output: (See anesthesia record)   Drains: Pratt catheter   Specimens: None   Findings: Dichorionic twins, vigorous  female and male   Complications: None   Condition: Stable   Procedure details: DESCRIPTION OF PROCEDURE:     The patient was transferred to the OR where spinal anesthesia was accomplished.  A pratt catheter was inserted and clear urine was noted.  The abdomen was scrubbed prepped and draped inthe usual manner.  A hard stop timeout was accomplished.  A transverse Pfannenstiel incision was made and sharp and electrocautery dissection carried down to the fascial layer.  The Fascia was opened in the midline andextended bluntly bilaterally.  The rectus muscles were  in the midline bluntly.  The peritoneum was bluntly divided and the Mandy ring retractor was placed.  A bladder flap was made sharply.  The lower uterinesegment was incised sharply in a low transverse fashion and the amniotic sac ruptured bluntly with a finger.  Clear fluid was noted and the incision extended bluntly.  The fetal head was deliverd in the OP position.  The infant received nasal and oropharyngeal suction  with the bulb suction. The shoulders delivered atraumatically followed by the remainder of the baby.   The cord wasclamped and cut and the infant handed to the  nurse.   evaluation was accomplished by Dr. Swift due to known increased risks of respiratory issues for twins at  section.    The second sac was then presenting and ruptured sharply. Twin B had presenting feet, and the feet were grasped and delivered proceeding until the clavicles were in view. The right and then left arms were swept down across the fetal chest and delivered with a Pinnard maneuver. The head was delivered with a Mauriceau maneuver. The cord was clamped and cut with a Yumiko clamp and scissors. Two plastic clamps were placed on the second cord on the placental side for identification.  The placentas were manually removed and the uterus wiped clear of clots and debris. Pitocin was added to the IV infusion and the uterus was noted to firm-up nicely. The tubes and ovaries appeared normal. The uterine incision was closed in a double layer of #1 Chromic in a running fashion. Hemostasis was adequate.  The abdomenirrigated clear of clots and debris.  The peritoneum and the rectus  muscles were approximated with  3 0 vicryl.   The fascia was closed with 0 PDS.  The subcutaneous layer was irrigated and made hemastatic withelectrocautery.  It was closed in a single layer of 3 0 vicryl. The skin was closed with Insorb staples.  The wound was dressed with steri-strips and a pressure dressing.  Counts were correct times 2.  The patient andnewborn were transferred to the recovery room in stable condition.    Woodrow Hurtado MD FACOG  8:30 AM 2018

## 2018-09-05 NOTE — IP AVS SNAPSHOT
MRN:[id number]                      After Visit Summary   9/5/2018    Maricarmen Ornelas    MRN: [id number]           Thank you!     Thank you for choosing [facility] for your care. Our goal is always to provide you with excellent care. Hearing back from our patients is one way we can continue to improve our services. Please take a few minutes to complete the written survey that you may receive in the mail after you visit with us. Thank you!        Patient Information     Date Of Birth          1980        Designated Caregiver       Most Recent Value    Caregiver    Will someone help with your care after discharge? yes    Name of designated caregiver Ki    Phone number of caregiver same    Caregiver address same      About your hospital stay     You were admitted on:  September 5, 2018 You last received care in the:  [facility]    You were discharged on:  September 8, 2018        Reason for your hospital stay       Maternity care                  Who to Call     For medical emergencies, please call 911.  For non-urgent questions about your medical care, please call your primary care provider or clinic, [phone]  For questions related to your surgery, please call your surgery clinic        Attending Provider     Provider Specialty    Woodrow Hurtado MD OB/Gyn       Primary Care Provider Office Phone # Fax #    Analy LUANA Bang [phone]      After Care Instructions     Activity       Review discharge instructions            Diet       Resume previous diet            Discharge Instructions - Postpartum visit       Schedule postpartum visit with your provider and return to clinic in 2 weeks.                  Follow-up Appointments     Follow Up and recommended labs and tests       Follow up with Dr. Hurtado , at (location with clinic name or city) [facility], within 2 weeks  to evaluate after surgery. No follow up labs or test are  "needed.                  Your next 10 appointments already scheduled     Sep 10, 2018  1:00 PM CDT   Consult HOD with GH LACTATION NURSE   [facility] ([facility])    [address]  [place] [address]   [phone]            Sep 20, 2018  9:30 AM CDT   SHORT with Woodrow Hurtado MD   [facility] ([facility])    [address]  [place] [address]   [phone]            Oct 18, 2018  9:15 AM CDT   Post Partum with Woodrow Hurtado MD   [facility] ([facility])    [address]  [place] [address]   [phone]              Pending Results     No orders found from 9/3/2018 to 2018.            Statement of Approval     Ordered          18 0935  I have reviewed and agree with all the recommendations and orders detailed in this document.  EFFECTIVE NOW     Approved and electronically signed by:  Syed Apple MD             Admission Information     Date & Time Provider Department Dept. Phone    2018 Woodrow Hurtado MD [facility] [phone]      Your Vitals Were     Blood Pressure Pulse Temperature Respirations Last Period Pulse Oximetry    116/74 78 98.5  F (36.9  C) (Oral) 18 2017 98%      MyChart Information     [employer] lets you send messages to your doctor, view your test results, renew your prescriptions, schedule appointments and more. To sign up, go to [email or web address] . Click on \"Log in\" on the left side of the screen, which will take you to the Welcome page. Then click on \"Sign up Now\" on the right side of the page.     You will be asked to enter the access code listed below, as well as some personal information. Please follow the directions to create your username and password.     Your access code is: [email or web address]  Expires: 2018 11:55 AM     Your access code will  in 90 days. " If you need help or a new code, please call your [facility] clinic or [phone].        Care EveryWhere ID     This is your Care EveryWhere ID. This could be used by other organizations to access your [facility] medical records  [id number]        Equal Access to Services     STEPHANIEDOV ARLET : Hadii aad ku hadmariamabiola Sosouleymaneali, waaxda luqadaha, qaybta kaalmada adeegbeccada, joey espinosa pbjasmina frank kiarraabraham amin. So [facility] [phone].    ATENCIÓN: Si habla español, tiene a jiang disposición servicios gratuitos de asistencia lingüística. Llame al [phone].    We comply with applicable federal civil rights laws and [place] laws. We do not discriminate on the basis of race, color, national origin, age, disability, sex, sexual orientation, or gender identity.               Review of your medicines      START taking        Dose / Directions    oxyCODONE-acetaminophen 5-325 MG per tablet   Commonly known as:  PERCOCET        Dose:  1-2 tablet   Take 1-2 tablets by mouth every 4 hours as needed for other (pain control or improvement in physical function. Hold dose for analgesic side effects.)   Quantity:  30 tablet   Refills:  0         CONTINUE these medicines which have NOT CHANGED        Dose / Directions    CLARITIN 10 MG tablet   Generic drug:  loratadine        Dose:  10 mg   Take 1 tablet (10 mg) by mouth daily   Quantity:  90 tablet   Refills:  3       CVS PRENATAL 28-0.8 MG Tabs        Dose:  1 tablet   Take 1 tablet by mouth   Refills:  0       ferrous gluconate 324 (38 Fe) MG tablet   Commonly known as:  FERGON   Used for:  Anemia affecting pregnancy in second trimester        Dose:  324 mg   Take 1 tablet (324 mg) by mouth 2 times daily (with meals)   Quantity:  100 tablet   Refills:  3       order for DME   Used for:  Dichorionic diamniotic twin pregnancy in second trimester        Equipment being ordered: Blood pressure cuff   Quantity:  1 Device   Refills:  0       order for DME   Used for:  Elderly multigravida  in second trimester, Dichorionic diamniotic twin pregnancy in second trimester        Equipment being ordered: maternity support belt   Quantity:  1 Device   Refills:  0       SYNTHROID 75 MCG tablet   Generic drug:  levothyroxine        Refills:  0         STOP taking     ranitidine 150 MG/10ML syrup   Commonly known as:  Zantac           UNISOM 25 MG Tabs tablet   Generic drug:  doxylamine                Where to get your medicines      Some of these will need a paper prescription and others can be bought over the counter. Ask your nurse if you have questions.     Bring a paper prescription for each of these medications     oxyCODONE-acetaminophen 5-325 MG per tablet                Protect others around you: Learn how to safely use, store and throw away your medicines at www.disposemymeds.org.        Information about OPIOIDS     PRESCRIPTION OPIOIDS: WHAT YOU NEED TO KNOW   We gave you an opioid (narcotic) pain medicine. It is important to manage your pain, but opioids are not always the best choice. You should first try all the other options your care team gave you. Take this medicine for as short a time (and as few doses) as possible.    Some activities can increase your pain, such as bandage changes or therapy sessions. It may help to take your pain medicine 30 to 60 minutes before these activities. Reduce your stress by getting enough sleep, working on hobbies you enjoy and practicing relaxation or meditation. Talk to your care team about ways to manage your pain beyond prescription opioids.    These medicines have risks:    DO NOT drive when on new or higher doses of pain medicine. These medicines can affect your alertness and reaction times, and you could be arrested for driving under the influence (DUI). If you need to use opioids long-term, talk to your care team about driving.    DO NOT operate heavy machinery    DO NOT do any other dangerous activities while taking these medicines.    DO NOT drink any  alcohol while taking these medicines.     If the opioid prescribed includes acetaminophen, DO NOT take with any other medicines that contain acetaminophen. Read all labels carefully. Look for the word  acetaminophen  or  Tylenol.  Ask your pharmacist if you have questions or are unsure.    You can get addicted to pain medicines, especially if you have a history of addiction (chemical, alcohol or substance dependence). Talk to your care team about ways to reduce this risk.    All opioids tend to cause constipation. Drink plenty of water and eat foods that have a lot of fiber, such as fruits, vegetables, prune juice, apple juice and high-fiber cereal. Take a laxative (Miralax, milk of magnesia, Colace, Senna) if you don t move your bowels at least every other day. Other side effects include upset stomach, sleepiness, dizziness, throwing up, tolerance (needing more of the medicine to have the same effect), physical dependence and slowed breathing.    Store your pills in a secure place, locked if possible. We will not replace any lost or stolen medicine. If you don t finish your medicine, please throw away (dispose) as directed by your pharmacist. The [place] Pollution Control Agency has more information about safe disposal: https://www.pca.[facility].[place].us/living-green/managing-unwanted-medications             Medication List: This is a list of all your medications and when to take them. Check marks below indicate your daily home schedule. Keep this list as a reference.      Medications           Morning Afternoon Evening Bedtime As Needed    CLARITIN 10 MG tablet   Take 1 tablet (10 mg) by mouth daily   Generic drug:  loratadine                                CVS PRENATAL 28-0.8 MG Tabs   Take 1 tablet by mouth                                ferrous gluconate 324 (38 Fe) MG tablet   Commonly known as:  FERGON   Take 1 tablet (324 mg) by mouth 2 times daily (with meals)                                order for DME    Equipment being ordered: Blood pressure cuff                                order for DME   Equipment being ordered: maternity support belt                                oxyCODONE-acetaminophen 5-325 MG per tablet   Commonly known as:  PERCOCET   Take 1-2 tablets by mouth every 4 hours as needed for other (pain control or improvement in physical function. Hold dose for analgesic side effects.)   Last time this was given:  2 tablets on 9/8/2018  8:25 AM                                SYNTHROID 75 MCG tablet   Generic drug:  levothyroxine

## 2018-09-05 NOTE — PROGRESS NOTES
[facility] ADMISSION NOTE    Patient admitted to room [facility] 404 at approximately 0540 via ambulation from home. Patient was accompanied by significant other.     Patient ambulated to bed independently. Patient alert and oriented X 3. The patient is not having any pain.  . Admission vital signs: Last menstrual period 12/17/2017, not currently breastfeeding. Patient was oriented to plan of care, call light, bed controls, tv, telephone, bathroom and visiting hours.     Risk Assessment    The following safety risks were identified during admission: none. Yellow risk band applied: RAMONITA Ramirez

## 2018-09-05 NOTE — ANESTHESIA POSTPROCEDURE EVALUATION
Patient: Maricarmen Ornelas    Procedure(s):   Section - twins - Wound Class: II-Clean Contaminated    Diagnosis:current twin pregnancy  Diagnosis Additional Information: No value filed.    Anesthesia Type:  Spinal    Note:  Anesthesia Post Evaluation    Patient location during evaluation: OB PACU  Patient participation: Able to fully participate in evaluation  Level of consciousness: awake and alert  Pain management: adequate  Airway patency: patent  Cardiovascular status: acceptable  Respiratory status: acceptable  Hydration status: acceptable  PONV: none     Anesthetic complications: None          Last vitals:  Vitals:    18 0855 18 0900 18 0930   BP: 101/60     Pulse:      Resp: 14     Temp:  97.1  F (36.2  C) 97.3  F (36.3  C)   SpO2:            Electronically Signed By: Ki Sandoval DO  2018  9:40 AM

## 2018-09-05 NOTE — IP AVS SNAPSHOT
[facility]    [address]    [place] [address]    Phone:  [phone]    Fax:  [phone]                                       After Visit Summary   9/5/2018    Maricarmen Ornelas    MRN: [id number]           After Visit Summary Signature Page     I have received my discharge instructions, and my questions have been answered. I have discussed any challenges I see with this plan with the nurse or doctor.    ..........................................................................................................................................  Patient/Patient Representative Signature      ..........................................................................................................................................  Patient Representative Print Name and Relationship to Patient    ..................................................               ................................................  Date                                            Time    ..........................................................................................................................................  Reviewed by Signature/Title    ...................................................              ..............................................  Date                                                            Time          22EPIC Rev 08/18

## 2018-09-05 NOTE — PROGRESS NOTES
Assessment done, VSS. See flowsheet for further info. Stood at the edge of the bed, tolerated activity well. IV infusing, abd dressing D/I, fundus firm at U. Settled back to bed.

## 2018-09-06 LAB
HGB BLD-MCNC: 10.9 G/DL (ref 11.7–15.7)
T PALLIDUM AB SER QL: NONREACTIVE

## 2018-09-06 PROCEDURE — 12000027 ZZH R&B OB

## 2018-09-06 PROCEDURE — 85018 HEMOGLOBIN: CPT | Performed by: OBSTETRICS & GYNECOLOGY

## 2018-09-06 PROCEDURE — 25000128 H RX IP 250 OP 636: Performed by: OBSTETRICS & GYNECOLOGY

## 2018-09-06 PROCEDURE — 36415 COLL VENOUS BLD VENIPUNCTURE: CPT | Performed by: OBSTETRICS & GYNECOLOGY

## 2018-09-06 PROCEDURE — 25000132 ZZH RX MED GY IP 250 OP 250 PS 637: Performed by: OBSTETRICS & GYNECOLOGY

## 2018-09-06 PROCEDURE — 99024 POSTOP FOLLOW-UP VISIT: CPT | Performed by: OBSTETRICS & GYNECOLOGY

## 2018-09-06 PROCEDURE — 40000275 ZZH STATISTIC RCP TIME EA 10 MIN

## 2018-09-06 RX ADMIN — IBUPROFEN 600 MG: 600 TABLET ORAL at 11:57

## 2018-09-06 RX ADMIN — OXYCODONE HYDROCHLORIDE AND ACETAMINOPHEN 2 TABLET: 5; 325 TABLET ORAL at 14:00

## 2018-09-06 RX ADMIN — KETOROLAC TROMETHAMINE 30 MG: 30 INJECTION, SOLUTION INTRAMUSCULAR at 06:13

## 2018-09-06 RX ADMIN — KETOROLAC TROMETHAMINE 30 MG: 30 INJECTION, SOLUTION INTRAMUSCULAR at 01:10

## 2018-09-06 RX ADMIN — OXYCODONE HYDROCHLORIDE AND ACETAMINOPHEN 1 TABLET: 5; 325 TABLET ORAL at 09:08

## 2018-09-06 RX ADMIN — IBUPROFEN 600 MG: 600 TABLET ORAL at 17:35

## 2018-09-06 RX ADMIN — OXYCODONE HYDROCHLORIDE AND ACETAMINOPHEN 2 TABLET: 5; 325 TABLET ORAL at 22:29

## 2018-09-06 RX ADMIN — DOCUSATE SODIUM 100 MG: 100 CAPSULE, LIQUID FILLED ORAL at 09:08

## 2018-09-06 RX ADMIN — OXYCODONE HYDROCHLORIDE AND ACETAMINOPHEN 2 TABLET: 5; 325 TABLET ORAL at 03:18

## 2018-09-06 RX ADMIN — DOCUSATE SODIUM 100 MG: 100 CAPSULE, LIQUID FILLED ORAL at 22:29

## 2018-09-06 RX ADMIN — OXYCODONE HYDROCHLORIDE AND ACETAMINOPHEN 2 TABLET: 5; 325 TABLET ORAL at 18:11

## 2018-09-06 NOTE — PROGRESS NOTES
Incentive Spirometry education completed.  Pt goal 2650 mls.  Pt achieved 3000 mls.  Pt instructed to perform 10/hr while awake with at least one deep breath and cough per hour until able to perform baseline activity.  RT will follow and re-assess as need.      Lesly Duval, RT on 9/5/2018 at 7:55 PM

## 2018-09-06 NOTE — PROGRESS NOTES
Assessment done, VSS. See flowsheet for further info. Up to bathroom to shower, tolerated activity well.

## 2018-09-06 NOTE — PROGRESS NOTES
[facility]    Obstetrics Post-Op / Progress Note    Assessment & Plan   Assessment:  -1 Day Post-Op  Procedure(s):   Section - twins - Wound Class: II-Clean Contaminated    Doing well.  Normal healing wound.  No immediate surgical complications identified.  No excessive bleeding  Pain well-controlled.    Plan:  Ambulation encouraged  Lactation consultation  Monitor wound for signs of infection  Pain control measures as needed    Woodrow Hurtado     Interval History   Doing well.  Pain is well-controlled.  No fevers.  No history of wound drainage, warmth or significant erythema.  Good appetite.  Denies chest pain, shortness of breath, nausea or vomiting.  Ambulatory.  Breastfeeding well.    Medications     dextrose 5% lactated ringers 1,000 mL (18 1201)     lactated ringers       - MEDICATION INSTRUCTIONS -       NO Rho (D) immune globulin (RhoGam) needed - mother Rh POSITIVE       - MEDICATION INSTRUCTIONS -       oxytocin in 0.9% NaCl Stopped (18 1657)     oxytocin in 0.9% NaCl         docusate sodium  100 mg Oral BID     levothyroxine  75 mcg Oral Daily     sodium chloride (PF)  3 mL Intracatheter Q8H       Physical Exam   Temp: 98.4  F (36.9  C) Temp src: Oral BP: 122/74 Pulse: 74 Heart Rate: 58 Resp: 18 SpO2: 94 % O2 Device: None (Room air)    There were no vitals filed for this visit.  Vital Signs with Ranges  Temp:  [96.6  F (35.9  C)-98.4  F (36.9  C)] 98.4  F (36.9  C)  Pulse:  [56-93] 74  Heart Rate:  [58-75] 58  Resp:  [14-18] 18  BP: (101-132)/(54-77) 122/74  SpO2:  [94 %] 94 %  I/O last 3 completed shifts:  In: 1450 [I.V.:1000; IV Piggyback:450]  Out: 3260 [Urine:3260]    Uterine fundus is firm, non-tender and at the level of the umbilicus  Incision C/D/I  Extremities Non-tender    Data   Recent Labs   Lab Test  18   0720   ABO  A   RH  Pos   AS  Neg     Recent Labs   Lab Test  18   0500  18   0720   HGB  10.9*  13.5     Recent Labs   Lab Test   03/02/18   0838   RUQIGG  30

## 2018-09-06 NOTE — PROGRESS NOTES
Assessment done, VSS. See flowsheet for further info. Medicated for pain per Doctor order. Resting in bed at this time.

## 2018-09-07 PROCEDURE — 25000132 ZZH RX MED GY IP 250 OP 250 PS 637: Performed by: OBSTETRICS & GYNECOLOGY

## 2018-09-07 PROCEDURE — 40000275 ZZH STATISTIC RCP TIME EA 10 MIN

## 2018-09-07 PROCEDURE — 99024 POSTOP FOLLOW-UP VISIT: CPT | Performed by: OBSTETRICS & GYNECOLOGY

## 2018-09-07 PROCEDURE — 12000027 ZZH R&B OB

## 2018-09-07 RX ADMIN — SIMETHICONE CHEW TAB 80 MG 80 MG: 80 TABLET ORAL at 18:28

## 2018-09-07 RX ADMIN — OXYCODONE HYDROCHLORIDE AND ACETAMINOPHEN 2 TABLET: 5; 325 TABLET ORAL at 02:29

## 2018-09-07 RX ADMIN — OXYCODONE HYDROCHLORIDE AND ACETAMINOPHEN 2 TABLET: 5; 325 TABLET ORAL at 10:21

## 2018-09-07 RX ADMIN — DOCUSATE SODIUM 100 MG: 100 CAPSULE, LIQUID FILLED ORAL at 10:20

## 2018-09-07 RX ADMIN — OXYCODONE HYDROCHLORIDE AND ACETAMINOPHEN 2 TABLET: 5; 325 TABLET ORAL at 22:42

## 2018-09-07 RX ADMIN — Medication: at 12:27

## 2018-09-07 RX ADMIN — IBUPROFEN 600 MG: 600 TABLET ORAL at 12:27

## 2018-09-07 RX ADMIN — OXYCODONE HYDROCHLORIDE AND ACETAMINOPHEN 2 TABLET: 5; 325 TABLET ORAL at 18:29

## 2018-09-07 RX ADMIN — SIMETHICONE CHEW TAB 80 MG 80 MG: 80 TABLET ORAL at 14:33

## 2018-09-07 RX ADMIN — OXYCODONE HYDROCHLORIDE AND ACETAMINOPHEN 2 TABLET: 5; 325 TABLET ORAL at 14:33

## 2018-09-07 RX ADMIN — IBUPROFEN 600 MG: 600 TABLET ORAL at 18:29

## 2018-09-07 RX ADMIN — OXYCODONE HYDROCHLORIDE AND ACETAMINOPHEN 2 TABLET: 5; 325 TABLET ORAL at 06:24

## 2018-09-07 RX ADMIN — IBUPROFEN 600 MG: 600 TABLET ORAL at 00:05

## 2018-09-07 RX ADMIN — DOCUSATE SODIUM 100 MG: 100 CAPSULE, LIQUID FILLED ORAL at 22:42

## 2018-09-07 NOTE — PROGRESS NOTES
Doing well POD#2  No concerns. Pain control now adequate  General diet.  Voiding.  + flatus  AVSS   Abd: soft, benign  Incision: clean and dry  Hgb: n/a    A: POD#2, s/p C/Section - doing well  P: Continue postop care  Routine instructions given, including discharge instructions  Discharge tomorrow per Dr. Apple

## 2018-09-07 NOTE — PLAN OF CARE
Problem: Postpartum ( Delivery) (Adult,Obstetrics,Pediatric)  Goal: Signs and Symptoms of Listed Potential Problems Will be Absent, Minimized or Managed (Postpartum)  Signs and symptoms of listed potential problems will be absent, minimized or managed by discharge/transition of care (reference Postpartum ( Delivery) (Adult,Obstetrics,Pediatric) CPG).   Outcome: Therapy, progress toward functional goals as expected  Assessments as charted. B/P: 126/74, T: 97.9, P: 78, R: 18. Rates pain: 6/10 abdomenal. Incision: Healing well, well approximated, without signs of infection and no drainage. Voiding without difficulty. Fundus Firm under the U. Lochia: Light. Activity: moving with difficulty due to post op surgical pain. Infant feeding: Breast feeding going well.     LATCH Score:   Latch: 2 - Good Latch  Audible Swallowin - Few  Type of Nipple: (Breast/Nipple) 2 - Everted  Comfort: 2 - Soft, Nontender  Hold: 1 - Min. Assist   Total LATCH Score:     Postpartum breastfeeding assessment completed and education provided, see Patient Education Activity.  Items included in the education are:     proper positioning and latch    effectiveness of feeding    manual expression    handling and storing breastmilk    maintenance of breastfeeding for the first 6 months    sign/symptoms of infant feeding issues requiring referral to qualified health care provider  Postpartum care education provided, see Patient Education activity. Patient denies needs. Will monitor.  Renetta Carson RN

## 2018-09-07 NOTE — PLAN OF CARE
Problem: Postpartum ( Delivery) (Adult,Obstetrics,Pediatric)  Goal: Signs and Symptoms of Listed Potential Problems Will be Absent, Minimized or Managed (Postpartum)  Signs and symptoms of listed potential problems will be absent, minimized or managed by discharge/transition of care (reference Postpartum ( Delivery) (Adult,Obstetrics,Pediatric) CPG).  Outcome: Therapy, progress toward functional goals as expected  Patient has been doing very well. She is up and about in her room independently. Pain is being maintained with Percocet and Motrin around the clock. VSS, afebrile, lungs clear, FF@U with small rubra flow. Passed one clot tonight. Very attentive of her twins and breastfeeding is going very well. Incision is dry and intact with some old blood, steri-strips intact. Voiding in good amounts, passing flatus.

## 2018-09-08 VITALS
SYSTOLIC BLOOD PRESSURE: 134 MMHG | OXYGEN SATURATION: 99 % | TEMPERATURE: 97.8 F | RESPIRATION RATE: 16 BRPM | HEART RATE: 61 BPM | DIASTOLIC BLOOD PRESSURE: 87 MMHG

## 2018-09-08 PROCEDURE — 99024 POSTOP FOLLOW-UP VISIT: CPT | Performed by: OBSTETRICS & GYNECOLOGY

## 2018-09-08 PROCEDURE — 40000275 ZZH STATISTIC RCP TIME EA 10 MIN

## 2018-09-08 PROCEDURE — 25000132 ZZH RX MED GY IP 250 OP 250 PS 637: Performed by: OBSTETRICS & GYNECOLOGY

## 2018-09-08 RX ORDER — OXYCODONE AND ACETAMINOPHEN 5; 325 MG/1; MG/1
1-2 TABLET ORAL EVERY 4 HOURS PRN
Qty: 30 TABLET | Refills: 0 | Status: SHIPPED | OUTPATIENT
Start: 2018-09-08 | End: 2018-09-13

## 2018-09-08 RX ADMIN — IBUPROFEN 600 MG: 600 TABLET ORAL at 06:31

## 2018-09-08 RX ADMIN — IBUPROFEN 600 MG: 600 TABLET ORAL at 00:46

## 2018-09-08 RX ADMIN — OXYCODONE HYDROCHLORIDE AND ACETAMINOPHEN 2 TABLET: 5; 325 TABLET ORAL at 08:25

## 2018-09-08 RX ADMIN — DOCUSATE SODIUM 100 MG: 100 CAPSULE, LIQUID FILLED ORAL at 09:35

## 2018-09-08 RX ADMIN — IBUPROFEN 600 MG: 600 TABLET ORAL at 12:07

## 2018-09-08 RX ADMIN — OXYCODONE HYDROCHLORIDE AND ACETAMINOPHEN 2 TABLET: 5; 325 TABLET ORAL at 03:35

## 2018-09-08 RX ADMIN — SIMETHICONE CHEW TAB 80 MG 80 MG: 80 TABLET ORAL at 09:35

## 2018-09-08 NOTE — PROGRESS NOTES
"Pt [place]  Depression Scale returned with a score of ten. Pt wrote on form \"Not applicable this week. Surgery=most stressful week ever.\" Will continue to monitor. Pt verbalized acceptance of life adjustments and reports no concerns at this time. Kayli Bull RN on 2018 at 11:36 AM    "

## 2018-09-08 NOTE — PROGRESS NOTES
[facility]    Post-Partum Progress Note    Assessment & Plan   Assessment:  Post-partum day #3  Primary  section, twins.      Doing well.  Normal healing wound.  No excessive bleeding  Pain well-controlled.    Plan:  Discharge later today if babies are able to be discharged.    Syed Apple     Interval History   Doing well.  Pain is adequately controlled.  No fevers.  No history of foul-smelling vaginal discharge.  Good appetite.  + flatus.  No BM yet.  Denies chest pain, shortness of breath, nausea or vomiting.  Vaginal bleeding is similar to a moderate menstrual flow.  Breastfeeding well.    Medications     - MEDICATION INSTRUCTIONS -       NO Rho (D) immune globulin (RhoGam) needed - mother Rh POSITIVE       - MEDICATION INSTRUCTIONS -         docusate sodium  100 mg Oral BID     levothyroxine  75 mcg Oral Daily       Physical Exam   Temp: 98.5  F (36.9  C) Temp src: Oral BP: 116/74 Pulse: 78   Resp: 18 SpO2: 98 %      There were no vitals filed for this visit.  Vital Signs with Ranges  Temp:  [97.9  F (36.6  C)-98.5  F (36.9  C)] 98.5  F (36.9  C)  Pulse:  [78] 78  Resp:  [16-18] 18  BP: (116-122)/(74) 116/74  SpO2:  [98 %] 98 %       Incision/Surgical Site 18 Abdomen (Active)   Incision Assessment [facility] 2018 12:00 AM   Closure Adhesive strip(s) 2018 12:00 AM   Incision Drainage Amount None 2018 12:00 AM   Dressing Intervention Clean, dry, intact;Open to air / No Dressing 2018 12:00 AM   Number of days:3       Incision/Surgical Site 18 Perineum (Active)   Incision Assessment [facility] 2018 12:00 AM   Incision Drainage Amount Moderate 2018  6:08 PM   Drainage Description Other (Comment) 2018  1:10 AM   Dressing Intervention Clean, dry, intact 2018  8:34 AM   Number of days:3     No line/device    Uterine fundus is firm, non-tender and at the level of the umbilicus  Extremities Non-tender  Heart is regular rate and rhythm and lungs clear to  auscultation  Lungs are clear to auscultation    Data   Recent Labs   Lab Test  09/05/18   0720   ABO  A   RH  Pos   AS  Neg     Recent Labs   Lab Test  09/06/18   0500  09/05/18   0720   HGB  10.9*  13.5     Recent Labs   Lab Test  03/02/18   0838   RUQIGG  30

## 2018-09-08 NOTE — DISCHARGE SUMMARY
[facility]    Discharge Summary  Obstetrics    Date of Admission:  2018  Date of Discharge:  2018  Discharging Provider: Syed Apple    Discharge Diagnoses   Twin gestation at term  Primary  section    Procedure/Surgery Information   Procedure: Procedure(s):   Section - twins - Wound Class: II-Clean Contaminated   Surgeon(s): Surgeon(s) and Role:     * Woodrow Hurtado MD - Primary     History of Present Illness   Maricarmen Ornelas is a 38 year old female who presented with dichorionic twin pregnancy at term, not in labor.     Hospital Course   A primary  section was done for delivery of her twin pregnancy. Both babies did well and required no resuscitation. The patient's hospital course was unremarkable.  She recovered as anticipated and experienced no post-operative complications.  On discharge, her pain was well controlled. Vaginal bleeding is similar to moderate menstrual flow.  Voiding without difficulty.  Ambulating well and tolerating a normal diet. Passing flatus but no BM yet.  No fever or significant wound drainage.  Breastfeeding well.  Infants are stable.  She was discharged on post-partum day #3.    Post-partum hemoglobin:   Hemoglobin   Date Value Ref Range Status   2018 10.9 (L) 11.7 - 15.7 g/dL Final       Syed Apple    Discharge Disposition   Discharged to home   Condition at discharge: Good    Pending Results   Final pathology results: Pending at time of discharge  These results will be followed up by Dr. Hurtado.  Unresulted Labs Ordered in the Past 30 Days of this Admission     No orders found from 2018 to 2018.          Primary Care Physician   LISA ORANTES    Consultations This Hospital Stay   HOME CARE POST PARTUM/ IP CONSULT  LACTATION IP CONSULT    Discharge Orders     Activity   Review discharge instructions     Reason for your hospital stay   Maternity care     Follow Up and recommended labs and  tests   Follow up with Dr. Hurtado , at (location with clinic name or city) [facility], within 2 weeks  to evaluate after surgery. No follow up labs or test are needed.     Discharge Instructions - Postpartum visit   Schedule postpartum visit with your provider and return to clinic in 2 weeks.     Diet   Resume previous diet       Discharge Medications   Current Discharge Medication List      START taking these medications    Details   oxyCODONE-acetaminophen (PERCOCET) 5-325 MG per tablet Take 1-2 tablets by mouth every 4 hours as needed for other (pain control or improvement in physical function. Hold dose for analgesic side effects.)  Qty: 30 tablet, Refills: 0    Associated Diagnoses: S/P  section         CONTINUE these medications which have NOT CHANGED    Details   ferrous gluconate (FERGON) 324 (38 Fe) MG tablet Take 1 tablet (324 mg) by mouth 2 times daily (with meals)  Qty: 100 tablet, Refills: 3    Associated Diagnoses: Anemia affecting pregnancy in second trimester      levothyroxine (SYNTHROID) 75 MCG tablet       loratadine (CLARITIN) 10 MG tablet Take 1 tablet (10 mg) by mouth daily  Qty: 90 tablet, Refills: 3      !! order for DME Equipment being ordered: maternity support belt  Qty: 1 Device, Refills: 0    Associated Diagnoses: Elderly multigravida in second trimester; Dichorionic diamniotic twin pregnancy in second trimester      !! order for DME Equipment being ordered: Blood pressure cuff  Qty: 1 Device, Refills: 0    Associated Diagnoses: Dichorionic diamniotic twin pregnancy in second trimester      Prenatal Vit-Fe Fumarate-FA ([employer] PRENATAL) 28-0.8 MG TABS Take 1 tablet by mouth       !! - Potential duplicate medications found. Please discuss with provider.      STOP taking these medications       doxylamine (UNISOM) 25 MG TABS tablet Comments:   Reason for Stopping:         ranitidine (ZANTAC) 150 MG/10ML syrup Comments:   Reason for Stopping:             Allergies   No Known  Allergies

## 2018-09-08 NOTE — PROGRESS NOTES
Patient discharged per wheelchair with infants in car seats. Mother verified that her band matches her infant's band by comparing the infant's  MR#.  Discharge instructions given. Encouraged to call for any problems, questions or concerns. RXs given to pt and follow up appointments given to pt. Pt verbalized understanding. Kayli Bull RN on 9/8/2018 at 1:24 PM

## 2018-09-08 NOTE — PROGRESS NOTES
Assessments as charted. B/P: 134/87, T: 97.8, P: 61, R: 16. Rates pain: 4/10 incisional. Incision: Healing well. Voiding without difficulty. Fundus U/1. Lochia: Light. Activity: normal activity. Infant feeding: Breast feeding going well.       Postpartum care education provided, see Patient Education activity. Patient denies needs. Will monitor.  Kayli Bull

## 2018-09-08 NOTE — PLAN OF CARE
Problem: Patient Care Overview  Goal: Plan of Care/Patient Progress Review   Patient plans to discharge to home tomorrow afternoon. Patient has been independent in caring for breastfeeding her babies. Patient has demonstrated ability to tandem nurse babies independently.   Carisa Bennett RN on 2018 at 1:02 AM      Problem: Postpartum ( Delivery) (Adult,Obstetrics,Pediatric)  Goal: Signs and Symptoms of Listed Potential Problems Will be Absent, Minimized or Managed (Postpartum)  Signs and symptoms of listed potential problems will be absent, minimized or managed by discharge/transition of care (reference Postpartum ( Delivery) (Adult,Obstetrics,Pediatric) CPG).   Patient taking prn pain medications every 4 hours for incisional pain. Is able to get pain to a 3-4 after pain meds given.  Will continue to offer prn pain meds as ordered. Encourage patient to get up out of bed and ambulate and reposition frequently.  Carisa Bennett RN on 2018 at 1:00 AM

## 2018-09-10 ENCOUNTER — HOSPITAL ENCOUNTER (OUTPATIENT)
Dept: OBGYN | Facility: OTHER | Age: 38
End: 2018-09-10
Attending: FAMILY MEDICINE
Payer: COMMERCIAL

## 2018-09-10 ENCOUNTER — LACTATION ENCOUNTER (OUTPATIENT)
Age: 38
End: 2018-09-10

## 2018-09-10 PROCEDURE — S9443 LACTATION CLASS: HCPCS | Performed by: REGISTERED NURSE

## 2018-09-10 NOTE — LACTATION NOTE
This note was copied from a baby's chart.  Outpatient Lactation Visit    Tyronetamika HUSSEIN Елена  [id number]    Consultation Date: September 10, 2018     Reason for Lactation Referral: Initial Lactation Consult    Baby's : 2018    Baby's Current Age: 5 day old  Baby's Gestational Age: Gestational Age: 37w0d    Primary Care Provider: No Ref-Primary, Physician    Presenting Problem (concerns as stated by parent): no problems     MATERNAL HISTORY   History of Breast Surgery: no  Breast Changes During Pregnancy: no  Breast Feeding History: nursed other 2 children with no problems  Maternal Meds: daily prenatal vitamin  Pregnancy Complications: none  Anesthesia during labor: spinal    MATERNAL ASSESSMENT    Breast Size: average, symmetrical, soft after feeding and filling prior to feeding  Nipple Appearance - Left: slightly cracked, with signs of healing, education on further healing techniques provided  Nipple Appearance - Right: slightly cracked, with signs of healing, education on further healing techniques provided  Nipple Erectility - Left: erect with stimulation  Nipple Erectility - Right: erect with stimulation  Areolas Compressibility: soft  Nipple Size: average  Special Equipment Used: none  Day mother reports milk came in:  Day 3    INFANT ASSESSMENT    Oral Anatomy  Mouth: normal  Palate: normal  Jaw: normal  Tongue: normal  Frenulum: normal   Digital Suck Exam: root    FEEDING   Feeding Time: aggressively for 20 minutes  Position:  football  Effort to Latch: awake and alert, latched easily  Duration of Breast Feeding: Right Breast: 20 minutes; Left Breast: 0  Results: excellent breast feed    Volume of Intake:    Birth Weight: 6 lb 6.6 oz    Hospital discharge weight: 5 lb 13.4 oz    Today's Weight 5 lb 13.2 oz    Total Intake: 1.3 oz  Output: 5-6 soil diapers in last 24 hours, 5-6 wet diapers in last 24 hours    LATCH Score:   Latch: 2 - Good Latch  Audible Swallowin - Spontaneous &  frequent  Type of Nipple: (Breast/Nipple) 2 - Everted  Comfort: 2 - Soft, Nontender  Hold: 2 - No Assist   Total LATCH Score:  10    FEEDING PLAN    Home Feeding Plan: Continue to feed on demand when  elicits feeding cues with deep latch.  Babe should be eating 8-12 times in a 24 hour period.  Exclusivity explained and encouraged in the early weeks to establish breastfeeding and order in milk supply.  Rooming-in encouraged with explanation of the benefits.  Continue to apply expressed breast milk and Lanolin cream to nipples after feedings for healing and comfort.  Postpartum breastfeeding assessment completed and education provided.  Items included in the education are:     proper positioning and latch    effectiveness of feeding    manual expression    handling and storing breastmilk    maintenance of breastfeeding for the first 6 months    sign/symptoms of infant feeding issues requiring referral to qualified health care provider    LACTATION COMMENTS   Deep latch explained for proper positioning of breast in infant's mouth, maximizing milk transfer and comfort.  Reassurance and encouragement provided in regard to mom's concerns about milk supply.  Follow-up support information provided.  Parents plan to keep [place] Well-Child Check with Dr. Garcia as scheduled for 2 week well child check.      Face-to-face Time: 60 minutes with assessment and education.    Viki Clement  9/10/2018  2:00 PM

## 2018-09-10 NOTE — LACTATION NOTE
This note was copied from a baby's chart.  Outpatient Lactation Visit    Peggy D Елена  [id number]    Consultation Date: September 10, 2018     Reason for Lactation Referral: Initial Lactation Consult    Baby's : 2018    Baby's Current Age: 5 day old  Baby's Gestational Age: Gestational Age: 37w0d    Primary Care Provider: No Ref-Primary, Physician    Presenting Problem (concerns as stated by parent): no concerns    MATERNAL HISTORY   History of Breast Surgery: no  Breast Changes During Pregnancy: no  Breast Feeding History: nursed other 2 children with no problem  Maternal Meds: daily prenatal vitamin  Pregnancy Complications: none  Anesthesia during labor: spinal    MATERNAL ASSESSMENT    Breast Size: average, symmetrical, soft after feeding and filling prior to feeding  Nipple Appearance - Left: slightly cracked, with signs of healing, education on further healing techniques provided  Nipple Appearance - Right: slightly cracked, with signs of healing, education on further healing techniques provided  Nipple Erectility - Left: erect with stimulation  Nipple Erectility - Right: erect with stimulation  Areolas Compressibility: soft  Nipple Size: average  Special Equipment Used: none  Day mother reports milk came in:  Day 3    INFANT ASSESSMENT    Oral Anatomy  Mouth: normal  Palate: normal  Jaw: normal  Tongue: normal  Frenulum: normal   Digital Suck Exam: root    FEEDING   Feeding Time: aggressively for 20 minutes  Position:  football  Effort to Latch: awake and alert, latched easily  Duration of Breast Feeding: Right Breast: 0; Left Breast: 20 minutes  Results: excellent breast feed    Volume of Intake:    Birth Weight: 5 lb 9.8 oz    Hospital discharge weight: 5 lb 2.2 oz    Today's Weight 5 lb 3.6 oz    Total Intake: 2.1 oz  Output: 5-6 soil diapers in last 24 hours, 5-6 wet diapers in last 24 hours    LATCH Score:   Latch: 2 - Good Latch  Audible Swallowin - Spontaneous &  frequent  Type of Nipple: (Breast/Nipple) 2 - Everted  Comfort: 2 - Soft, Nontender  Hold: 2 - No Assist   Total LATCH Score:  10    FEEDING PLAN    Home Feeding Plan: Continue to feed on demand when  elicits feeding cues with deep latch.  Babe should be eating 8-12 times in a 24 hour period.  Exclusivity explained and encouraged in the early weeks to establish breastfeeding and order in milk supply.  Rooming-in encouraged with explanation of the benefits.  Continue to apply expressed breast milk and Lanolin cream to nipples after feedings for healing and comfort.  Postpartum breastfeeding assessment completed and education provided.  Items included in the education are:     proper positioning and latch    effectiveness of feeding    manual expression    handling and storing breastmilk    maintenance of breastfeeding for the first 6 months    sign/symptoms of infant feeding issues requiring referral to qualified health care provider    LACTATION COMMENTS   Deep latch explained for proper positioning of breast in infant's mouth, maximizing milk transfer and comfort.  Reassurance and encouragement provided in regard to mom's concerns about milk supply.  Follow-up support information provided.  Parents plan to keep [place] Well-Child Check with Dr. Garcia as scheduled for 2 week well child check.      Face-to-face Time: 60 minutes with assessment and education.    Viki Clement  9/10/2018  1:59 PM

## 2018-09-11 ENCOUNTER — NURSE TRIAGE (OUTPATIENT)
Dept: OBGYN | Facility: OTHER | Age: 38
End: 2018-09-11

## 2018-09-11 NOTE — TELEPHONE ENCOUNTER
"Patient calls today stating she has been having pain with urination since her BM earlier today. This was her first BM since  on 18. She describes the pain as \"cramping\", not burning or stinging. She denies fever, foul discharge and urgency frequency. She states the pain has gotten progressively better throughout the day. She is taking Motrin every 6 hours, not taking Percocet regularly. Patient advised to monitor at home. She will call back or present to hospital if symptoms worsen or do not improve.    Kell Gibson RN...................2018 1:46 PM    Additional Information    Other post-op symptom or question (all triage questions negative)    Protocols used: POSTPARTUM -  SYMPTOMS-ADULT-AH      "

## 2018-09-12 ENCOUNTER — TELEPHONE (OUTPATIENT)
Dept: OBGYN | Facility: OTHER | Age: 38
End: 2018-09-12

## 2018-09-12 DIAGNOSIS — R30.0 DYSURIA: ICD-10-CM

## 2018-09-12 DIAGNOSIS — R30.0 DYSURIA: Primary | ICD-10-CM

## 2018-09-12 LAB
ALBUMIN UR-MCNC: NEGATIVE MG/DL
APPEARANCE UR: CLEAR
BACTERIA #/AREA URNS HPF: ABNORMAL /HPF
BILIRUB UR QL STRIP: NEGATIVE
COLOR UR AUTO: YELLOW
GLUCOSE UR STRIP-MCNC: NEGATIVE MG/DL
HGB UR QL STRIP: ABNORMAL
KETONES UR STRIP-MCNC: NEGATIVE MG/DL
LEUKOCYTE ESTERASE UR QL STRIP: NEGATIVE
NITRATE UR QL: NEGATIVE
NON-SQ EPI CELLS #/AREA URNS LPF: ABNORMAL /LPF
PH UR STRIP: 6 PH (ref 5–9)
RBC #/AREA URNS AUTO: ABNORMAL /HPF
SOURCE: ABNORMAL
SP GR UR STRIP: 1.01 (ref 1–1.03)
UROBILINOGEN UR STRIP-ACNC: 0.2 EU/DL (ref 0.2–1)
WBC #/AREA URNS AUTO: ABNORMAL /HPF

## 2018-09-12 PROCEDURE — 81001 URINALYSIS AUTO W/SCOPE: CPT | Performed by: OBSTETRICS & GYNECOLOGY

## 2018-09-12 NOTE — TELEPHONE ENCOUNTER
Maricarmen has a question about a breast rash she has.  She is fairly sure that it came from a breast feeding pillow that she used, and has since quit using, but just wanted to ask the nurse.  Haylie Landry on 9/12/2018 at 1:59 PM

## 2018-09-12 NOTE — TELEPHONE ENCOUNTER
Pt telephoned.  States she has developed a rash where she has held her boppy, under her chest area radiating around her back.  Denies any dyspnea.  Advised and instructed to stop using the boppy, wash it with a double rinse cycle, and to use per protocol loratadine, diphenhydramine, or cetirizine, and to call back if rash does not resolve after treatment.  Pt verbalizes understanding.  Tracey Oconnell RN on 9/12/2018 at 2:11 PM

## 2018-09-12 NOTE — TELEPHONE ENCOUNTER
This RN returned pt's telephone call.  Pt reports pain with urination that began yesterday that has progressively become worse.  Denies urgency or frequency.  Denies any other abnormal S&S.  UA ordered per protocol.  Tracey Oconnell RN on 9/12/2018 at 7:54 AM

## 2018-09-13 ENCOUNTER — TELEPHONE (OUTPATIENT)
Dept: FAMILY MEDICINE | Facility: OTHER | Age: 38
End: 2018-09-13

## 2018-09-13 ENCOUNTER — OFFICE VISIT (OUTPATIENT)
Dept: FAMILY MEDICINE | Facility: OTHER | Age: 38
End: 2018-09-13
Attending: FAMILY MEDICINE
Payer: COMMERCIAL

## 2018-09-13 VITALS
SYSTOLIC BLOOD PRESSURE: 132 MMHG | DIASTOLIC BLOOD PRESSURE: 86 MMHG | HEART RATE: 78 BPM | TEMPERATURE: 99.6 F | WEIGHT: 207.8 LBS | RESPIRATION RATE: 16 BRPM | BODY MASS INDEX: 33.54 KG/M2

## 2018-09-13 DIAGNOSIS — L25.8 CONTACT DERMATITIS DUE TO SOAP: ICD-10-CM

## 2018-09-13 PROCEDURE — 99213 OFFICE O/P EST LOW 20 MIN: CPT | Performed by: FAMILY MEDICINE

## 2018-09-13 RX ORDER — CETIRIZINE HYDROCHLORIDE 10 MG/1
10 TABLET ORAL DAILY
COMMUNITY
End: 2018-09-20

## 2018-09-13 RX ORDER — PREDNISONE 10 MG/1
TABLET ORAL
Qty: 30 TABLET | Refills: 0 | Status: SHIPPED | OUTPATIENT
Start: 2018-09-13 | End: 2018-09-28

## 2018-09-13 RX ORDER — DIAPER,BRIEF,INFANT-TODD,DISP
EACH MISCELLANEOUS 2 TIMES DAILY
COMMUNITY
End: 2018-09-28

## 2018-09-13 RX ORDER — SENNOSIDES 8.6 MG
1 TABLET ORAL DAILY
COMMUNITY
End: 2018-09-20

## 2018-09-13 ASSESSMENT — ENCOUNTER SYMPTOMS
WOUND: 0
COUGH: 0
CHILLS: 0
APPETITE CHANGE: 0
ARTHRALGIAS: 0
WHEEZING: 0
PALPITATIONS: 0
SHORTNESS OF BREATH: 0
FEVER: 0
FATIGUE: 1

## 2018-09-13 NOTE — TELEPHONE ENCOUNTER
Per chart review Dr. Hurtado and OB staff have been dealing with patient's rash will route to OB staff for continuity of care. Malathi Luz RN on 9/13/2018 at 9:00 AM

## 2018-09-13 NOTE — TELEPHONE ENCOUNTER
Please call patient regarding abdomin rash which is spreading to back and legs. She states it may be a reaction to a medication.

## 2018-09-13 NOTE — PROGRESS NOTES
SUBJECTIVE:   Maricarmen Ornelas is a 38 year old female who presents to clinic today for the following health issues:    HPI  Maricarmen presents for concerns of rash on her abdomen that has been getting progressively worse.  Initially started 2-3 days after her .  Initially was a scarlatiniform type rash on her central abdomen, and has now progressed to a more widespread erythematous inflamed area.  It is spreading down to her sides, groin and high thigh.  It is sparing her back, and currently sparing the region of her incision and slightly above.  She has tried over-the-counter hydrocortisone, Benadryl, Claritin/Zyrtec.  No relief.  No other affected people in her household.  Upon review of surgical information, she was prepped with chloraprep.  Did not shower until well into POD #1; therefore prep was in place for 30+ hours.    Patient Active Problem List    Diagnosis Date Noted     S/P  section 2018     Priority: Medium     Encounter for triage in pregnant patient 2018     Priority: Medium     Bilateral carpal tunnel syndrome 2018     Priority: Medium     Adjustment disorder with anxious mood 2018     Priority: Medium     Acquired hypothyroidism 2018     Priority: Medium     Dichorionic diamniotic twin pregnancy in third trimester 2018     Priority: Medium     Attention deficit disorder 2018     Priority: Medium     Twin pregnancy 2018     Priority: Medium     EDC 2018       Lumbosacral spondylosis without myelopathy 03/10/2015     Priority: Medium     Other staphylococcus infection in conditions classified elsewhere and of unspecified site 2011     Priority: Medium     Past Medical History:   Diagnosis Date     Dichorionic diamniotic twin pregnancy in third trimester 2018     Disorder of thyroid     hypo     Supervision of elderly multigravida     2018     Twin pregnancy     EDC 2018      Past Surgical History:   Procedure Laterality  Date     APPENDECTOMY OPEN            SECTION N/A 2018    Procedure:  SECTION;   Section - twins;  Surgeon: Woodrow Hurtado MD;  Location: [facility] OR     Family History   Problem Relation Age of Onset     Substance Abuse Maternal Grandmother      Alcohol/Drug,Alcohol abuse     Cancer Maternal Grandmother      Cancer, lung cancer     Other - See Comments Maternal Grandfather      Alzheimer's disease     Diabetes Paternal Grandmother      Diabetes,type 1     No Known Problems Sister      No Known Problems Son      No Known Problems Son      Diabetes Maternal Uncle      Diabetes,type 2     No Known Problems Son      2018     No Known Problems Daughter      2018     HEART DISEASE No family hx of      Heart Disease     Social History   Substance Use Topics     Smoking status: Never Smoker     Smokeless tobacco: Never Used     Alcohol use 1.2 oz/week     Social History     Social History Narrative       - Ki Parry      Sons: Franco Linn ,    Daughter:    Father: Jose;  Mother: Bronwyn      Siblings: Sister Nydia,  Joelle     Current Outpatient Prescriptions   Medication Sig Dispense Refill     Acetaminophen (TYLENOL PO)        cetirizine (ZYRTEC) 10 MG tablet Take 10 mg by mouth daily       DiphenhydrAMINE HCl (BENADRYL PO)        Docusate Sodium (COLACE PO)        ferrous gluconate (FERGON) 324 (38 Fe) MG tablet Take 1 tablet (324 mg) by mouth 2 times daily (with meals) 100 tablet 3     hydrocortisone 0.5 % cream Apply topically 2 times daily       IBUPROFEN PO        levothyroxine (SYNTHROID) 75 MCG tablet        order for DME Equipment being ordered: maternity support belt 1 Device 0     order for DME Equipment being ordered: Blood pressure cuff 1 Device 0     predniSONE (DELTASONE) 10 MG tablet Take 4 tablets PO daily x 3 days; 3 tab x 3 days; 2 tab x 3 days; 1 tab x 3 days; then stop. 30 tablet 0     Prenatal Vit-Fe Fumarate-FA (CVS PRENATAL) 28-0.8 MG TABS  Take 1 tablet by mouth       sennosides (SENOKOT) 8.6 MG tablet Take 1 tablet by mouth daily       loratadine (CLARITIN) 10 MG tablet Take 1 tablet (10 mg) by mouth daily 90 tablet 3     No Known Allergies    Review of Systems   Constitutional: Positive for fatigue. Negative for appetite change, chills and fever.   Respiratory: Negative for cough, shortness of breath and wheezing.    Cardiovascular: Negative for chest pain and palpitations.   Musculoskeletal: Negative for arthralgias.   Skin: Negative for wound.        OBJECTIVE:     /86 (BP Location: Right arm, Patient Position: Chair, Cuff Size: Adult Large)  Pulse 78  Temp 99.6  F (37.6  C) (Tympanic)  Resp 16  Wt 207 lb 12.8 oz (94.3 kg)  LMP 12/17/2017  BMI 33.54 kg/m2  Body mass index is 33.54 kg/(m^2).  Physical Exam   Constitutional: She appears well-developed and well-nourished.   HENT:   Head: Normocephalic and atraumatic.   Right Ear: External ear normal.   Left Ear: External ear normal.   Eyes: EOM are normal. Pupils are equal, round, and reactive to light.   Cardiovascular: Normal rate and normal heart sounds.    Pulmonary/Chest: Effort normal and breath sounds normal.   Abdominal: There is no hepatosplenomegaly. There is no tenderness. There is no CVA tenderness.       Erythematous inflamed rash, confluent at the upper abdomen; but with more discrete pinpoint lesions spreading from borders (along sides and high thighs).   Nursing note and vitals reviewed.    Diagnostic Test Results:  none     ASSESSMENT/PLAN:     1. Postpartum care and examination  - predniSONE (DELTASONE) 10 MG tablet; Take 4 tablets PO daily x 3 days; 3 tab x 3 days; 2 tab x 3 days; 1 tab x 3 days; then stop.  Dispense: 30 tablet; Refill: 0    2. Contact dermatitis due to soap  Related to her prep during surgery and chlorhexadine exposure; due to distribution.  Discussed the central clearing as that was the area that was wiped clean after surgery and bandaged.  She is  encouraged to continue benadryl, claritin/zyrtec, may add zantac.  Topical agents as desired to help with discomfort/itching.  Will start prednisone taper x 12 days.  Follow up prn.  Chloraprep will be added to allergy list.   - predniSONE (DELTASONE) 10 MG tablet; Take 4 tablets PO daily x 3 days; 3 tab x 3 days; 2 tab x 3 days; 1 tab x 3 days; then stop.  Dispense: 30 tablet; Refill: 0    Follow up prn.    Avril Sanders, [facility]

## 2018-09-13 NOTE — TELEPHONE ENCOUNTER
"Has had a rash since shortly after . Started as tiny red dots, then \"hives\" and now large red areas that itch quite a bit. She has tried Benadryl, Claritin and Hydrocortisone. She switched to Zyrtec today. She states all medications have helped some with the itching, but not the appearance of the rash. In fact, she shares that her rash has spread.     Colace, percocet, senna are all brand new medications for her. And she is wondering if maybe she is allergic to one of them. She denies SOB, chest pain or any other concerning symptoms.    Patient advised to be evaluated by any family practice provider. SHe agrees to this and was transferred to scheduling.    Kell Gibson RN...................2018 9:29 AM    "

## 2018-09-13 NOTE — MR AVS SNAPSHOT
"              After Visit Summary   9/13/2018    Maricarmen Ornelas    MRN: [id number]           Patient Information     Date Of Birth          1980        Visit Information        Provider Department      9/13/2018 11:00 AM Avril Sanders DO [facility]        Today's Diagnoses     Postpartum care and examination    -  1    Contact dermatitis due to soap           Follow-ups after your visit        Your next 10 appointments already scheduled     Sep 20, 2018  9:30 AM CDT   [facility]    [address]  [facility] [address]   [phone]            Oct 18, 2018  9:15 AM CDT   [facility] Partum with [facility]ado [facility]    [address]  [facility] [address]   [phone]              Who to contact     If you have questions or need follow up information about today's clinic visit or your schedule please contact [facility] directly at [phone].  Normal or non-critical lab and imaging results will be communicated to you by Neuralahart, letter or phone within 4 business days after the clinic has received the results. If you do not hear from us within 7 days, please contact the clinic through Cardiolat or phone. If you have a critical or abnormal lab result, we will notify you by phone as soon as possible.  Submit refill requests through Xirrus or call your pharmacy and they will forward the refill request to us. Please allow 3 business days for your refill to be completed.          Additional Information About Your Visit        Neuralahart Information     Xirrus lets you send messages to your doctor, view your test results, renew your prescriptions, schedule appointments and more. To sign up, go to [email or web address] . Click on \"Log in\" on the left side of the screen, which will take you to the " "Welcome page. Then click on \"Sign up Now\" on the right side of the page.     You will be asked to enter the access code listed below, as well as some personal information. Please follow the directions to create your username and password.     Your access code is: [email or web address]  Expires: 2018 11:55 AM     Your access code will  in 90 days. If you need help or a new code, please call your [place] clinic or [phone].        Care EveryWhere ID     This is your Care EveryWhere ID. This could be used by other organizations to access your [place] medical records  [id number]        Your Vitals Were     Pulse Temperature Respirations Last Period BMI (Body Mass Index)       78 99.6  F (37.6  C) (Tympanic) 16 2017 33.54 kg/m2        Blood Pressure from Last 3 Encounters:   18 132/86   18 134/87   18 128/86    Weight from Last 3 Encounters:   18 207 lb 12.8 oz (94.3 kg)   18 225 lb 6.4 oz (102.2 kg)   18 224 lb (101.6 kg)              Today, you had the following     No orders found for display         Today's Medication Changes          These changes are accurate as of 18  1:49 PM.  If you have any questions, ask your nurse or doctor.               Start taking these medicines.        Dose/Directions    predniSONE 10 MG tablet   Commonly known as:  DELTASONE   Used for:  Contact dermatitis due to soap, Postpartum care and examination   Started by:  Avril Sanders DO        Take 4 tablets PO daily x 3 days; 3 tab x 3 days; 2 tab x 3 days; 1 tab x 3 days; then stop.   Quantity:  30 tablet   Refills:  0         Stop taking these medicines if you haven't already. Please contact your care team if you have questions.     oxyCODONE-acetaminophen 5-325 MG per tablet   Commonly known as:  PERCOCET   Stopped by:  Avril Sanders, DO                Where to get your medicines      These medications were sent to [facility] [place], [place] - [address]  [address],  [facility] [address]     Phone:  [phone]     predniSONE 10 MG tablet                Primary Care Provider Office Phone # Fax #    Analy CRAFT Siva Bang [phone]       [address]   [facility] [address]        Equal Access to Services     [facility] : Hadii aad ku hadasho Soomaali, waaxda luqadaha, qaybta kaalmada adeegyada, waxay maryin hayaan adeeg khryanabraham latricia . So [facility] [phone].    ATENCIÓN: Si habla español, tiene a jiang disposición servicios gratuitos de asistencia lingüística. Llame al [phone].    We comply with applicable federal civil rights laws and [place] laws. We do not discriminate on the basis of race, color, national origin, age, disability, sex, sexual orientation, or gender identity.            Thank you!     Thank you for choosing [facility]  for your care. Our goal is always to provide you with excellent care. Hearing back from our patients is one way we can continue to improve our services. Please take a few minutes to complete the written survey that you may receive in the mail after your visit with us. Thank you!             Your Updated Medication List - Protect others around you: Learn how to safely use, store and throw away your medicines at www.disposemymeds.org.          This list is accurate as of 9/13/18  1:49 PM.  Always use your most recent med list.                   Brand Name Dispense Instructions for use Diagnosis    BENADRYL PO           cetirizine 10 MG tablet    zyrTEC     Take 10 mg by mouth daily        CLARITIN 10 MG tablet   Generic drug:  loratadine     90 tablet    Take 1 tablet (10 mg) by mouth daily        COLACE PO           CVS PRENATAL 28-0.8 MG Tabs      Take 1 tablet by mouth        ferrous gluconate 324 (38 Fe) MG tablet    FERGON    100 tablet    Take 1 tablet (324 mg) by mouth 2 times daily (with meals)    Anemia affecting pregnancy in  second trimester       hydrocortisone 0.5 % cream      Apply topically 2 times daily        IBUPROFEN PO           order for DME     1 Device    Equipment being ordered: Blood pressure cuff    Dichorionic diamniotic twin pregnancy in second trimester       order for DME     1 Device    Equipment being ordered: maternity support belt    Elderly multigravida in second trimester, Dichorionic diamniotic twin pregnancy in second trimester       predniSONE 10 MG tablet    DELTASONE    30 tablet    Take 4 tablets PO daily x 3 days; 3 tab x 3 days; 2 tab x 3 days; 1 tab x 3 days; then stop.    Contact dermatitis due to soap, Postpartum care and examination       sennosides 8.6 MG tablet    SENOKOT     Take 1 tablet by mouth daily        SYNTHROID 75 MCG tablet   Generic drug:  levothyroxine           TYLENOL PO

## 2018-09-13 NOTE — NURSING NOTE
"Chief Complaint   Patient presents with     Derm Problem     abd, legs, and back. Rash is warm to the touch       Initial /86 (BP Location: Right arm, Patient Position: Chair, Cuff Size: Adult Large)  Pulse 78  Temp 99.6  F (37.6  C) (Tympanic)  Resp 16  Wt 207 lb 12.8 oz (94.3 kg)  LMP 12/17/2017  BMI 33.54 kg/m2 Estimated body mass index is 33.54 kg/(m^2) as calculated from the following:    Height as of 8/28/18: 5' 6\" (1.676 m).    Weight as of this encounter: 207 lb 12.8 oz (94.3 kg).  Medication Reconciliation: complete    Dasia Interiano LPN  "

## 2018-09-20 ENCOUNTER — OFFICE VISIT (OUTPATIENT)
Dept: OBGYN | Facility: OTHER | Age: 38
End: 2018-09-20
Attending: OBSTETRICS & GYNECOLOGY
Payer: COMMERCIAL

## 2018-09-20 VITALS
TEMPERATURE: 98.2 F | DIASTOLIC BLOOD PRESSURE: 78 MMHG | BODY MASS INDEX: 32.43 KG/M2 | SYSTOLIC BLOOD PRESSURE: 128 MMHG | WEIGHT: 200.9 LBS

## 2018-09-20 DIAGNOSIS — Z98.890 POSTOPERATIVE STATE: Primary | ICD-10-CM

## 2018-09-20 PROCEDURE — 99024 POSTOP FOLLOW-UP VISIT: CPT | Performed by: OBSTETRICS & GYNECOLOGY

## 2018-09-20 ASSESSMENT — PAIN SCALES - GENERAL: PAINLEVEL: NO PAIN (0)

## 2018-09-20 NOTE — MR AVS SNAPSHOT
"              After Visit Summary   9/20/2018    Maricarmen Ornelas    MRN: [id number]           Patient Information     Date Of Birth          1980        Visit Information        Provider Department      9/20/2018 9:30 AM Woodrow Hurtado MD [facility]        Today's Diagnoses     Postoperative state    -  1       Follow-ups after your visit        Follow-up notes from your care team     Return in about 4 weeks (around 10/18/2018).      Your next 10 appointments already scheduled     Oct 18, 2018  9:15 AM CDT   Post Partum with Woodrow Hurtado MD   [facility] ([facility])    [address]  [place] [address] [phone]              Who to contact     If you have questions or need follow up information about today's clinic visit or your schedule please contact [facility] directly at [phone].  Normal or non-critical lab and imaging results will be communicated to you by Aerial BioPharmahart, letter or phone within 4 business days after the clinic has received the results. If you do not hear from us within 7 days, please contact the clinic through Aerial BioPharmahart or phone. If you have a critical or abnormal lab result, we will notify you by phone as soon as possible.  Submit refill requests through Masabi or call your pharmacy and they will forward the refill request to us. Please allow 3 business days for your refill to be completed.          Additional Information About Your Visit        Aerial BioPharmaharAvailink Information     Masabi lets you send messages to your doctor, view your test results, renew your prescriptions, schedule appointments and more. To sign up, go to [email or web address] . Click on \"Log in\" on the left side of the screen, which will take you to the Welcome page. Then click on \"Sign up Now\" on the right side of the page.     You will be asked to enter the access code listed below, as well as some personal information. " Please follow the directions to create your username and password.     Your access code is: [email or web address]  Expires: 2018 11:55 AM     Your access code will  in 90 days. If you need help or a new code, please call your [place] clinic or [phone].        Care EveryWhere ID     This is your Care EveryWhere ID. This could be used by other organizations to access your [place] medical records  [id number]        Your Vitals Were     Temperature Last Period Breastfeeding? BMI (Body Mass Index)          98.2  F (36.8  C) 2017 Yes 32.43 kg/m2         Blood Pressure from Last 3 Encounters:   18 128/78   18 132/86   18 134/87    Weight from Last 3 Encounters:   18 91.1 kg (200 lb 14.4 oz)   18 94.3 kg (207 lb 12.8 oz)   18 102.2 kg (225 lb 6.4 oz)              Today, you had the following     No orders found for display         Today's Medication Changes          These changes are accurate as of 18 10:08 AM.  If you have any questions, ask your nurse or doctor.               Stop taking these medicines if you haven't already. Please contact your care team if you have questions.     BENADRYL PO   Stopped by:  Woodrow Hurtado MD           cetirizine 10 MG tablet   Commonly known as:  zyrTEC   Stopped by:  Woodrow Hurtado MD           CLARITIN 10 MG tablet   Generic drug:  loratadine   Stopped by:  Woodrow Hurtado MD           COLACE PO   Stopped by:  Woodrow Hurtado MD           order for DME   Stopped by:  Woodrow Hurtado MD           order for DME   Stopped by:  Woodrow Hurtado MD           sennosides 8.6 MG tablet   Commonly known as:  SENOKOT   Stopped by:  Woodrow Hurtado MD           TYLENOL PO   Stopped by:  Woodrow Hurtado MD                    Primary Care Provider Office Phone # Fax #    Analy CRAFT Siva Bang [phone] [address] [facility] [address]        Equal Access to Services     [facility] ARLET AH: Cassi regalado  becka Clifford, sarah dolanaristidseha, qabonita kafrancis jacobej, joey maryin hayaajasmina jamesgeorgina bethanyjaime laFrancorosie eloisa. So [facility] [phone].    ATENCIÓN: Si habla trungañol, tiene a jiang disposición servicios gratuitos de asistencia lingüística. Cheng al [phone].    We comply with applicable federal civil rights laws and [place] laws. We do not discriminate on the basis of race, color, national origin, age, disability, sex, sexual orientation, or gender identity.            Thank you!     Thank you for choosing [facility] AND [facility]  for your care. Our goal is always to provide you with excellent care. Hearing back from our patients is one way we can continue to improve our services. Please take a few minutes to complete the written survey that you may receive in the mail after your visit with us. Thank you!             Your Updated Medication List - Protect others around you: Learn how to safely use, store and throw away your medicines at www.disposemymeds.org.          This list is accurate as of 9/20/18 10:08 AM.  Always use your most recent med list.                   Brand Name Dispense Instructions for use Diagnosis    CVS PRENATAL 28-0.8 MG Tabs      Take 1 tablet by mouth        ferrous gluconate 324 (38 Fe) MG tablet    FERGON    100 tablet    Take 1 tablet (324 mg) by mouth 2 times daily (with meals)    Anemia affecting pregnancy in second trimester       hydrocortisone 0.5 % cream      Apply topically 2 times daily        IBUPROFEN PO           predniSONE 10 MG tablet    DELTASONE    30 tablet    Take 4 tablets PO daily x 3 days; 3 tab x 3 days; 2 tab x 3 days; 1 tab x 3 days; then stop.    Contact dermatitis due to soap, Postpartum care and examination       SYNTHROID 75 MCG tablet   Generic drug:  levothyroxine

## 2018-09-20 NOTE — PROGRESS NOTES
CC: postpartum exam at 2 weeks from delivery    HPI: Maricarmen Ornelas presents for postpartum exam. Baby was born by  delivery. Complications included reaction to her chloroprep, now on steroids and improving.  Mood:OK    Breastfeeding:yes  Incision(s): OK  Bleeding: minimal  Birthcontrol: vasectomy    Past Medical History:   Diagnosis Date     Dichorionic diamniotic twin pregnancy in third trimester 2018     Disorder of thyroid     hypo     Supervision of elderly multigravida     2018     Twin pregnancy 2018    2018     Past Surgical History:   Procedure Laterality Date     APPENDECTOMY OPEN            SECTION N/A 2018    Procedure:  SECTION;   Section - twins;  Surgeon: Woodrow Hurtado MD;  Location:  OR     Current Outpatient Prescriptions   Medication     ferrous gluconate (FERGON) 324 (38 Fe) MG tablet     hydrocortisone 0.5 % cream     IBUPROFEN PO     levothyroxine (SYNTHROID) 75 MCG tablet     predniSONE (DELTASONE) 10 MG tablet     Prenatal Vit-Fe Fumarate-FA (CVS PRENATAL) 28-0.8 MG TABS     No current facility-administered medications for this visit.       Allergies   Allergen Reactions     Chloraprep One Step Rash     With twin C/S (2018)       REVIEW OF SYSTEMS  General: negative  CV: negative  GI: negative  : negative    O: /78 (BP Location: Right arm)  Temp 98.2  F (36.8  C)  Wt 91.1 kg (200 lb 14.4 oz)  LMP 2017  Breastfeeding? Yes  BMI 32.43 kg/m2  Body mass index is 32.43 kg/(m^2).    Exam:  Constitutional: healthy, alert, active and no distress  Incision CDI    PHQ-9 score:    PHQ-9 SCORE 2018   Total Score 11       Results for orders placed or performed in visit on 18   Urinalysis w Reflex Microscopic If Positive   Result Value Ref Range    Color Urine Yellow     Appearance Urine Clear     Glucose Urine Negative NEG^Negative mg/dL    Bilirubin Urine Negative NEG^Negative    Ketones Urine Negative NEG^Negative  mg/dL    Specific Gravity Urine 1.015 1.000 - 1.030    Blood Urine Large (A) NEG^Negative    pH Urine 6.0 5.0 - 9.0 pH    Protein Albumin Urine Negative NEG^Negative mg/dL    Urobilinogen Urine 0.2 0.2 - 1.0 EU/dL    Nitrite Urine Negative NEG^Negative    Leukocyte Esterase Urine Negative NEG^Negative    Source Midstream Urine    Urine Microscopic   Result Value Ref Range    WBC Urine 0 - 5 OTO5^0 - 5 /HPF    RBC Urine O - 2 OTO2^O - 2 /HPF    Squamous Epithelial /LPF Urine Few FEW^Few /LPF    Bacteria Urine Few (A) NEG^Negative /HPF         I/P:  Postpartum visit.  Recheck in one month.      Woodrow Hurtado MD FACOG  10:04 AM 9/20/2018

## 2018-09-28 ENCOUNTER — OFFICE VISIT (OUTPATIENT)
Dept: FAMILY MEDICINE | Facility: OTHER | Age: 38
End: 2018-09-28
Attending: FAMILY MEDICINE
Payer: COMMERCIAL

## 2018-09-28 VITALS
RESPIRATION RATE: 24 BRPM | BODY MASS INDEX: 31.64 KG/M2 | SYSTOLIC BLOOD PRESSURE: 122 MMHG | WEIGHT: 196 LBS | DIASTOLIC BLOOD PRESSURE: 66 MMHG | TEMPERATURE: 98.7 F | HEART RATE: 96 BPM

## 2018-09-28 DIAGNOSIS — N61.0 MASTITIS: Primary | ICD-10-CM

## 2018-09-28 PROCEDURE — 99213 OFFICE O/P EST LOW 20 MIN: CPT | Performed by: FAMILY MEDICINE

## 2018-09-28 RX ORDER — DICLOXACILLIN SODIUM 500 MG
500 CAPSULE ORAL 4 TIMES DAILY
Qty: 28 CAPSULE | Refills: 0 | Status: SHIPPED | OUTPATIENT
Start: 2018-09-28 | End: 2019-02-26

## 2018-09-28 ASSESSMENT — PAIN SCALES - GENERAL: PAINLEVEL: MILD PAIN (2)

## 2018-09-28 NOTE — NURSING NOTE
"Patient is here for concerns of infection of breast. States started about 4 days go having body aches, chills, fever, breast pain and skin pain.  Josie Garcia LPN .............9/28/2018     12:04 PM        Chief Complaint   Patient presents with     Breast Problem       Initial /66 (BP Location: Right arm, Patient Position: Sitting)  Pulse 96  Temp 98.7  F (37.1  C) (Tympanic)  Resp 24  Wt 196 lb (88.9 kg)  LMP 12/17/2017  Breastfeeding? Yes  BMI 31.64 kg/m2 Estimated body mass index is 31.64 kg/(m^2) as calculated from the following:    Height as of 8/28/18: 5' 6\" (1.676 m).    Weight as of this encounter: 196 lb (88.9 kg).  Medication Reconciliation: complete    Josie Garcia LPN    "

## 2018-09-28 NOTE — MR AVS SNAPSHOT
"              After Visit Summary   9/28/2018    Maricarmen Ornelas    MRN: [id number]           Patient Information     Date Of Birth          1980        Visit Information        Provider Department      9/28/2018 12:00 PM Valentina Zeng MD [facility]        Today's Diagnoses     Mastitis    -  1       Follow-ups after your visit        Your next 10 appointments already scheduled     Oct 18, 2018  9:15 AM CDT   Post Partum with Woodrow Hurtado MD   [facility] ([facility] [place])    [address]  [place] [address]   [phone]              Who to contact     If you have questions or need follow up information about today's clinic visit or your schedule please contact [facility] directly at [phone].  Normal or non-critical lab and imaging results will be communicated to you by [employer], letter or phone within 4 business days after the clinic has received the results. If you do not hear from us within 7 days, please contact the clinic through [employer] or phone. If you have a critical or abnormal lab result, we will notify you by phone as soon as possible.  Submit refill requests through at[employer]ore or call your pharmacy and they will forward the refill request to us. Please allow 3 business days for your refill to be completed.          Additional Information About Your Visit        MyChart Information     atokore lets you send messages to your doctor, view your test results, renew your prescriptions, schedule appointments and more. To sign up, go to [email or web address] . Click on \"Log in\" on the left side of the screen, which will take you to the Welcome page. Then click on \"Sign up Now\" on the right side of the page.     You will be asked to enter the access code listed below, as well as some personal information. Please follow the directions to create your username and password.     Your access " code is: [email or web address]  Expires: 2018 11:55 AM     Your access code will  in 90 days. If you need help or a new code, please call your [place] clinic or [phone].        Care EveryWhere ID     This is your Care EveryWhere ID. This could be used by other organizations to access your [place] medical records  [id number]        Your Vitals Were     Pulse Temperature Respirations Last Period Breastfeeding? BMI (Body Mass Index)    96 98.7  F (37.1  C) (Tympanic) 24 2017 Yes 31.64 kg/m2       Blood Pressure from Last 3 Encounters:   10/02/18 112/70   18 122/66   18 128/78    Weight from Last 3 Encounters:   10/02/18 199 lb 2 oz (90.3 kg)   18 196 lb (88.9 kg)   18 200 lb 14.4 oz (91.1 kg)              Today, you had the following     No orders found for display         Today's Medication Changes          These changes are accurate as of 18 11:59 PM.  If you have any questions, ask your nurse or doctor.               Start taking these medicines.        Dose/Directions    dicloxacillin 500 MG capsule   Commonly known as:  DYNAPEN   Used for:  Mastitis   Started by:  Valentina Zeng MD        Dose:  500 mg   Take 1 capsule (500 mg) by mouth 4 times daily for 7 days   Quantity:  28 capsule   Refills:  0            Where to get your medicines      These medications were sent to [facility] [address] [place], [place] - [address], [facility] [address]     Phone:  [phone]     dicloxacillin 500 MG capsule                Primary Care Provider Office Phone # Fax #    Analy LUANA Bang [phone] [address] [facility] [address]        Equal Access to Services     [facility] AH: Hadii lizbeth regalado hadasho Soomaali, waaxda luqadaha, qaybta kaalmada adegeorginayagraeme, joey amin. [facility] [phone].    ATENCIÓN: Si davis steinberg  disposición servicios gratuitos de asistencia lingüística. Cheng evans [phone].    We comply with applicable federal civil rights laws and [place] laws. We do not discriminate on the basis of race, color, national origin, age, disability, sex, sexual orientation, or gender identity.            Thank you!     Thank you for choosing [facility]  for your care. Our goal is always to provide you with excellent care. Hearing back from our patients is one way we can continue to improve our services. Please take a few minutes to complete the written survey that you may receive in the mail after your visit with us. Thank you!             Your Updated Medication List - Protect others around you: Learn how to safely use, store and throw away your medicines at www.disposemymeds.org.          This list is accurate as of 9/28/18 11:59 PM.  Always use your most recent med list.                   Brand Name Dispense Instructions for use Diagnosis    [facility] PRENATAL 28-0.8 MG Tabs      Take 1 tablet by mouth        dicloxacillin 500 MG capsule    DYNAPEN    28 capsule    Take 1 capsule (500 mg) by mouth 4 times daily for 7 days    Mastitis       IBUPROFEN PO           SYNTHROID 75 MCG tablet   Generic drug:  levothyroxine

## 2018-10-01 ENCOUNTER — TELEPHONE (OUTPATIENT)
Dept: FAMILY MEDICINE | Facility: OTHER | Age: 38
End: 2018-10-01

## 2018-10-01 NOTE — TELEPHONE ENCOUNTER
Patient states had flu like symptoms last week, was seen and treating for mastitis, has been taking medication and tylenol and ibuprofen. States is both breast, also having back pain. Nipples are very tender and sensitive and is hard to feed babies. Wondering what to do, states sister told her antibiotic should be working by now.   Josie Garcia LPN .............10/1/2018     3:39 PM      Patient states SMS and PCJ are primary basically. Ok for message to go to either.

## 2018-10-01 NOTE — TELEPHONE ENCOUNTER
Typically mastitis should be better.  She should be seen in the next 24 hours.  Analy Holbrook MD

## 2018-10-01 NOTE — TELEPHONE ENCOUNTER
Patient states that she is having a lot of breast pain. She is currently breast feeding twins and states it is very painful. She is wondering if she could possibly get worked in or if there was something she can do for this.  Thank you.

## 2018-10-02 ENCOUNTER — OFFICE VISIT (OUTPATIENT)
Dept: FAMILY MEDICINE | Facility: OTHER | Age: 38
End: 2018-10-02
Attending: FAMILY MEDICINE
Payer: COMMERCIAL

## 2018-10-02 ENCOUNTER — HOSPITAL ENCOUNTER (OUTPATIENT)
Dept: GENERAL RADIOLOGY | Facility: OTHER | Age: 38
Discharge: HOME | End: 2018-10-02
Attending: FAMILY MEDICINE | Admitting: FAMILY MEDICINE
Payer: COMMERCIAL

## 2018-10-02 VITALS
BODY MASS INDEX: 32 KG/M2 | HEIGHT: 66 IN | HEART RATE: 76 BPM | DIASTOLIC BLOOD PRESSURE: 70 MMHG | WEIGHT: 199.13 LBS | SYSTOLIC BLOOD PRESSURE: 112 MMHG | TEMPERATURE: 100.1 F

## 2018-10-02 DIAGNOSIS — R50.9 FEVER, UNSPECIFIED FEVER CAUSE: Primary | ICD-10-CM

## 2018-10-02 DIAGNOSIS — R50.9 FEVER, UNSPECIFIED FEVER CAUSE: ICD-10-CM

## 2018-10-02 LAB
ALBUMIN UR-MCNC: NEGATIVE MG/DL
APPEARANCE UR: CLEAR
BASOPHILS # BLD AUTO: 0.1 10E9/L (ref 0–0.2)
BASOPHILS NFR BLD AUTO: 0.6 %
BILIRUB UR QL STRIP: NEGATIVE
COLOR UR AUTO: YELLOW
DIFFERENTIAL METHOD BLD: NORMAL
EOSINOPHIL # BLD AUTO: 0.2 10E9/L (ref 0–0.7)
EOSINOPHIL NFR BLD AUTO: 2.4 %
ERYTHROCYTE [DISTWIDTH] IN BLOOD BY AUTOMATED COUNT: 15 % (ref 10–15)
FLUAV+FLUBV RNA SPEC QL NAA+PROBE: NEGATIVE
FLUAV+FLUBV RNA SPEC QL NAA+PROBE: NEGATIVE
GLUCOSE UR STRIP-MCNC: NEGATIVE MG/DL
HCT VFR BLD AUTO: 41.4 % (ref 35–47)
HGB BLD-MCNC: 13.7 G/DL (ref 11.7–15.7)
HGB UR QL STRIP: ABNORMAL
IMM GRANULOCYTES # BLD: 0.1 10E9/L (ref 0–0.4)
IMM GRANULOCYTES NFR BLD: 0.6 %
KETONES UR STRIP-MCNC: NEGATIVE MG/DL
LEUKOCYTE ESTERASE UR QL STRIP: NEGATIVE
LYMPHOCYTES # BLD AUTO: 1.9 10E9/L (ref 0.8–5.3)
LYMPHOCYTES NFR BLD AUTO: 19.6 %
MCH RBC QN AUTO: 29.2 PG (ref 26.5–33)
MCHC RBC AUTO-ENTMCNC: 33.1 G/DL (ref 31.5–36.5)
MCV RBC AUTO: 88 FL (ref 78–100)
MONOCYTES # BLD AUTO: 0.8 10E9/L (ref 0–1.3)
MONOCYTES NFR BLD AUTO: 8.1 %
NEUTROPHILS # BLD AUTO: 6.7 10E9/L (ref 1.6–8.3)
NEUTROPHILS NFR BLD AUTO: 68.7 %
NITRATE UR QL: NEGATIVE
PH UR STRIP: 7 PH (ref 5–9)
PLATELET # BLD AUTO: 321 10E9/L (ref 150–450)
RBC # BLD AUTO: 4.69 10E12/L (ref 3.8–5.2)
RBC #/AREA URNS AUTO: NORMAL /HPF
RSV RNA SPEC NAA+PROBE: NEGATIVE
SOURCE: ABNORMAL
SP GR UR STRIP: 1.01 (ref 1–1.03)
SPECIMEN SOURCE: NORMAL
UROBILINOGEN UR STRIP-ACNC: 0.2 EU/DL (ref 0.2–1)
WBC # BLD AUTO: 9.8 10E9/L (ref 4–11)
WBC #/AREA URNS AUTO: NORMAL /HPF

## 2018-10-02 PROCEDURE — 87631 RESP VIRUS 3-5 TARGETS: CPT | Performed by: FAMILY MEDICINE

## 2018-10-02 PROCEDURE — 99214 OFFICE O/P EST MOD 30 MIN: CPT | Performed by: FAMILY MEDICINE

## 2018-10-02 PROCEDURE — 85025 COMPLETE CBC W/AUTO DIFF WBC: CPT | Performed by: FAMILY MEDICINE

## 2018-10-02 PROCEDURE — 71046 X-RAY EXAM CHEST 2 VIEWS: CPT

## 2018-10-02 PROCEDURE — 36415 COLL VENOUS BLD VENIPUNCTURE: CPT | Performed by: FAMILY MEDICINE

## 2018-10-02 PROCEDURE — 81001 URINALYSIS AUTO W/SCOPE: CPT | Performed by: FAMILY MEDICINE

## 2018-10-02 PROCEDURE — 86618 LYME DISEASE ANTIBODY: CPT | Performed by: FAMILY MEDICINE

## 2018-10-02 RX ORDER — CLINDAMYCIN HCL 300 MG
300 CAPSULE ORAL 3 TIMES DAILY
Qty: 30 CAPSULE | Refills: 0 | Status: SHIPPED | OUTPATIENT
Start: 2018-10-02 | End: 2018-10-18

## 2018-10-02 ASSESSMENT — PAIN SCALES - GENERAL: PAINLEVEL: MODERATE PAIN (5)

## 2018-10-02 NOTE — TELEPHONE ENCOUNTER
Left message to call back....................  10/2/2018   8:47 AM  Rajani Ford LPN  10/2/2018  8:47 AM           Appointment made for today with Analy Holbrook MD at 2:45, message left for patient to call us back to make sure that time works.  Rajani Ford LPN  10/2/2018  8:47 AM

## 2018-10-02 NOTE — NURSING NOTE
"Chief Complaint   Patient presents with     Breast Problem     Mastitis         Initial /70  Pulse 76  Temp 100.1  F (37.8  C) (Tympanic)  Ht 5' 5.5\" (1.664 m)  Wt 199 lb 2 oz (90.3 kg)  LMP 12/17/2017  BMI 32.63 kg/m2 Estimated body mass index is 32.63 kg/(m^2) as calculated from the following:    Height as of this encounter: 5' 5.5\" (1.664 m).    Weight as of this encounter: 199 lb 2 oz (90.3 kg).    Medication Reconciliation: complete  Norma J. Gosselin, LPN  "

## 2018-10-02 NOTE — TELEPHONE ENCOUNTER
Maricarmen called back and will see Siva Bang today at 2:45PM.  Norma J. Gosselin LPN......  10/2/2018   8:51 AM

## 2018-10-02 NOTE — MR AVS SNAPSHOT
"              After Visit Summary   10/2/2018    Maricarmen Ornelas    MRN: [id number]           Patient Information     Date Of Birth          1980        Visit Information        Provider Department      10/2/2018 2:45 PM Analy Trimble MD [facility]        Today's Diagnoses     Fever, unspecified fever cause    -  1       Follow-ups after your visit        Your next 10 appointments already scheduled     Oct 18, 2018  9:15 AM CDT   Post Partum with Woodrow Hurtado MD   [facility]    [address]  [place] [id number] [phone]              Who to contact     If you have questions or need follow up information about today's clinic visit or your schedule please contact [facility] directly at [phone].  Normal or non-critical lab and imaging results will be communicated to you by [employer], letter or phone within 4 business days after the clinic has received the results. If you do not hear from us within 7 days, please contact the clinic through [employer] or phone. If you have a critical or abnormal lab result, we will notify you by phone as soon as possible.  Submit refill requests through [employer] or call your pharmacy and they will forward the refill request to us. Please allow 3 business days for your refill to be completed.          Additional Information About Your Visit        MyChart Information     [employer] lets you send messages to your doctor, view your test results, renew your prescriptions, schedule appointments and more. To sign up, go to [email or web address] . Click on \"Log in\" on the left side of the screen, which will take you to the Welcome page. Then click on \"Sign up Now\" on the right side of the page.     You will be asked to enter the access code listed below, as well as some personal information. Please follow the directions to create your username and password.   " "  Your access code is: [email or web address]  Expires: 2018 11:55 AM     Your access code will  in 90 days. If you need help or a new code, please call your [facility] or [phone].        Care EveryWhere ID     This is your Care EveryWhere ID. This could be used by other organizations to access your [place] medical records  [id number]        Your Vitals Were     Pulse Temperature Height Last Period BMI (Body Mass Index)       76 100.1  F (37.8  C) (Tympanic) 5' 5.5\" (1.664 m) 2017 32.63 kg/m2        Blood Pressure from Last 3 Encounters:   10/02/18 112/70   18 122/66   18 128/78    Weight from Last 3 Encounters:   10/02/18 199 lb 2 oz (90.3 kg)   18 196 lb (88.9 kg)   18 200 lb 14.4 oz (91.1 kg)              We Performed the Following     CBC and Differential     Influenza A and B and RSV PCR     Lyme Disease Ab with reflex to WB Serum     UA reflex to Microscopic and Culture     Urine Microscopic          Today's Medication Changes          These changes are accurate as of 10/2/18 11:59 PM.  If you have any questions, ask your nurse or doctor.               Start taking these medicines.        Dose/Directions    clindamycin 300 MG capsule   Commonly known as:  CLEOCIN   Used for:  Fever, unspecified fever cause   Started by:  Analy Trimble MD        Dose:  300 mg   Take 1 capsule (300 mg) by mouth 3 times daily   Quantity:  30 capsule   Refills:  0            Where to get your medicines      These medications were sent to [facility] - [place], [place] - [address]  [address], [facility] [address]     Phone:  [phone]     clindamycin 300 MG capsule                Primary Care Provider Office Phone # Fax #    Analy Bang [phone] [address]  [facility] [address]        Equal Access to Services     HAYDEE NICE AH: sarah Richard " veronique lewisjanessagraeme jamesjoey pagan maryerika amin. [facility] [phone].    ATENCIÓN: Si amee ann, tiene a jiang disposición servicios gratuitos de asistencia lingüística. Llame al [phone].    We comply with applicable federal civil rights laws and [place] laws. We do not discriminate on the basis of race, color, national origin, age, disability, sex, sexual orientation, or gender identity.            Thank you!     Thank you for choosing [facility] AND hospitals  for your care. Our goal is always to provide you with excellent care. Hearing back from our patients is one way we can continue to improve our services. Please take a few minutes to complete the written survey that you may receive in the mail after your visit with us. Thank you!             Your Updated Medication List - Protect others around you: Learn how to safely use, store and throw away your medicines at www.disposemymeds.org.          This list is accurate as of 10/2/18 11:59 PM.  Always use your most recent med list.                   Brand Name Dispense Instructions for use Diagnosis    clindamycin 300 MG capsule    CLEOCIN    30 capsule    Take 1 capsule (300 mg) by mouth 3 times daily    Fever, unspecified fever cause       [facility] PRENATAL 28-0.8 MG Tabs      Take 1 tablet by mouth        dicloxacillin 500 MG capsule    DYNAPEN    28 capsule    Take 1 capsule (500 mg) by mouth 4 times daily for 7 days    Mastitis       IBUPROFEN PO           SYNTHROID 75 MCG tablet   Generic drug:  levothyroxine

## 2018-10-02 NOTE — PROGRESS NOTES
"Nursing Notes:   Gosselin, Norma J., LPN  10/2/2018  3:22 PM  Signed  Chief Complaint   Patient presents with     Breast Problem     Mastitis         Initial /70  Pulse 76  Temp 100.1  F (37.8  C) (Tympanic)  Ht 5' 5.5\" (1.664 m)  Wt 199 lb 2 oz (90.3 kg)  LMP 2017  BMI 32.63 kg/m2 Estimated body mass index is 32.63 kg/(m^2) as calculated from the following:    Height as of this encounter: 5' 5.5\" (1.664 m).    Weight as of this encounter: 199 lb 2 oz (90.3 kg).    Medication Reconciliation: complete  Norma J. Gosselin, LPN      SUBJECTIVE:   CC:  Maricarmen Ornelas is a 38 year old female who presents to clinic today for the following health issues:  fevers    HPI  Maricarmen Ornelas is a 38 year old female in for evaluation of ongoing fever.  She is one month s/p .  She did react to the topical skin prep - but no post-op infection.    Last week she started to run fevers and have flu-like symptoms.  Her skin and body hurts.  She was in to clinic last Friday for evaluation.  She started treatment for mastitis last week - on dicloxacillin.  She is breast feeding her twins.  No nausea, but not a normal appetite.  Dysuria the past two days.  Vaginal discharge/bleeding hasn't changed.  Upper/mid back pain.    Started coughing yesterday and into today.  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  Some tenderness along her incision, no drainage.  No rash.         Allergies   Allergen Reactions     Chloraprep One Step Rash     With twin C/S (2018)     Current Outpatient Prescriptions   Medication     clindamycin (CLEOCIN) 300 MG capsule     IBUPROFEN PO     levothyroxine (SYNTHROID) 75 MCG tablet     Prenatal Vit-Fe Fumarate-FA ([facility] PRENATAL) 28-0.8 MG TABS     No current facility-administered medications for this visit.       Past Medical History:   Diagnosis Date     Dichorionic diamniotic twin pregnancy in third trimester 2018     Disorder of thyroid     hypo     Supervision of elderly multigravida     " "2018     Twin pregnancy 2018    EDC 2018      Past Surgical History:   Procedure Laterality Date     APPENDECTOMY OPEN            SECTION N/A 2018    Procedure:  SECTION;   Section - twins;  Surgeon: Woodrow Hurtado MD;  Location:  OR     Family History   Problem Relation Age of Onset     Substance Abuse Maternal Grandmother      Alcohol/Drug,Alcohol abuse     Cancer Maternal Grandmother      Cancer, lung cancer     Other - See Comments Maternal Grandfather      Alzheimer's disease     Diabetes Paternal Grandmother      Diabetes,type 1     No Known Problems Sister      No Known Problems Son      No Known Problems Son      Diabetes Maternal Uncle      Diabetes,type 2     No Known Problems Son      2018     No Known Problems Daughter      2018     HEART DISEASE No family hx of      Heart Disease       Review of Systems   Constitutional: Positive for appetite change, chills, fatigue and fever.   HENT: Negative.    Eyes: Negative.    Respiratory: Positive for cough. Negative for wheezing.    Gastrointestinal: Negative for diarrhea and vomiting.   Genitourinary: Positive for dysuria. Negative for frequency and urgency.   Skin: Negative.         PHQ-2 Score:     PHQ-2 (  [employer]) 10/2/2018 2018   Q1: Little interest or pleasure in doing things 0 0   Q2: Feeling down, depressed or hopeless 0 1   PHQ-2 Score 0 1         PHQ-9 SCORE 2015   Total Score 6 5 11         OBJECTIVE:     /70  Pulse 76  Temp 100.1  F (37.8  C) (Tympanic)  Ht 5' 5.5\" (1.664 m)  Wt 199 lb 2 oz (90.3 kg)  LMP 2017  BMI 32.63 kg/m2  Body mass index is 32.63 kg/(m^2).  Physical Exam   Constitutional:   flushed   HENT:   Head: Normocephalic.   Right Ear: External ear normal.   Left Ear: External ear normal.   Nose: Nose normal.   Cardiovascular: Normal rate and regular rhythm.    Pulmonary/Chest: She has no wheezes. She has no rales.   Breast tenderness inferiorly " with some mild redness medial left breast  - but she is not tender in this area; nipples intact   Abdominal: Bowel sounds are normal.   Incision C/D/I  Mild lower abdomen tenderness   Musculoskeletal: She exhibits no edema.   Lymphadenopathy:     She has no cervical adenopathy.   Skin: No rash noted.   Nursing note and vitals reviewed.       Results for orders placed or performed in visit on 10/02/18   UA reflex to Microscopic and Culture   Result Value Ref Range    Color Urine Yellow     Appearance Urine Clear     Glucose Urine Negative NEG^Negative mg/dL    Bilirubin Urine Negative NEG^Negative    Ketones Urine Negative NEG^Negative mg/dL    Specific Gravity Urine 1.010 1.000 - 1.030    Blood Urine Small (A) NEG^Negative    pH Urine 7.0 5.0 - 9.0 pH    Protein Albumin Urine Negative NEG^Negative mg/dL    Urobilinogen Urine 0.2 0.2 - 1.0 EU/dL    Nitrite Urine Negative NEG^Negative    Leukocyte Esterase Urine Negative NEG^Negative    Source Midstream Urine    CBC and Differential   Result Value Ref Range    WBC 9.8 4.0 - 11.0 10e9/L    RBC Count 4.69 3.8 - 5.2 10e12/L    Hemoglobin 13.7 11.7 - 15.7 g/dL    Hematocrit 41.4 35.0 - 47.0 %    MCV 88 78 - 100 fl    MCH 29.2 26.5 - 33.0 pg    MCHC 33.1 31.5 - 36.5 g/dL    RDW 15.0 10.0 - 15.0 %    Platelet Count 321 150 - 450 10e9/L    Diff Method Automated Method     % Neutrophils 68.7 %    % Lymphocytes 19.6 %    % Monocytes 8.1 %    % Eosinophils 2.4 %    % Basophils 0.6 %    % Immature Granulocytes 0.6 %    Absolute Neutrophil 6.7 1.6 - 8.3 10e9/L    Absolute Lymphocytes 1.9 0.8 - 5.3 10e9/L    Absolute Monocytes 0.8 0.0 - 1.3 10e9/L    Absolute Eosinophils 0.2 0.0 - 0.7 10e9/L    Absolute Basophils 0.1 0.0 - 0.2 10e9/L    Abs Immature Granulocytes 0.1 0 - 0.4 10e9/L   Lyme Disease Ab with reflex to WB Serum   Result Value Ref Range    Lyme Disease Antibodies Serum 0.07 0.00 - 0.89   Urine Microscopic   Result Value Ref Range    WBC Urine 0 - 5 OTO5^0 - 5 /HPF    RBC  Urine O - 2 OTO2^O - 2 /HPF   Influenza A and B and RSV PCR   Result Value Ref Range    Specimen Description Nasopharyngeal     Influenza A PCR Negative NEG^Negative    Influenza B PCR Negative NEG^Negative    Resp Syncytial Virus Negative NEG^Negative     Chest xray  - negative     ASSESSMENT/PLAN:       ICD-10-CM    1. Fever, unspecified fever cause R50.9 CBC and Differential     Lyme Disease Ab with reflex to WB Serum     XR Chest 2 Views     UA reflex to Microscopic and Culture     Influenza A and B and RSV PCR     CBC and Differential     Lyme Disease Ab with reflex to WB Serum     Urine Microscopic     clindamycin (CLEOCIN) 300 MG capsule            PLAN:  1.  Differential diagnosis discussed with patient including MRSA mastitis, endometritis. Noted that WBC is normal, UA is within normal limits.  Will change antibiotics to clindamycin.  Discussed taking probiotics along with this.  Continue with over the counter analgesics.  If not improving over the next 24-48 hrs, consider ultrasound/CT pelvis and admission for additional treatment.        LISA ORANTES MD  [facility]    This note was created using voice recognition software and was screened for errors in transcription.

## 2018-10-03 ENCOUNTER — TELEPHONE (OUTPATIENT)
Dept: FAMILY MEDICINE | Facility: OTHER | Age: 38
End: 2018-10-03

## 2018-10-03 NOTE — TELEPHONE ENCOUNTER
Ok - I don't see who she is seeing.  I've contacted her and stated that one of my concerns would be for endometritis/abscess.    Analy Holbrook MD

## 2018-10-03 NOTE — TELEPHONE ENCOUNTER
Patient called back and said her fever is 101.5 to 103 . She says she started on clindamycin last night. She says she does not have an appointment but is coming in . I asked her who she was seeing and she said someone . I let her know that I would send right tot he doctor . Marleni Larsen LPN ....................10/3/2018  3:19 PM

## 2018-10-04 LAB — B BURGDOR IGG+IGM SER QL: 0.07 (ref 0–0.89)

## 2018-10-04 NOTE — PROGRESS NOTES
"  SUBJECTIVE:   Maricarmen Ornelas is a 38 year old female who presents to clinic today for the following health issues:  Nursing Notes:   Josie Garcia LPN  2018 12:20 PM  Signed  Patient is here for concerns of infection of breast. States started about 4 days go having body aches, chills, fever, breast pain and skin pain.  Josie Garcia LPN .............2018     12:04 PM        Chief Complaint   Patient presents with     Breast Problem       Initial /66 (BP Location: Right arm, Patient Position: Sitting)  Pulse 96  Temp 98.7  F (37.1  C) (Tympanic)  Resp 24  Wt 196 lb (88.9 kg)  LMP 2017  Breastfeeding? Yes  BMI 31.64 kg/m2 Estimated body mass index is 31.64 kg/(m^2) as calculated from the following:    Height as of 18: 5' 6\" (1.676 m).    Weight as of this encounter: 196 lb (88.9 kg).  Medication Reconciliation: complete    Josie Garcia LPN      HPI  Pleasant 38-year-old female 1 month postop  section for twin gestation presents with 4-day history of not feeling well.  She complains of generalized flulike symptoms including body aches, chills, tactile fever, right sided breast pain and skin sensitivity.  She has not taken her temperature at home.  She denies any recent respiratory symptoms.  Denies abdominal pain, nausea, vomiting, urinary symptoms,abnormal bleeding or vaginal discharge.  She is breast-feeding her twins.  This is going fairly well.    Patient Active Problem List    Diagnosis Date Noted     S/P  section 2018     Priority: Medium     Encounter for triage in pregnant patient 2018     Priority: Medium     Bilateral carpal tunnel syndrome 2018     Priority: Medium     Adjustment disorder with anxious mood 2018     Priority: Medium     Acquired hypothyroidism 2018     Priority: Medium     Dichorionic diamniotic twin pregnancy in third trimester 2018     Priority: Medium     Attention deficit disorder 2018 "     Priority: Medium     Twin pregnancy 01/01/2018     Priority: Medium     EDC 9/2018       Lumbosacral spondylosis without myelopathy 03/10/2015     Priority: Medium     Other staphylococcus infection in conditions classified elsewhere and of unspecified site 03/23/2011     Priority: Medium     Past Medical History:   Diagnosis Date     Dichorionic diamniotic twin pregnancy in third trimester 4/4/2018     Disorder of thyroid     hypo     Supervision of elderly multigravida     2/6/2018     Twin pregnancy 2018    EDC 9/2018        Review of Systems     OBJECTIVE:     /66 (BP Location: Right arm, Patient Position: Sitting)  Pulse 96  Temp 98.7  F (37.1  C) (Tympanic)  Resp 24  Wt 196 lb (88.9 kg)  LMP 12/17/2017  Breastfeeding? Yes  BMI 31.64 kg/m2  Body mass index is 31.64 kg/(m^2).  Physical Exam   Constitutional: She appears well-developed.   HENT:   Right Ear: External ear normal.   Nose: Nose normal.   Mouth/Throat: Oropharynx is clear and moist.   Neck: No thyromegaly present.   Pulmonary/Chest: Right breast exhibits tenderness. Right breast exhibits no inverted nipple, no mass, no nipple discharge and no skin change.       Patient has mild tenderness in upper lateral right breast.  There is no redness.  No nipple changes noted.  No axilla lymphadenopathy.   Abdominal: Soft. There is no tenderness.   Lymphadenopathy:     She has no cervical adenopathy.   Skin: No rash noted.       Diagnostic Test Results:  Results for orders placed or performed in visit on 09/12/18   Urinalysis w Reflex Microscopic If Positive   Result Value Ref Range    Color Urine Yellow     Appearance Urine Clear     Glucose Urine Negative NEG^Negative mg/dL    Bilirubin Urine Negative NEG^Negative    Ketones Urine Negative NEG^Negative mg/dL    Specific Gravity Urine 1.015 1.000 - 1.030    Blood Urine Large (A) NEG^Negative    pH Urine 6.0 5.0 - 9.0 pH    Protein Albumin Urine Negative NEG^Negative mg/dL    Urobilinogen Urine  0.2 0.2 - 1.0 EU/dL    Nitrite Urine Negative NEG^Negative    Leukocyte Esterase Urine Negative NEG^Negative    Source Midstream Urine    Urine Microscopic   Result Value Ref Range    WBC Urine 0 - 5 OTO5^0 - 5 /HPF    RBC Urine O - 2 OTO2^O - 2 /HPF    Squamous Epithelial /LPF Urine Few FEW^Few /LPF    Bacteria Urine Few (A) NEG^Negative /HPF       ASSESSMENT/PLAN:           ICD-10-CM    1. Mastitis N61.0 dicloxacillin (DYNAPEN) 500 MG capsule     Probable early mastitis, given upcoming weekend recommend she start dicloxacillin encouraged to continue breast-feeding and pumping.  She may use anti-inflammatories for discomfort.  Follow-up should she develop any new symptoms.  She is comfortable with this plan.  Valentina Duran MD  [facility] AND [facility]

## 2018-10-06 ASSESSMENT — ENCOUNTER SYMPTOMS
EYES NEGATIVE: 1
CHILLS: 1
DIARRHEA: 0
FREQUENCY: 0
COUGH: 1
VOMITING: 0
APPETITE CHANGE: 1
DYSURIA: 1
FEVER: 1
WHEEZING: 0
FATIGUE: 1

## 2018-10-11 ENCOUNTER — TELEPHONE (OUTPATIENT)
Dept: FAMILY MEDICINE | Facility: OTHER | Age: 38
End: 2018-10-11

## 2018-10-11 NOTE — TELEPHONE ENCOUNTER
Patient has appt with Dr Garcia on Monday for the babies and will have this addressed at that visit.  Norma J. Gosselin LPN......  10/11/2018   2:07 PM

## 2018-10-16 ENCOUNTER — NURSE TRIAGE (OUTPATIENT)
Dept: FAMILY MEDICINE | Facility: OTHER | Age: 38
End: 2018-10-16

## 2018-10-16 ENCOUNTER — TELEPHONE (OUTPATIENT)
Dept: FAMILY MEDICINE | Facility: OTHER | Age: 38
End: 2018-10-16

## 2018-10-16 NOTE — TELEPHONE ENCOUNTER
States they have sharp pain in breast and achy body and that it is ongoing. Would like to know what to do

## 2018-10-18 ENCOUNTER — PRENATAL OFFICE VISIT (OUTPATIENT)
Dept: OBGYN | Facility: OTHER | Age: 38
End: 2018-10-18
Attending: OBSTETRICS & GYNECOLOGY
Payer: COMMERCIAL

## 2018-10-18 VITALS
WEIGHT: 194.56 LBS | BODY MASS INDEX: 31.27 KG/M2 | TEMPERATURE: 97.3 F | SYSTOLIC BLOOD PRESSURE: 112 MMHG | HEART RATE: 68 BPM | DIASTOLIC BLOOD PRESSURE: 74 MMHG | HEIGHT: 66 IN

## 2018-10-18 PROBLEM — O30.043 DICHORIONIC DIAMNIOTIC TWIN PREGNANCY IN THIRD TRIMESTER: Status: RESOLVED | Noted: 2018-04-04 | Resolved: 2018-10-18

## 2018-10-18 PROCEDURE — 99207 ZZC POST-PARTUM 6 WK VISIT - GICH ONLY: CPT | Performed by: OBSTETRICS & GYNECOLOGY

## 2018-10-18 ASSESSMENT — PAIN SCALES - GENERAL: PAINLEVEL: NO PAIN (0)

## 2018-10-18 NOTE — MR AVS SNAPSHOT
"              After Visit Summary   10/18/2018    Maricarmen Ornelas    MRN: [id number]           Patient Information     Date Of Birth          1980        Visit Information        Provider Department      10/18/2018 9:15 AM Woodrow Hurtado MD [facility]        Today's Diagnoses     Postpartum care following  delivery    -  1       Follow-ups after your visit        Who to contact     If you have questions or need follow up information about today's clinic visit or your schedule please contact [facility] directly at [phone].  Normal or non-critical lab and imaging results will be communicated to you by My 1%hart, letter or phone within 4 business days after the clinic has received the results. If you do not hear from us within 7 days, please contact the clinic through RESPACEt or phone. If you have a critical or abnormal lab result, we will notify you by phone as soon as possible.  Submit refill requests through ugichem or call your pharmacy and they will forward the refill request to us. Please allow 3 business days for your refill to be completed.          Additional Information About Your Visit        MyChart Information     ugichem lets you send messages to your doctor, view your test results, renew your prescriptions, schedule appointments and more. To sign up, go to [email or web address] . Click on \"Log in\" on the left side of the screen, which will take you to the Welcome page. Then click on \"Sign up Now\" on the right side of the page.     You will be asked to enter the access code listed below, as well as some personal information. Please follow the directions to create your username and password.     Your access code is: [email or web address]  Expires: 2018 11:55 AM     Your access code will  in 90 days. If you need help or a new code, please call your [place] clinic or [phone].        Care EveryWhere ID     This is your Care EveryWhere ID. " "This could be used by other organizations to access your [facility] medical records  [id number]        Your Vitals Were     Pulse Temperature Height Last Period BMI (Body Mass Index)       68 97.3  F (36.3  C) (Tympanic) 1.676 m (5' 6\") 12/17/2017 31.4 kg/m2        Blood Pressure from Last 3 Encounters:   10/18/18 112/74   10/02/18 112/70   09/28/18 122/66    Weight from Last 3 Encounters:   10/18/18 88.3 kg (194 lb 9 oz)   10/02/18 90.3 kg (199 lb 2 oz)   09/28/18 88.9 kg (196 lb)              Today, you had the following     No orders found for display       Primary Care Provider Office Phone # Fax #    Analy CRAFT Siva Bang [phone]       [address] [facility] [address]        Equal Access to Services     [facility]: Hadii lizbeth regalado hadasho Sojuliette, waaxda luqadaha, qaybta kaalmada adeegyada, joey chavez . So [facility] [phone].    ATENCIÓN: Si habla español, tiene a jiang disposición servicios gratuitos de asistencia lingüística. Llame al [phone].    We comply with applicable federal civil rights laws and [place] laws. We do not discriminate on the basis of race, color, national origin, age, disability, sex, sexual orientation, or gender identity.            Thank you!     Thank you for choosing [facility] AND [facility]  for your care. Our goal is always to provide you with excellent care. Hearing back from our patients is one way we can continue to improve our services. Please take a few minutes to complete the written survey that you may receive in the mail after your visit with us. Thank you!             Your Updated Medication List - Protect others around you: Learn how to safely use, store and throw away your medicines at www.disposemymeds.org.          This list is accurate as of 10/18/18 10:43 AM.  Always use your most recent med list.                   Brand Name Dispense Instructions for use Diagnosis    CVS PRENATAL 28-0.8 " MG Tabs      Take 1 tablet by mouth        IBUPROFEN PO           SYNTHROID 75 MCG tablet   Generic drug:  levothyroxine

## 2018-10-18 NOTE — NURSING NOTE
Patient presents today for her six week postpartum visit. She had a  section on 18.  Rajani Ford LPN  10/18/2018  9:35 AM

## 2018-10-18 NOTE — PROGRESS NOTES
"CC: postpartum exam at 6 weeks from delivery    HPI: Maricarmen Ornelas presents for postpartum exam. Babies were born by  delivery. Complications included none.  Mood: struggling with anxiety, denies depression    Breastfeeding: yes, but with difficulty  Incision(s): some discomfort, no bleeding or redness  Bleeding: has stopped  Birthcontrol: vasectomy    Past Medical History:   Diagnosis Date     Dichorionic diamniotic twin pregnancy in third trimester 2018     Disorder of thyroid     hypo     Supervision of elderly multigravida     2018     Twin pregnancy 2018     Past Surgical History:   Procedure Laterality Date     APPENDECTOMY OPEN            SECTION N/A 2018    Procedure:  SECTION;   Section - twins;  Surgeon: Woodrow Hurtado MD;  Location:  OR     Current Outpatient Prescriptions   Medication     IBUPROFEN PO     levothyroxine (SYNTHROID) 75 MCG tablet     Prenatal Vit-Fe Fumarate-FA (CVS PRENATAL) 28-0.8 MG TABS     No current facility-administered medications for this visit.       Allergies   Allergen Reactions     Chloraprep One Step Rash     With twin C/S (2018)       REVIEW OF SYSTEMS  General: negative  GI: negative  : negative    O: /74 (BP Location: Right arm, Patient Position: Sitting, Cuff Size: Adult Large)  Pulse 68  Temp 97.3  F (36.3  C) (Tympanic)  Ht 1.676 m (5' 6\")  Wt 88.3 kg (194 lb 9 oz)  LMP 2017  BMI 31.4 kg/m2  Body mass index is 31.4 kg/(m^2).    Exam:  Constitutional: healthy, alert, no distress and a bit anxious  Pelvic exam: normal vagina and vulva, normal cervix without lesions or tenderness, uterus normal size anteverted, adenxa normal in size without tenderness, exam chaperoned by nurse.    PHQ-9 score:    PHQ-9 SCORE 2018   Total Score 11       Results for orders placed or performed during the hospital encounter of 10/02/18   XR Chest 2 Views    Narrative    PROCEDURE:  XR CHEST 2 " VW    HISTORY:  ; Fever, unspecified fever cause.     COMPARISON:  None.    FINDINGS:   The cardiac silhouette is normal in size. The pulmonary vasculature is  normal.  The lungs are clear. No pleural effusion or pneumothorax.      Impression    IMPRESSION:  No acute cardiopulmonary disease.      DEWAYNE BROOKS MD         I/P:  Postpartum visit.    Resume annual preventive healthcare.     Discussed using unisom to help with sleep, and an serotonin specific reuptake inhibitor for anxiety. She will consider the serotonin specific reuptake inhibitor and let me know by phone if she would like to start it. I woul dlean toward celexa or lexapro for the anxiolytic property.    RTC prn    Woodrow Hurtado MD FACOG  10:38 AM 10/18/2018

## 2018-10-19 NOTE — TELEPHONE ENCOUNTER
Patient has figured out the cause ans is doing better. States can disregard phone call.   Magali Arciniega LPN ..........10/19/2018 2:36 PM

## 2018-10-30 ENCOUNTER — ALLIED HEALTH/NURSE VISIT (OUTPATIENT)
Dept: FAMILY MEDICINE | Facility: OTHER | Age: 38
End: 2018-10-30
Payer: COMMERCIAL

## 2018-10-30 DIAGNOSIS — Z23 NEED FOR PROPHYLACTIC VACCINATION AND INOCULATION AGAINST INFLUENZA: Primary | ICD-10-CM

## 2018-10-30 PROCEDURE — 90471 IMMUNIZATION ADMIN: CPT

## 2018-10-30 PROCEDURE — 90686 IIV4 VACC NO PRSV 0.5 ML IM: CPT

## 2018-10-30 NOTE — PROGRESS NOTES

## 2018-10-30 NOTE — MR AVS SNAPSHOT
After Visit Summary   10/30/2018    Maricarmen Ornelas    MRN: [id number]           Patient Information     Date Of Birth          1980        Visit Information        Provider Department      10/30/2018 2:10 PM  [facility]        Today's Diagnoses     Need for prophylactic vaccination and inoculation against influenza    -  1       Follow-ups after your visit        Your next 10 appointments already scheduled     Oct 30, 2018  2:10 PM CDT   Nurse Only with [facility] and [facility] ([facility])    [address]  [place] [address] [phone]              Who to contact     If you have questions or need follow up information about today's clinic visit or your schedule please contact [facility] directly at [phone].  Normal or non-critical lab and imaging results will be communicated to you by MyChart, letter or phone within 4 business days after the clinic has received the results. If you do not hear from us within 7 days, please contact the clinic through MyChart or phone. If you have a critical or abnormal lab result, we will notify you by phone as soon as possible.  Submit refill requests through Drivable or call your pharmacy and they will forward the refill request to us. Please allow 3 business days for your refill to be completed.          Additional Information About Your Visit        Care EveryWhere ID     This is your Care EveryWhere ID. This could be used by other organizations to access your [facility] medical records  [id number]         Blood Pressure from Last 3 Encounters:   10/18/18 112/74   10/02/18 112/70   09/28/18 122/66    Weight from Last 3 Encounters:   10/18/18 194 lb 9 oz (88.3 kg)   10/02/18 199 lb 2 oz (90.3 kg)   09/28/18 196 lb (88.9 kg)              We Performed the Following     HC FLU VAC PRESRV FREE QUAD SPLIT VIR 3+YRS IM     Vaccine Administration, Initial  [[id number]]        Primary Care Provider Office Phone # Fax #    Analy CRAFT Siva Bang [phone] [address]   [facility] [address]        Equal Access to Services     HAYDEE NICE : Cassi regalado becka Clifford, waaxda luqadaha, qaybta kaalmada adegeorginayada, joey maryin hayaajasmina jamesgeorgina funez scott amin. So [facility] [phone].    ATENCIÓN: Si habla español, tiene a jiang disposición servicios gratuitos de asistencia lingüística. Llame al [phone].    We comply with applicable federal civil rights laws and [place] laws. We do not discriminate on the basis of race, color, national origin, age, disability, sex, sexual orientation, or gender identity.            Thank you!     Thank you for choosing [facility] AND [facility]  for your care. Our goal is always to provide you with excellent care. Hearing back from our patients is one way we can continue to improve our services. Please take a few minutes to complete the written survey that you may receive in the mail after your visit with us. Thank you!             Your Updated Medication List - Protect others around you: Learn how to safely use, store and throw away your medicines at www.disposemymeds.org.          This list is accurate as of 10/30/18  2:05 PM.  Always use your most recent med list.                   Brand Name Dispense Instructions for use Diagnosis    CVS PRENATAL 28-0.8 MG Tabs      Take 1 tablet by mouth        IBUPROFEN PO           SYNTHROID 75 MCG tablet   Generic drug:  levothyroxine

## 2018-11-05 ENCOUNTER — TELEPHONE (OUTPATIENT)
Dept: FAMILY MEDICINE | Facility: OTHER | Age: 38
End: 2018-11-05

## 2018-11-05 DIAGNOSIS — G89.29 CHRONIC PAIN OF LEFT KNEE: Primary | ICD-10-CM

## 2018-11-05 DIAGNOSIS — M25.562 CHRONIC PAIN OF LEFT KNEE: Primary | ICD-10-CM

## 2018-11-12 ENCOUNTER — HOSPITAL ENCOUNTER (OUTPATIENT)
Dept: PHYSICAL THERAPY | Facility: OTHER | Age: 38
Setting detail: THERAPIES SERIES
End: 2018-11-12
Attending: FAMILY MEDICINE
Payer: COMMERCIAL

## 2018-11-12 DIAGNOSIS — M25.562 CHRONIC PAIN OF LEFT KNEE: ICD-10-CM

## 2018-11-12 DIAGNOSIS — G89.29 CHRONIC PAIN OF LEFT KNEE: ICD-10-CM

## 2018-11-12 PROCEDURE — 97161 PT EVAL LOW COMPLEX 20 MIN: CPT | Mod: GP

## 2018-11-12 PROCEDURE — 97112 NEUROMUSCULAR REEDUCATION: CPT | Mod: GP

## 2018-11-12 PROCEDURE — 97110 THERAPEUTIC EXERCISES: CPT | Mod: GP

## 2018-11-12 PROCEDURE — 40000185 ZZHC STATISTIC PT OUTPT VISIT

## 2018-11-12 NOTE — PROGRESS NOTES
11/12/18 0900   General Information   Type of Visit Initial OP Ortho PT Evaluation   Start of Care Date 11/12/18   Referring Physician Avril Sanders, DO   Orders Evaluate and Treat   Date of Order 11/07/18   Insurance Type [employer]   Medical Diagnosis chronic pain of left knee   Surgical/Medical history reviewed Yes   Body Part(s)   Body Part(s) Knee;Lumbar Spine/SI   Presentation and Etiology   Pertinent history of current problem (include personal factors and/or comorbidities that impact the POC) Delivered twins on 9/5/18, c section. Was alergic to chloraprep, had hives. Then developed a uterine infection, MRSA. Knee pain started during pregnancy when she as at her heaviest weight. Left knee cap pain, with stair use. Still feels like it will give out at time. Having more hand numbness the last week or so when holding babies when she breast feeds. Had cortisone shots, did help but doesn't want them again. Needs to wear braces at night again. Low back pain starting again. Thinks she is still dealing with pregancy related changes. Still breast feeing.    Impairments A. Pain;E. Decreased flexibility;F. Decreased strength and endurance;H. Impaired gait;K. Numbness   Functional Limitations perform activities of daily living;perform required work activities;perform desired leisure / sports activities   Onset date of current episode/exacerbation 11/07/18   Chronicity Chronic   Pain rating (0-10 point scale) Best (/10);Worst (/10)   Best (/10) 2   Worst (/10) 5   Pain quality B. Dull;C. Aching;F. Stabbing   Frequency of pain/symptoms B. Intermittent   Pain/symptoms exacerbated by G. Certain positions;M. Other   Pain exacerbation comment stair use   Pain/symptoms eased by E. Changing positions   Progression of symptoms since onset: Improved   Prior Level of Function   Prior Level of Function-Mobility limited walking at end of pregnancy due to apin   Current Level of Function   Patient role/employment history A.  Employed   Employment Comments [employer]   Fall Risk Screen   Fall screen completed by PT   Have you fallen 2 or more times in the past year? No   Have you fallen and had an injury in the past year? No   Is patient a fall risk? No   Knee Objective Findings   Side (if bilateral, select both right and left) Left   Knee ROM Comment Full knee ROM present   Knee/Hip Strength Comments limited hip abduction stretch left and right   Palpation limited tissue loading thourgh head right to left, shoulders R/R L/L, pelvis L/L + FFT left. Inferior left pubic shear, Left leg long in supine. Limited loading in to left medial tibial platuea,    Lumbar Spine/SI Objective Findings   Lumbar/Hip/Knee/Foot Strength Comments limited core strength noted with supine to sit.    Segmental Mobility ERS left T1,ERS left T12, ERS right T9, ERS right T4   Planned Therapy Interventions   Planned Therapy Interventions joint mobilization;manual therapy;neuromuscular re-education   Planned Modality Interventions   Planned Modality Interventions Cryotherapy;Hot packs   Clinical Impression   Criteria for Skilled Therapeutic Interventions Met yes, treatment indicated   PT Diagnosis left knee pain, lumbar and thoracic segmental dysfucntions, pelvic dysfunctions, muscle weakness   Influenced by the following impairments pain, limited weight bearing through left knee and pelvis, numbness in hands.    Functional limitations due to impairments stair use   Clinical Presentation Stable/Uncomplicated   Clinical Decision Making (Complexity) Low complexity   Therapy Frequency 1 time/week   Predicted Duration of Therapy Intervention (days/wks) 6 months   Risk & Benefits of therapy have been explained Yes   Patient, Family & other staff in agreement with plan of care Yes   Clinical Impression Comments Patient is 2 months post partum, twins, with continued postural dysfunctions due to limited spine mobility, myofascial tightness and restrictions, muscle weakness,  limiting her abiliyt to correctly loading wieght through spine, pelvis and left knee.    ORTHO GOALS   PT Ortho Eval Goals 1;2;3;4   Ortho Goal 1   Goal Identifier tissue loading   Goal Description Patient to have equal tissue  loading through spine and pelvis to allow reduced strain to tissues during function tasks.    Target Date 05/12/19   Ortho Goal 2   Goal Identifier stiar use   Goal Description Patient to use steps without left knee pain   Target Date 05/12/19   Ortho Goal 3   Goal Identifier HEP   Goal Description Patient to be compliant wit HEP and return to aerobic exercise.   Target Date 05/12/19   Total Evaluation Time   Total Evaluation Time 20

## 2018-12-20 ENCOUNTER — HOSPITAL ENCOUNTER (OUTPATIENT)
Dept: PHYSICAL THERAPY | Facility: OTHER | Age: 38
Setting detail: THERAPIES SERIES
End: 2018-12-20
Attending: FAMILY MEDICINE
Payer: COMMERCIAL

## 2018-12-20 PROCEDURE — 97140 MANUAL THERAPY 1/> REGIONS: CPT | Mod: GP

## 2019-01-02 DIAGNOSIS — E03.9 HYPOTHYROIDISM, UNSPECIFIED TYPE: Primary | ICD-10-CM

## 2019-01-04 RX ORDER — LEVOTHYROXINE SODIUM 75 MCG
TABLET ORAL
Qty: 90 TABLET | Refills: 2 | Status: SHIPPED | OUTPATIENT
Start: 2019-01-04 | End: 2019-09-30

## 2019-01-04 NOTE — TELEPHONE ENCOUNTER
"Requested Prescriptions   Pending Prescriptions Disp Refills     SYNTHROID 75 MCG tablet [Pharmacy Med Name: SYNTHROID 0.075MG (75MCG)TABLETS] 90 tablet 0     Sig: TAKE 1 TABLET BY MOUTH EVERY DAY    Thyroid Protocol Passed - 1/2/2019  8:20 AM       Passed - Patient is 12 years or older       Passed - Recent (12 mo) or future (30 days) visit within the authorizing provider's specialty    Patient had office visit in the last 12 months or has a visit in the next 30 days with authorizing provider or within the authorizing provider's specialty.  See \"Patient Info\" tab in inbasket, or \"Choose Columns\" in Meds & Orders section of the refill encounter.             Passed - Normal TSH on file in past 12 months    No lab results found.          Passed - No active pregnancy on record    If patient is pregnant or has had a positive pregnancy test, please check TSH.         Passed - No positive pregnancy test in past 12 months    If patient is pregnant or has had a positive pregnancy test, please check TSH.          LOV-10/02/2018  Last TSH-08/13/2018    Prescription refilled per RN Medication RefillPolnicoley.................... Malathi Luz ....................  1/4/2019   12:12 PM        "

## 2019-02-12 NOTE — ADDENDUM NOTE
Encounter addended by: Rose Avalos, PT on: 2/12/2019 11:10 AM   Actions taken: Pend clinical note, Flowsheet accepted, Sign clinical note, Episode resolved

## 2019-02-12 NOTE — PROGRESS NOTES
Outpatient Physical Therapy Discharge Note     Patient: Maricarmen Ornelas  : 1980    Beginning/End Dates of Reporting Period:  18 to 18    Referring Provider: DO Sophia Page Diagnosis: left knee pain, lumbar and thoracic segmental dysfucntions, pelvic dysfunctions, muscle weakness     Client Self Report: generally doing better, hiking, started step again. Knee stilll bothers her, feels there is a bump. If she goes up the steps more than 5 times a day, it really bothers her.     Objective Measurements:  Objective Measure: tissue loading  Details: limited through right shoulder, general listening to right lower abdomen, local to mesetneric root, root to loop, loop to loops. limtied loading through left medial tibial platuea       Goals:  Goal Identifier tissue loading   Goal Description Patient to have equal tissue  loading through spine and pelvis to allow reduced strain to tissues during function tasks.    Target Date 19   Date Met      Progress:     Goal Identifier stiar use   Goal Description Patient to use steps without left knee pain   Target Date 19   Date Met      Progress:     Goal Identifier HEP   Goal Description Patient to be compliant wit HEP and return to aerobic exercise.   Target Date 19   Date Met      Progress:       Progress Toward Goals:   Progress this reporting period: improving with pain, will take time as her body is readjusting after pregnancy. Increasing activity levels.           Plan:  Discharge from therapy.    Discharge:    Reason for Discharge: Patient has failed to schedule further appointments.    Equipment Issued: NA    Discharge Plan: Patient to continue home program.

## 2019-02-14 ENCOUNTER — TELEPHONE (OUTPATIENT)
Dept: FAMILY MEDICINE | Facility: OTHER | Age: 39
End: 2019-02-14

## 2019-02-14 NOTE — TELEPHONE ENCOUNTER
Patient called in for an appointment for Blood From Rectum After Passing Stool - Worse Over Last Couple of Weeks, she has a first available of 03/08/2019 at 11:15 but would like to be seen sooner if possible    Please call to schedule    Thank you

## 2019-02-14 NOTE — TELEPHONE ENCOUNTER
Patient notified per [facility] nurse, she not able to work patient in. Offered appointment with available providers. She declined, and requested to schedule at next available. She was transferred to appointment line.     Mary Jones LPN.................. 2/14/2019 1:57 PM

## 2019-02-26 ENCOUNTER — OFFICE VISIT (OUTPATIENT)
Dept: FAMILY MEDICINE | Facility: OTHER | Age: 39
End: 2019-02-26
Attending: FAMILY MEDICINE
Payer: COMMERCIAL

## 2019-02-26 VITALS
SYSTOLIC BLOOD PRESSURE: 116 MMHG | TEMPERATURE: 97.5 F | DIASTOLIC BLOOD PRESSURE: 78 MMHG | HEART RATE: 64 BPM | BODY MASS INDEX: 30.96 KG/M2 | WEIGHT: 191.8 LBS

## 2019-02-26 DIAGNOSIS — K62.5 BRBPR (BRIGHT RED BLOOD PER RECTUM): Primary | ICD-10-CM

## 2019-02-26 DIAGNOSIS — F43.22 ADJUSTMENT DISORDER WITH ANXIOUS MOOD: ICD-10-CM

## 2019-02-26 DIAGNOSIS — K59.00 CONSTIPATION, UNSPECIFIED CONSTIPATION TYPE: ICD-10-CM

## 2019-02-26 PROCEDURE — 99213 OFFICE O/P EST LOW 20 MIN: CPT | Performed by: FAMILY MEDICINE

## 2019-02-26 ASSESSMENT — ANXIETY QUESTIONNAIRES
1. FEELING NERVOUS, ANXIOUS, OR ON EDGE: MORE THAN HALF THE DAYS
GAD7 TOTAL SCORE: 16
3. WORRYING TOO MUCH ABOUT DIFFERENT THINGS: NEARLY EVERY DAY
IF YOU CHECKED OFF ANY PROBLEMS ON THIS QUESTIONNAIRE, HOW DIFFICULT HAVE THESE PROBLEMS MADE IT FOR YOU TO DO YOUR WORK, TAKE CARE OF THINGS AT HOME, OR GET ALONG WITH OTHER PEOPLE: VERY DIFFICULT
6. BECOMING EASILY ANNOYED OR IRRITABLE: NEARLY EVERY DAY
2. NOT BEING ABLE TO STOP OR CONTROL WORRYING: NEARLY EVERY DAY
5. BEING SO RESTLESS THAT IT IS HARD TO SIT STILL: SEVERAL DAYS
7. FEELING AFRAID AS IF SOMETHING AWFUL MIGHT HAPPEN: NEARLY EVERY DAY

## 2019-02-26 ASSESSMENT — PATIENT HEALTH QUESTIONNAIRE - PHQ9
SUM OF ALL RESPONSES TO PHQ QUESTIONS 1-9: 13
5. POOR APPETITE OR OVEREATING: SEVERAL DAYS

## 2019-02-26 NOTE — PROGRESS NOTES
Nursing Notes:   Airam Espinosa LPN  2019 10:30 AM  Signed  Patient here for hemorrhoids. She also states she is having some emotional issues since her twins were born 6 months ago.  Medication Reconciliation: complete    Airam Espinosa LPN    SUBJECTIVE:   Maricarmen Ornelas is a 38 year old female who presents to clinic today for the following health issues:    HPI  Maricarmen is here for concerns of hemorrhoid.  She has noticed increased BRBPR for the last few weeks to months.  Has had hemorrhoids since an older child was born.  Seems to be back.  Has tried some OTC products without relief.  Biggest concern is the blood.  Does not seem to be mixed in with stool; but instead around and filling the toilet water.  Not painful; some itching/irritation.  + constipation recently (~6 months ago after C/S and while taking pain medication); when things started.  Since that time has been eating prunes or drinking prune juice to help relieve that.  Soft stools for the last week or so.  Tried going off and bleeding seemed to return.  No prior colonoscopy.  ~6 months ago delivered twins by C/S.    Was noted by nursing staff that she had an elevated PHQ9.  PHQ-9 SCORE 2016   PHQ-9 Total Score 5 11 13   Has noticed a worsening of mood since her twins were born 6 months ago.  Has a history of mood issues off and on over the years - declines need for medications at this time.  Has had suicidal thoughts but without action.    Patient Active Problem List    Diagnosis Date Noted     S/P  section 2018     Priority: Medium     Encounter for triage in pregnant patient 2018     Priority: Medium     Bilateral carpal tunnel syndrome 2018     Priority: Medium     Adjustment disorder with anxious mood 2018     Priority: Medium     Acquired hypothyroidism 2018     Priority: Medium     Attention deficit disorder 2018     Priority: Medium     Twin pregnancy 2018      Priority: Medium     EDC 2018       Lumbosacral spondylosis without myelopathy 03/10/2015     Priority: Medium     Other staphylococcus infection in conditions classified elsewhere and of unspecified site 2011     Priority: Medium     Past Medical History:   Diagnosis Date     Dichorionic diamniotic twin pregnancy in third trimester 2018     Disorder of thyroid     hypo     Supervision of elderly multigravida     2018     Twin pregnancy 2018    EDC 2018      Past Surgical History:   Procedure Laterality Date     APPENDECTOMY OPEN            SECTION N/A 2018    Procedure:  SECTION;   Section - twins;  Surgeon: Woodrow Hurtado MD;  Location:  OR     Family History   Problem Relation Age of Onset     Substance Abuse Maternal Grandmother         Alcohol/Drug,Alcohol abuse     Cancer Maternal Grandmother         Cancer, lung cancer     Other - See Comments Maternal Grandfather         Alzheimer's disease     Diabetes Paternal Grandmother         Diabetes,type 1     No Known Problems Sister      No Known Problems Son      No Known Problems Son      Diabetes Maternal Uncle         Diabetes,type 2     No Known Problems Son         2018     No Known Problems Daughter         2018     Heart Disease No family hx of         Heart Disease     Social History     Tobacco Use     Smoking status: Never Smoker     Smokeless tobacco: Never Used   Substance Use Topics     Alcohol use: Yes     Alcohol/week: 1.2 oz     Comment: rarely     Social History     Social History Narrative       - Ki Parry      Sons: Franco Linn ,    Daughter:    Father: Jose;  Mother: Bronwyn      Siblings: Sister Nydia,  Sambrother     Current Outpatient Medications   Medication Sig Dispense Refill     SYNTHROID 75 MCG tablet TAKE 1 TABLET BY MOUTH EVERY DAY 90 tablet 2     Allergies   Allergen Reactions     Chloraprep One Step Rash     With twin C/S (2018)     Review of Systems    All other systems reviewed and are negative.       OBJECTIVE:     /78 (BP Location: Right arm, Patient Position: Sitting, Cuff Size: Adult Large)   Pulse 64   Temp 97.5  F (36.4  C) (Tympanic)   Wt 87 kg (191 lb 12.8 oz)   BMI 30.96 kg/m    Body mass index is 30.96 kg/m .  Physical Exam   Constitutional: She appears well-developed and well-nourished. No distress.   Cardiovascular: Normal rate and regular rhythm.   Pulmonary/Chest: Effort normal and breath sounds normal.   Abdominal: Soft. Bowel sounds are normal. She exhibits no distension and no mass. There is no tenderness. There is no rebound and no guarding. No hernia.   Genitourinary: Rectal exam shows external hemorrhoid (very small hemorrhoidal tags; without inflammation). Rectal exam shows no fissure, no mass, no tenderness and anal tone normal.   Skin: She is not diaphoretic.   Nursing note and vitals reviewed.    Diagnostic Test Results:  None    ASSESSMENT/PLAN:     1. BRBPR (bright red blood per rectum)  If not improved with below treatment x 1 month; will refer to [facility] for consideration for colonoscopy.  - GENERAL SURG ADULT REFERRAL    2. Constipation, unspecified constipation type  Triggering likely internal hemorrhoids vs fissure - not palpable on today's exam.  Discussed soft stools x 1 month - with either Rx or dietary to allow healing for possible internal hemorrhoids vs fissure.  No external finding on today's exam.  Discussed other possibilities to include - diverticula, polyp, mucosal erosion, AVM, etc.   Avoid NSAIDs during this time as well.  If not improving - refer to GS as above.  S/s for anemia or worsening of symptoms - instructed to be seen in ER or call for [facility] referral sooner.  - GENERAL SURG ADULT REFERRAL    3. Adjustment disorder with anxious mood  PHQ score today is elevated including suicidal thoughts.  Patient states she would not act on anything, and is looking at improvement in symptoms.  Just moved twins into their  own room, and they are sleeping better within the last week.  Have suffered with mood off an on most of her life.  Will continue to monitor.  Declines restarting her Wellbutrin or other serotonin specific reuptake inhibitor at this time.    Follow up with me prn.    Avril Sanders, [facility]

## 2019-02-26 NOTE — NURSING NOTE
Patient here for hemorrhoids. She also states she is having some emotional issues since her twins were born 6 months ago.  Medication Reconciliation: complete    Airam Espinosa LPN

## 2019-02-27 ASSESSMENT — ANXIETY QUESTIONNAIRES: GAD7 TOTAL SCORE: 16

## 2019-04-13 NOTE — PLAN OF CARE
Problem:  Delivery (Adult,Obstetrics,Pediatric)  Goal: Signs and Symptoms of Listed Potential Problems Will be Absent, Minimized or Managed ( Delivery)  Signs and symptoms of listed potential problems will be absent, minimized or managed by discharge/transition of care (reference  Delivery (Adult,Obstetrics,Pediatric) CPG).   Outcome: Therapy, progress toward functional goals as expected  Assessments as charted. B/P: 122/74, T: 98.1, P: 74, R: 16. Rates pain: 6-7/10. Incision: no drainage.  Fundus 1/U. Lochia: Light. Activity: moving with difficulty due to post op surgical pain. Up to BR with stand by assist.  Moves slowly but steady on feet.   Voided x 1 after catheter removed. Flor care instructions given.  Infant feeding: Breast feeding going well, mother able to tandem nurse  Babies.      LATCH Score:   Latch: 2 - Good Latch  Audible Swallowin - Few  Type of Nipple: (Breast/Nipple) 2 - Everted  Comfort: 2 - Soft, Nontender  Hold: 2 - No Assist   Total LATCH Score: 9    Postpartum breastfeeding assessment completed and education provided, see Patient Education Activity.  Items included in the education are:     proper positioning and latch    effectiveness of feeding    manual expression    handling and storing breastmilk    maintenance of breastfeeding for the first 6 months    sign/symptoms of infant feeding issues requiring referral to qualified health care provider  Postpartum care education provided, see Patient Education activity. Patient denies needs. Will monitor.  Marlene Gutierrez RN       No pertinent past medical history

## 2019-06-10 ENCOUNTER — OFFICE VISIT (OUTPATIENT)
Dept: FAMILY MEDICINE | Facility: OTHER | Age: 39
End: 2019-06-10
Attending: NURSE PRACTITIONER
Payer: COMMERCIAL

## 2019-06-10 VITALS
RESPIRATION RATE: 16 BRPM | HEART RATE: 64 BPM | WEIGHT: 180.8 LBS | TEMPERATURE: 97.7 F | BODY MASS INDEX: 29.06 KG/M2 | HEIGHT: 66 IN | SYSTOLIC BLOOD PRESSURE: 120 MMHG | DIASTOLIC BLOOD PRESSURE: 78 MMHG

## 2019-06-10 DIAGNOSIS — H00.015 HORDEOLUM EXTERNUM LEFT LOWER EYELID: Primary | ICD-10-CM

## 2019-06-10 PROCEDURE — 99213 OFFICE O/P EST LOW 20 MIN: CPT | Performed by: NURSE PRACTITIONER

## 2019-06-10 ASSESSMENT — ENCOUNTER SYMPTOMS
EYE REDNESS: 1
PHOTOPHOBIA: 0
MUSCULOSKELETAL NEGATIVE: 1
NEUROLOGICAL NEGATIVE: 1
FEVER: 0
EYE ITCHING: 0
EYE PAIN: 0
COLOR CHANGE: 1
CARDIOVASCULAR NEGATIVE: 1
RESPIRATORY NEGATIVE: 1
EYE DISCHARGE: 1

## 2019-06-10 ASSESSMENT — MIFFLIN-ST. JEOR: SCORE: 1516.85

## 2019-06-10 ASSESSMENT — PAIN SCALES - GENERAL: PAINLEVEL: MILD PAIN (3)

## 2019-06-10 NOTE — PROGRESS NOTES
SUBJECTIVE:   Maricarmen Ornelas is a 39 year old female who presents to clinic today for the following health issues:    HPI  4 days of a stye to the inner right eye, started draining today. Red and tender surrounding. Developed sinus drainage as well. Has been applying occasional warm compresses. No vision changes. No fever.       Patient Active Problem List    Diagnosis Date Noted     S/P  section 2018     Priority: Medium     Encounter for triage in pregnant patient 2018     Priority: Medium     Bilateral carpal tunnel syndrome 2018     Priority: Medium     Adjustment disorder with anxious mood 2018     Priority: Medium     Acquired hypothyroidism 2018     Priority: Medium     Attention deficit disorder 2018     Priority: Medium     Twin pregnancy 2018     Priority: Medium     EDC 2018       Lumbosacral spondylosis without myelopathy 03/10/2015     Priority: Medium     Other staphylococcus infection in conditions classified elsewhere and of unspecified site 2011     Priority: Medium     Past Medical History:   Diagnosis Date     Dichorionic diamniotic twin pregnancy in third trimester 2018     Disorder of thyroid     hypo     Supervision of elderly multigravida     2018     Twin pregnancy     EDC 2018      Past Surgical History:   Procedure Laterality Date     APPENDECTOMY OPEN            SECTION N/A 2018    Procedure:  SECTION;   Section - twins;  Surgeon: Woodrow Hurtado MD;  Location:  OR     Family History   Problem Relation Age of Onset     Substance Abuse Maternal Grandmother         Alcohol/Drug,Alcohol abuse     Cancer Maternal Grandmother         Cancer, lung cancer     Other - See Comments Maternal Grandfather         Alzheimer's disease     Diabetes Paternal Grandmother         Diabetes,type 1     No Known Problems Sister      No Known Problems Son      No Known Problems Son      Diabetes Maternal  "Uncle         Diabetes,type 2     No Known Problems Son         2018     No Known Problems Daughter         2018     Heart Disease No family hx of         Heart Disease     Social History     Tobacco Use     Smoking status: Never Smoker     Smokeless tobacco: Never Used   Substance Use Topics     Alcohol use: Yes     Alcohol/week: 1.2 oz     Comment: rarely     Social History     Social History Narrative       - Ki Parry      Sons: Franco Linn ,    Daughter:    Father: Jose;  Mother: Bronwyn      Siblings: Sister Nydia,  Sambrother     Current Outpatient Medications   Medication Sig Dispense Refill     SYNTHROID 75 MCG tablet TAKE 1 TABLET BY MOUTH EVERY DAY 90 tablet 2     Allergies   Allergen Reactions     Chloraprep One Step Rash     With twin C/S (9/2018)       Review of Systems   Constitutional: Negative for fever.   HENT: Negative.    Eyes: Positive for discharge and redness. Negative for photophobia, pain, itching and visual disturbance.   Respiratory: Negative.    Cardiovascular: Negative.    Musculoskeletal: Negative.    Skin: Positive for color change.   Neurological: Negative.         OBJECTIVE:     /78 (BP Location: Left arm, Patient Position: Sitting, Cuff Size: Adult Regular)   Pulse 64   Temp 97.7  F (36.5  C) (Tympanic)   Resp 16   Ht 1.676 m (5' 6\")   Wt 82 kg (180 lb 12.8 oz)   BMI 29.18 kg/m    Body mass index is 29.18 kg/m .  Physical Exam   Constitutional: She is oriented to person, place, and time. She appears well-developed and well-nourished. No distress.   HENT:   Head: Normocephalic and atraumatic.   Right Ear: External ear normal.   Left Ear: External ear normal.   Nose: Nose normal.   Eyes: Conjunctivae and EOM are normal. Right eye exhibits hordeolum. Right eye exhibits no discharge. Left eye exhibits no discharge and no hordeolum. No scleral icterus.       Neck: Normal range of motion.   Cardiovascular: Normal rate.   Pulmonary/Chest: Effort " normal.   Neurological: She is alert and oriented to person, place, and time.   Skin: Skin is warm and dry. She is not diaphoretic.   Psychiatric: She has a normal mood and affect. Her behavior is normal.   Nursing note and vitals reviewed.         ASSESSMENT/PLAN:       ICD-10-CM    1. Hordeolum externum left lower eyelid H00.015 OPHTHALMOLOGY ADULT REFERRAL        PLAN:  Hordeolum evident in office with surrounding cellulitis.   Due to extent of sx call placed to Dr. Barahona's office, reviewed patient with staff.   They kindly agreed to see her today for re-evaluation and will work her in this morning.   Referral placed. Pt will drive herself there from our office.     I explained my diagnostic considerations and recommendations to pt who voiced understanding and agreement with the treatment plan. All questions were answered. We discussed potential side effects of any prescribed or recommended therapies, as well as expectations for response to treatments.    Disclaimer:  This note consists of words and symbols derived from keyboarding, dictation, or using voice recognition software. As a result, there may be errors in the script that have gone undetected. Please consider this when interpreting information found in this note.      FLAVIO Aranda, NP-C  6/10/2019 at 10:14 AM  [facility]

## 2019-06-10 NOTE — PATIENT INSTRUCTIONS
Follow up with Dr. Barahona now, they will work you into his schedule and are aware you need to be done by noon.

## 2019-06-10 NOTE — NURSING NOTE
Patient presents to the clinic for right eye redness x 4 days. Patient reports the area being puffy and having drainage. She has used warm cloths for relief.  Medication Reconciliation: complete    Jenna Candelario, CMA

## 2019-06-27 ENCOUNTER — TELEPHONE (OUTPATIENT)
Dept: FAMILY MEDICINE | Facility: OTHER | Age: 39
End: 2019-06-27

## 2019-06-27 DIAGNOSIS — Z12.11 SPECIAL SCREENING FOR MALIGNANT NEOPLASMS, COLON: Primary | ICD-10-CM

## 2019-06-27 DIAGNOSIS — K62.5 BRIGHT RED BLOOD PER RECTUM: Primary | ICD-10-CM

## 2019-06-27 NOTE — TELEPHONE ENCOUNTER
Contacted patient for more information: Patient states that she saw SMS back in march or April for bleeding while going to the bathroom she was on stool softeners for approximately  1 month. Patient states that the issues went away for a while but are now back.   Patient would like a referral for a colonoscopy.       Mecca Florian LPN on 6/27/2019 at 12:00 PM

## 2019-06-27 NOTE — TELEPHONE ENCOUNTER
Because this is not a routine colonoscopy - General Surgery usually likes to evaluate in the office and set up the plan for colonoscopy or other testing needs.  Referral placed.  Please notify patient that surgery nurses will be contacting to schedule office appointment with one of the surgeons and set up plan going forward from there.    Avril Sanders, DO

## 2019-06-27 NOTE — TELEPHONE ENCOUNTER
Patient called and left a message requesting a call back. She did not go into details as to why she needed a call back or what for. Please return call. Thank You !

## 2019-06-27 NOTE — TELEPHONE ENCOUNTER
Patient contacted; messaged left on voicemail for patient regarding colonoscopy. Patient to call with any further concerns.     Mecca Florian LPN on 6/27/2019 at 12:20 PM

## 2019-06-27 NOTE — TELEPHONE ENCOUNTER
Screening Questions for the Scheduling of Screening Colonoscopies   (If Colonoscopy is diagnostic, Provider should review the chart before scheduling.)  Are you younger than 50 or older than 80? YES   Do you take aspirin or fish oil?  NO  (if yes, tell patient to stop 1 week prior to Colonoscopy)  Do you take warfarin (Coumadin), clopidogrel (Plavix), apixaban (Eliquis), dabigatram (Pradaxa), rivaroxaban (Xarelto) or any blood thinner? NO   Do you use oxygen at home?  NO   Do you have kidney disease? NO   Are you on dialysis? NO   Have you had a stroke or heart attack in the last year? NO   Have you had a stent in your heart or any blood vessel in the last year? NO  Have you had a transplant of any organ? NO   Have you had a colonoscopy or upper endoscopy (EGD) before? NO          When?    Date of scheduled Colonoscopy. 07/10/2019  Provider [facility]

## 2019-07-08 NOTE — TELEPHONE ENCOUNTER
Patient would like their prep today.  Dr Villareal out,=.  Flori Duncan LPN..........7/8/2019  11:22 AM

## 2019-07-09 RX ORDER — BISACODYL 5 MG
TABLET, DELAYED RELEASE (ENTERIC COATED) ORAL
Qty: 2 TABLET | Refills: 0 | Status: ON HOLD | OUTPATIENT
Start: 2019-07-09 | End: 2019-07-10

## 2019-07-09 RX ORDER — POLYETHYLENE GLYCOL 3350, SODIUM CHLORIDE, SODIUM BICARBONATE, POTASSIUM CHLORIDE 420; 11.2; 5.72; 1.48 G/4L; G/4L; G/4L; G/4L
4000 POWDER, FOR SOLUTION ORAL ONCE
Qty: 4000 ML | Refills: 0 | Status: ON HOLD | OUTPATIENT
Start: 2019-07-09 | End: 2019-07-10

## 2019-07-10 ENCOUNTER — ANESTHESIA EVENT (OUTPATIENT)
Dept: SURGERY | Facility: OTHER | Age: 39
End: 2019-07-10
Payer: COMMERCIAL

## 2019-07-10 ENCOUNTER — TELEPHONE (OUTPATIENT)
Dept: SURGERY | Facility: OTHER | Age: 39
End: 2019-07-10

## 2019-07-10 ENCOUNTER — ANESTHESIA (OUTPATIENT)
Dept: SURGERY | Facility: OTHER | Age: 39
End: 2019-07-10
Payer: COMMERCIAL

## 2019-07-10 ENCOUNTER — HOSPITAL ENCOUNTER (OUTPATIENT)
Facility: OTHER | Age: 39
Discharge: HOME | End: 2019-07-10
Attending: SURGERY | Admitting: SURGERY
Payer: COMMERCIAL

## 2019-07-10 VITALS
TEMPERATURE: 97.1 F | RESPIRATION RATE: 16 BRPM | HEART RATE: 60 BPM | HEIGHT: 66 IN | DIASTOLIC BLOOD PRESSURE: 64 MMHG | OXYGEN SATURATION: 100 % | WEIGHT: 180 LBS | BODY MASS INDEX: 28.93 KG/M2 | SYSTOLIC BLOOD PRESSURE: 119 MMHG

## 2019-07-10 DIAGNOSIS — K60.2 ANAL FISSURE: Primary | ICD-10-CM

## 2019-07-10 PROCEDURE — 25000125 ZZHC RX 250: Performed by: SURGERY

## 2019-07-10 PROCEDURE — 88305 TISSUE EXAM BY PATHOLOGIST: CPT

## 2019-07-10 PROCEDURE — 45380 COLONOSCOPY AND BIOPSY: CPT | Performed by: SURGERY

## 2019-07-10 PROCEDURE — 25000128 H RX IP 250 OP 636: Performed by: NURSE ANESTHETIST, CERTIFIED REGISTERED

## 2019-07-10 PROCEDURE — 25800030 ZZH RX IP 258 OP 636: Performed by: SURGERY

## 2019-07-10 PROCEDURE — 40000010 ZZH STATISTIC ANES STAT CODE-CRNA PER MINUTE: Performed by: SURGERY

## 2019-07-10 PROCEDURE — 25000125 ZZHC RX 250: Performed by: NURSE ANESTHETIST, CERTIFIED REGISTERED

## 2019-07-10 RX ORDER — ONDANSETRON 2 MG/ML
4 INJECTION INTRAMUSCULAR; INTRAVENOUS
Status: DISCONTINUED | OUTPATIENT
Start: 2019-07-10 | End: 2019-07-10 | Stop reason: HOSPADM

## 2019-07-10 RX ORDER — ONDANSETRON 4 MG/1
4 TABLET, ORALLY DISINTEGRATING ORAL EVERY 6 HOURS PRN
Status: DISCONTINUED | OUTPATIENT
Start: 2019-07-10 | End: 2019-07-10 | Stop reason: HOSPADM

## 2019-07-10 RX ORDER — FLUMAZENIL 0.1 MG/ML
0.2 INJECTION, SOLUTION INTRAVENOUS
Status: DISCONTINUED | OUTPATIENT
Start: 2019-07-10 | End: 2019-07-10 | Stop reason: HOSPADM

## 2019-07-10 RX ORDER — DIBUCAINE 0.28 G/28G
1 OINTMENT TOPICAL 3 TIMES DAILY PRN
Qty: 56.7 G | Refills: 1 | Status: SHIPPED | OUTPATIENT
Start: 2019-07-10 | End: 2019-10-10

## 2019-07-10 RX ORDER — PROPOFOL 10 MG/ML
INJECTION, EMULSION INTRAVENOUS CONTINUOUS PRN
Status: DISCONTINUED | OUTPATIENT
Start: 2019-07-10 | End: 2019-07-10

## 2019-07-10 RX ORDER — ONDANSETRON 2 MG/ML
4 INJECTION INTRAMUSCULAR; INTRAVENOUS EVERY 6 HOURS PRN
Status: DISCONTINUED | OUTPATIENT
Start: 2019-07-10 | End: 2019-07-10 | Stop reason: HOSPADM

## 2019-07-10 RX ORDER — LIDOCAINE HYDROCHLORIDE 20 MG/ML
INJECTION, SOLUTION INFILTRATION; PERINEURAL PRN
Status: DISCONTINUED | OUTPATIENT
Start: 2019-07-10 | End: 2019-07-10

## 2019-07-10 RX ORDER — NALOXONE HYDROCHLORIDE 0.4 MG/ML
.1-.4 INJECTION, SOLUTION INTRAMUSCULAR; INTRAVENOUS; SUBCUTANEOUS
Status: DISCONTINUED | OUTPATIENT
Start: 2019-07-10 | End: 2019-07-10 | Stop reason: HOSPADM

## 2019-07-10 RX ORDER — L.ACIDOPH/B.ANIMALIS/B.LONGUM 15B CELL
1 CAPSULE ORAL 2 TIMES DAILY
Qty: 30 CAPSULE | COMMUNITY
Start: 2019-07-10 | End: 2019-10-10

## 2019-07-10 RX ORDER — SODIUM CHLORIDE, SODIUM LACTATE, POTASSIUM CHLORIDE, CALCIUM CHLORIDE 600; 310; 30; 20 MG/100ML; MG/100ML; MG/100ML; MG/100ML
INJECTION, SOLUTION INTRAVENOUS CONTINUOUS
Status: DISCONTINUED | OUTPATIENT
Start: 2019-07-10 | End: 2019-07-10 | Stop reason: HOSPADM

## 2019-07-10 RX ORDER — PROPOFOL 10 MG/ML
INJECTION, EMULSION INTRAVENOUS PRN
Status: DISCONTINUED | OUTPATIENT
Start: 2019-07-10 | End: 2019-07-10

## 2019-07-10 RX ORDER — LIDOCAINE 40 MG/G
CREAM TOPICAL
Status: DISCONTINUED | OUTPATIENT
Start: 2019-07-10 | End: 2019-07-10 | Stop reason: HOSPADM

## 2019-07-10 RX ADMIN — PROPOFOL 60 MG: 10 INJECTION, EMULSION INTRAVENOUS at 13:21

## 2019-07-10 RX ADMIN — PROPOFOL 140 MCG/KG/MIN: 10 INJECTION, EMULSION INTRAVENOUS at 13:17

## 2019-07-10 RX ADMIN — SODIUM CHLORIDE, POTASSIUM CHLORIDE, SODIUM LACTATE AND CALCIUM CHLORIDE: 600; 310; 30; 20 INJECTION, SOLUTION INTRAVENOUS at 13:01

## 2019-07-10 RX ADMIN — PROPOFOL 90 MG: 10 INJECTION, EMULSION INTRAVENOUS at 13:17

## 2019-07-10 RX ADMIN — LIDOCAINE HYDROCHLORIDE 60 MG: 20 INJECTION, SOLUTION INFILTRATION; PERINEURAL at 13:17

## 2019-07-10 ASSESSMENT — MIFFLIN-ST. JEOR: SCORE: 1513.22

## 2019-07-10 NOTE — H&P
"PRE-PROCEDURE NOTE    CHIEFCOMPLAINT / REASON FOR PROCEDURE:  Rectal bleeding and pain    PERTINENT HISTORY   Patient complains of rectal bleeding and pain for several months. Previous colonoscopy none. No no family history of colon polyps or colon cancer.    Past Medical History:   Diagnosis Date     Dichorionic diamniotic twin pregnancy in third trimester 2018     Disorder of thyroid     hypo     Supervision of elderly multigravida     2018     Twin pregnancy 2018     Past Surgical History:   Procedure Laterality Date     APPENDECTOMY OPEN            SECTION N/A 2018    Procedure:  SECTION;   Section - twins;  Surgeon: Woodrow Hurtado MD;  Location:  OR     Other:  None  Bleeding tendencies:  No    Relevant Family History:  none    Relevant Social History:  none    A relevant review of systems was performed and wasNegative. See HPI.    ALLERGIES/SENSITIVITIES:   Allergies   Allergen Reactions     Chloraprep One Step Rash     With twin C/S (2018)        CURRENT MEDICATIONS:    Current Facility-Administered Medications   Medication     lactated ringers infusion     lidocaine (LMX4) cream     lidocaine 1 % 0.1-1 mL     ondansetron (ZOFRAN) injection 4 mg     sodium chloride (PF) 0.9% PF flush 3 mL     sodium chloride (PF) 0.9% PF flush 3 mL       No current facility-administered medications on file prior to encounter.   Current Outpatient Medications on File Prior to Encounter:  bisacodyl (DULCOLAX) 5 MG EC tablet Use as directed per colonoscopy prep.   SYNTHROID 75 MCG tablet TAKE 1 TABLET BY MOUTH EVERY DAY   [] polyethylene glycol-electrolytes (NULYTELY) 420 g solution Take 4,000 mLs by mouth once for 1 dose         PRE-SEDATION ASSESSMENT:    /78 (Cuff Size: Adult Regular)   Pulse 64   Resp 16   Ht 1.676 m (5' 6\")   Wt 81.6 kg (180 lb)   SpO2 98%   Breastfeeding? No   BMI 29.05 kg/m    Lung Exam:Normal  Heart Exam:  " Normal    Comment(s):      IMPRESSION:  Rectal bleeding and pain.    PLAN:  I discussed diagnostic colonoscopy with possible banding of hemorrhoids with the patient.

## 2019-07-10 NOTE — ANESTHESIA CARE TRANSFER NOTE
Patient: Maricarmen Ornelas    Procedure(s):  COLONOSCOPY, WITH POLYPECTOMY AND BIOPSY    Diagnosis: Rectal Bleeding  Diagnosis Additional Information: No value filed.    Anesthesia Type:   No value filed.     Note:  Airway :Room Air  Patient transferred to:Phase II  Handoff Report: Identifed the Patient, Identified the Reponsible Provider, Reviewed the pertinent medical history, Discussed the surgical course, Reviewed Intra-OP anesthesia mangement and issues during anesthesia, Set expectations for post-procedure period and Allowed opportunity for questions and acknowledgement of understanding      Vitals: (Last set prior to Anesthesia Care Transfer)    CRNA VITALS  7/10/2019 1310 - 7/10/2019 1340      7/10/2019             Resp Rate (set):  10                Electronically Signed By: FLAVIO Hathaway CRNA  July 10, 2019  1:40 PM

## 2019-07-10 NOTE — TELEPHONE ENCOUNTER
States that they don't have the ingredients to make diltiazem cream. Would like changed to something simple

## 2019-07-10 NOTE — ANESTHESIA POSTPROCEDURE EVALUATION
Patient: Maricarmen Ornelas    Procedure(s):  COLONOSCOPY, WITH POLYPECTOMY AND BIOPSY    Diagnosis:Rectal Bleeding  Diagnosis Additional Information: No value filed.    Anesthesia Type:  No value filed.    Note:  Anesthesia Post Evaluation    Patient location during evaluation: Phase 2  Patient participation: Able to fully participate in evaluation  Level of consciousness: awake and alert  Pain management: adequate  Airway patency: patent  Cardiovascular status: acceptable  Respiratory status: acceptable  Hydration status: acceptable  PONV: none     Anesthetic complications: None          Last vitals:  Vitals:    07/10/19 1241 07/10/19 1343 07/10/19 1346   BP: 135/78 95/49 93/49   Pulse:      Resp: 16 16    Temp:  96.5  F (35.8  C)    SpO2:  97%          Electronically Signed By: FLAVIO PETER CRNA  July 10, 2019  1:56 PM

## 2019-07-10 NOTE — DISCHARGE INSTRUCTIONS
Procedure you had done: colonoscopy with biopsies  Your health care provider is:  Avril Sanders  Your surgeon is Dr. Viki Villareal.   Please call your health care provider or surgeon at [phone] if:    - you feel you are getting worse or having an increase in problems    - fever greater than 101 degrees  - increasing shortness of breath or chest pain  - any signs of infection (increasing redness, swelling, tenderness, warmth, change in appearance, or  increased drainage)  - blood in your urine or stool  - coughing or vomiting blood  - nausea (upset stomach) and vomiting and/or diarrhea that will not stop  - severe pain that is not relieved by medicine, rest or ice  You have had medications for sedation. Please be aware that this can cause drowsiness and impaired judgment for up to 24 hours after your procedure. Do not drive, operate power tools or drink alcohol for 24 hours.  A probiotic like Florajen can help restore bowel cali after the prep and procedure.  If samples were taken-you will get a phone call and a letter with your results in the next 7-10 days. If you don't get results, please call and let us know!     [facility]  Adult Discharge Orders & Instructions    ________________________________________________________________          For 12 hours after surgery  1. Get plenty of rest.  A responsible adult must stay with you for at least 12 hours after you leave the hospital.   2. You may feel lightheaded.  IF so, sit for a few minutes before standing.  Have someone help you get up.   3. You may have a slight fever. Call the doctor if your fever is over 101 F (38.3 C) (taken under the tongue) or lasts longer than 24 hours.  4. You may have a dry mouth, a sore throat, muscle aches or trouble sleeping.  These should go away after 24 hours.  5. Do not make important or legal decisions.  6.   Do not drive or use heavy equipment.  If you have weakness or tingling, don't drive or use heavy  equipment until this feeling goes away.    To contact a doctor, call   [phone]_______________________

## 2019-07-10 NOTE — ANESTHESIA PREPROCEDURE EVALUATION
Anesthesia Pre-Procedure Evaluation    Patient: Maricarmen Ornelas   MRN: [id number] : 1980          Preoperative Diagnosis: Rectal Bleeding    Procedure(s):  COLONOSCOPY    Past Medical History:   Diagnosis Date     Dichorionic diamniotic twin pregnancy in third trimester 2018     Disorder of thyroid     hypo     Supervision of elderly multigravida     2018     Twin pregnancy     EDC 2018     Past Surgical History:   Procedure Laterality Date     APPENDECTOMY OPEN            SECTION N/A 2018    Procedure:  SECTION;   Section - twins;  Surgeon: Woodrow Hurtado MD;  Location:  OR       Anesthesia Evaluation     . Pt has had prior anesthetic. Type: Regional    History of anesthetic complications   - motion sickness        ROS/MED HX    ENT/Pulmonary:  - neg pulmonary ROS     Neurologic:  - neg neurologic ROS     Cardiovascular:  - neg cardiovascular ROS       METS/Exercise Tolerance:     Hematologic:  - neg hematologic  ROS       Musculoskeletal:  - neg musculoskeletal ROS       GI/Hepatic:  - neg GI/hepatic ROS       Renal/Genitourinary:  - ROS Renal section negative       Endo:  - neg endo ROS       Psychiatric:     (+) psychiatric history Psychiatric Hx List: ADD.      Infectious Disease:  - neg infectious disease ROS       Malignancy:      - no malignancy   Other:    - neg other ROS                      Physical Exam  Normal systems: pulmonary and dental    Airway   Mallampati: II  TM distance: >3 FB  Neck ROM: full    Dental     Cardiovascular   Rhythm and rate: regular and normal      Pulmonary             Lab Results   Component Value Date    WBC 9.8 10/02/2018    HGB 13.7 10/02/2018    HCT 41.4 10/02/2018     10/02/2018     2018    POTASSIUM 3.9 2018    CHLORIDE 105 2018    CO2 21 2018    BUN 9 2018    CR 0.75 2018    GLC 80 2018    MARIANELA 9.0 2018    ALBUMIN 3.3 (L) 2018    PROTTOTAL 6.3 (L)  "08/27/2018    ALT 14 08/27/2018    AST 17 08/27/2018    ALKPHOS 146 (H) 08/27/2018    BILITOTAL 0.2 (L) 08/27/2018    HCG Negative 12/14/2017       Preop Vitals  BP Readings from Last 3 Encounters:   06/10/19 120/78   02/26/19 116/78   10/18/18 112/74    Pulse Readings from Last 3 Encounters:   06/10/19 64   02/26/19 64   10/18/18 68      Resp Readings from Last 3 Encounters:   06/10/19 16   09/28/18 24   09/13/18 16    SpO2 Readings from Last 3 Encounters:   09/08/18 99%   08/28/18 97%      Temp Readings from Last 1 Encounters:   06/10/19 97.7  F (36.5  C) (Tympanic)    Ht Readings from Last 1 Encounters:   06/10/19 1.676 m (5' 6\")      Wt Readings from Last 1 Encounters:   06/10/19 82 kg (180 lb 12.8 oz)    Estimated body mass index is 29.18 kg/m  as calculated from the following:    Height as of 6/10/19: 1.676 m (5' 6\").    Weight as of 6/10/19: 82 kg (180 lb 12.8 oz).       Anesthesia Plan      History & Physical Review      ASA Status:  2 .    NPO Status:  > 4 hours    Plan for with Propofol induction.          Postoperative Care      Consents  Anesthetic plan, risks, benefits and alternatives discussed with:  Patient..                 FLAVIO CORTEZ CRNA  "

## 2019-07-10 NOTE — OP NOTE
PROCEDURE NOTE    SURGEON: Viki Villareal MD.    PRE-OP DIAGNOSIS:  Diagnostic Colonoscopy, rectal bleeding and pain      POST-OP DIAGNOSIS: anal fissure    PROCEDURE:  Colonoscopy with biopsy    SPECIMEN:  Terminal ileum biopsies    ANESTHESIA:  See anesthesia note    ESTIMATED BLOOD LOSS: none    COMPLICATIONS:  None    INDICATION FOR THE PROCEDURE: The patient is a 39 year old female. The patient presents with rectal bleeding and pain. I explained to the patient the risks, benefits and alternatives to diagnostic colonoscopy for evaluating her complaints. We specifically discussed the risks of bleeding, infection, perforation, potential inability to reach the cecum and the risks of sedation. The patient's questions were answered and the patient wished to proceed. Informed consent paperwork was completed.    PROCEDURE: The patient was taken to the endoscopy suite. Appropriate monitors were attached. The patient was placed in the left lateral decubitus position.Timeout was performed confirming the patient's identity and procedure to be performed. After appropriate sedation was confirmed, digital rectal exam was performed. There was normal tone and a chronic appearing fissure was noted. The lubricated colonoscope was introduced into the anus the colon was insufflated with air. The prep quality was adequate. Under direct visualization the scope was advanced to the cecum. The ileocecal valve was intubated and the terminal ileum inspected. No gross abnormality was noted. Biopsy was obtained. The scope was withdrawn back into the cecum. The mucosa of the colon was inspected while withdrawing the scope. No abnormalities were noted. The scope was retroflexed in the rectum and the anorectal junction was inspected. No significant internal hemorrhoids were noted. The scope was returned to a neutral position and the colon was decompressed. The scope was removed. The patient tolerated the procedure with no immediately apparent  complication. The patient was taken to recovery in stable condition.    FOLLOW UP:RECOMMEND high fiber diet, will call with pathology results. Will prescribe diltiazem ointment and nupercainal for relief if the fissure flares up again. Florajen 3 for restoring bowel cali.

## 2019-07-11 NOTE — TELEPHONE ENCOUNTER
Spoke with [facility], prescription adjusted to ingredients available for diltiazem 2% cream. Viki Villareal MD on 7/11/2019 at 9:46 AM

## 2019-07-12 ENCOUNTER — NURSE TRIAGE (OUTPATIENT)
Dept: FAMILY MEDICINE | Facility: OTHER | Age: 39
End: 2019-07-12
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
## 2019-07-12 RX ORDER — DOXYCYCLINE 100 MG/1
200 TABLET ORAL ONCE
Qty: 2 TABLET | Refills: 0 | Status: SHIPPED | OUTPATIENT
Start: 2019-07-12 | End: 2019-10-10

## 2019-07-12 NOTE — TELEPHONE ENCOUNTER
"S-(situation): Per call center-Pt states she found an engorged deer tick on her    B-(background): Removed a deer tick from my right arm pit . Probably got it a couple of days ago when I was in the woods. I think I removed the whole thing. The tick was chubby the size of a sesame seed.     A-(assessment): No fever. No headache now but had one earlier thought it was from my colonoscopy. No rash. Denies any other symptoms.     R-(recommendations): Please note protocol recommends an OV today due to tick attached longer than 36 hours as well as engorged. However, Patient is requesting physician consideration for an antibiotic called in to [facility], \"It will cost me a co-pay a couple of $100 dollars for a visit. I have 4 kids and we are in there often.     PCP is out of clinic. Pt requests doc of the day consideration and a callback today please.     Malathi Luz RN on 7/12/2019 at 9:12 AM      Additional Information    Probable deer tick that was attached > 36 hours (or tick appears swollen, not flat)    Negative: Not a tick bite    Negative: Patient sounds very sick or weak to the triager    Negative: Fever or severe headache occurs, 2 to 14 days following the bite    Negative: Widespread rash occurs, 2 to 14 days following the bite    Negative: Can't remove live tick (after using Care Advice)    Negative: Can't remove tick's head that was broken off in the skin (after using Care Advice)    Negative: Fever and area is red    Negative: Fever and area is very tender to touch    Negative: Red streak or red line and length > 2 inches (5 cm)    Negative: Red ring or bull's-eye rash occurs around a deer tick bite    Answer Assessment - Initial Assessment Questions  1. TYPE of TICK: \"Is it a wood tick or a deer tick?\" If unsure, ask: \"What size was the tick?\" \"Did it look more like a watermelon seed or a poppy seed?\"       Deer Tick. It was a little bit chubby - seasame.   2. LOCATION: \"Where is the tick bite located?\"    " "  Right side  armpit  3. ONSET: \"How long do you think the tick was attached before you removed it?\" (Hours or days)       A couple of days   4. TETANUS: \"When was the last tetanus booster?\"       I am pretty sure  5. PREGNANCY: \"Is there any chance you are pregnant?\" \"When was your last menstrual period?\"      No LMP- due the 28 th    Protocols used: TICK BITE-A-[place]    "

## 2019-07-12 NOTE — TELEPHONE ENCOUNTER
"Patient notified, asked if starts to fever should she call, offered to ask provider, patient states \"no that's ok, she is busy, if I start to feel sick I will call back\"  Josie Garcia LPN .............7/12/2019     10:57 AM    "

## 2019-07-15 ENCOUNTER — TELEPHONE (OUTPATIENT)
Dept: FAMILY MEDICINE | Facility: OTHER | Age: 39
End: 2019-07-15

## 2019-07-15 DIAGNOSIS — M25.661 KNEE STIFFNESS, RIGHT: Primary | ICD-10-CM

## 2019-07-15 NOTE — TELEPHONE ENCOUNTER
Wants referral to see PT for a tightness in her right knee. Can't sit or move without it getting tight. Wants to see vish pozo

## 2019-07-16 ENCOUNTER — TELEPHONE (OUTPATIENT)
Dept: SURGERY | Facility: OTHER | Age: 39
End: 2019-07-16

## 2019-07-16 ENCOUNTER — HOSPITAL ENCOUNTER (OUTPATIENT)
Dept: PHYSICAL THERAPY | Facility: OTHER | Age: 39
Setting detail: THERAPIES SERIES
End: 2019-07-16
Attending: FAMILY MEDICINE
Payer: COMMERCIAL

## 2019-07-16 DIAGNOSIS — M25.661 KNEE STIFFNESS, RIGHT: ICD-10-CM

## 2019-07-16 PROCEDURE — 97161 PT EVAL LOW COMPLEX 20 MIN: CPT | Mod: GP

## 2019-07-16 PROCEDURE — 97110 THERAPEUTIC EXERCISES: CPT | Mod: GP

## 2019-07-16 PROCEDURE — 97140 MANUAL THERAPY 1/> REGIONS: CPT | Mod: GP

## 2019-07-16 NOTE — RESULT ENCOUNTER NOTE
Katarina/Flori/Smitha-Please call patient and let them know that her colonoscopy biopsy showed no abnormalities. There was no sign of infection or inflammation.   She should see me in the office for further issues with bleeding. Follow up colonoscopy at 51 for screening. They should call with questions or concerns. Thanks!

## 2019-07-17 NOTE — PROGRESS NOTES
07/16/19 1400   General Information   Type of Visit Initial OP Ortho PT Evaluation   Start of Care Date 07/16/19   Referring Physician Avril Sanders, DO   Patient/Family Goals Statement reduce pain   Orders Evaluate and Treat   Date of Order 07/16/19   Medical Diagnosis knee stiffness, right   Surgical/Medical history reviewed Yes   Body Part(s)   Body Part(s) Knee;Lumbar Spine/SI   Presentation and Etiology   Pertinent history of current problem (include personal factors and/or comorbidities that impact the POC) Started developing knee pain during pregnancy 2018, had been gradually gettign worse. No specific injury. Right medial knee pain. Cannot sit cross legged on the floor with her twins. Sharp shooting pain. Also issues with squatting and lifting. Pain always there but changes in intensity.   Impairments A. Pain;E. Decreased flexibility;F. Decreased strength and endurance   Functional Limitations perform activities of daily living;perform desired leisure / sports activities   Onset date of current episode/exacerbation 07/16/19   Chronicity Recurrent   Pain rating (0-10 point scale) Best (/10);Worst (/10)   Best (/10) 1   Worst (/10) 7   Pain quality A. Sharp;B. Dull;C. Aching;E. Shooting   Frequency of pain/symptoms A. Constant   Pain/symptoms exacerbated by A. Sitting;C. Lifting;G. Certain positions;I. Bending;K. Home tasks;L. Work tasks   Pain/symptoms eased by C. Rest;E. Changing positions   Progression of symptoms since onset: Worsened   Current Level of Function   Patient role/employment history A. Employed   Fall Risk Screen   Fall screen completed by PT   Have you fallen 2 or more times in the past year? No   Have you fallen and had an injury in the past year? No   Is patient a fall risk? No   Knee Objective Findings   Knee ROM Comment full knee ROM present bilateral as well as hip ROM.    Knee Special Test Comments no findings with knee special tests   Palpation tenderness along right knee MCL    Lumbar Spine/SI Objective Findings   Palpation Postural loading limited thorugh shoulders left adn right, right pelvis. General listening to right upper chest. Local listening to right superior pleura and right bronchus. Myofascial restrictions with pleura, right bronchus, fascia of toldt, lateral/inferior/medial parietocecal ligaments. Limited loading through tibia- medial, and ankle. Myofascial tightness and pain with right hip adductors starting at adductor tubercle.    Planned Therapy Interventions   Planned Therapy Interventions joint mobilization;manual therapy;neuromuscular re-education;strengthening;stretching   Planned Modality Interventions   Planned Modality Interventions Cryotherapy;Hot packs;Ultrasound   Clinical Impression   Criteria for Skilled Therapeutic Interventions Met yes, treatment indicated   PT Diagnosis right knee pain, myofascial tightness and pain   Influenced by the following impairments pain, loss of movement and strength   Functional limitations due to impairments lifting, squatting, sitting cross legged on floor to play with twins   Clinical Presentation Evolving/Changing   Clinical Decision Making (Complexity) Low complexity   Therapy Frequency 1 time/week   Predicted Duration of Therapy Intervention (days/wks) 3 months   Risk & Benefits of therapy have been explained Yes   Patient, Family & other staff in agreement with plan of care Yes   Clinical Impression Comments Patient developed right medial knee pain without injury due to myofascial restrictions limiting correct weight bearing through right leg   ORTHO GOALS   PT Ortho Eval Goals 1;2;3;4   Ortho Goal 1   Goal Identifier lifting   Goal Description Patient to lift items and children without limitation from righ tknee pain.   Target Date 10/16/19   Ortho Goal 2   Goal Identifier squat   Goal Description Patient to be able to squat and sit cross legged on floor to care for children.    Target Date 10/16/19   Ortho Goal 3   Goal  Identifier postural loading   Goal Description Patient to have equal postural loading and normal joint mechanics to allow mobility without righ tknee pain.    Target Date 10/16/19   Ortho Goal 4   Goal Identifier HEP   Goal Description Patient to be compliant with HEP for self management of symtpoms.    Target Date 10/16/19   Total Evaluation Time   PT Eval, Low Complexity Minutes (15655) 15

## 2019-08-02 ENCOUNTER — HOSPITAL ENCOUNTER (OUTPATIENT)
Dept: PHYSICAL THERAPY | Facility: OTHER | Age: 39
Setting detail: THERAPIES SERIES
End: 2019-08-02
Attending: FAMILY MEDICINE
Payer: COMMERCIAL

## 2019-08-02 PROCEDURE — 97110 THERAPEUTIC EXERCISES: CPT | Mod: GP

## 2019-08-02 PROCEDURE — 97140 MANUAL THERAPY 1/> REGIONS: CPT | Mod: GP

## 2019-08-26 ENCOUNTER — HOSPITAL ENCOUNTER (OUTPATIENT)
Dept: PHYSICAL THERAPY | Facility: OTHER | Age: 39
Setting detail: THERAPIES SERIES
End: 2019-08-26
Attending: FAMILY MEDICINE
Payer: COMMERCIAL

## 2019-08-26 PROCEDURE — 97112 NEUROMUSCULAR REEDUCATION: CPT | Mod: GP

## 2019-08-26 PROCEDURE — 97110 THERAPEUTIC EXERCISES: CPT | Mod: GP

## 2019-09-30 DIAGNOSIS — E03.9 HYPOTHYROIDISM, UNSPECIFIED TYPE: ICD-10-CM

## 2019-10-02 DIAGNOSIS — E03.9 HYPOTHYROIDISM, UNSPECIFIED TYPE: ICD-10-CM

## 2019-10-02 RX ORDER — LEVOTHYROXINE SODIUM 75 MCG
TABLET ORAL
Qty: 90 TABLET | Refills: 0 | Status: SHIPPED | OUTPATIENT
Start: 2019-10-02 | End: 2019-11-22

## 2019-10-02 NOTE — TELEPHONE ENCOUNTER
Prescription approved per [facility] Refill Protocol.  LOV; 2/26/19    Patient due for annual review and labs.    Letter sent.  Kavita refill given  Dina Bull RN on 10/2/2019 at 2:56 PM

## 2019-10-04 RX ORDER — LEVOTHYROXINE SODIUM 75 MCG
TABLET ORAL
Qty: 90 TABLET | Refills: 0 | OUTPATIENT
Start: 2019-10-04

## 2019-10-04 NOTE — TELEPHONE ENCOUNTER
Filled 10/2/19 #90 . Pharmacy alerted. Unable to complete prescription refill per RNMedication Refill Policy.................... Malathi Luz RN ....................  10/4/2019   4:10 PM    SYNTHROID 75 MCG tablet 90 tablet 0 10/2/2019  Yes   Sig: TAKE 1 TABLET BY MOUTH EVERY DAY   Sent to pharmacy as: Synthroid 75 MCG Oral Tablet   Class: E-Prescribe   Order: [id number]   E-Prescribing Status: Receipt confirmed by pharmacy (10/2/2019  3:02 PM CDT)   Printout Tracking     External Result Report   Pharmacy     [id number] - [place], [place] - [address]

## 2019-10-10 ENCOUNTER — OFFICE VISIT (OUTPATIENT)
Dept: FAMILY MEDICINE | Facility: OTHER | Age: 39
End: 2019-10-10
Attending: FAMILY MEDICINE
Payer: COMMERCIAL

## 2019-10-10 VITALS
HEIGHT: 66 IN | RESPIRATION RATE: 18 BRPM | WEIGHT: 172.6 LBS | DIASTOLIC BLOOD PRESSURE: 70 MMHG | BODY MASS INDEX: 27.74 KG/M2 | HEART RATE: 80 BPM | SYSTOLIC BLOOD PRESSURE: 120 MMHG | TEMPERATURE: 97.2 F

## 2019-10-10 DIAGNOSIS — F43.22 ADJUSTMENT DISORDER WITH ANXIOUS MOOD: ICD-10-CM

## 2019-10-10 DIAGNOSIS — F90.0 ATTENTION DEFICIT HYPERACTIVITY DISORDER (ADHD), PREDOMINANTLY INATTENTIVE TYPE: ICD-10-CM

## 2019-10-10 DIAGNOSIS — E03.9 ACQUIRED HYPOTHYROIDISM: Primary | ICD-10-CM

## 2019-10-10 LAB
T4 FREE SERPL-MCNC: 0.86 NG/DL (ref 0.6–1.6)
TSH SERPL DL<=0.05 MIU/L-ACNC: 1.29 IU/ML (ref 0.34–5.6)

## 2019-10-10 PROCEDURE — 36415 COLL VENOUS BLD VENIPUNCTURE: CPT | Mod: ZL | Performed by: FAMILY MEDICINE

## 2019-10-10 PROCEDURE — 99214 OFFICE O/P EST MOD 30 MIN: CPT | Performed by: FAMILY MEDICINE

## 2019-10-10 PROCEDURE — 84439 ASSAY OF FREE THYROXINE: CPT | Mod: ZL | Performed by: FAMILY MEDICINE

## 2019-10-10 PROCEDURE — 84443 ASSAY THYROID STIM HORMONE: CPT | Mod: ZL | Performed by: FAMILY MEDICINE

## 2019-10-10 RX ORDER — METHYLPHENIDATE HYDROCHLORIDE 27 MG/1
27 TABLET ORAL EVERY MORNING
Qty: 30 TABLET | Refills: 0 | Status: SHIPPED | OUTPATIENT
Start: 2019-11-08 | End: 2019-12-24

## 2019-10-10 RX ORDER — METHYLPHENIDATE HYDROCHLORIDE 18 MG/1
18 TABLET ORAL EVERY MORNING
Qty: 30 TABLET | Refills: 0 | Status: SHIPPED | OUTPATIENT
Start: 2019-10-10 | End: 2019-11-22 | Stop reason: DRUGHIGH

## 2019-10-10 ASSESSMENT — PAIN SCALES - GENERAL: PAINLEVEL: NO PAIN (0)

## 2019-10-10 ASSESSMENT — MIFFLIN-ST. JEOR: SCORE: 1479.66

## 2019-10-10 ASSESSMENT — ANXIETY QUESTIONNAIRES
3. WORRYING TOO MUCH ABOUT DIFFERENT THINGS: MORE THAN HALF THE DAYS
2. NOT BEING ABLE TO STOP OR CONTROL WORRYING: MORE THAN HALF THE DAYS
IF YOU CHECKED OFF ANY PROBLEMS ON THIS QUESTIONNAIRE, HOW DIFFICULT HAVE THESE PROBLEMS MADE IT FOR YOU TO DO YOUR WORK, TAKE CARE OF THINGS AT HOME, OR GET ALONG WITH OTHER PEOPLE: EXTREMELY DIFFICULT
5. BEING SO RESTLESS THAT IT IS HARD TO SIT STILL: SEVERAL DAYS
7. FEELING AFRAID AS IF SOMETHING AWFUL MIGHT HAPPEN: NEARLY EVERY DAY
6. BECOMING EASILY ANNOYED OR IRRITABLE: SEVERAL DAYS
GAD7 TOTAL SCORE: 13
1. FEELING NERVOUS, ANXIOUS, OR ON EDGE: NEARLY EVERY DAY

## 2019-10-10 ASSESSMENT — PATIENT HEALTH QUESTIONNAIRE - PHQ9
SUM OF ALL RESPONSES TO PHQ QUESTIONS 1-9: 8
5. POOR APPETITE OR OVEREATING: SEVERAL DAYS

## 2019-10-10 NOTE — NURSING NOTE
Patient presents to the clinic today for a med check. She states she would also like to discuss depression.   Med rec complete.   Mary Jones LPN.................. 10/10/2019 2:09 PM

## 2019-10-10 NOTE — PROGRESS NOTES
"Nursing Notes:   Mary Jones LPN  10/10/2019  2:09 PM  Sign at exiting of workspace  Patient presents to the clinic today for a med check. She states she would also like to discuss depression.   Med rec complete.   Mary Jones LPN.................. 10/10/2019 2:09 PM     SUBJECTIVE:   Maricarmen Ornelas is a 39 year old female who presents to clinic today for the following health issues:    HPI    Maricarmen is here for concerns of mood and desire to restart her stimulant.  She has been on Adderall in the past for ADHD, inattentive type - but last back in grad school.  She has had issues recently since the birth of her twins (~1 year ago).  There has been difficulty sleeping in the house between her and her , mood changes/depression, and feelings that \"my whole life is overwhelming.\"  She has two older boys that are busy in sports/events, but also having the twins and need for strict bedtimes to keep them on a schedule can get difficult.  She has recently gone back to work as well; which is contributing to a lot.  She is having a difficult time keeping home things at home and work things at work.  Time management is more difficult.    She has been on both stimulants and anti-depressants in the past.  Most of the serotonin specific reuptake inhibitor's caused weight gain and she does not want to have that occur.      PHQ-9 SCORE 2018 2019 10/10/2019   PHQ-9 Total Score 11 13 8     MINNIE-7 SCORE 2018 2019 10/10/2019   Total Score 5 16 13     PHQ-2 Score:   PHQ-2 (  [employer]) 6/10/2019 10/2/2018   Q1: Little interest or pleasure in doing things 0 0   Q2: Feeling down, depressed or hopeless 0 0   PHQ-2 Score 0 0       Patient Active Problem List    Diagnosis Date Noted     S/P  section 2018     Priority: Medium     Encounter for triage in pregnant patient 2018     Priority: Medium     Bilateral carpal tunnel syndrome 2018     Priority: Medium     Adjustment disorder " with anxious mood 2018     Priority: Medium     Acquired hypothyroidism 2018     Priority: Medium     Attention deficit disorder 2018     Priority: Medium     Twin pregnancy 2018     Priority: Medium     EDC 2018       Lumbosacral spondylosis without myelopathy 03/10/2015     Priority: Medium     Other staphylococcus infection in conditions classified elsewhere and of unspecified site 2011     Priority: Medium     Past Medical History:   Diagnosis Date     Dichorionic diamniotic twin pregnancy in third trimester 2018     Disorder of thyroid     hypo     Supervision of elderly multigravida     2018     Twin pregnancy     EDC 2018      Past Surgical History:   Procedure Laterality Date     APPENDECTOMY OPEN            SECTION N/A 2018    Procedure:  SECTION;   Section - twins;  Surgeon: Woodrow Hurtado MD;  Location:  OR     COLONOSCOPY N/A 7/10/2019    Procedure: COLONOSCOPY, WITH POLYPECTOMY AND BIOPSY;  Surgeon: Viki Villareal MD;  Location:  OR     Family History   Problem Relation Age of Onset     Substance Abuse Maternal Grandmother         Alcohol/Drug,Alcohol abuse     Cancer Maternal Grandmother         Cancer, lung cancer     Other - See Comments Maternal Grandfather         Alzheimer's disease     Diabetes Paternal Grandmother         Diabetes,type 1     No Known Problems Sister      No Known Problems Son      No Known Problems Son      Diabetes Maternal Uncle         Diabetes,type 2     No Known Problems Son         2018     No Known Problems Daughter         2018     Heart Disease No family hx of         Heart Disease     Social History     Tobacco Use     Smoking status: Never Smoker     Smokeless tobacco: Never Used   Substance Use Topics     Alcohol use: Yes     Alcohol/week: 2.0 standard drinks     Comment: rarely     Social History     Patient does not qualify to have social determinant information on file (likely too  "young).   Social History Narrative       - Ki Parry      Sons: Franco Linn ,    Daughter:    Father: Jose;  Mother: Bronwyn      Siblings: Sister Nydia,  Emilyther     Current Outpatient Medications   Medication Sig Dispense Refill     methylcellulose (CITRUCEL) powder Take 0.3 g (1.05 teaspoonful) by mouth daily 454 g 3     methylphenidate (CONCERTA) 18 MG CR tablet Take 1 tablet (18 mg) by mouth every morning 30 tablet 0     [START ON 11/8/2019] methylphenidate (CONCERTA) 27 MG CR tablet Take 1 tablet (27 mg) by mouth every morning 30 tablet 0     SYNTHROID 75 MCG tablet TAKE 1 TABLET BY MOUTH EVERY DAY 90 tablet 0     Allergies   Allergen Reactions     Chloraprep One Step Rash     With twin C/S (9/2018)     Review of Systems   All other systems reviewed and are negative.     OBJECTIVE:     /70   Pulse 80   Temp 97.2  F (36.2  C) (Tympanic)   Resp 18   Ht 1.676 m (5' 6\")   Wt 78.3 kg (172 lb 9.6 oz)   LMP 10/09/2019   Breastfeeding? No   BMI 27.86 kg/m    Body mass index is 27.86 kg/m .  Physical Exam  Vitals signs and nursing note reviewed.   Constitutional:       Comments: Tired appearing.   HENT:      Head: Normocephalic and atraumatic.      Nose: Nose normal.      Mouth/Throat:      Mouth: Mucous membranes are moist.   Eyes:      Extraocular Movements: Extraocular movements intact.      Pupils: Pupils are equal, round, and reactive to light.   Neck:      Musculoskeletal: Normal range of motion.   Cardiovascular:      Rate and Rhythm: Normal rate and regular rhythm.   Pulmonary:      Effort: Pulmonary effort is normal.   Skin:     Capillary Refill: Capillary refill takes less than 2 seconds.   Neurological:      General: No focal deficit present.      Mental Status: She is alert.   Psychiatric:         Mood and Affect: Mood normal.         Behavior: Behavior normal.         Thought Content: Thought content normal.         Judgement: Judgment normal.       Diagnostic " Test Results:  Results for orders placed or performed in visit on 10/10/19   T4, Free   Result Value Ref Range    T4 Free 0.86 0.60 - 1.60 ng/dL   TSH   Result Value Ref Range    Thyrotropin 1.29 0.34 - 5.60 IU/mL       ASSESSMENT/PLAN:     1. Acquired hypothyroidism  Chronic, stable.  Recheck labs as it has been nearly one year.  Will adjust medication prn.  - TSH; Future  - T4, Free; Future  - T4, Free  - TSH    2. Adjustment disorder with anxious mood  New episode, recurrent.  Reviewed indications for mood treatment vs other.  She is currently seeing a mental health provider/therapist.  Will trial concerta; as discussed below.  However, may require treatment of mood/anxiety as well.  - methylphenidate (CONCERTA) 18 MG CR tablet; Take 1 tablet (18 mg) by mouth every morning  Dispense: 30 tablet; Refill: 0  - methylphenidate (CONCERTA) 27 MG CR tablet; Take 1 tablet (27 mg) by mouth every morning  Dispense: 30 tablet; Refill: 0    3. ADHD, inattentive type  With prior testing.  Recurrent.  Start concerta at low dose x 1 month.  Also given next highest dose x 1 month.  She will follow up in 6-8 weeks to review efficacy of medications.  At that time, contract and terms will be discussed.    Avril Sanders, [facility]

## 2019-10-11 ENCOUNTER — TELEPHONE (OUTPATIENT)
Dept: FAMILY MEDICINE | Facility: OTHER | Age: 39
End: 2019-10-11

## 2019-10-11 ASSESSMENT — ANXIETY QUESTIONNAIRES: GAD7 TOTAL SCORE: 13

## 2019-10-23 NOTE — PROGRESS NOTES
Outpatient Physical Therapy Discharge Note     Patient: Maricarmen Ornelas  : 1980    Beginning/End Dates of Reporting Period:  19 to 2019    Referring Provider: DO Sophia Page Diagnosis: right knee pain, myofascial tightness and pain     Client Self Report: Knee is doing better, a little bit of strain with sitting cross legged but she can do it. Started to run last week, first time in 2 years. Got new running shoes.     Objective Measurements:  Objective Measure: postural loading  Details: limited loading through left head and shoulder general listening to left upper back, local listening to T3, left temproal bone           Goals:  Goal Identifier lifting   Goal Description Patient to lift items and children without limitation from righ tknee pain.   Target Date 10/16/19   Date Met      Progress:     Goal Identifier squat   Goal Description Patient to be able to squat and sit cross legged on floor to care for children.    Target Date 10/16/19   Date Met      Progress:     Goal Identifier postural loading   Goal Description Patient to have equal postural loading and normal joint mechanics to allow mobility without righ tknee pain.    Target Date 10/16/19   Date Met      Progress:     Goal Identifier HEP   Goal Description Patient to be compliant with HEP for self management of symtpoms.    Target Date 10/16/19   Date Met      Progress:     Progress Toward Goals:   Progress this reporting period: patient able to function without limitation from knee pain. All goals met. Cancelled last visit as it was not needed.           Plan:  Discharge from therapy.    Discharge:    Reason for Discharge: Patient has met all goals.    Equipment Issued: NA    Discharge Plan: Patient to continue home program.

## 2019-10-28 ENCOUNTER — ALLIED HEALTH/NURSE VISIT (OUTPATIENT)
Dept: FAMILY MEDICINE | Facility: OTHER | Age: 39
End: 2019-10-28
Attending: FAMILY MEDICINE
Payer: COMMERCIAL

## 2019-10-28 DIAGNOSIS — Z23 NEED FOR PROPHYLACTIC VACCINATION AND INOCULATION AGAINST INFLUENZA: Primary | ICD-10-CM

## 2019-10-28 PROCEDURE — 90686 IIV4 VACC NO PRSV 0.5 ML IM: CPT

## 2019-10-28 PROCEDURE — 90471 IMMUNIZATION ADMIN: CPT

## 2019-11-22 ENCOUNTER — OFFICE VISIT (OUTPATIENT)
Dept: FAMILY MEDICINE | Facility: OTHER | Age: 39
End: 2019-11-22
Attending: FAMILY MEDICINE
Payer: COMMERCIAL

## 2019-11-22 VITALS
SYSTOLIC BLOOD PRESSURE: 104 MMHG | TEMPERATURE: 98 F | DIASTOLIC BLOOD PRESSURE: 66 MMHG | WEIGHT: 172 LBS | HEART RATE: 72 BPM | BODY MASS INDEX: 27.76 KG/M2 | RESPIRATION RATE: 18 BRPM

## 2019-11-22 DIAGNOSIS — E03.9 HYPOTHYROIDISM, UNSPECIFIED TYPE: ICD-10-CM

## 2019-11-22 DIAGNOSIS — F90.0 ATTENTION DEFICIT HYPERACTIVITY DISORDER (ADHD), PREDOMINANTLY INATTENTIVE TYPE: ICD-10-CM

## 2019-11-22 DIAGNOSIS — F43.22 ADJUSTMENT DISORDER WITH ANXIOUS MOOD: ICD-10-CM

## 2019-11-22 PROCEDURE — 99213 OFFICE O/P EST LOW 20 MIN: CPT | Performed by: FAMILY MEDICINE

## 2019-11-22 RX ORDER — METHYLPHENIDATE HYDROCHLORIDE 27 MG/1
27 TABLET ORAL EVERY MORNING
Qty: 30 TABLET | Refills: 0 | Status: CANCELLED | OUTPATIENT
Start: 2019-11-22

## 2019-11-22 RX ORDER — LEVOTHYROXINE SODIUM 75 MCG
75 TABLET ORAL DAILY
Qty: 90 TABLET | Refills: 4 | Status: SHIPPED | OUTPATIENT
Start: 2019-11-22 | End: 2020-09-23

## 2019-11-22 ASSESSMENT — ANXIETY QUESTIONNAIRES
6. BECOMING EASILY ANNOYED OR IRRITABLE: SEVERAL DAYS
IF YOU CHECKED OFF ANY PROBLEMS ON THIS QUESTIONNAIRE, HOW DIFFICULT HAVE THESE PROBLEMS MADE IT FOR YOU TO DO YOUR WORK, TAKE CARE OF THINGS AT HOME, OR GET ALONG WITH OTHER PEOPLE: NOT DIFFICULT AT ALL
3. WORRYING TOO MUCH ABOUT DIFFERENT THINGS: NOT AT ALL
2. NOT BEING ABLE TO STOP OR CONTROL WORRYING: NOT AT ALL
7. FEELING AFRAID AS IF SOMETHING AWFUL MIGHT HAPPEN: NOT AT ALL
5. BEING SO RESTLESS THAT IT IS HARD TO SIT STILL: SEVERAL DAYS
1. FEELING NERVOUS, ANXIOUS, OR ON EDGE: SEVERAL DAYS
GAD7 TOTAL SCORE: 4

## 2019-11-22 ASSESSMENT — PATIENT HEALTH QUESTIONNAIRE - PHQ9
5. POOR APPETITE OR OVEREATING: SEVERAL DAYS
SUM OF ALL RESPONSES TO PHQ QUESTIONS 1-9: 0

## 2019-11-22 ASSESSMENT — PAIN SCALES - GENERAL: PAINLEVEL: NO PAIN (1)

## 2019-11-22 NOTE — NURSING NOTE
"Chief Complaint   Patient presents with     Recheck Medication       Initial /66   Pulse 72   Temp 98  F (36.7  C) (Tympanic)   Resp 18   Wt 78 kg (172 lb)   LMP 11/04/2019 (Exact Date)   BMI 27.76 kg/m   Estimated body mass index is 27.76 kg/m  as calculated from the following:    Height as of 10/10/19: 1.676 m (5' 6\").    Weight as of this encounter: 78 kg (172 lb).  Medication Reconciliation: complete    Rajani Ford LPN  "

## 2019-11-22 NOTE — PROGRESS NOTES
"Nursing Notes:   Rajani Ford LPN  2019  8:55 AM  Signed  Chief Complaint   Patient presents with     Recheck Medication       Initial /66   Pulse 72   Temp 98  F (36.7  C) (Tympanic)   Resp 18   Wt 78 kg (172 lb)   LMP 2019 (Exact Date)   BMI 27.76 kg/m    Estimated body mass index is 27.76 kg/m  as calculated from the following:    Height as of 10/10/19: 1.676 m (5' 6\").    Weight as of this encounter: 78 kg (172 lb).  Medication Reconciliation: complete  Rajani Ford LPN      SUBJECTIVE:   Maricarmen Ornelas is a 39 year old female who presents to clinic today for the following health issues:    HPI  Maricarmen is here for recheck of the start of medication.  At last visit, we started Concerta @ 27mg daily - due to long standing history of ADHD and previous use of stimulants off and on in the past.  She was having increasing difficulty after her twins were born.  Continues to see mental health provider.    Since last visit; has tried the 18mg daily.  Just filled the 27mg dose recently and taken it, so is uncertain which dose is more effective.  She did feel much better with the 18mg dose.  Anxiety improved significantly.  Able to manage older kids along with the twins well without becoming scattered, overwhelmed.        Patient Active Problem List    Diagnosis Date Noted     S/P  section 2018     Priority: Medium     Encounter for triage in pregnant patient 2018     Priority: Medium     Bilateral carpal tunnel syndrome 2018     Priority: Medium     Adjustment disorder with anxious mood 2018     Priority: Medium     Acquired hypothyroidism 2018     Priority: Medium     Attention deficit disorder 2018     Priority: Medium     Twin pregnancy 2018     Priority: Medium     EDC 2018       Lumbosacral spondylosis without myelopathy 03/10/2015     Priority: Medium     Other staphylococcus infection in conditions classified elsewhere and of unspecified " site 2011     Priority: Medium     Past Medical History:   Diagnosis Date     Dichorionic diamniotic twin pregnancy in third trimester 2018     Disorder of thyroid     hypo     Supervision of elderly multigravida     2018     Twin pregnancy 2018      Past Surgical History:   Procedure Laterality Date     APPENDECTOMY OPEN            SECTION N/A 2018    Procedure:  SECTION;   Section - twins;  Surgeon: Woodrow Hurtado MD;  Location:  OR     COLONOSCOPY N/A 7/10/2019    Procedure: COLONOSCOPY, WITH POLYPECTOMY AND BIOPSY;  Surgeon: Viki Villareal MD;  Location:  OR     Family History   Problem Relation Age of Onset     Substance Abuse Maternal Grandmother         Alcohol/Drug,Alcohol abuse     Cancer Maternal Grandmother         Cancer, lung cancer     Other - See Comments Maternal Grandfather         Alzheimer's disease     Diabetes Paternal Grandmother         Diabetes,type 1     No Known Problems Sister      No Known Problems Son      No Known Problems Son      Diabetes Maternal Uncle         Diabetes,type 2     No Known Problems Son         2018     No Known Problems Daughter         2018     Heart Disease No family hx of         Heart Disease     Social History     Tobacco Use     Smoking status: Never Smoker     Smokeless tobacco: Never Used   Substance Use Topics     Alcohol use: Yes     Alcohol/week: 2.0 standard drinks     Comment: rarely     Social History     Social History Narrative       - Ki Parry      Sons: Franco Linn ,    Daughter:    Father: Jose;  Mother: Bronwyn      Siblings: Sister Nydia,  Sambrother     Current Outpatient Medications   Medication Sig Dispense Refill     SYNTHROID 75 MCG tablet Take 1 tablet (75 mcg) by mouth daily 90 tablet 4     methylcellulose (CITRUCEL) powder Take 0.3 g (1.05 teaspoonful) by mouth daily 454 g 3     methylphenidate (CONCERTA) 27 MG CR tablet Take 1 tablet (27 mg) by mouth  every morning 30 tablet 0     Allergies   Allergen Reactions     Chloraprep One Step Rash     With twin C/S (9/2018)     Review of Systems   All other systems reviewed and are negative.     OBJECTIVE:     /66   Pulse 72   Temp 98  F (36.7  C) (Tympanic)   Resp 18   Wt 78 kg (172 lb)   LMP 11/04/2019 (Exact Date)   BMI 27.76 kg/m    Body mass index is 27.76 kg/m .  Physical Exam  Vitals signs and nursing note reviewed.   Constitutional:       Appearance: Normal appearance.   Neck:      Musculoskeletal: Normal range of motion and neck supple. No muscular tenderness.   Cardiovascular:      Rate and Rhythm: Normal rate and regular rhythm.   Pulmonary:      Effort: Pulmonary effort is normal.      Breath sounds: Normal breath sounds.   Skin:     Capillary Refill: Capillary refill takes less than 2 seconds.   Neurological:      General: No focal deficit present.      Mental Status: She is alert.   Psychiatric:         Mood and Affect: Mood normal.         Judgment: Judgment normal.     Diagnostic Test Results:  none     ASSESSMENT/PLAN:     1. Hypothyroidism, unspecified type  Chronic, stable.  Refilled synthroid at 75mcg dose daily.   - SYNTHROID 75 MCG tablet; Take 1 tablet (75 mcg) by mouth daily  Dispense: 90 tablet; Refill: 4    2. Adjustment disorder with anxious mood  Improved.    3. Attention deficit hyperactivity disorder (ADHD), predominantly inattentive type  Improved with Concerta; no side effects.  Patient will call with which dose she tolerates better.  May refill over the phone x 1 time (x3 months); then will have follow up and fill out contract.  No red flags or concerns for misuse of medications.  Does have small children in the home and aware to keep medication out of reach.      Avril Sanders,   [facility] [place]

## 2019-11-23 ASSESSMENT — ANXIETY QUESTIONNAIRES: GAD7 TOTAL SCORE: 4

## 2019-12-18 ENCOUNTER — TELEPHONE (OUTPATIENT)
Dept: FAMILY MEDICINE | Facility: OTHER | Age: 39
End: 2019-12-18

## 2019-12-18 NOTE — TELEPHONE ENCOUNTER
Patient called and stated that her period is 3 weeks late and her  has a vasectomy so she would like to speak with SMS to see if she should come in and be check out.   Marleni Doyle on 12/18/2019 at 8:40 AM

## 2019-12-18 NOTE — TELEPHONE ENCOUNTER
Left message to call back....................  12/18/2019   9:00 AM  Rajani Ford LPN, LPN  12/18/2019  9:00 AM

## 2019-12-18 NOTE — TELEPHONE ENCOUNTER
Pt is not terribly concerned at this time. Will schedule to be seen after the holidays.    Niki Temple LPN on 12/18/2019 at 9:11 AM

## 2019-12-20 ENCOUNTER — TELEPHONE (OUTPATIENT)
Dept: FAMILY MEDICINE | Facility: OTHER | Age: 39
End: 2019-12-20

## 2019-12-20 DIAGNOSIS — F43.22 ADJUSTMENT DISORDER WITH ANXIOUS MOOD: ICD-10-CM

## 2019-12-20 DIAGNOSIS — F90.0 ATTENTION DEFICIT HYPERACTIVITY DISORDER (ADHD), PREDOMINANTLY INATTENTIVE TYPE: Primary | ICD-10-CM

## 2019-12-20 NOTE — TELEPHONE ENCOUNTER
Left message to call back....................  12/20/2019   9:56 AM  Rajani Ford LPN, LPN  12/20/2019  9:56 AM

## 2019-12-20 NOTE — TELEPHONE ENCOUNTER
Pt calling with follow up on medication, Concerta. Pt requesting to change dosage and will need to  script. Please call

## 2019-12-23 RX ORDER — METHYLPHENIDATE HYDROCHLORIDE 27 MG/1
27 TABLET ORAL EVERY MORNING
Qty: 30 TABLET | Refills: 0 | Status: CANCELLED | OUTPATIENT
Start: 2019-12-23

## 2019-12-23 NOTE — TELEPHONE ENCOUNTER
Patient presented to window to  RX for Concerta, explained to her that there was a message out for her to call back Friday but she never did.  Patient states she is doing well on the 27 mg and that is what she would like refilled. She will be back in clinic tomorrow at 1030 for her son's appointment and would like to  the prescriptions at that time.  Yue Firend LPN ...... 12/23/2019 10:45 AM

## 2019-12-24 RX ORDER — METHYLPHENIDATE HYDROCHLORIDE 27 MG/1
27 TABLET ORAL DAILY
Qty: 30 TABLET | Refills: 0 | Status: SHIPPED | OUTPATIENT
Start: 2020-01-23 | End: 2020-01-22

## 2019-12-24 RX ORDER — METHYLPHENIDATE HYDROCHLORIDE 27 MG/1
27 TABLET ORAL DAILY
Qty: 30 TABLET | Refills: 0 | Status: SHIPPED | OUTPATIENT
Start: 2019-12-24 | End: 2020-01-22

## 2019-12-24 RX ORDER — METHYLPHENIDATE HYDROCHLORIDE 27 MG/1
27 TABLET ORAL DAILY
Qty: 30 TABLET | Refills: 0 | Status: SHIPPED | OUTPATIENT
Start: 2020-02-22 | End: 2020-01-22

## 2019-12-24 NOTE — TELEPHONE ENCOUNTER
Noted; please notify patient that we now have ability to eRx medications - three months of Concerta 27mg were sent to [facility],  on 12/24/2019 at 6:28 AM

## 2020-01-13 ENCOUNTER — TELEPHONE (OUTPATIENT)
Dept: FAMILY MEDICINE | Facility: OTHER | Age: 40
End: 2020-01-13

## 2020-01-13 NOTE — TELEPHONE ENCOUNTER
Left message to call back....................  1/13/2020   10:21 AM  Rajani Ford LPN, LPN  1/13/2020  10:21 AM

## 2020-01-13 NOTE — TELEPHONE ENCOUNTER
Reason for call: Patient wanting a work in appointment.    Patient is having the following symptoms:  Hasn't gotten period for 2 months and had negative pregnancy tests. Wondering if at all possible to be worked in sooner.     The patient is requesting an appointment with  SMS    Was an appointment offered for this call? Yes    If Yes, what is the date of the appointment?  Jan 29 8:45     Preferred method for responding to this message: Telephone Call    Phone number patient can be reached at? Cell number on file:    Telephone Information:   Mobile [phone]       If we can't reach you directly, may we leave a detailed response at the number you provided? Yes    Can this message wait until your PCP/provider returns if unavailable today? Yes

## 2020-01-22 ENCOUNTER — TELEPHONE (OUTPATIENT)
Dept: FAMILY MEDICINE | Facility: OTHER | Age: 40
End: 2020-01-22

## 2020-01-22 ENCOUNTER — OFFICE VISIT (OUTPATIENT)
Dept: FAMILY MEDICINE | Facility: OTHER | Age: 40
End: 2020-01-22
Attending: FAMILY MEDICINE
Payer: COMMERCIAL

## 2020-01-22 VITALS
SYSTOLIC BLOOD PRESSURE: 110 MMHG | RESPIRATION RATE: 16 BRPM | WEIGHT: 172.5 LBS | BODY MASS INDEX: 27.84 KG/M2 | DIASTOLIC BLOOD PRESSURE: 78 MMHG | TEMPERATURE: 97.8 F | HEART RATE: 60 BPM

## 2020-01-22 DIAGNOSIS — F43.22 ADJUSTMENT DISORDER WITH ANXIOUS MOOD: ICD-10-CM

## 2020-01-22 DIAGNOSIS — F90.0 ATTENTION DEFICIT HYPERACTIVITY DISORDER (ADHD), PREDOMINANTLY INATTENTIVE TYPE: ICD-10-CM

## 2020-01-22 DIAGNOSIS — N91.1 SECONDARY AMENORRHEA: ICD-10-CM

## 2020-01-22 DIAGNOSIS — E03.9 HYPOTHYROIDISM, UNSPECIFIED TYPE: Primary | ICD-10-CM

## 2020-01-22 LAB
B-HCG SERPL-ACNC: <1 IU/L
BASOPHILS # BLD AUTO: 0 10E9/L (ref 0–0.2)
BASOPHILS NFR BLD AUTO: 0.5 %
DIFFERENTIAL METHOD BLD: NORMAL
EOSINOPHIL # BLD AUTO: 0.2 10E9/L (ref 0–0.7)
EOSINOPHIL NFR BLD AUTO: 2 %
ERYTHROCYTE [DISTWIDTH] IN BLOOD BY AUTOMATED COUNT: 13.7 % (ref 10–15)
FSH SERPL-ACNC: 22.5 IU/L
HCT VFR BLD AUTO: 41.4 % (ref 35–47)
HGB BLD-MCNC: 13.5 G/DL (ref 11.7–15.7)
IMM GRANULOCYTES # BLD: 0 10E9/L (ref 0–0.4)
IMM GRANULOCYTES NFR BLD: 0.2 %
LYMPHOCYTES # BLD AUTO: 2.4 10E9/L (ref 0.8–5.3)
LYMPHOCYTES NFR BLD AUTO: 30 %
MCH RBC QN AUTO: 28 PG (ref 26.5–33)
MCHC RBC AUTO-ENTMCNC: 32.6 G/DL (ref 31.5–36.5)
MCV RBC AUTO: 86 FL (ref 78–100)
MONOCYTES # BLD AUTO: 0.4 10E9/L (ref 0–1.3)
MONOCYTES NFR BLD AUTO: 5.3 %
NEUTROPHILS # BLD AUTO: 5 10E9/L (ref 1.6–8.3)
NEUTROPHILS NFR BLD AUTO: 62 %
PLATELET # BLD AUTO: 363 10E9/L (ref 150–450)
RBC # BLD AUTO: 4.83 10E12/L (ref 3.8–5.2)
T4 FREE SERPL-MCNC: 0.96 NG/DL (ref 0.6–1.6)
TSH SERPL DL<=0.05 MIU/L-ACNC: 2.14 IU/ML (ref 0.34–5.6)
WBC # BLD AUTO: 8 10E9/L (ref 4–11)

## 2020-01-22 PROCEDURE — 84443 ASSAY THYROID STIM HORMONE: CPT | Mod: ZL | Performed by: FAMILY MEDICINE

## 2020-01-22 PROCEDURE — 85025 COMPLETE CBC W/AUTO DIFF WBC: CPT | Mod: ZL | Performed by: FAMILY MEDICINE

## 2020-01-22 PROCEDURE — 83001 ASSAY OF GONADOTROPIN (FSH): CPT | Mod: ZL | Performed by: FAMILY MEDICINE

## 2020-01-22 PROCEDURE — 99214 OFFICE O/P EST MOD 30 MIN: CPT | Performed by: FAMILY MEDICINE

## 2020-01-22 PROCEDURE — 36415 COLL VENOUS BLD VENIPUNCTURE: CPT | Mod: ZL | Performed by: FAMILY MEDICINE

## 2020-01-22 PROCEDURE — 84439 ASSAY OF FREE THYROXINE: CPT | Mod: ZL | Performed by: FAMILY MEDICINE

## 2020-01-22 PROCEDURE — 84702 CHORIONIC GONADOTROPIN TEST: CPT | Mod: ZL | Performed by: FAMILY MEDICINE

## 2020-01-22 RX ORDER — METHYLPHENIDATE HYDROCHLORIDE 27 MG/1
27 TABLET ORAL DAILY
Qty: 30 TABLET | Refills: 0 | Status: SHIPPED | OUTPATIENT
Start: 2020-03-22 | End: 2020-04-15 | Stop reason: DRUGHIGH

## 2020-01-22 RX ORDER — METHYLPHENIDATE HYDROCHLORIDE 27 MG/1
27 TABLET ORAL DAILY
Qty: 30 TABLET | Refills: 0 | Status: SHIPPED | OUTPATIENT
Start: 2020-05-21 | End: 2020-03-19

## 2020-01-22 RX ORDER — METHYLPHENIDATE HYDROCHLORIDE 27 MG/1
27 TABLET ORAL DAILY
Qty: 30 TABLET | Refills: 0 | Status: SHIPPED | OUTPATIENT
Start: 2020-04-21 | End: 2020-04-15 | Stop reason: DRUGHIGH

## 2020-01-22 RX ORDER — MEDROXYPROGESTERONE ACETATE 10 MG
10 TABLET ORAL DAILY
Qty: 10 TABLET | Refills: 0 | Status: SHIPPED | OUTPATIENT
Start: 2020-01-22 | End: 2020-03-16

## 2020-01-22 ASSESSMENT — ENCOUNTER SYMPTOMS
FATIGUE: 1
WOUND: 0
CHEST TIGHTNESS: 0

## 2020-01-22 ASSESSMENT — ANXIETY QUESTIONNAIRES
6. BECOMING EASILY ANNOYED OR IRRITABLE: SEVERAL DAYS
IF YOU CHECKED OFF ANY PROBLEMS ON THIS QUESTIONNAIRE, HOW DIFFICULT HAVE THESE PROBLEMS MADE IT FOR YOU TO DO YOUR WORK, TAKE CARE OF THINGS AT HOME, OR GET ALONG WITH OTHER PEOPLE: SOMEWHAT DIFFICULT
2. NOT BEING ABLE TO STOP OR CONTROL WORRYING: MORE THAN HALF THE DAYS
GAD7 TOTAL SCORE: 7
7. FEELING AFRAID AS IF SOMETHING AWFUL MIGHT HAPPEN: SEVERAL DAYS
1. FEELING NERVOUS, ANXIOUS, OR ON EDGE: SEVERAL DAYS
3. WORRYING TOO MUCH ABOUT DIFFERENT THINGS: MORE THAN HALF THE DAYS
5. BEING SO RESTLESS THAT IT IS HARD TO SIT STILL: NOT AT ALL

## 2020-01-22 ASSESSMENT — PATIENT HEALTH QUESTIONNAIRE - PHQ9
5. POOR APPETITE OR OVEREATING: NOT AT ALL
SUM OF ALL RESPONSES TO PHQ QUESTIONS 1-9: 5

## 2020-01-22 ASSESSMENT — PAIN SCALES - GENERAL: PAINLEVEL: NO PAIN (0)

## 2020-01-22 NOTE — TELEPHONE ENCOUNTER
"----- Message from Avril Sanders DO sent at 1/22/2020  2:31 PM CST -----  Call and notify -  OKAY to leave information on voicemail by verbal consent today in the office.    Your thyroid level is normal; pregnancy test negative; blood counts are good.  The FSH level is not yet convincing for menopause, but it could be that we checked it early.    I would consider a progesterone challenge to see if we can \"reset\" your hormones/cycle.  I will send this Rx to the pharmacy.  If no period develops; we can continue to monitor with repeat FSH level in a few months (and estrogen level) to evaluate for early menopause.    Avril Sanders,      "

## 2020-01-22 NOTE — NURSING NOTE
"Chief Complaint   Patient presents with     Amenorrhea     no period for 2 months       Initial /78   Pulse 60   Temp 97.8  F (36.6  C) (Tympanic)   Resp 16   Wt 78.2 kg (172 lb 8 oz)   LMP 11/04/2019 (Approximate)   BMI 27.84 kg/m   Estimated body mass index is 27.84 kg/m  as calculated from the following:    Height as of 10/10/19: 1.676 m (5' 6\").    Weight as of this encounter: 78.2 kg (172 lb 8 oz).  Medication Reconciliation: complete    Rajani Ford LPN  "

## 2020-01-22 NOTE — TELEPHONE ENCOUNTER
Patient notified.  She is wondering if she should go forth with the ultrasound and if that looks normal, can she not do the progesterone challenge?  She states that she would rather not take hormones and is okay with not having her periods.  Rajani Ford, HAON, LPN  1/22/2020  4:18 PM

## 2020-01-22 NOTE — PROGRESS NOTES
"Nursing Notes:   Rajani Ford LPN  2020 11:18 AM  Sign at exiting of workspace  Chief Complaint   Patient presents with     Amenorrhea     no period for 2 months     Initial /78   Pulse 60   Temp 97.8  F (36.6  C) (Tympanic)   Resp 16   Wt 78.2 kg (172 lb 8 oz)   LMP 2019 (Approximate)   BMI 27.84 kg/m    Estimated body mass index is 27.84 kg/m  as calculated from the following:    Height as of 10/10/19: 1.676 m (5' 6\").    Weight as of this encounter: 78.2 kg (172 lb 8 oz).  Medication Reconciliation: complete  Rajani Ford LPN    SUBJECTIVE:   Maricarmen Ornelas is a 39 year old female who presents to clinic today for the following health issues:    HPI  Maricarmen is here for concerns re: menstrual cycle; she has not had one for 2 months.  Main change during hat time is starting Concerta at 27mg daily with regular use.  Many home stressors - two special need boys, and twins that are 1.5 years old.  She is sexually active with her  only; no risk of STI.   with vasectomy; and follow up testing was completed.  No chance of pregnancy.  Wondering about thyroid; premature menopause (maternal aunt and maternal grandmother with this).       Patient Active Problem List    Diagnosis Date Noted     S/P  section 2018     Priority: Medium     Encounter for triage in pregnant patient 2018     Priority: Medium     Bilateral carpal tunnel syndrome 2018     Priority: Medium     Adjustment disorder with anxious mood 2018     Priority: Medium     Acquired hypothyroidism 2018     Priority: Medium     Attention deficit disorder 2018     Priority: Medium     Twin pregnancy 2018     Priority: Medium     EDC 2018       Lumbosacral spondylosis without myelopathy 03/10/2015     Priority: Medium     Other staphylococcus infection in conditions classified elsewhere and of unspecified site 2011     Priority: Medium     Past Medical History:   Diagnosis " Date     Dichorionic diamniotic twin pregnancy in third trimester 2018     Disorder of thyroid     hypo     Supervision of elderly multigravida     2018     Twin pregnancy 2018      Past Surgical History:   Procedure Laterality Date     APPENDECTOMY OPEN            SECTION N/A 2018    Procedure:  SECTION;   Section - twins;  Surgeon: Woodrow Hurtado MD;  Location:  OR     COLONOSCOPY N/A 7/10/2019    Procedure: COLONOSCOPY, WITH POLYPECTOMY AND BIOPSY;  Surgeon: Viki Villareal MD;  Location:  OR     Family History   Problem Relation Age of Onset     Substance Abuse Maternal Grandmother         Alcohol/Drug,Alcohol abuse     Cancer Maternal Grandmother         Cancer, lung cancer     Other - See Comments Maternal Grandfather         Alzheimer's disease     Diabetes Paternal Grandmother         Diabetes,type 1     No Known Problems Sister      No Known Problems Son      No Known Problems Son      Diabetes Maternal Uncle         Diabetes,type 2     No Known Problems Son         2018     No Known Problems Daughter         2018     Heart Disease No family hx of         Heart Disease     Social History     Tobacco Use     Smoking status: Never Smoker     Smokeless tobacco: Never Used   Substance Use Topics     Alcohol use: Yes     Alcohol/week: 2.0 standard drinks     Comment: rarely     Social History     Social History Narrative       - Ki Turciosastian      Sons: Franco Linn ,    Daughter:    Father: Jose;  Mother: Bronwyn      Siblings: Sister Nydia,  Sambrother     Current Outpatient Medications   Medication Sig Dispense Refill     doxylamine (UNISOM) 25 MG TABS tablet Take 0.5 tablets (12.5 mg) by mouth nightly as needed       methylcellulose (CITRUCEL) powder Take 0.3 g (1.05 teaspoonful) by mouth daily 454 g 3     methylphenidate (CONCERTA) 27 MG CR tablet Take 1 tablet (27 mg) by mouth daily 30 tablet 0     [START ON 2020]  methylphenidate (CONCERTA) 27 MG CR tablet Take 1 tablet (27 mg) by mouth daily 30 tablet 0     [START ON 2/22/2020] methylphenidate (CONCERTA) 27 MG CR tablet Take 1 tablet (27 mg) by mouth daily 30 tablet 0     SYNTHROID 75 MCG tablet Take 1 tablet (75 mcg) by mouth daily 90 tablet 4     Allergies   Allergen Reactions     Chloraprep One Step Rash     With twin C/S (9/2018)     Review of Systems   Constitutional: Positive for fatigue.   Respiratory: Negative for chest tightness.    Genitourinary: Negative for vaginal discharge and vaginal pain.   Skin: Negative for rash and wound.        OBJECTIVE:     /78   Pulse 60   Temp 97.8  F (36.6  C) (Tympanic)   Resp 16   Wt 78.2 kg (172 lb 8 oz)   LMP 11/04/2019 (Approximate)   BMI 27.84 kg/m    Body mass index is 27.84 kg/m .  Physical Exam  Vitals signs and nursing note reviewed.   Constitutional:       Appearance: Normal appearance.   HENT:      Head: Normocephalic and atraumatic.   Neck:      Musculoskeletal: Normal range of motion.   Cardiovascular:      Rate and Rhythm: Normal rate.   Pulmonary:      Effort: Pulmonary effort is normal.   Skin:     Capillary Refill: Capillary refill takes less than 2 seconds.   Neurological:      General: No focal deficit present.      Mental Status: She is alert.   Psychiatric:         Mood and Affect: Mood normal.       Diagnostic Test Results:  Results for orders placed or performed in visit on 01/22/20   CBC and Differential     Status: None   Result Value Ref Range    WBC 8.0 4.0 - 11.0 10e9/L    RBC Count 4.83 3.8 - 5.2 10e12/L    Hemoglobin 13.5 11.7 - 15.7 g/dL    Hematocrit 41.4 35.0 - 47.0 %    MCV 86 78 - 100 fl    MCH 28.0 26.5 - 33.0 pg    MCHC 32.6 31.5 - 36.5 g/dL    RDW 13.7 10.0 - 15.0 %    Platelet Count 363 150 - 450 10e9/L    Diff Method Automated Method     % Neutrophils 62.0 %    % Lymphocytes 30.0 %    % Monocytes 5.3 %    % Eosinophils 2.0 %    % Basophils 0.5 %    % Immature Granulocytes 0.2 %     Absolute Neutrophil 5.0 1.6 - 8.3 10e9/L    Absolute Lymphocytes 2.4 0.8 - 5.3 10e9/L    Absolute Monocytes 0.4 0.0 - 1.3 10e9/L    Absolute Eosinophils 0.2 0.0 - 0.7 10e9/L    Absolute Basophils 0.0 0.0 - 0.2 10e9/L    Abs Immature Granulocytes 0.0 0 - 0.4 10e9/L   HCG quantitative pregnancy     Status: None   Result Value Ref Range    HCG Quantitative Serum <1 IU/L   T4, Free     Status: None   Result Value Ref Range    T4 Free 0.96 0.60 - 1.60 ng/dL   TSH     Status: None   Result Value Ref Range    Thyrotropin 2.14 0.34 - 5.60 IU/mL   FSH     Status: None   Result Value Ref Range    FSH 22.5 IU/L         ASSESSMENT/PLAN:     1. Hypothyroidism, unspecified type  Recent check normal; but with acute change last two months will screen again with other secondary amenorrhea lab work.  - TSH; Future  - T4, Free; Future  - T4, Free  - TSH    2. Secondary amenorrhea  Labs as below.  Discussed if normal; would proceed with progesterone challenge.  May also need pelvic US vs OB/GYN consult if not improving.  Maricarmen is not opposed to a hysterectomy in the long run.  - FSH; Future  - TSH; Future  - T4, Free; Future  - HCG quantitative pregnancy; Future  - CBC and Differential; Future  - CBC and Differential  - HCG quantitative pregnancy  - T4, Free  - TSH  - FSH  - medroxyPROGESTERone (PROVERA) 10 MG tablet; Take 1 tablet (10 mg) by mouth daily for 10 days  Dispense: 10 tablet; Refill: 0    3. Adjustment disorder with anxious mood  Refilled concerta today as a medication check.  NExt three months eRx.  Due for contract and UDS at next visit now that dose is stable.  Follow up every 3 months; sooner prn.  Discussed locking up medications as her boys are getting older and will have friends over more and more.    - methylphenidate (CONCERTA) 27 MG CR tablet; Take 1 tablet (27 mg) by mouth daily  Dispense: 30 tablet; Refill: 0  - methylphenidate (CONCERTA) 27 MG CR tablet; Take 1 tablet (27 mg) by mouth daily  Dispense: 30 tablet;  Refill: 0  - methylphenidate (CONCERTA) 27 MG CR tablet; Take 1 tablet (27 mg) by mouth daily  Dispense: 30 tablet; Refill: 0    4. Attention deficit hyperactivity disorder (ADHD), predominantly inattentive type  See above.  - methylphenidate (CONCERTA) 27 MG CR tablet; Take 1 tablet (27 mg) by mouth daily  Dispense: 30 tablet; Refill: 0  - methylphenidate (CONCERTA) 27 MG CR tablet; Take 1 tablet (27 mg) by mouth daily  Dispense: 30 tablet; Refill: 0  - methylphenidate (CONCERTA) 27 MG CR tablet; Take 1 tablet (27 mg) by mouth daily  Dispense: 30 tablet; Refill: 0      Avril Sanders [facility] AND [facility]

## 2020-01-22 NOTE — TELEPHONE ENCOUNTER
Although despite an ultrasound appearance of normal lining (of the uterus), there is no imaging test which can predict pre-cancerous changes of the cells - even with a normal or thickened lining.  Therefore, if we are not hormonally suppressing your periods; it is better to have them.  The other option would be to just wait and see for another 1-2 months; and consider further work up (ie - consideration for hysterectomy) with OB/GYN if no period or irregular periods develop.    If she prefers the later option - she can just call to have referral placed (or even call to schedule with OB/GYN in ~2 months, as insurance may not require a referral).  Avril Sandres, DO

## 2020-01-23 ASSESSMENT — ANXIETY QUESTIONNAIRES: GAD7 TOTAL SCORE: 7

## 2020-01-23 NOTE — TELEPHONE ENCOUNTER
Talked with patient and she states that she got her period last night. She will wait and let us know how her period cycle goes in the next few months and is there is a concern, she will let us know.  Rajani Ford, CHETAN, LPN  1/23/2020  10:21 AM

## 2020-01-29 ENCOUNTER — TELEPHONE (OUTPATIENT)
Dept: FAMILY MEDICINE | Facility: OTHER | Age: 40
End: 2020-01-29

## 2020-01-29 DIAGNOSIS — M54.2 NECK PAIN: ICD-10-CM

## 2020-01-29 DIAGNOSIS — M25.661 KNEE STIFFNESS, RIGHT: Primary | ICD-10-CM

## 2020-01-29 NOTE — TELEPHONE ENCOUNTER
Talked with patient and she would like a PT referral for her right knee and her neck. She states that she talked to Dr. Sanders about this at her recent visit.  She has done PT before on her knee.  Rajani Ford LPN, LPN  1/29/2020  1:35 PM

## 2020-02-19 ENCOUNTER — TELEPHONE (OUTPATIENT)
Dept: FAMILY MEDICINE | Facility: OTHER | Age: 40
End: 2020-02-19

## 2020-02-19 NOTE — TELEPHONE ENCOUNTER
Patient was wondering when she was due to come back in for med management. Did review last office note and it said 3 months. She will make an appointment towards the end of April.  Rajani Ford LPN, HAON  2/19/2020  10:49 AM

## 2020-02-20 ENCOUNTER — HOSPITAL ENCOUNTER (OUTPATIENT)
Dept: PHYSICAL THERAPY | Facility: OTHER | Age: 40
Setting detail: THERAPIES SERIES
End: 2020-02-20
Attending: FAMILY MEDICINE
Payer: COMMERCIAL

## 2020-02-20 DIAGNOSIS — M25.661 KNEE STIFFNESS, RIGHT: ICD-10-CM

## 2020-02-20 DIAGNOSIS — M54.2 NECK PAIN: ICD-10-CM

## 2020-02-20 PROCEDURE — 97161 PT EVAL LOW COMPLEX 20 MIN: CPT | Mod: GP

## 2020-02-20 PROCEDURE — 97140 MANUAL THERAPY 1/> REGIONS: CPT | Mod: GP,XU

## 2020-02-20 PROCEDURE — 97112 NEUROMUSCULAR REEDUCATION: CPT | Mod: GP

## 2020-02-20 NOTE — PROGRESS NOTES
02/20/20 0800   General Information   Type of Visit Initial OP Ortho PT Evaluation   Start of Care Date 02/20/20   Referring Physician Avril Sanders, DO   Orders Evaluate and Treat   Date of Order 01/29/20   Certification Required? No   Medical Diagnosis right knee stiffness, neck pain   Surgical/Medical history reviewed Yes   Body Part(s)   Body Part(s) Knee;Lumbar Spine/SI;Cervical Spine   Presentation and Etiology   Pertinent history of current problem (include personal factors and/or comorbidities that impact the POC) Knee pain comes and goes, working past HEP. Random. Neck pain is more the issue and is interering with life. Started last fall. Thinks it's from carrying babies. No pain down arms. Prone press ups are super painful. Feels like there is a vice around base of neck. Off and on headaches, occipital.    Impairments A. Pain;E. Decreased flexibility   Functional Limitations perform activities of daily living;perform desired leisure / sports activities   Symptom Location upper back, neck, shoulders. right knee   Onset date of current episode/exacerbation 01/29/20   Chronicity New   Pain rating (0-10 point scale) Best (/10);Worst (/10)   Best (/10) 2   Worst (/10) 6-8   Pain quality A. Sharp;C. Aching;E. Shooting;G. Cramping   Frequency of pain/symptoms A. Constant   Pain/symptoms exacerbated by C. Lifting;D. Carrying;G. Certain positions;K. Home tasks   Pain/symptoms eased by D. Nothing;E. Changing positions;F. Certain positions;I. OTC medication(s)   Progression of symptoms since onset: Worsened   Prior Level of Function   Prior Level of Function-Mobility no limitations   Prior Level of Function-ADLs no limitations   Current Level of Function   Patient role/employment history A. Employed   Fall Risk Screen   Fall screen completed by PT   Have you fallen 2 or more times in the past year? No   Have you fallen and had an injury in the past year? No   Is patient a fall risk? No   Cervical Spine    Observation hyperactivity upper traps right greater than left   Posture rounded shoulders   Cervical/Thoracic/Shoulder ROM Comments generalized loss of cervical rotation due to tightness   Upper Trapezius Flexibility tightness right greater than left   Scalene Flexibility tightness bilat   Segmental Mobility-Cervical FRS right C4   Segmental Mobility-Thoracic FRS right T2, ERS right T2   Palpation postural loading limited through head R/R L/R, shoulders R/R R/L. General listening to right upper t spine; Local listening to T2. Myofascial tightness with clavipectoral fasica, middle and deep cervical fascia, thoracic inlet.    Planned Therapy Interventions   Planned Therapy Interventions joint mobilization;manual therapy;neuromuscular re-education;strengthening;stretching   Planned Therapy Interventions Comment will continue assessment of right knee   Planned Modality Interventions   Planned Modality Interventions Cryotherapy;Electrical stimulation;Hot packs;Ultrasound   Clinical Impression   Criteria for Skilled Therapeutic Interventions Met yes, treatment indicated   PT Diagnosis neck pain, thoracic and cervical segmental dysfunction, myofascial tightness   Influenced by the following impairments pain, stiffness   Functional limitations due to impairments lifting, carrying twin babies, exercise, sleep   Clinical Presentation Evolving/Changing   Clinical Decision Making (Complexity) Low complexity   Therapy Frequency 2 times/Week   Predicted Duration of Therapy Intervention (days/wks) 6 months   Risk & Benefits of therapy have been explained Yes   Patient, Family & other staff in agreement with plan of care Yes   Clinical Impression Comments Patient with on set of upper back neck pain with repeated lifting of twin children; noted segmental dysfunctions and myofascial tightness and pain.    ORTHO GOALS   PT Ortho Eval Goals 1;2;3;4;5   Ortho Goal 1   Goal Identifier lifting/carrying   Goal Description Patient to  report 50% reduction in pain with carrying babies, using correct lifting techniques.    Target Date 08/20/20   Ortho Goal 2   Goal Identifier exercise   Goal Description Patient to report ability to exercise without upper back and neck pain.    Target Date 08/20/20   Ortho Goal 3   Goal Identifier sleep   Goal Description Patient to report ability to sleep without interupption from neck and upper back pain.   Target Date 08/20/20   Ortho Goal 4   Goal Identifier joint mobility   Goal Description Patient to demonstrate normal joint mechanics of thoracic and cervcal spine to allow movement without neck and upper bck pain.    Target Date 08/20/20   Ortho Goal 5   Goal Identifier righ tknee   Goal Description Patient to report ability to complete mobility without right knee pain.    Target Date 08/20/20   Total Evaluation Time   PT Eval, Low Complexity Minutes (54139) 20

## 2020-02-27 ENCOUNTER — HOSPITAL ENCOUNTER (OUTPATIENT)
Dept: PHYSICAL THERAPY | Facility: OTHER | Age: 40
Setting detail: THERAPIES SERIES
End: 2020-02-27
Attending: FAMILY MEDICINE
Payer: COMMERCIAL

## 2020-02-27 PROCEDURE — 97112 NEUROMUSCULAR REEDUCATION: CPT | Mod: GP

## 2020-02-27 PROCEDURE — 97140 MANUAL THERAPY 1/> REGIONS: CPT | Mod: GP

## 2020-03-04 ENCOUNTER — HOSPITAL ENCOUNTER (OUTPATIENT)
Dept: PHYSICAL THERAPY | Facility: OTHER | Age: 40
Setting detail: THERAPIES SERIES
End: 2020-03-04
Attending: FAMILY MEDICINE
Payer: COMMERCIAL

## 2020-03-04 PROCEDURE — 97112 NEUROMUSCULAR REEDUCATION: CPT | Mod: GP

## 2020-03-04 PROCEDURE — 97140 MANUAL THERAPY 1/> REGIONS: CPT | Mod: GP

## 2020-03-16 ENCOUNTER — OFFICE VISIT (OUTPATIENT)
Dept: FAMILY MEDICINE | Facility: OTHER | Age: 40
End: 2020-03-16
Attending: FAMILY MEDICINE
Payer: COMMERCIAL

## 2020-03-16 ENCOUNTER — TELEPHONE (OUTPATIENT)
Dept: FAMILY MEDICINE | Facility: OTHER | Age: 40
End: 2020-03-16

## 2020-03-16 VITALS
TEMPERATURE: 98.9 F | OXYGEN SATURATION: 99 % | DIASTOLIC BLOOD PRESSURE: 82 MMHG | HEART RATE: 91 BPM | SYSTOLIC BLOOD PRESSURE: 130 MMHG | RESPIRATION RATE: 18 BRPM | WEIGHT: 171 LBS | BODY MASS INDEX: 27.6 KG/M2

## 2020-03-16 DIAGNOSIS — J02.0 STREP THROAT: Primary | ICD-10-CM

## 2020-03-16 LAB
SPECIMEN SOURCE: ABNORMAL
STREP GROUP A PCR: ABNORMAL

## 2020-03-16 PROCEDURE — 87651 STREP A DNA AMP PROBE: CPT | Mod: ZL | Performed by: FAMILY MEDICINE

## 2020-03-16 PROCEDURE — 99214 OFFICE O/P EST MOD 30 MIN: CPT | Performed by: FAMILY MEDICINE

## 2020-03-16 RX ORDER — PENICILLIN V POTASSIUM 500 MG/1
500 TABLET, FILM COATED ORAL 3 TIMES DAILY
Qty: 30 TABLET | Refills: 0 | Status: SHIPPED | OUTPATIENT
Start: 2020-03-16 | End: 2020-04-15

## 2020-03-16 ASSESSMENT — PAIN SCALES - GENERAL: PAINLEVEL: SEVERE PAIN (7)

## 2020-03-16 NOTE — TELEPHONE ENCOUNTER
Patient notified per TYP of results, rx was already sent to pharmacy.  Josie Garcia LPN .............3/16/2020     4:58 PM

## 2020-03-16 NOTE — NURSING NOTE
Patient is here for concerns of strep. States started having sore throat last night. States son was dx with strep on Monday.  Josie Garcia LPN .............3/16/2020     1:29 PM      Patient's last menstrual period was 03/01/2020 (approximate).  Medication Reconciliation: complete    Josie Garcia LPN  3/16/2020 1:33 PM

## 2020-03-16 NOTE — TELEPHONE ENCOUNTER
Reason for call: Request for results.    Name of test or procedure: strep test    Date of test or procedure: 03/16/2020    Location of test or procedure: [facility]    Preferred method for responding to this message: Telephone Call    Phone number patient can be reached at: Cell number on file:    Telephone Information:   Mobile [phone]       If we can't reach you directly, may we leave a detailed response at the number you provided?Yes

## 2020-03-16 NOTE — PROGRESS NOTES
SUBJECTIVE:   Maricarmen Ornelas is a 39 year old female who presents to clinic today for the following health issues:  Nursing Notes:   Josie Garcia LPN  3/16/2020  1:46 PM  Signed  Patient is here for concerns of strep. States started having sore throat last night. States son was dx with strep on Monday.  Josie Garcia LPN .............3/16/2020     1:29 PM      Patient's last menstrual period was 2020 (approximate).  Medication Reconciliation: complete    Josie Garcia LPN  3/16/2020 1:33 PM    HPI    40 yo female presents with sore throat and low grade fever x 1 day. Left sided ST. NO cough, rash or nasal congestion.    Son was diagnosed with Strep 6 days ago.    No recent travel      Patient Active Problem List    Diagnosis Date Noted     S/P  section 2018     Priority: Medium     Encounter for triage in pregnant patient 2018     Priority: Medium     Bilateral carpal tunnel syndrome 2018     Priority: Medium     Adjustment disorder with anxious mood 2018     Priority: Medium     Acquired hypothyroidism 2018     Priority: Medium     Attention deficit disorder 2018     Priority: Medium     Twin pregnancy 2018     Priority: Medium     EDC 2018       Lumbosacral spondylosis without myelopathy 03/10/2015     Priority: Medium     Other staphylococcus infection in conditions classified elsewhere and of unspecified site 2011     Priority: Medium     Past Medical History:   Diagnosis Date     Dichorionic diamniotic twin pregnancy in third trimester 2018     Disorder of thyroid     hypo     Supervision of elderly multigravida     2018     Twin pregnancy     EDC 2018        Review of Systems   See hPi  OBJECTIVE:     /82 (BP Location: Right arm, Patient Position: Sitting, Cuff Size: Adult Regular)   Pulse 91   Temp 98.9  F (37.2  C) (Tympanic)   Resp 18   Wt 77.6 kg (171 lb)   LMP 2020 (Approximate)   SpO2 99%    Breastfeeding No   BMI 27.60 kg/m    Body mass index is 27.6 kg/m .  Physical Exam  HENT:      Mouth/Throat:      Mouth: Mucous membranes are moist.      Pharynx: Oropharynx is clear. Posterior oropharyngeal erythema present.   Neck:      Musculoskeletal: No neck rigidity.   Cardiovascular:      Rate and Rhythm: Normal rate.      Heart sounds: No murmur.   Pulmonary:      Effort: Pulmonary effort is normal. No respiratory distress.      Breath sounds: No wheezing.   Lymphadenopathy:      Cervical: No cervical adenopathy.   Neurological:      Mental Status: She is alert.         Diagnostic Test Results:  none     ASSESSMENT/PLAN:           ICD-10-CM    1. Strep throat  J02.0 penicillin V (VEETID) 500 MG tablet     Group A Streptococcus PCR Throat Swab       Rapid strep positive.  Discussed supportive care, PVK x 10days  Valentina Duran MD  [facility] [place]

## 2020-03-19 ENCOUNTER — TELEPHONE (OUTPATIENT)
Dept: FAMILY MEDICINE | Facility: OTHER | Age: 40
End: 2020-03-19

## 2020-03-19 DIAGNOSIS — F43.22 ADJUSTMENT DISORDER WITH ANXIOUS MOOD: ICD-10-CM

## 2020-03-19 DIAGNOSIS — F90.0 ATTENTION DEFICIT HYPERACTIVITY DISORDER (ADHD), PREDOMINANTLY INATTENTIVE TYPE: ICD-10-CM

## 2020-03-19 RX ORDER — METHYLPHENIDATE HYDROCHLORIDE 36 MG/1
36 TABLET ORAL DAILY
Qty: 30 TABLET | Refills: 0 | Status: SHIPPED | OUTPATIENT
Start: 2020-05-21 | End: 2020-03-23

## 2020-03-19 NOTE — TELEPHONE ENCOUNTER
Please see other telephone note from today. Rx for Concerta 36 mg CR daily sent to pharmacy and will need a PA as the 27 mg tablets were not effective for now.  Rajani Ford LPN, LPN  3/19/2020  11:13 AM

## 2020-03-19 NOTE — TELEPHONE ENCOUNTER
I have sent a 30-day Rx in; will follow up in 1 month (can do telephone encounter) to determine if effective.  Can let DAG's know.  Avril Sanders, DO

## 2020-03-19 NOTE — TELEPHONE ENCOUNTER
Talked with patient and she is wondering if she could get her concerta dose increased? She states that it will require a PA so I will send a message to our DAG's to initiate the PA. She is not due to have her refills done until 3/22/2020.  Rajani Ford, CHETAN, LPN  3/19/2020  10:16 AM

## 2020-03-19 NOTE — TELEPHONE ENCOUNTER
Patient notified. Will send DAG's a message.  Rajani Ford LPN, LPN  3/19/2020  11:11 AM

## 2020-03-23 DIAGNOSIS — F90.0 ATTENTION DEFICIT HYPERACTIVITY DISORDER (ADHD), PREDOMINANTLY INATTENTIVE TYPE: ICD-10-CM

## 2020-03-23 DIAGNOSIS — F43.22 ADJUSTMENT DISORDER WITH ANXIOUS MOOD: ICD-10-CM

## 2020-03-23 RX ORDER — METHYLPHENIDATE HYDROCHLORIDE 36 MG/1
36 TABLET ORAL DAILY
Qty: 30 TABLET | Refills: 0 | Status: SHIPPED | OUTPATIENT
Start: 2020-03-23 | End: 2020-04-15

## 2020-03-23 RX ORDER — METHYLPHENIDATE HYDROCHLORIDE 27 MG/1
27 TABLET ORAL DAILY
Qty: 30 TABLET | Refills: 0 | OUTPATIENT
Start: 2020-04-21

## 2020-03-23 RX ORDER — METHYLPHENIDATE HYDROCHLORIDE 27 MG/1
27 TABLET ORAL DAILY
Qty: 30 TABLET | Refills: 0 | OUTPATIENT
Start: 2020-03-23

## 2020-03-23 NOTE — TELEPHONE ENCOUNTER
Pt states that her new prescription for Concerta has a start date of May and it is suppose to start now

## 2020-04-15 ENCOUNTER — VIRTUAL VISIT (OUTPATIENT)
Dept: FAMILY MEDICINE | Facility: OTHER | Age: 40
End: 2020-04-15
Attending: FAMILY MEDICINE
Payer: COMMERCIAL

## 2020-04-15 VITALS — HEIGHT: 66 IN | WEIGHT: 167 LBS | BODY MASS INDEX: 26.84 KG/M2

## 2020-04-15 DIAGNOSIS — F90.0 ATTENTION DEFICIT HYPERACTIVITY DISORDER (ADHD), PREDOMINANTLY INATTENTIVE TYPE: ICD-10-CM

## 2020-04-15 DIAGNOSIS — F43.22 ADJUSTMENT DISORDER WITH ANXIOUS MOOD: ICD-10-CM

## 2020-04-15 DIAGNOSIS — K60.2 ANAL FISSURE: ICD-10-CM

## 2020-04-15 PROCEDURE — 99212 OFFICE O/P EST SF 10 MIN: CPT | Mod: 95 | Performed by: FAMILY MEDICINE

## 2020-04-15 RX ORDER — METHYLPHENIDATE HYDROCHLORIDE 36 MG/1
36 TABLET ORAL EVERY MORNING
Qty: 30 TABLET | Refills: 0 | Status: SHIPPED | OUTPATIENT
Start: 2020-06-13 | End: 2020-07-16

## 2020-04-15 RX ORDER — METHYLPHENIDATE HYDROCHLORIDE 36 MG/1
36 TABLET ORAL DAILY
Qty: 30 TABLET | Refills: 0 | Status: CANCELLED | OUTPATIENT
Start: 2020-04-15

## 2020-04-15 RX ORDER — METHYLPHENIDATE HYDROCHLORIDE 36 MG/1
36 TABLET ORAL EVERY MORNING
Qty: 30 TABLET | Refills: 0 | Status: SHIPPED | OUTPATIENT
Start: 2020-05-15 | End: 2020-07-16

## 2020-04-15 RX ORDER — METHYLPHENIDATE HYDROCHLORIDE 36 MG/1
36 TABLET ORAL DAILY
Qty: 30 TABLET | Refills: 0 | Status: SHIPPED | OUTPATIENT
Start: 2020-04-15 | End: 2020-07-16

## 2020-04-15 ASSESSMENT — PAIN SCALES - GENERAL: PAINLEVEL: NO PAIN (0)

## 2020-04-15 ASSESSMENT — MIFFLIN-ST. JEOR: SCORE: 1449.26

## 2020-04-15 ASSESSMENT — PATIENT HEALTH QUESTIONNAIRE - PHQ9: SUM OF ALL RESPONSES TO PHQ QUESTIONS 1-9: 17

## 2020-04-15 NOTE — PROGRESS NOTES
"Maricarmen Ornelas is a 40 year old female who is being evaluated via a billable telephone visit.      The patient has been notified of following:     \"This telephone visit will be conducted via a call between you and your physician/provider. We have found that certain health care needs can be provided without the need for a physical exam.  This service lets us provide the care you need with a short phone conversation.  If a prescription is necessary we can send it directly to your pharmacy.  If lab work is needed we can place an order for that and you can then stop by our lab to have the test done at a later time.    Telephone visits are billed at different rates depending on your insurance coverage. During this emergency period, for some insurers they may be billed the same as an in-person visit.  Please reach out to your insurance provider with any questions.    If during the course of the call the physician/provider feels a telephone visit is not appropriate, you will not be charged for this service.\"    Patient has given verbal consent for Telephone visit?  Yes    How would you like to obtain your AVS? E-Mail (inform patient AVS not encrypted)    Subjective     Maricarmen Ornelas is a 40 year old female who presents to clinic today for the following health issues:    Maricarmen is being contacted for refill of her Concerta medication.  Last visit we increased her dose from 27mg to 36mg daily.  She is tolerating this dose much better with the effects lasting further into the afternoon/evening.  She states she is feeling more overwhelmed again; but this is directly related to Covid-19 pandemic, and inability to find work/jobs for next year.  She is hopeful to get hired on by the college full time.  She is also struggling with being home with the children everyday; but gets her workout times and breaks for herself.  Not wanting anything further for medications at this time.  She denies any insomnia, abdominal pain, " headaches.      Patient Active Problem List   Diagnosis     Attention deficit disorder     Lumbosacral spondylosis without myelopathy     Other staphylococcus infection in conditions classified elsewhere and of unspecified site     Twin pregnancy     Acquired hypothyroidism     Bilateral carpal tunnel syndrome     Adjustment disorder with anxious mood     Encounter for triage in pregnant patient     S/P  section     Past Surgical History:   Procedure Laterality Date     APPENDECTOMY OPEN            SECTION N/A 2018    Procedure:  SECTION;   Section - twins;  Surgeon: Woodrow Hurtado MD;  Location:  OR     COLONOSCOPY N/A 7/10/2019    Procedure: COLONOSCOPY, WITH POLYPECTOMY AND BIOPSY;  Surgeon: Viki Villareal MD;  Location:  OR       Social History     Tobacco Use     Smoking status: Never Smoker     Smokeless tobacco: Never Used   Substance Use Topics     Alcohol use: Yes     Alcohol/week: 2.0 standard drinks     Comment: one daily      Family History   Problem Relation Age of Onset     Substance Abuse Maternal Grandmother         Alcohol/Drug,Alcohol abuse     Cancer Maternal Grandmother         Cancer, lung cancer     Other - See Comments Maternal Grandfather         Alzheimer's disease     Diabetes Paternal Grandmother         Diabetes,type 1     No Known Problems Sister      No Known Problems Son      No Known Problems Son      Diabetes Maternal Uncle         Diabetes,type 2     No Known Problems Son         2018     No Known Problems Daughter         2018     Heart Disease No family hx of         Heart Disease         Reviewed and updated as needed this visit by Provider  Tobacco  Allergies  Meds  Problems  Med Hx  Surg Hx  Fam Hx         Review of Systems   ROS COMP: CONSTITUTIONAL: NEGATIVE for fever, chills, change in weight  ENT/MOUTH: NEGATIVE for ear, mouth and throat problems  RESP: NEGATIVE for significant cough or SOB  CV: NEGATIVE for chest pain,  "palpitations or peripheral edema       Objective   Reported vitals:  Ht 1.676 m (5' 6\")   Wt 75.8 kg (167 lb)   Breastfeeding No   BMI 26.95 kg/m     healthy, alert and no distress  PSYCH: Alert and oriented times 3; coherent speech, normal   rate and volume, able to articulate logical thoughts, able   to abstract reason, no tangential thoughts, no hallucinations   or delusions  Her affect is normal  RESP: No cough, no audible wheezing, able to talk in full sentences  Remainder of exam unable to be completed due to telephone visits    Diagnostic Test Results:  Labs reviewed in Epic      Assessment/Plan:  1. Adjustment disorder with anxious mood  Has helped with Concerta; and avoiding returning to Wellbutrin or other medication at this time as she had some undesirable side effects on most anxiety medications she has been on in the past.   Refilled Concerta at 36mg daily x 3 months; refilled her unisom which she has used prior to Concerta initiation, at same dose.  Is due for contract and UDS in future physical appointment; this is unable to be completed today by phone visit.  She is aware of keeping medications protected/locked up and unavailable to her children at home.  - methylphenidate (CONCERTA) 36 MG CR tablet; Take 1 tablet (36 mg) by mouth daily  Dispense: 30 tablet; Refill: 0  - methylphenidate (CONCERTA) 36 MG CR tablet; Take 1 tablet (36 mg) by mouth every morning  Dispense: 30 tablet; Refill: 0  - methylphenidate (CONCERTA) 36 MG CR tablet; Take 1 tablet (36 mg) by mouth every morning  Dispense: 30 tablet; Refill: 0  - doxylamine (UNISOM) 25 MG TABS tablet; Take 0.5 tablets (12.5 mg) by mouth nightly as needed for sleep  Dispense: 45 tablet; Refill: 4    2. Attention deficit hyperactivity disorder (ADHD), predominantly inattentive type  See above.  - methylphenidate (CONCERTA) 36 MG CR tablet; Take 1 tablet (36 mg) by mouth daily  Dispense: 30 tablet; Refill: 0  - methylphenidate (CONCERTA) 36 MG CR " tablet; Take 1 tablet (36 mg) by mouth every morning  Dispense: 30 tablet; Refill: 0  - methylphenidate (CONCERTA) 36 MG CR tablet; Take 1 tablet (36 mg) by mouth every morning  Dispense: 30 tablet; Refill: 0    3. Anal fissure  Recurrent; controlled with fiber and regular stools.  Refilled her current citrucel supplement.  - methylcellulose (CITRUCEL) powder; Take 0.3 g (1.05 teaspoonful) by mouth daily  Dispense: 454 g; Refill: 3    Return in about 3 months (around 7/15/2020) for Controlled Substance/Medications - Concerta.      Phone call duration:  9 minutes  Start: 11:37 am  Stop: 11:46 am    Avril Sanders,   Family Practice

## 2020-04-15 NOTE — NURSING NOTE
"Chief Complaint   Patient presents with     Recheck Medication     Patient following up on medications. She has no concerns or side effects in regards to medications. She will need Concerta refilled for next month.     Initial Ht 1.676 m (5' 6\")   Wt 75.8 kg (167 lb)   Breastfeeding No   BMI 26.95 kg/m   Estimated body mass index is 26.95 kg/m  as calculated from the following:    Height as of this encounter: 1.676 m (5' 6\").    Weight as of this encounter: 75.8 kg (167 lb).  Medication Reconciliation: complete    Xenia Diaz MA  "

## 2020-04-28 ENCOUNTER — VIRTUAL VISIT (OUTPATIENT)
Dept: FAMILY MEDICINE | Facility: OTHER | Age: 40
End: 2020-04-28
Attending: FAMILY MEDICINE
Payer: COMMERCIAL

## 2020-04-28 DIAGNOSIS — Z20.818 STREPTOCOCCUS EXPOSURE: Primary | ICD-10-CM

## 2020-04-28 PROCEDURE — 99212 OFFICE O/P EST SF 10 MIN: CPT | Mod: 95 | Performed by: FAMILY MEDICINE

## 2020-04-28 NOTE — PROGRESS NOTES
"Maricarmen Ornelas is a 40 year old female who is being evaluated via a billable telephone visit.      The patient has been notified of following:     \"This telephone visit will be conducted via a call between you and your physician/provider. We have found that certain health care needs can be provided without the need for a physical exam.  This service lets us provide the care you need with a short phone conversation.  If a prescription is necessary we can send it directly to your pharmacy.  If lab work is needed we can place an order for that and you can then stop by our lab to have the test done at a later time.  Telephone visits are billed at different rates depending on your insurance coverage. During this emergency period, for some insurers they may be billed the same as an in-person visit.  Please reach out to your insurance provider with any questions.  If during the course of the call the physician/provider feels a telephone visit is not appropriate, you will not be charged for this service.\"    Patient has given verbal consent for Telephone visit?  Yes  How would you like to obtain your AVS? Yaohart    Subjective     Maricarmen Ornelas is a 40 year old female who is contacted today for the following health issues:    KENN Hernadez calls today with concerns of possible URI/strep.  Sunday; getting slightly better today.  Some off/dizzy feeling in afternoon; nauseated more in the afternoon/evening.  Wondering if she is needing reading glasses.    Kishore and herself were both diagnosed with strep within the last month.  Dizziness, chills, strep, achy.        Patient Active Problem List   Diagnosis     Attention deficit disorder     Lumbosacral spondylosis without myelopathy     Other staphylococcus infection in conditions classified elsewhere and of unspecified site     Twin pregnancy     Acquired hypothyroidism     Bilateral carpal tunnel syndrome     Adjustment disorder with anxious mood     Encounter for triage in pregnant " patient     S/P  section     Past Surgical History:   Procedure Laterality Date     APPENDECTOMY OPEN            SECTION N/A 2018    Procedure:  SECTION;   Section - twins;  Surgeon: Woodrow Hurtado MD;  Location:  OR     COLONOSCOPY N/A 7/10/2019    Procedure: COLONOSCOPY, WITH POLYPECTOMY AND BIOPSY;  Surgeon: Viki Villareal MD;  Location:  OR       Social History     Tobacco Use     Smoking status: Never Smoker     Smokeless tobacco: Never Used   Substance Use Topics     Alcohol use: Yes     Alcohol/week: 2.0 standard drinks     Comment: one daily      Family History   Problem Relation Age of Onset     Substance Abuse Maternal Grandmother         Alcohol/Drug,Alcohol abuse     Cancer Maternal Grandmother         Cancer, lung cancer     Other - See Comments Maternal Grandfather         Alzheimer's disease     Diabetes Paternal Grandmother         Diabetes,type 1     No Known Problems Sister      No Known Problems Son      No Known Problems Son      Diabetes Maternal Uncle         Diabetes,type 2     No Known Problems Son         2018     No Known Problems Daughter         2018     Heart Disease No family hx of         Heart Disease           Reviewed and updated as needed this visit by Provider         Review of Systems   ROS COMP: INTEGUMENTARY/SKIN: NEGATIVE for worrisome rashes, moles or lesions  RESP: NEGATIVE for significant cough or SOB  CV: NEGATIVE for chest pain, palpitations or peripheral edema       Objective   Reported vitals:  There were no vitals taken for this visit.   healthy, alert and no distress  PSYCH: Alert and oriented times 3; coherent speech, normal   rate and volume, able to articulate logical thoughts, able   to abstract reason, no tangential thoughts, no hallucinations   or delusions  Her affect is normal  RESP: No cough, no audible wheezing, able to talk in full sentences  Remainder of exam unable to be completed due to telephone  visits    Diagnostic Test Results:  none         Assessment/Plan:  1. Streptococcus exposure  No classic symptoms; and feeling improved today.  Continue to monitor.  Will order strep swab should symptoms fail to improve, worsen, or development of tonsillar exudate/fever, etc.  She can make lab only appointment to come collect.  Treating if necessary.  Otherwise, supportive cares.  Follow up prn.  - Group A Streptococcus PCR Throat Swab; Future    Also placed order for her son, and  to have testing completed should they get symptoms.    Phone call duration:  7 minutes  Start: 4:19 pm  Stop: 4:26 pm    Avril Sanders, DO  Family Practice

## 2020-05-07 ENCOUNTER — HOSPITAL ENCOUNTER (OUTPATIENT)
Dept: PHYSICAL THERAPY | Facility: OTHER | Age: 40
Setting detail: THERAPIES SERIES
End: 2020-05-07
Attending: FAMILY MEDICINE
Payer: COMMERCIAL

## 2020-05-07 PROCEDURE — 97140 MANUAL THERAPY 1/> REGIONS: CPT | Mod: GP

## 2020-05-07 PROCEDURE — 97112 NEUROMUSCULAR REEDUCATION: CPT | Mod: GP

## 2020-05-07 NOTE — PROGRESS NOTES
Outpatient Physical Therapy Progress Note     Patient: Maricarmen Ornelas  : 1980    Beginning/End Dates of Reporting Period:  20 to 2020    Referring Provider: DO Sophia Page Diagnosis: neck pain, thoracic and cervical segmental dysfunction, myofascial tightness     Client Self Report: Has been quite sore lately, carrying youngest children more. Pain with stretching, right side of neck, head. Pain turning head to left, pain on right. Patient's PT was put on hold due to COVID concerns and her symptoms worsened during that time period.     Objective Measurements:  Objective Measure: postural loading  Details: limited loading through right head, right shoulder; general listening right CT junction; local listening to C7, 1st rib right  Objective Measure: joint mobility  Details: ERS right C7-T1, superior subluzation of right 1st rib.  Objective Measure: myofascial  Details: deep cervcial fascia, middle cervical fascia                        Goals:  Goal Identifier lifting/carrying   Goal Description Patient to report 50% reduction in pain with carrying babies, using correct lifting techniques.    Target Date 20   Date Met      Progress:     Goal Identifier exercise   Goal Description Patient to report ability to exercise without upper back and neck pain.    Target Date 20   Date Met      Progress:     Goal Identifier sleep   Goal Description Patient to report ability to sleep without interupption from neck and upper back pain.   Target Date 20   Date Met      Progress:     Goal Identifier joint mobility   Goal Description Patient to demonstrate normal joint mechanics of thoracic and cervcal spine to allow movement without neck and upper bck pain.    Target Date 20   Date Met      Progress:     Goal Identifier righ tknee   Goal Description Patient to report ability to complete mobility without right knee pain.    Target Date 20   Date Met      Progress:      Progress Toward Goals:   Progress this reporting period: Patient's goals remain appropriate and will work towards goals completion. Goals not met at this time as episode of care was interrupted by COVID concerns.           Plan:  Continue therapy per current plan of care.    Discharge:  No

## 2020-05-15 NOTE — TELEPHONE ENCOUNTER
Called patient and unable to leave message regarding processed referrals and lab orders for updating diabetes.   Spoke with Maricarmen and she states that she would like PCP to be SMS even thought she saw J for her pregnancy. Can SMS please place this referral?  Airam Cortez............................... 11/7/2018 10:02 AM

## 2020-05-21 ENCOUNTER — HOSPITAL ENCOUNTER (OUTPATIENT)
Dept: PHYSICAL THERAPY | Facility: OTHER | Age: 40
Setting detail: THERAPIES SERIES
End: 2020-05-21
Attending: FAMILY MEDICINE
Payer: COMMERCIAL

## 2020-05-21 PROCEDURE — 97112 NEUROMUSCULAR REEDUCATION: CPT | Mod: GP

## 2020-05-21 PROCEDURE — 97140 MANUAL THERAPY 1/> REGIONS: CPT | Mod: GP

## 2020-05-26 DIAGNOSIS — F90.0 ATTENTION DEFICIT HYPERACTIVITY DISORDER (ADHD), PREDOMINANTLY INATTENTIVE TYPE: ICD-10-CM

## 2020-05-26 DIAGNOSIS — F43.22 ADJUSTMENT DISORDER WITH ANXIOUS MOOD: ICD-10-CM

## 2020-05-27 RX ORDER — METHYLPHENIDATE HYDROCHLORIDE 36 MG/1
36 TABLET ORAL DAILY
Qty: 30 TABLET | OUTPATIENT
Start: 2020-05-27

## 2020-05-27 NOTE — TELEPHONE ENCOUNTER
[id number] sent Rx request for the following:      METHYLPHENIDATE 36MG ER TABLET   Sig: Take 1 tablet (36 mg) by mouth daily      Last Prescription Date:   5/15/2020  Last Fill Qty/Refills:         30, R-0    Last Office Visit:              4/28/2020   Future Office visit:           none      Redundant refill request refused: Too soon:    Allen Dahl RN, BSN  ....................  5/27/2020   9:53 AM

## 2020-06-02 ENCOUNTER — OFFICE VISIT (OUTPATIENT)
Dept: FAMILY MEDICINE | Facility: OTHER | Age: 40
End: 2020-06-02
Attending: FAMILY MEDICINE
Payer: COMMERCIAL

## 2020-06-02 VITALS
TEMPERATURE: 97.3 F | BODY MASS INDEX: 28.08 KG/M2 | RESPIRATION RATE: 16 BRPM | SYSTOLIC BLOOD PRESSURE: 104 MMHG | HEART RATE: 69 BPM | DIASTOLIC BLOOD PRESSURE: 74 MMHG | WEIGHT: 174 LBS | OXYGEN SATURATION: 99 %

## 2020-06-02 DIAGNOSIS — Z13.1 SCREENING FOR DIABETES MELLITUS: ICD-10-CM

## 2020-06-02 DIAGNOSIS — S29.012A STRAIN OF LATISSIMUS DORSI MUSCLE, INITIAL ENCOUNTER: Primary | ICD-10-CM

## 2020-06-02 LAB
ANION GAP SERPL CALCULATED.3IONS-SCNC: 7 MMOL/L (ref 3–14)
BUN SERPL-MCNC: 10 MG/DL (ref 7–25)
CALCIUM SERPL-MCNC: 9.1 MG/DL (ref 8.6–10.3)
CHLORIDE SERPL-SCNC: 103 MMOL/L (ref 98–107)
CO2 SERPL-SCNC: 27 MMOL/L (ref 21–31)
CREAT SERPL-MCNC: 0.77 MG/DL (ref 0.6–1.2)
CRP SERPL-MCNC: 0.4 MG/L
ERYTHROCYTE [SEDIMENTATION RATE] IN BLOOD BY WESTERGREN METHOD: 3 MM/H (ref 1–15)
GFR SERPL CREATININE-BSD FRML MDRD: 83 ML/MIN/{1.73_M2}
GLUCOSE SERPL-MCNC: 96 MG/DL (ref 70–105)
HBA1C MFR BLD: 5.2 % (ref 4–6)
POTASSIUM SERPL-SCNC: 4.1 MMOL/L (ref 3.5–5.1)
SODIUM SERPL-SCNC: 137 MMOL/L (ref 134–144)

## 2020-06-02 PROCEDURE — 83036 HEMOGLOBIN GLYCOSYLATED A1C: CPT | Mod: ZL | Performed by: FAMILY MEDICINE

## 2020-06-02 PROCEDURE — 86140 C-REACTIVE PROTEIN: CPT | Mod: ZL | Performed by: FAMILY MEDICINE

## 2020-06-02 PROCEDURE — 80048 BASIC METABOLIC PNL TOTAL CA: CPT | Mod: ZL | Performed by: FAMILY MEDICINE

## 2020-06-02 PROCEDURE — 99213 OFFICE O/P EST LOW 20 MIN: CPT | Performed by: FAMILY MEDICINE

## 2020-06-02 PROCEDURE — 36415 COLL VENOUS BLD VENIPUNCTURE: CPT | Mod: ZL | Performed by: FAMILY MEDICINE

## 2020-06-02 PROCEDURE — 85652 RBC SED RATE AUTOMATED: CPT | Mod: ZL | Performed by: FAMILY MEDICINE

## 2020-06-02 RX ORDER — LORATADINE 10 MG/1
10 TABLET ORAL DAILY PRN
COMMUNITY
Start: 2020-06-02 | End: 2024-01-03

## 2020-06-02 ASSESSMENT — ENCOUNTER SYMPTOMS
DYSURIA: 0
CHEST TIGHTNESS: 0
FLANK PAIN: 0
NECK STIFFNESS: 0
CHILLS: 0
FEVER: 0
WOUND: 0
ABDOMINAL PAIN: 0
FATIGUE: 1
NECK PAIN: 0
CONSTIPATION: 0
DIARRHEA: 0
SHORTNESS OF BREATH: 0
COUGH: 0
APPETITE CHANGE: 0
ACTIVITY CHANGE: 0

## 2020-06-02 ASSESSMENT — PATIENT HEALTH QUESTIONNAIRE - PHQ9: SUM OF ALL RESPONSES TO PHQ QUESTIONS 1-9: 17

## 2020-06-02 ASSESSMENT — PAIN SCALES - GENERAL: PAINLEVEL: EXTREME PAIN (8)

## 2020-06-02 NOTE — PROGRESS NOTES
"Nursing Notes:   Rajani Ford LPN  6/2/2020  8:20 AM  Sign at exiting of workspace  Chief Complaint   Patient presents with     Shoulder     Patient states that she has been having bilateral shoulder pain and it has been worse the last few days. She has been doing physical therapy for her neck and shoulders every other week.    Initial /74   Pulse 69   Temp 97.3  F (36.3  C) (Tympanic)   Resp 16   Wt 78.9 kg (174 lb)   LMP 05/02/2020   SpO2 99%   Breastfeeding No   BMI 28.08 kg/m   Estimated body mass index is 28.08 kg/m  as calculated from the following:    Height as of 4/15/20: 1.676 m (5' 6\").    Weight as of this encounter: 78.9 kg (174 lb).  Medication Reconciliation: william Ford LPN      SUBJECTIVE:   Maricarmen Ornelas is a 40 year old female who presents to clinic today for the following health issues:    HPI  Maricarmen is here for shoulder pain.  Changes between each one; started noticing this more in the last couple months.  Has been moving rocks; and gardening.  Lifts her \"babies\" all the time (~1.5 years old).  Stroller movement (fixed wheel); has to push it down and rotate it to make turns - walks every other day.  R>L; feels deep to armpit.  Feels like she has lost strength, even having difficulty pushing herself up in bed.  Monday morning; had more difficulty taking a deep breath without \"unbearable\" pain in L lower ribs (side).  The things above make the pain more noticeable; Aleve does helps relieve it.    Has also tried PT every other week (helping neck/back).    Feeling like she is having some blood sugar issues.  Not quite herself; wondering what her levels would be.      PHQ 1/22/2020 4/15/2020 6/2/2020   PHQ-9 Total Score 5 17 17   Q9: Thoughts of better off dead/self-harm past 2 weeks Not at all Not at all Several days   F/U: Thoughts of suicide or self-harm - - -   F/U: Self harm-plan - - -   F/U: Self-harm action - - -   F/U: Safety concerns - - -     MINNIE-7 SCORE " 10/10/2019 2019 2020   Total Score 13 4 7         Patient Active Problem List    Diagnosis Date Noted     S/P  section 2018     Priority: Medium     Encounter for triage in pregnant patient 2018     Priority: Medium     Bilateral carpal tunnel syndrome 2018     Priority: Medium     Adjustment disorder with anxious mood 2018     Priority: Medium     Acquired hypothyroidism 2018     Priority: Medium     Attention deficit disorder 2018     Priority: Medium     Twin pregnancy 2018     Priority: Medium     EDC 2018       Lumbosacral spondylosis without myelopathy 03/10/2015     Priority: Medium     Other staphylococcus infection in conditions classified elsewhere and of unspecified site 2011     Priority: Medium     Past Medical History:   Diagnosis Date     Dichorionic diamniotic twin pregnancy in third trimester 2018     Disorder of thyroid     hypo     Supervision of elderly multigravida     2018     Twin pregnancy     EDC 2018      Past Surgical History:   Procedure Laterality Date     APPENDECTOMY OPEN            SECTION N/A 2018    Procedure:  SECTION;   Section - twins;  Surgeon: Woodrow Hurtado MD;  Location:  OR     COLONOSCOPY N/A 7/10/2019    Procedure: COLONOSCOPY, WITH POLYPECTOMY AND BIOPSY;  Surgeon: Viki Villareal MD;  Location:  OR     Family History   Problem Relation Age of Onset     Substance Abuse Maternal Grandmother         Alcohol/Drug,Alcohol abuse     Cancer Maternal Grandmother         Cancer, lung cancer     Other - See Comments Maternal Grandfather         Alzheimer's disease     Diabetes Paternal Grandmother         Diabetes,type 1     No Known Problems Sister      No Known Problems Son      No Known Problems Son      Diabetes Maternal Uncle         Diabetes,type 2     No Known Problems Son         2018     No Known Problems Daughter         2018     Heart Disease No family hx  of         Heart Disease     Social History     Tobacco Use     Smoking status: Never Smoker     Smokeless tobacco: Never Used   Substance Use Topics     Alcohol use: Yes     Alcohol/week: 2.0 standard drinks     Comment: one daily      Social History     Social History Narrative       - Ki Parry      Sons: Franco Linn ,    Daughter:    Father: Jose;  Mother: Bronwyn      Siblings: Sister Nydia,  Joelle     Current Outpatient Medications   Medication Sig Dispense Refill     loratadine (CLARITIN) 10 MG tablet Take 1 tablet (10 mg) by mouth daily       doxylamine (UNISOM) 25 MG TABS tablet Take 0.5 tablets (12.5 mg) by mouth nightly as needed for sleep 45 tablet 4     medical cannabis (Patient's own supply) See Admin Instructions (The purpose of this order is to document that the patient reports taking medical cannabis.  This is not a prescription, and is not used to certify that the patient has a qualifying medical condition.)       methylcellulose (CITRUCEL) powder Take 0.3 g (1.05 teaspoonful) by mouth daily 454 g 3     methylphenidate (CONCERTA) 36 MG CR tablet Take 1 tablet (36 mg) by mouth daily 30 tablet 0     methylphenidate (CONCERTA) 36 MG CR tablet Take 1 tablet (36 mg) by mouth every morning 30 tablet 0     [START ON 6/13/2020] methylphenidate (CONCERTA) 36 MG CR tablet Take 1 tablet (36 mg) by mouth every morning 30 tablet 0     SYNTHROID 75 MCG tablet Take 1 tablet (75 mcg) by mouth daily 90 tablet 4     Allergies   Allergen Reactions     Seasonal Allergies Itching     Chloraprep One Step Rash     With twin C/S (9/2018)     Review of Systems   Constitutional: Positive for fatigue. Negative for activity change, appetite change, chills and fever.   Respiratory: Negative for cough, chest tightness and shortness of breath.    Cardiovascular: Negative for chest pain and peripheral edema.   Gastrointestinal: Negative for abdominal pain, constipation and diarrhea.   Genitourinary:  Negative for dysuria and flank pain.   Musculoskeletal: Negative for neck pain and neck stiffness.   Skin: Negative for rash and wound.        OBJECTIVE:     /74   Pulse 69   Temp 97.3  F (36.3  C) (Tympanic)   Resp 16   Wt 78.9 kg (174 lb)   LMP 05/02/2020   SpO2 99%   Breastfeeding No   BMI 28.08 kg/m    Body mass index is 28.08 kg/m .  Physical Exam  Vitals signs and nursing note reviewed.   Constitutional:       Appearance: Normal appearance.   HENT:      Head: Normocephalic and atraumatic.      Right Ear: Tympanic membrane and ear canal normal.      Left Ear: Tympanic membrane and ear canal normal.   Neck:      Musculoskeletal: Normal range of motion.   Cardiovascular:      Rate and Rhythm: Normal rate and regular rhythm.      Pulses: Normal pulses.      Heart sounds: Normal heart sounds.   Pulmonary:      Effort: Pulmonary effort is normal.      Breath sounds: Normal breath sounds.   Musculoskeletal: Normal range of motion.         General: No swelling, tenderness, deformity or signs of injury.      Right lower leg: No edema.      Left lower leg: No edema.      Comments: Point tenderness of latissimus dorsi with position on bed.  Reproduces pain.   Skin:     Capillary Refill: Capillary refill takes less than 2 seconds.   Neurological:      General: No focal deficit present.      Mental Status: She is alert.   Psychiatric:         Mood and Affect: Mood normal.         Behavior: Behavior normal.       Diagnostic Test Results:  Results for orders placed or performed in visit on 06/02/20   Sedimentation Rate (ESR)     Status: None   Result Value Ref Range    Sed Rate 3 1 - 15 mm/h   CRP inflammation     Status: None   Result Value Ref Range    CRP Inflammation 0.4 <0.5 mg/L   Basic Metabolic Panel     Status: None   Result Value Ref Range    Sodium 137 134 - 144 mmol/L    Potassium 4.1 3.5 - 5.1 mmol/L    Chloride 103 98 - 107 mmol/L    Carbon Dioxide 27 21 - 31 mmol/L    Anion Gap 7 3 - 14 mmol/L     Glucose 96 70 - 105 mg/dL    Urea Nitrogen 10 7 - 25 mg/dL    Creatinine 0.77 0.60 - 1.20 mg/dL    GFR Estimate 83 >60 mL/min/[1.73_m2]    GFR Estimate If Black >90 >60 mL/min/[1.73_m2]    Calcium 9.1 8.6 - 10.3 mg/dL   Hemoglobin A1c     Status: None   Result Value Ref Range    Hemoglobin A1C 5.2 4.0 - 6.0 %       ASSESSMENT/PLAN:     1. Strain of latissimus dorsi muscle, initial encounter  Obtain ESR/CRP due to duration of discomfort; rule out any shoulder girdle/possible PMR.   She will continue to RICE and provide activity modification from either the wheelbarrow, carrying rocks or the stroller (or all three and return to activity as tolerated).  - Sedimentation Rate (ESR)  - CRP inflammation    2. Screening for diabetes mellitus  Labs obtained along with ESR/CRP.  Screening purposes.  Reassurance if normal.  - Basic Metabolic Panel  - Hemoglobin A1c      Avril Sanders DO  [facility]    Called patient with normal results.  Avril Sanders DO on 6/2/2020 at 1:31 PM

## 2020-06-02 NOTE — NURSING NOTE
"Chief Complaint   Patient presents with     Shoulder     Patient states that she has been having bilateral shoulder pain and it has been worse the last few days. She has been doing physical therapy for her neck and shoulders every other week.  Initial /74   Pulse 69   Temp 97.3  F (36.3  C) (Tympanic)   Resp 16   Wt 78.9 kg (174 lb)   LMP 05/02/2020   SpO2 99%   Breastfeeding No   BMI 28.08 kg/m   Estimated body mass index is 28.08 kg/m  as calculated from the following:    Height as of 4/15/20: 1.676 m (5' 6\").    Weight as of this encounter: 78.9 kg (174 lb).  Medication Reconciliation: complete    Rajani Ford LPN  "

## 2020-06-04 ENCOUNTER — HOSPITAL ENCOUNTER (OUTPATIENT)
Dept: PHYSICAL THERAPY | Facility: OTHER | Age: 40
Setting detail: THERAPIES SERIES
End: 2020-06-04
Attending: FAMILY MEDICINE
Payer: COMMERCIAL

## 2020-06-04 PROCEDURE — 97140 MANUAL THERAPY 1/> REGIONS: CPT | Mod: GP

## 2020-06-18 ENCOUNTER — HOSPITAL ENCOUNTER (OUTPATIENT)
Dept: PHYSICAL THERAPY | Facility: OTHER | Age: 40
Setting detail: THERAPIES SERIES
End: 2020-06-18
Attending: FAMILY MEDICINE
Payer: COMMERCIAL

## 2020-06-18 PROCEDURE — 97140 MANUAL THERAPY 1/> REGIONS: CPT | Mod: GP

## 2020-06-22 DIAGNOSIS — F90.0 ATTENTION DEFICIT HYPERACTIVITY DISORDER (ADHD), PREDOMINANTLY INATTENTIVE TYPE: ICD-10-CM

## 2020-06-22 DIAGNOSIS — F43.22 ADJUSTMENT DISORDER WITH ANXIOUS MOOD: ICD-10-CM

## 2020-06-23 RX ORDER — METHYLPHENIDATE HYDROCHLORIDE 36 MG/1
TABLET ORAL
Qty: 30 TABLET | OUTPATIENT
Start: 2020-06-23

## 2020-06-23 NOTE — TELEPHONE ENCOUNTER
"Received refill request for Methylphenidate (CONCERTA) 36 MG CR      04/15/2020 Virtual Visit-Return in about 3 months (around 7/15/2020) for Controlled Substance/Medications - Concerta.    Contacted patient to remind her that a visit is needed for refills of this medication. \" I just filled this medication yesterday for 30 days\". Patient agreed to be transferred to scheduling to set up visit for refills.     Jul 17, 2020  1:20 PM CDT  SHORT with Avril Sanders DO  [facility] ([facility]) [address]  [place] [id number] [phone]           Unable to complete prescription refill per RNMedication Refill Policy.................... Malathi Luz RN ....................  6/23/2020   2:02 PM          "

## 2020-06-29 ENCOUNTER — HOSPITAL ENCOUNTER (OUTPATIENT)
Dept: PHYSICAL THERAPY | Facility: OTHER | Age: 40
Setting detail: THERAPIES SERIES
End: 2020-06-29
Attending: FAMILY MEDICINE
Payer: COMMERCIAL

## 2020-06-29 PROCEDURE — 97140 MANUAL THERAPY 1/> REGIONS: CPT | Mod: GP

## 2020-07-10 ENCOUNTER — OFFICE VISIT (OUTPATIENT)
Dept: FAMILY MEDICINE | Facility: OTHER | Age: 40
End: 2020-07-10
Attending: FAMILY MEDICINE
Payer: COMMERCIAL

## 2020-07-10 VITALS
BODY MASS INDEX: 27.29 KG/M2 | DIASTOLIC BLOOD PRESSURE: 76 MMHG | TEMPERATURE: 98 F | HEART RATE: 72 BPM | SYSTOLIC BLOOD PRESSURE: 110 MMHG | HEIGHT: 66 IN | OXYGEN SATURATION: 98 % | RESPIRATION RATE: 18 BRPM | WEIGHT: 169.8 LBS

## 2020-07-10 DIAGNOSIS — G89.29 CHRONIC RIGHT SHOULDER PAIN: Primary | ICD-10-CM

## 2020-07-10 DIAGNOSIS — M25.511 CHRONIC RIGHT SHOULDER PAIN: Primary | ICD-10-CM

## 2020-07-10 PROCEDURE — 99213 OFFICE O/P EST LOW 20 MIN: CPT | Performed by: FAMILY MEDICINE

## 2020-07-10 ASSESSMENT — MIFFLIN-ST. JEOR: SCORE: 1461.96

## 2020-07-10 ASSESSMENT — PAIN SCALES - GENERAL: PAINLEVEL: SEVERE PAIN (7)

## 2020-07-10 NOTE — PROGRESS NOTES
"SUBJECTIVE:  Maricarmen Ornelas is a 40 year old female here for right posterior shoulder pain.  She has had this for last couple of months.  She has 22-month-old twins at home.  She is right-hand dominant.  She is had no trauma.  She is not had right shoulder problems in the past.  She states that her pain is posterior and is worsened when she sleeps on her right side at night or if she is reaching out in front of her.  She occasionally uses anti-inflammatories however she does not tolerate these over prolonged period time due to GI side effects.  She is also been working with physical therapy mostly for her neck which seems to flareup her shoulder.    ROS:    As above otherwise ROS is unremarkable.    OBJECTIVE:  /76   Pulse 72   Temp 98  F (36.7  C)   Resp 18   Ht 1.676 m (5' 6\")   Wt 77 kg (169 lb 12.8 oz)   SpO2 98%   BMI 27.41 kg/m      EXAM:  General Appearance: Pleasant, alert, appropriate appearance for age. No acute distress  Musculoskeletal: Right shoulder shows normal flexion, abduction internal Tatian.  No AC joint tenderness.  She has no tenderness palpation along her scapula but reports that her pain is usually along the lateral border of her scapula and lateral chest wall.  She has posterior upper arm pain with deltoid testing.  Supraspinatus, internal X rotation strength testing is normal.  No pain with Mahoney or Neer's.  Normal biceps testing.    ASSESSEMENT AND PLAN:    1. Chronic right shoulder pain      She is relatively pain-free today however her symptoms are consistent with serratus anterior or teres muscle dysfunction.  Suspect that her symptoms are due to overuse from picking her 22-month-old twins up frequently throughout the day.  Recommend that she try to limit this as much as possible, anti-inflammatories as much she is able to tolerate, ice.  We will also refer back to physical therapy for scapular stabilizer exercises.  Reassurance was given that I do not think this is " intra-articular in the shoulder so I do not think imaging would be helpful.  She previously had labs drawn for PMR which came back negative.    Thong Ballesteros MD    This document was prepared using voice generated software.  While every attempt was made for accuracy, grammatical errors may exist.

## 2020-07-10 NOTE — NURSING NOTE
Patient presents today for right shoulder pain that started 1 month ago.     Medication Reconciliation Complete    Niki Mauricio LPN  7/10/2020 2:57 PM

## 2020-07-17 ENCOUNTER — VIRTUAL VISIT (OUTPATIENT)
Dept: FAMILY MEDICINE | Facility: OTHER | Age: 40
End: 2020-07-17
Attending: FAMILY MEDICINE
Payer: COMMERCIAL

## 2020-07-17 VITALS — BODY MASS INDEX: 27.12 KG/M2 | WEIGHT: 168 LBS

## 2020-07-17 DIAGNOSIS — F90.0 ATTENTION DEFICIT HYPERACTIVITY DISORDER (ADHD), PREDOMINANTLY INATTENTIVE TYPE: ICD-10-CM

## 2020-07-17 DIAGNOSIS — F43.22 ADJUSTMENT DISORDER WITH ANXIOUS MOOD: ICD-10-CM

## 2020-07-17 PROCEDURE — 99212 OFFICE O/P EST SF 10 MIN: CPT | Mod: 95 | Performed by: FAMILY MEDICINE

## 2020-07-17 RX ORDER — METHYLPHENIDATE HYDROCHLORIDE 36 MG/1
36 TABLET ORAL DAILY
Qty: 30 TABLET | Refills: 0 | Status: SHIPPED | OUTPATIENT
Start: 2020-09-15 | End: 2020-08-04 | Stop reason: DRUGHIGH

## 2020-07-17 RX ORDER — METHYLPHENIDATE HYDROCHLORIDE 36 MG/1
36 TABLET ORAL EVERY MORNING
Qty: 30 TABLET | Refills: 0 | Status: SHIPPED | OUTPATIENT
Start: 2020-07-17 | End: 2020-09-17

## 2020-07-17 RX ORDER — METHYLPHENIDATE HYDROCHLORIDE 36 MG/1
36 TABLET ORAL EVERY MORNING
Qty: 30 TABLET | Refills: 0 | Status: SHIPPED | OUTPATIENT
Start: 2020-08-16 | End: 2020-08-04 | Stop reason: DRUGHIGH

## 2020-07-17 ASSESSMENT — ANXIETY QUESTIONNAIRES
IF YOU CHECKED OFF ANY PROBLEMS ON THIS QUESTIONNAIRE, HOW DIFFICULT HAVE THESE PROBLEMS MADE IT FOR YOU TO DO YOUR WORK, TAKE CARE OF THINGS AT HOME, OR GET ALONG WITH OTHER PEOPLE: VERY DIFFICULT
3. WORRYING TOO MUCH ABOUT DIFFERENT THINGS: NEARLY EVERY DAY
7. FEELING AFRAID AS IF SOMETHING AWFUL MIGHT HAPPEN: NEARLY EVERY DAY
GAD7 TOTAL SCORE: 16
5. BEING SO RESTLESS THAT IT IS HARD TO SIT STILL: NOT AT ALL
1. FEELING NERVOUS, ANXIOUS, OR ON EDGE: NEARLY EVERY DAY
2. NOT BEING ABLE TO STOP OR CONTROL WORRYING: MORE THAN HALF THE DAYS
6. BECOMING EASILY ANNOYED OR IRRITABLE: MORE THAN HALF THE DAYS

## 2020-07-17 ASSESSMENT — PATIENT HEALTH QUESTIONNAIRE - PHQ9
5. POOR APPETITE OR OVEREATING: NEARLY EVERY DAY
SUM OF ALL RESPONSES TO PHQ QUESTIONS 1-9: 3

## 2020-07-17 ASSESSMENT — PAIN SCALES - GENERAL: PAINLEVEL: NO PAIN (0)

## 2020-07-17 NOTE — PROGRESS NOTES
"Maricarmen Ornelas is a 40 year old female who is being evaluated via a billable telephone visit.      The patient has been notified of following:     \"This telephone visit will be conducted via a call between you and your physician/provider. We have found that certain health care needs can be provided without the need for a physical exam.  This service lets us provide the care you need with a short phone conversation.  If a prescription is necessary we can send it directly to your pharmacy.  If lab work is needed we can place an order for that and you can then stop by our lab to have the test done at a later time.    Telephone visits are billed at different rates depending on your insurance coverage. During this emergency period, for some insurers they may be billed the same as an in-person visit.  Please reach out to your insurance provider with any questions.    If during the course of the call the physician/provider feels a telephone visit is not appropriate, you will not be charged for this service.\"  Patient has given verbal consent for Telephone visit?  Yes  What phone number would you like to be contacted at? [phone]  How would you like to obtain your AVS? [facility]    Nursing Notes:   Josie Garcia LPN  7/17/2020  1:38 PM  Signed  Patient is having a virtual visit for medication check for concerta. She has no concerns on the medication.  Patient's last menstrual period was 07/08/2020 (approximate).  Medication Reconciliation: complete  Josie Garcia LPN  7/17/2020 1:36 PM    Subjective   Maricarmen Ornelas is a 40 year old female who presents via phone visit today for the following health issues:    HPI  Maricarmen is here for medication check for ADHD.  Currently on Concerta 36mg daily.  Helpful for getting through work, and able to manage household needs along with children activities.  Feels like things could be improved - as her anxiety is still high.  Has difficulty having all of her children home at one " time.  Older two boys are difficult to manage at times, and then the energy and chaos of the twins does her in.  She does not want to try an increase in dose at this time however, because she doesn't want it to make her anxiety worsen.    [place]  access[place] and [place].  Contract: NEED at next in person visit  UDS: NEED at next in person visit    PHQ 4/15/2020 2020 2020   PHQ-9 Total Score 17 17 3   Q9: Thoughts of better off dead/self-harm past 2 weeks Not at all Several days Not at all   F/U: Thoughts of suicide or self-harm - - -   F/U: Self harm-plan - - -   F/U: Self-harm action - - -   F/U: Safety concerns - - -     MINNIE-7 SCORE 2019   Total Score 4 7 16           Patient Active Problem List   Diagnosis     Attention deficit disorder     Lumbosacral spondylosis without myelopathy     Other staphylococcus infection in conditions classified elsewhere and of unspecified site     Twin pregnancy     Acquired hypothyroidism     Bilateral carpal tunnel syndrome     Adjustment disorder with anxious mood     Encounter for triage in pregnant patient     S/P  section     Past Surgical History:   Procedure Laterality Date     APPENDECTOMY OPEN            SECTION N/A 2018    Procedure:  SECTION;   Section - twins;  Surgeon: Woodrow Hurtado MD;  Location:  OR     COLONOSCOPY N/A 7/10/2019    Procedure: COLONOSCOPY, WITH POLYPECTOMY AND BIOPSY;  Surgeon: Viki Villareal MD;  Location:  OR       Social History     Tobacco Use     Smoking status: Never Smoker     Smokeless tobacco: Never Used   Substance Use Topics     Alcohol use: Yes     Alcohol/week: 2.0 standard drinks     Comment: one daily      Family History   Problem Relation Age of Onset     Substance Abuse Maternal Grandmother         Alcohol/Drug,Alcohol abuse     Cancer Maternal Grandmother         Cancer, lung cancer     Other - See Comments Maternal Grandfather         Alzheimer's disease      Diabetes Paternal Grandmother         Diabetes,type 1     No Known Problems Sister      No Known Problems Son      No Known Problems Son      Diabetes Maternal Uncle         Diabetes,type 2     No Known Problems Son         2018     No Known Problems Daughter         2018     Heart Disease No family hx of         Heart Disease           Reviewed and updated as needed this visit by Provider  Tobacco  Allergies  Meds  Problems  Med Hx  Surg Hx  Fam Hx         Review of Systems   CONSTITUTIONAL: NEGATIVE for fever, chills, change in weight  ENT/MOUTH: NEGATIVE for ear, mouth and throat problems  RESP: NEGATIVE for significant cough or SOB  CV: NEGATIVE for chest pain, palpitations or peripheral edema       Objective   Reported vitals:  Wt 76.2 kg (168 lb)   LMP 07/08/2020 (Approximate)   Breastfeeding No   BMI 27.12 kg/m     healthy, alert and no distress  PSYCH: Alert and oriented times 3; coherent speech, normal   rate and volume, able to articulate logical thoughts, able   to abstract reason, no tangential thoughts, no hallucinations   or delusions  Her affect is normal  RESP: No cough, no audible wheezing, able to talk in full sentences  Remainder of exam unable to be completed due to telephone visits    Diagnostic Test Results:  none         Assessment/Plan:    1. Adjustment disorder with anxious mood  - methylphenidate (CONCERTA) 36 MG CR tablet; Take 1 tablet (36 mg) by mouth daily  Dispense: 30 tablet; Refill: 0  - methylphenidate (CONCERTA) 36 MG CR tablet; Take 1 tablet (36 mg) by mouth every morning  Dispense: 30 tablet; Refill: 0  - methylphenidate (CONCERTA) 36 MG CR tablet; Take 1 tablet (36 mg) by mouth every morning  Dispense: 30 tablet; Refill: 0    2. Attention deficit hyperactivity disorder (ADHD), predominantly inattentive type  Chronic, controlled relatively well with Concerta; denies needing dosage change now.  Will consider this fall when she is returning to courses and possibly  having to manage home schooling due to Covid-19 restrictions.   For now: [place]  accessed and appropriate.  Refilled x 3 months.  - methylphenidate (CONCERTA) 36 MG CR tablet; Take 1 tablet (36 mg) by mouth daily  Dispense: 30 tablet; Refill: 0  - methylphenidate (CONCERTA) 36 MG CR tablet; Take 1 tablet (36 mg) by mouth every morning  Dispense: 30 tablet; Refill: 0  - methylphenidate (CONCERTA) 36 MG CR tablet; Take 1 tablet (36 mg) by mouth every morning  Dispense: 30 tablet; Refill: 0  - Drug  Screen Comprehensive , Urine with Reported Meds (Med[employer]) (Pain Care Package); Future    Return in about 3 months (around 10/17/2020) for Controlled Substance/Medications.      Phone call duration:  9 minutes  Start: 1:44 PM  Stop: 1:53 PM    Avril Sanders,   Family Practice

## 2020-07-17 NOTE — NURSING NOTE
Patient is having a virtual visit for medication check for concerta. She has no concerns on the medication.    Patient's last menstrual period was 07/08/2020 (approximate).  Medication Reconciliation: complete    Josie Garcia LPN  7/17/2020 1:36 PM

## 2020-07-18 ASSESSMENT — ANXIETY QUESTIONNAIRES: GAD7 TOTAL SCORE: 16

## 2020-07-22 ENCOUNTER — HOSPITAL ENCOUNTER (OUTPATIENT)
Dept: PHYSICAL THERAPY | Facility: OTHER | Age: 40
Setting detail: THERAPIES SERIES
End: 2020-07-22
Attending: FAMILY MEDICINE
Payer: COMMERCIAL

## 2020-07-22 PROCEDURE — 97112 NEUROMUSCULAR REEDUCATION: CPT | Mod: GP

## 2020-07-27 ENCOUNTER — HOSPITAL ENCOUNTER (OUTPATIENT)
Dept: PHYSICAL THERAPY | Facility: OTHER | Age: 40
Setting detail: THERAPIES SERIES
End: 2020-07-27
Attending: FAMILY MEDICINE
Payer: COMMERCIAL

## 2020-07-27 PROCEDURE — 97112 NEUROMUSCULAR REEDUCATION: CPT | Mod: GP

## 2020-07-27 NOTE — PROGRESS NOTES
Outpatient Physical Therapy Progress Note     Patient: Maricarmen Ornelas  : 1980    Beginning/End Dates of Reporting Period:  20 to 2020    Referring Provider: DO Sophia Page Diagnosis: neck pain, thoracic and cervical segmental dysfunction, myofascial tightness     Client Self Report: (P) Was sore for a couple of days after last session, in general it's been a little better. Finger tips go numb with median nerve glide    Objective Measurements:  Objective Measure: postural loading  Details: limited loading through shoulder R/R L/R; general listening anterior right posterior shoulder; local listening right radial nerve  Objective Measure: joint mobility     ULLT: + right median and radial    Goals:  Goal Identifier lifting/carrying   Goal Description Patient to report 50% reduction in pain with carrying babies, using correct lifting techniques.    Target Date 20   Date Met      Progress:     Goal Identifier exercise   Goal Description Patient to report ability to exercise without upper back and neck pain.    Target Date 20   Date Met      Progress:     Goal Identifier sleep   Goal Description Patient to report ability to sleep without interupption from neck and upper back pain.   Target Date 20   Date Met      Progress:     Goal Identifier joint mobility   Goal Description Patient to demonstrate normal joint mechanics of thoracic and cervcal spine to allow movement without neck and upper bck pain.    Target Date 20   Date Met      Progress:     Goal Identifier righ tknee   Goal Description Patient to report ability to complete mobility without right knee pain.    Target Date 20   Date Met   20   Progress:       Progress Toward Goals:   Progress this reporting period: knee is doing well, improved mechanics of thoracic spine. Still dealing with neck and right arm symptoms that appear to be involving the radial, axillary and median nerve with noted  restriction of the right brachial plexus from the neck through the axilla. Will continue to work on improving nerve mobility and reduce irritation to allow sleep and use of right arm without pain and numbness.           Plan:  Continue therapy per current plan of care.    Discharge:  No

## 2020-08-03 ENCOUNTER — TELEPHONE (OUTPATIENT)
Dept: FAMILY MEDICINE | Facility: OTHER | Age: 40
End: 2020-08-03

## 2020-08-03 DIAGNOSIS — F90.0 ATTENTION DEFICIT HYPERACTIVITY DISORDER (ADHD), PREDOMINANTLY INATTENTIVE TYPE: Primary | ICD-10-CM

## 2020-08-03 NOTE — TELEPHONE ENCOUNTER
Patient states at recent virtual visit was discussing doing a dose change for fall, but last time a change in dosing required a P.A. patient is worried if she waits to find out when needing med she will be out and then have side effects again.   She is hoping to start new dose in august, will be due mid august.   Josie Garcia LPN .............8/3/2020     3:32 PM      Currently on concerta 36 mg daily, pending concerta 54 mg daily.

## 2020-08-03 NOTE — TELEPHONE ENCOUNTER
Patient called wanting to get her Concerta medication dosage changed as per last visit discussion.    Holly Goodwin on 8/3/2020 at 3:21 PM

## 2020-08-04 RX ORDER — METHYLPHENIDATE HYDROCHLORIDE 54 MG/1
54 TABLET ORAL DAILY
Qty: 30 TABLET | Refills: 0 | Status: SHIPPED | OUTPATIENT
Start: 2020-09-15 | End: 2020-11-24

## 2020-08-04 RX ORDER — METHYLPHENIDATE HYDROCHLORIDE 54 MG/1
54 TABLET ORAL DAILY
Qty: 30 TABLET | Refills: 0 | Status: SHIPPED | OUTPATIENT
Start: 2020-08-16 | End: 2020-11-24

## 2020-08-04 RX ORDER — METHYLPHENIDATE HYDROCHLORIDE 54 MG/1
54 TABLET ORAL DAILY
Qty: 30 TABLET | Refills: 0 | Status: SHIPPED | OUTPATIENT
Start: 2020-10-15 | End: 2020-11-24

## 2020-08-04 NOTE — TELEPHONE ENCOUNTER
Agree; as discussed at most recent Virtual visit and upcoming school, potential distance learning amidst Covid-19 pandemic, etc.  Patient has otherwise regular follow up and no concerns of misuse of medications.  Avril Sanders, DO on 8/4/2020 at 7:15 AM

## 2020-08-06 ENCOUNTER — HOSPITAL ENCOUNTER (OUTPATIENT)
Dept: PHYSICAL THERAPY | Facility: OTHER | Age: 40
Setting detail: THERAPIES SERIES
End: 2020-08-06
Attending: FAMILY MEDICINE
Payer: COMMERCIAL

## 2020-08-06 PROCEDURE — 97140 MANUAL THERAPY 1/> REGIONS: CPT | Mod: GP

## 2020-08-06 PROCEDURE — 97112 NEUROMUSCULAR REEDUCATION: CPT | Mod: GP

## 2020-08-10 ENCOUNTER — TELEPHONE (OUTPATIENT)
Dept: FAMILY MEDICINE | Facility: OTHER | Age: 40
End: 2020-08-10

## 2020-08-11 NOTE — TELEPHONE ENCOUNTER
OK to call pharmacy and approve running Rx today as PA will be needed.    Avril Sanders, DO on 8/11/2020 at 12:48 PM

## 2020-08-11 NOTE — TELEPHONE ENCOUNTER
Pharmacy notified of providers note and will try to run this today.    Patient notified of current status and denies any further concerns at this time.      Katarina Escobar LPN 8/11/2020 1:17 PM

## 2020-08-11 NOTE — TELEPHONE ENCOUNTER
Patient feels that her insurance with need a PA with a change in dosing.  This happened for her son and herself in the past.    Patient is wondering if she can try to get the pharmacy to run the prescription sooner rather than later because is takes a while to get PA's completed.  Prescription is dated for 8-16-20.      Katarina Escobar LPN 8/11/2020 12:34 PM

## 2020-08-17 ENCOUNTER — HOSPITAL ENCOUNTER (OUTPATIENT)
Dept: PHYSICAL THERAPY | Facility: OTHER | Age: 40
Setting detail: THERAPIES SERIES
End: 2020-08-17
Attending: FAMILY MEDICINE
Payer: COMMERCIAL

## 2020-08-17 DIAGNOSIS — G89.29 CHRONIC RIGHT SHOULDER PAIN: ICD-10-CM

## 2020-08-17 DIAGNOSIS — M25.511 CHRONIC RIGHT SHOULDER PAIN: ICD-10-CM

## 2020-08-17 PROCEDURE — 97112 NEUROMUSCULAR REEDUCATION: CPT | Mod: GP

## 2020-08-17 PROCEDURE — 97140 MANUAL THERAPY 1/> REGIONS: CPT | Mod: GP

## 2020-08-25 ENCOUNTER — HOSPITAL ENCOUNTER (OUTPATIENT)
Dept: PHYSICAL THERAPY | Facility: OTHER | Age: 40
Setting detail: THERAPIES SERIES
End: 2020-08-25
Attending: FAMILY MEDICINE
Payer: COMMERCIAL

## 2020-08-25 PROCEDURE — 97112 NEUROMUSCULAR REEDUCATION: CPT | Mod: GP

## 2020-08-25 PROCEDURE — 97140 MANUAL THERAPY 1/> REGIONS: CPT | Mod: GP

## 2020-08-27 ENCOUNTER — TELEPHONE (OUTPATIENT)
Dept: FAMILY MEDICINE | Facility: OTHER | Age: 40
End: 2020-08-27

## 2020-08-27 ENCOUNTER — THERAPY VISIT (OUTPATIENT)
Dept: CHIROPRACTIC MEDICINE | Facility: OTHER | Age: 40
End: 2020-08-27
Attending: CHIROPRACTOR
Payer: COMMERCIAL

## 2020-08-27 DIAGNOSIS — M54.2 DORSALGIA OF CERVICAL REGION: ICD-10-CM

## 2020-08-27 DIAGNOSIS — M54.12 CERVICAL RADICULOPATHY: ICD-10-CM

## 2020-08-27 DIAGNOSIS — M62.838 SPASM OF MUSCLE: ICD-10-CM

## 2020-08-27 DIAGNOSIS — M99.01 SEGMENTAL AND SOMATIC DYSFUNCTION OF CERVICAL REGION: ICD-10-CM

## 2020-08-27 DIAGNOSIS — G54.0 THORACIC OUTLET SYNDROME OF RIGHT THORACIC OUTLET: Primary | ICD-10-CM

## 2020-08-27 PROCEDURE — 97810 ACUP 1/> WO ESTIM 1ST 15 MIN: CPT | Performed by: CHIROPRACTOR

## 2020-08-27 PROCEDURE — 99213 OFFICE O/P EST LOW 20 MIN: CPT | Mod: 25 | Performed by: CHIROPRACTOR

## 2020-08-27 NOTE — PROGRESS NOTES
PATIENT:  Maricarmen Ornelas is a 40 year old female presenting for neck and upper back pain with radicular complaints into the right arm    PROBLEM:   Date of Initial Visit for this Episode:  8/27/2020    Visit #1    SUBJECTIVE / HPI: Patient presents with primary complaints of neck and upper back pain with numbness and tingling radiating into her right hand following radial and ulnar nerve pathways.  Patient states that symptoms began during initial start of COVID this past spring.  Patient initially attributed to neck and back pain symptoms being from increased levels of stress.  Patient began working with physical therapist Mony Avalos with [facility].  Patient notes that she has been finding some relief of symptoms however she has begun experiencing radiating pain and tingling into fourth and fifth digits of the right hand.  Patient presented to [facility] in [facility] for acupuncture therapy.  Patient states that she is very interested in pursuing this course of treatment however due to insurance coverage she was unable to follow prescribed treatment protocol set forth by [facility].  This is why patient is presenting to our office at this time.  Description and onset:  Duration and Frequency of Pain: March 2020 and constantly aching, tingling, burning, stiff  Radiation of pain: right arm,following radial nerve path and ulnar nerve path  Pain rated at it's worst: 9/10  Pain rated currently:  2/10  Pain course: Gradually getting worse  Worse with:  Shifting/rolling in bed, any neck/right arm movement  Improved by:  Tylenol and Rest  Additional Features: numbness and tingling following right radial nerve and ulnar nerve  Other Health Care Providers seen for this: Mony Avalos PT  Previous treatment: physical therapy  Previous injury:patient has had similar neck and upper back pain symptoms in the past, not with radiculopathy          See flowsheets in chart for details.  8/27/2020    Neck  Disability Index (  Anthony HIDALGO and Rachelle CASAS . All rights reserved.; used with permission) 2020   SECTION 1 - PAIN INTENSITY 1   SECTION 2 - PERSONAL CARE 2   SECTION 3 - LIFTING 1   SECTION 4 - READING 4   SECTION 5 - HEADACHES 2   SECTION 6 - CONCENTRATION 4   SECTION 7 - WORK 2   SECTION 8 - DRIVING 2   SECTION 9 - SLEEPING 5   SECTION 10 - RECREATION 4   Count 10   Sum 27   Raw Score: /50 27   Neck Disability Index Score: (%) 54     Oswestry (NAVEED) Questionnaire    OSWESTRY DISABILITY INDEX 2020   Count 9   Sum 10   Oswestry Score (%) 22.22   Some recent data might be hidden        Functional limitations:  reading, picking up twin children    Past D.C. Care: yes, no acupuncture     PAST MEDICAL HISTORY:  Past Medical History:   Diagnosis Date     Dichorionic diamniotic twin pregnancy in third trimester 2018     Disorder of thyroid     hypo     Supervision of elderly multigravida     2018     Twin pregnancy 2018       PAST SURGICAL HISTORY:  Past Surgical History:   Procedure Laterality Date     APPENDECTOMY OPEN            SECTION N/A 2018    Procedure:  SECTION;   Section - twins;  Surgeon: Woodrow Hurtado MD;  Location:  OR     COLONOSCOPY N/A 7/10/2019    Procedure: COLONOSCOPY, WITH POLYPECTOMY AND BIOPSY;  Surgeon: Viki Villareal MD;  Location:  OR       ALLERGIES:  Allergies   Allergen Reactions     Seasonal Allergies Itching     Chloraprep One Step Rash     With twin C/S (2018)       CURRENT MEDICATIONS:  Current Outpatient Medications   Medication Sig Dispense Refill     loratadine (CLARITIN) 10 MG tablet Take 1 tablet (10 mg) by mouth daily       medical cannabis (Patient's own supply) See Admin Instructions (The purpose of this order is to document that the patient reports taking medical cannabis.  This is not a prescription, and is not used to certify that the patient has a qualifying medical condition.)       methylcellulose  (CITRUCEL) powder Take 0.3 g (1.05 teaspoonful) by mouth daily 454 g 3     methylphenidate (CONCERTA) 36 MG CR tablet Take 1 tablet (36 mg) by mouth every morning 30 tablet 0     methylphenidate (CONCERTA) 54 MG CR tablet Take 1 tablet (54 mg) by mouth daily 30 tablet 0     [START ON 9/15/2020] methylphenidate (CONCERTA) 54 MG CR tablet Take 1 tablet (54 mg) by mouth daily 30 tablet 0     [START ON 10/15/2020] methylphenidate (CONCERTA) 54 MG CR tablet Take 1 tablet (54 mg) by mouth daily 30 tablet 0     SYNTHROID 75 MCG tablet Take 1 tablet (75 mcg) by mouth daily 90 tablet 4       SOCIAL HISTORY:  Marital Status: .  Children: yes.  Occupation: [employer].  Alcohol use:yes.  Tobacco use: Smoker: no.  Are you or have you used illicit drugs:  Medicinal cannabis.    FAMILY HISTORY:  Family History   Problem Relation Age of Onset     Substance Abuse Maternal Grandmother         Alcohol/Drug,Alcohol abuse     Cancer Maternal Grandmother         Cancer, lung cancer     Other - See Comments Maternal Grandfather         Alzheimer's disease     Diabetes Paternal Grandmother         Diabetes,type 1     No Known Problems Sister      No Known Problems Son      No Known Problems Son      Diabetes Maternal Uncle         Diabetes,type 2     No Known Problems Son         2018     No Known Problems Daughter         2018     Heart Disease No family hx of         Heart Disease       Patient Active Problem List   Diagnosis     Attention deficit disorder     Lumbosacral spondylosis without myelopathy     Other staphylococcus infection in conditions classified elsewhere and of unspecified site     Twin pregnancy     Acquired hypothyroidism     Bilateral carpal tunnel syndrome     Adjustment disorder with anxious mood     Encounter for triage in pregnant patient     S/P  section         ROS:  The patient denies any fevers, chills, nausea, vomiting, diarrhea, constipation,dysuria, hematuria, or urinary hesitancy  or incontinence.  No shortness of breath, chest pain, or rashes.    OBJECTIVE:    DIAGNOSTICS:  No current spinal imaging taken.     PHYSICAL EXAM:     GENERAL APPEARANCE: healthy, alert, mild distress, moderate distress and cooperative   GAIT: NORMAL      MUSCULOSKELETAL:   Posture: Anterior head carriage, rounded shoulders.  Low left shoulder.  Gait:  unremarkable.     Cervical performed actively, measured approximately  ROM:   smooth/halting arc of motion   50/50 flexion painful    45/45 extension    25/45 RLF painful  35/45 LLF    65/85 RR painful         80/85 LR       -Maximal Foraminal Compression: -focal neck pain, -radicular symptoms   Adsons: +right, -left  Modified adsons: +right, -left   -Distraction improves        +Tenderness: Present throughout musculature of the right CT junction including but not limited to right upper trapezius, right scalenes of the medial, anterior and posterior muscle bellies,  +Muscle spasm: Right anterior, middle, posterior scalenes, right upper trapezius,  +Joint asymmetry and restriction: C7 extension right lateral flexion restriction    ASSESSMENT: Maricarmen Ornelas is a 40 year old female resenting with primary complaints of right-sided neck and upper back pain symptoms with radiculopathy into the right hand following radial nerve pathway.  Patient appears to be suffering from severe form of TOS.  Patient may be suffering from discogenic involvement causing compression of the nerves however MRI studies would be required to confirm the suspicions.  Patient does appear to be a good candidate for acupuncture therapy.  Patient may also benefit from chiropractic care provided to lower CT junction.  Prior to providing patient acupuncture services I did explain techniques to be utilized and how acupuncture produces desired outcomes.  All questions answered prior to providing care and without any other contradictions precluding patient from receiving care today acupuncture therapy was  provided.    Her practic assessment of the patient does show restriction at the C7 vertebral level.     1. Thoracic outlet syndrome of right thoracic outlet    2. Segmental and somatic dysfunction of cervical region    3. Dorsalgia of cervical region    4. Cervical radiculopathy    5. Spasm of muscle        PLAN    Evaluation and Management:  35315 Moderate exam established patient 20 min    Procedures:  Modalities:  38210: Acupuncture, for 15 minutes:  Points: GB21, SI 12, 13, 15, LI 11, TW 5, 12  For 15 minutes    CMT:  None performed today      Therapeutic procedures:  None    Response to Treatment  Reduction in symptoms as reported by patient    Prognosis: Guarded    8/27/2020 Plan of Care:  6-8 visits of Chiropractic Care including Spinal Adjustments, acupuncture and/or physiotherapy and active rehabilitation, to include exercises in the office and/or at home to meet care plan goals.     Frequency: 2xweek for up to 4 weeks. A reevaluation would be clinically appropriate in 6-8 visits, to determine progress and further course of care.    POC discussed and patient agreeable to plan of care.      8/27/2020 Goals:      Patient will report improved right neck/upper back pain by 50%.   Patient will report improved right radiculopathy by 50%.   Patient will report able to sleep without painful limits.   Patient will demonstrate an improved ability to complete Activities of Daily Living  as shown by a reported 10-30% reduced score on neck  index.    Patient will demonstrate improved ROM.        INSTRUCTIONS   ice 20 minutes every other hour as needed, heat 15 minutes every other hour as needed and rest    Follow-up:  Return to care in 4 days.        Disclaimer: This note consists of symbols derived from keyboarding, dictation and/or voice recognition software. As a result, there may be errors in the script that have gone undetected. Please consider this when interpreting information found in this chart.

## 2020-08-27 NOTE — TELEPHONE ENCOUNTER
Mom was in the clinic with her son and wanted a copy of her MIIC.  Lorena Wright CMA ([facility])......................8/27/2020  8:20 AM

## 2020-09-01 ENCOUNTER — THERAPY VISIT (OUTPATIENT)
Dept: CHIROPRACTIC MEDICINE | Facility: OTHER | Age: 40
End: 2020-09-01
Attending: CHIROPRACTOR
Payer: COMMERCIAL

## 2020-09-01 ENCOUNTER — HOSPITAL ENCOUNTER (OUTPATIENT)
Dept: PHYSICAL THERAPY | Facility: OTHER | Age: 40
Setting detail: THERAPIES SERIES
End: 2020-09-01
Attending: FAMILY MEDICINE
Payer: COMMERCIAL

## 2020-09-01 DIAGNOSIS — G54.0 THORACIC OUTLET SYNDROME OF RIGHT THORACIC OUTLET: Primary | ICD-10-CM

## 2020-09-01 DIAGNOSIS — M54.12 CERVICAL RADICULOPATHY: ICD-10-CM

## 2020-09-01 DIAGNOSIS — M54.2 DORSALGIA OF CERVICAL REGION: ICD-10-CM

## 2020-09-01 PROCEDURE — 97810 ACUP 1/> WO ESTIM 1ST 15 MIN: CPT | Performed by: CHIROPRACTOR

## 2020-09-01 PROCEDURE — 97112 NEUROMUSCULAR REEDUCATION: CPT | Mod: GP

## 2020-09-01 PROCEDURE — 97140 MANUAL THERAPY 1/> REGIONS: CPT | Mod: GP

## 2020-09-01 NOTE — PROGRESS NOTES
Visit #:  2    Subjective:  Maricarmen Ornelas is a 40 year old female who is seen in f/u up for:        Thoracic outlet syndrome of right thoracic outlet  Dorsalgia of cervical region  Cervical radiculopathy.     Since last visit on 8/27/2020,  Maricarmen Ornelas reports: Noticing quite a bit of improvement of symptoms since last visit.  Patient states that later that evening following treatment she did feel somewhat lethargic.  Patient states upon waking the next day neck pain and radiculopathy into the right arm had improved tremendously.  Patient stated that she is still having some pain in her neck however it does not feel nervy, rather it feels more like a pulled muscle.  Patient states that she has been sleeping better and that numbness and tingling into her right hand has decreased quite a bit.  Patient is still having some radiating pain following radial nerve path into the fourth and fifth digits of the right hand.    Earlier today patient treated with physical therapist Mony Avalos PT and is somewhat sore but is very pleased with progress.    Area of chief complaint:  Cervical and Thoracic :  Symptoms are graded at 1/10. The quality is described as achey, tingling and pulling.      Objective:  The following was observed:    P: palpatory tenderness right upper trapezius, scalenes:    A: asymmetric joints, not assessed  R: restricted motion , not assessed  T: muscle spasm at level(s):  right upper trapezius, right scalenes, right levator scapulae:      Segmental spinal dysfunction/restrictions found at:  Not assessed      Assessment: Patient is shown quite a bit of progress since last visit.  Recommend continuation of care at this time.    Diagnoses:      1. Thoracic outlet syndrome of right thoracic outlet    2. Dorsalgia of cervical region    3. Cervical radiculopathy        Patient's condition:  Patient symptoms are gradually improving    Treatment effectiveness:  Patient claims to feel looser post  manipulation and Patients symptoms are getting better      Procedures:  CMT:  None performed      Modalities:  97260: Acupuncture, for 15 minutes:  Points: GB21, SI 12, 13, 15, LI 11, TW 5, 12  For 15 minutes    Therapeutic procedures:  None    Response to Treatment  Reduction in symptoms as reported by patient    Prognosis: Good    Progress towards Goals:  In progress   Patient will report improved right neck/upper back pain by 50%.              Patient will report improved right radiculopathy by 50%.              Patient will report able to sleep without painful limits.              Patient will demonstrate an improved ability to complete Activities of Daily Living   as shown by a reported 10-30% reduced score on neck  index.               Patient will demonstrate improved ROM.      Recommendations:    Instructions:monitor symptoms closely, perform activities as tolerated    Follow-up:  Return to care in 3 days.

## 2020-09-03 ENCOUNTER — THERAPY VISIT (OUTPATIENT)
Dept: CHIROPRACTIC MEDICINE | Facility: OTHER | Age: 40
End: 2020-09-03
Attending: CHIROPRACTOR
Payer: COMMERCIAL

## 2020-09-03 DIAGNOSIS — G54.0 THORACIC OUTLET SYNDROME OF RIGHT THORACIC OUTLET: Primary | ICD-10-CM

## 2020-09-03 DIAGNOSIS — M54.12 CERVICAL RADICULOPATHY: ICD-10-CM

## 2020-09-03 DIAGNOSIS — M54.2 DORSALGIA OF CERVICAL REGION: ICD-10-CM

## 2020-09-03 PROCEDURE — 97810 ACUP 1/> WO ESTIM 1ST 15 MIN: CPT | Mod: AT | Performed by: CHIROPRACTOR

## 2020-09-03 NOTE — PROGRESS NOTES
Visit #:  3    Subjective:  Maricarmen Ornelas is a 40 year old female who is seen in f/u up for:        Thoracic outlet syndrome of right thoracic outlet  Dorsalgia of cervical region  Cervical radiculopathy.     Since last visit on 9/1/2020,  Maricarmen Ornelas reports: Continue to notice improvement of symptoms after last visit.  Patient states that yesterday she was relatively pain-free.  Minimal radiculopathy into the right arm.  Patient states that yesterday she was very active with doing home remodeling/cleaning projects and was quite stressed due to various other life aspects.  Patient states that she woke noticing increased levels of pain primarily of the neck and upper back.  Patient is still having mild radiculopathy into the right arm however patient notes that symptoms seem to be primarily of the upper brachium    Area of chief complaint:  Cervical and Thoracic :  Symptoms are graded at 1-2/10. The quality is described as achey, and pulling constantly.       Objective:  The following was observed:    P: palpatory tenderness mild right upper trapezius/CT junction:    A: asymmetric joints, None apparent  R: intersegmental range of motion restriction, not detected  T: hypertonicity at: Trapezius, scalenes, teres muscle group,:  On right    Segmental spinal dysfunction/restrictions found at:  None apparent      Assessment: Patient is showing definite signs of progress at this time.  I do believe that culminating effects of both physical and emotional stress of yesterday likely contributing to recurrence of neck and upper back pain is worsening at this time.  We believe further progress can be made with regards to current patient condition.  We will follow-up with patient next week.  I do anticipate patient care to begin tapering shortly.    Diagnoses:      1. Thoracic outlet syndrome of right thoracic outlet    2. Dorsalgia of cervical region    3. Cervical radiculopathy        Patient's condition:  Patient symptoms  are gradually improving and Symptoms come and go    Treatment effectiveness:  Patient claims to feel looser post manipulation and Patients symptoms are getting better      Procedures:  CMT:  None performed      Modalities:  58316: Acupuncture, for 15 minutes:  Points: GB21, SI 12, 13, 15, LI 11, TW 5, 12  For 15 minutes    Therapeutic procedures:  None    Response to Treatment  Reduction in symptoms as reported by patient    Prognosis: Excellent    Progress towards Goals: In progress   Patient will report improved right neck/upper back pain by 50%.              Patient will report improved right radiculopathy by 50%.              Patient will report able to sleep without painful limits.              Patient will demonstrate an improved ability to complete Activities of Daily Living   as shown by a reported 10-30% reduced score on neck  index.               Patient will demonstrate improved ROM    Recommendations:    Instructions:Monitor symptoms and perform activities as tolerated.    Follow-up:  Return to care in 5 days.

## 2020-09-08 ENCOUNTER — THERAPY VISIT (OUTPATIENT)
Dept: CHIROPRACTIC MEDICINE | Facility: OTHER | Age: 40
End: 2020-09-08
Attending: CHIROPRACTOR
Payer: COMMERCIAL

## 2020-09-08 DIAGNOSIS — G54.0 THORACIC OUTLET SYNDROME OF RIGHT THORACIC OUTLET: Primary | ICD-10-CM

## 2020-09-08 DIAGNOSIS — M99.02 SEGMENTAL AND SOMATIC DYSFUNCTION OF THORACIC REGION: ICD-10-CM

## 2020-09-08 DIAGNOSIS — M54.2 DORSALGIA OF CERVICAL REGION: ICD-10-CM

## 2020-09-08 PROCEDURE — 98940 CHIROPRACT MANJ 1-2 REGIONS: CPT | Mod: AT | Performed by: CHIROPRACTOR

## 2020-09-08 PROCEDURE — 97810 ACUP 1/> WO ESTIM 1ST 15 MIN: CPT | Performed by: CHIROPRACTOR

## 2020-09-08 NOTE — PATIENT INSTRUCTIONS
monitor symptoms and perform activities as tolerated. try utilizing foam roller for left calf symptoms.

## 2020-09-08 NOTE — PROGRESS NOTES
Visit #:  4    Subjective:  Mariacrmen Ornelas is a 40 year old female who is seen in f/u up for:        Thoracic outlet syndrome of right thoracic outlet  Dorsalgia of cervical region  Segmental and somatic dysfunction of thoracic region.     Since last visit on 9/3/2020,  Maricarmen Ornelas reports: Continuing to notice improvement of symptoms since last visit.  Patient states that she continues to sleep relatively uninhibited due to pain symptoms.  Patient states that she was also able to do yoga for extended period of time over the weekend without aggravating her symptoms.  Patient continues to require very few medications to help with ongoing pain symptoms.  Patient very pleased with progress at this time.    Been noticing some pain following right mid back along lateral angle of the rib cage.    Patient concerned of left-sided sciatica symptoms.  Patient described some paresthesia following S1 dermatome of the left calf, unsure if paresthesia continues into the upper thigh.    Area of chief complaint:  Cervical and Thoracic :  Symptoms are graded at 0-2/10. The quality is described as stiff, achey, and pulling occasionally.       Objective:  The following was observed:    P: palpatory tenderness right side T8, right CT junction:    A: static palpation demonstrates intersegmental asymmetry , thoracic  R: motion palpation notes restricted motion, T8   T: muscle spasm at level(s): right upper trapezius, right scalenes, taut/tender fibers noted along the right latissimus dorsi:      Segmental spinal dysfunction/restrictions found at:  :  T8 Right lateral flexion restricted and Extension restriction.      Assessment: Patient shown quite a bit of improvement since last visit.  Evidence is present to suggest segmental/somatic dysfunction present of the T8 vertebra consistent with patient's symptoms.  We will follow-up with patient for 2 additional visits 1 this week and one early next week.  Patient care will begin tapering at  that time barring acute exacerbation.    Left lateral paresthesia possibly stemming from muscle spasms from either the low back, hip, or knee.  Recommend patient attempt to perform foam rolling of peroneal muscle group.    Diagnoses:      1. Thoracic outlet syndrome of right thoracic outlet    2. Dorsalgia of cervical region    3. Segmental and somatic dysfunction of thoracic region        Patient's condition:  Patient had restrictions pre-manipulation and Patient symptoms are gradually improving    Treatment effectiveness:  Post manipulation there is better intersegmental movement, Patient claims to feel looser post manipulation and Patients symptoms are getting better      Procedures:  CMT:  28426 Chiropractic manipulative treatment 1-2 regions performed   Thoracic: Diversified, T8, Prone    Modalities:  13796: Acupuncture, for 15 minutes:  Points: GB21, SI 12, 13, 15, LI 11, TW 5, 12  For 15 minutes    Therapeutic procedures:  None    Response to Treatment  Reduction in symptoms as reported by patient    Prognosis: Good    Progress towards Goals: Patient will report improved right neck/upper back pain by 50%. Goal Met 9/8/2020 improve by 100%              Patient will report improved right radiculopathy by 50%.Goal Met 9/8/2020 improve by 100%              Patient will report able to sleep without painful limits.Goal Met 9/8/2020              Patient will demonstrate an improved ability to complete Activities of Daily Living   as shown by a reported 10-30% reduced score on neck  index. Not assessed              Patient will demonstrate improved ROM. In progress    Recommendations:    Instructions:monitor symptoms and perform activities as tolerated. try utilizing foam roller for left calf symptoms.    Follow-up:  Return to care in 2 days.

## 2020-09-10 ENCOUNTER — THERAPY VISIT (OUTPATIENT)
Dept: CHIROPRACTIC MEDICINE | Facility: OTHER | Age: 40
End: 2020-09-10
Attending: CHIROPRACTOR
Payer: COMMERCIAL

## 2020-09-10 DIAGNOSIS — G54.0 THORACIC OUTLET SYNDROME OF RIGHT THORACIC OUTLET: Primary | ICD-10-CM

## 2020-09-10 DIAGNOSIS — M54.2 DORSALGIA OF CERVICAL REGION: ICD-10-CM

## 2020-09-10 PROCEDURE — 97810 ACUP 1/> WO ESTIM 1ST 15 MIN: CPT | Performed by: CHIROPRACTOR

## 2020-09-10 NOTE — PROGRESS NOTES
Visit #:  5    Subjective:  Maricarmen Ornelas is a 40 year old female who is seen in f/u up for:        Thoracic outlet syndrome of right thoracic outlet  Dorsalgia of cervical region.     Since last visit on 9/8/2020,  Maricarmen Ornelas reports:    Area of chief complaint:  Cervical and Thoracic :  Symptoms are graded at 0/10.  Patient has continued to notice improvement of symptoms since last treatment.  Patient is still having some numbness and tingling into her upper arm but notes that this is improving as well.  Patient very pleased with course of treatment with acupuncture     Objective:  The following was observed:    P: palpatory tenderness None elicited:    A: static palpation demonstrates intersegmental asymmetry , cervical  R: motion palpation notes restricted motion, not detected  T: hypertonicity at: Right upper trapezius, right scalenes.    Segmental spinal dysfunction/restrictions found at:  :.      Assessment: Patient showing quite a bit of progress.  We will begin tapering patient care at this time to once a week for at least 2 weeks.  Patient is scheduled to follow-up with physical therapist in 2 weeks.    Diagnoses:      1. Thoracic outlet syndrome of right thoracic outlet    2. Dorsalgia of cervical region        Patient's condition:  Patient symptoms are gradually improving    Treatment effectiveness:  Patients symptoms are getting better      Procedures:  CMT:  None performed      Modalities:  61761: Acupuncture, for 15 minutes:  Points: GB21, SI 12, 13, 15, LI 11, TW 5, 12  For 15 minutes    Therapeutic procedures:  None    Response to Treatment  Reduction in symptoms as reported by patient    Prognosis: Good    Progress towards Goals: Patient will report improved right neck/upper back pain by 50%. Goal Met 9/8/2020 improve by 100%              Patient will report improved right radiculopathy by 50%.Goal Met 9/8/2020 improve by 100%              Patient will report able to sleep without painful  limits.Goal Met 9/8/2020              Patient will demonstrate an improved ability to complete Activities of Daily Living   as shown by a reported 10-30% reduced score on neck  index. Not assessed              Patient will demonstrate improved ROM. In progress    Recommendations:    Instructions:monitor symptoms, perform activities as tolerated    Follow-up:  Return to care in 1 week.

## 2020-09-14 DIAGNOSIS — E03.9 HYPOTHYROIDISM, UNSPECIFIED TYPE: ICD-10-CM

## 2020-09-14 RX ORDER — LEVOTHYROXINE SODIUM 75 MCG
75 TABLET ORAL DAILY
Qty: 90 TABLET | Refills: 4 | OUTPATIENT
Start: 2020-09-14

## 2020-09-14 NOTE — TELEPHONE ENCOUNTER
[id number] sent Rx request for the following:   SYNTHROID 75 MCG tablet  Sig:Take 1 tablet (75 mcg) by mouth daily    Last Prescription Date:   11/22/2019  Last Fill Qty/Refills:         90, R-4    Last Office Visit:              07/17/2020 ([facility])   Future Office visit:           09/17/2020 ([facility])    Redundant refill request refused: Too soon:    Unable to complete prescription refill per RN Medication Refill Policy.................... Deysi Arellano RN ....................  9/14/2020   4:22 PM

## 2020-09-16 ENCOUNTER — THERAPY VISIT (OUTPATIENT)
Dept: CHIROPRACTIC MEDICINE | Facility: OTHER | Age: 40
End: 2020-09-16
Attending: CHIROPRACTOR
Payer: COMMERCIAL

## 2020-09-16 DIAGNOSIS — M54.2 DORSALGIA OF CERVICAL REGION: ICD-10-CM

## 2020-09-16 DIAGNOSIS — G54.0 THORACIC OUTLET SYNDROME OF RIGHT THORACIC OUTLET: Primary | ICD-10-CM

## 2020-09-16 DIAGNOSIS — M99.02 SEGMENTAL AND SOMATIC DYSFUNCTION OF THORACIC REGION: ICD-10-CM

## 2020-09-16 PROCEDURE — 97810 ACUP 1/> WO ESTIM 1ST 15 MIN: CPT | Performed by: CHIROPRACTOR

## 2020-09-16 PROCEDURE — 98940 CHIROPRACT MANJ 1-2 REGIONS: CPT | Mod: AT | Performed by: CHIROPRACTOR

## 2020-09-16 NOTE — PROGRESS NOTES
Visit #:  6    Subjective:  Maricarmen Ornelas is a 40 year old female who is seen in f/u up for:        Thoracic outlet syndrome of right thoracic outlet  Dorsalgia of cervical region  Segmental and somatic dysfunction of thoracic region.     Since last visit on 9/10/2020,  Maricarmen Ornelas reports: Having a slight flare of symptoms last Sunday.  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  The act of holding her paint brush with an outstretched arm did cause symptoms to increase somewhat.  Patient noticing some restriction along her right CT junction as well as mid thoracic spine.    Overall patient is pleased with course of care at this time.    Area of chief complaint:  Cervical and Thoracic :  Symptoms are graded at 0-2/10. The quality is described as achey, occasionally.       Objective:  The following was observed:    P: palpatory tenderness right side T2, T8:  R>>L  A: static palpation demonstrates intersegmental asymmetry , thoracic  R: motion palpation notes restricted motion, T2  and T8   T: muscle spasm at level(s): Right upper trapezius, right scalene musculature.:      Segmental spinal dysfunction/restrictions found at:  :  T2 Right lateral flexion restricted and Extension restriction  T8 Right lateral flexion restricted and Extension restriction.      Assessment: Patient showing signs of improvement.  At this time I do believe patient is experiencing mild flareup of symptoms.  Discussed options for helping to manage upper back symptoms from flaring again.  Discussed posture braces, changing of workstation ergonomics.  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  I do believe that further progress can be made regarding patient's care but continue to recommend follow-up in 1 week as originally planned.    Diagnoses:      1. Thoracic outlet syndrome of right thoracic outlet    2. Dorsalgia of cervical region    3. Segmental and somatic dysfunction of  thoracic region        Patient's condition:  Patient had restrictions pre-manipulation and Symptoms come and go    Treatment effectiveness:  Post manipulation there is better intersegmental movement, Patient claims to feel looser post manipulation and Symptoms appear to be decreasing      Procedures:  CMT:  75345 Chiropractic manipulative treatment 1-2 regions performed   Thoracic: Diversified, T2, T8, Prone    Modalities:  Acupuncture, for 15 minutes:  Points: GB21, SI 12, 13, 15, LI 11, TW 5, 12  For 15 minutes    Therapeutic procedures:  None    Response to Treatment  Reduction in symptoms as reported by patient    Prognosis: Good    Progress towards Goals:Patient will report improved right neck/upper back pain by 50%. Goal Met 9/8/2020 improve by 100%              Patient will report improved right radiculopathy by 50%.Goal Met 9/8/2020 improve by 100%              Patient will report able to sleep without painful limits.Goal Met 9/8/2020              Patient will demonstrate an improved ability to complete Activities of Daily Living   as shown by a reported 10-30% reduced score on neck  index. Not assessed              Patient will demonstrate improved ROM. In progress    Recommendations:    Instructions:monitor symptoms,     Follow-up:  Return to care in 1 week.

## 2020-09-17 ENCOUNTER — OFFICE VISIT (OUTPATIENT)
Dept: FAMILY MEDICINE | Facility: OTHER | Age: 40
End: 2020-09-17
Attending: FAMILY MEDICINE
Payer: COMMERCIAL

## 2020-09-17 VITALS
BODY MASS INDEX: 26.85 KG/M2 | SYSTOLIC BLOOD PRESSURE: 116 MMHG | HEART RATE: 76 BPM | OXYGEN SATURATION: 99 % | TEMPERATURE: 97.9 F | DIASTOLIC BLOOD PRESSURE: 80 MMHG | WEIGHT: 166.38 LBS | RESPIRATION RATE: 16 BRPM

## 2020-09-17 DIAGNOSIS — R07.89 ATYPICAL CHEST PAIN: ICD-10-CM

## 2020-09-17 DIAGNOSIS — R10.2 UTERINE ADNEXAL PAIN: Primary | ICD-10-CM

## 2020-09-17 LAB
ALBUMIN SERPL-MCNC: 4.8 G/DL (ref 3.5–5.7)
ALP SERPL-CCNC: 60 U/L (ref 34–104)
ALT SERPL W P-5'-P-CCNC: 10 U/L (ref 7–52)
ANION GAP SERPL CALCULATED.3IONS-SCNC: 8 MMOL/L (ref 3–14)
AST SERPL W P-5'-P-CCNC: 13 U/L (ref 13–39)
BASOPHILS # BLD AUTO: 0.1 10E9/L (ref 0–0.2)
BASOPHILS NFR BLD AUTO: 0.8 %
BILIRUB SERPL-MCNC: 0.4 MG/DL (ref 0.3–1)
BUN SERPL-MCNC: 16 MG/DL (ref 7–25)
CALCIUM SERPL-MCNC: 9.5 MG/DL (ref 8.6–10.3)
CHLORIDE SERPL-SCNC: 102 MMOL/L (ref 98–107)
CO2 SERPL-SCNC: 29 MMOL/L (ref 21–31)
CREAT SERPL-MCNC: 0.75 MG/DL (ref 0.6–1.2)
CRP SERPL-MCNC: 5 MG/L
DIFFERENTIAL METHOD BLD: NORMAL
EOSINOPHIL # BLD AUTO: 0.3 10E9/L (ref 0–0.7)
EOSINOPHIL NFR BLD AUTO: 4.2 %
ERYTHROCYTE [DISTWIDTH] IN BLOOD BY AUTOMATED COUNT: 13 % (ref 10–15)
ERYTHROCYTE [SEDIMENTATION RATE] IN BLOOD BY WESTERGREN METHOD: 7 MM/H (ref 1–15)
GFR SERPL CREATININE-BSD FRML MDRD: 86 ML/MIN/{1.73_M2}
GLUCOSE SERPL-MCNC: 87 MG/DL (ref 70–105)
HCT VFR BLD AUTO: 43.9 % (ref 35–47)
HGB BLD-MCNC: 14.3 G/DL (ref 11.7–15.7)
IMM GRANULOCYTES # BLD: 0 10E9/L (ref 0–0.4)
IMM GRANULOCYTES NFR BLD: 0.2 %
LYMPHOCYTES # BLD AUTO: 2.2 10E9/L (ref 0.8–5.3)
LYMPHOCYTES NFR BLD AUTO: 36.4 %
MCH RBC QN AUTO: 28.3 PG (ref 26.5–33)
MCHC RBC AUTO-ENTMCNC: 32.6 G/DL (ref 31.5–36.5)
MCV RBC AUTO: 87 FL (ref 78–100)
MONOCYTES # BLD AUTO: 0.3 10E9/L (ref 0–1.3)
MONOCYTES NFR BLD AUTO: 5.3 %
NEUTROPHILS # BLD AUTO: 3.2 10E9/L (ref 1.6–8.3)
NEUTROPHILS NFR BLD AUTO: 53.1 %
PLATELET # BLD AUTO: 373 10E9/L (ref 150–450)
POTASSIUM SERPL-SCNC: 4.2 MMOL/L (ref 3.5–5.1)
PROT SERPL-MCNC: 7.5 G/DL (ref 6.4–8.9)
RBC # BLD AUTO: 5.05 10E12/L (ref 3.8–5.2)
SODIUM SERPL-SCNC: 139 MMOL/L (ref 134–144)
TROPONIN I SERPL-MCNC: <2.3 PG/ML
WBC # BLD AUTO: 6 10E9/L (ref 4–11)

## 2020-09-17 PROCEDURE — 80053 COMPREHEN METABOLIC PANEL: CPT | Mod: ZL | Performed by: FAMILY MEDICINE

## 2020-09-17 PROCEDURE — 36415 COLL VENOUS BLD VENIPUNCTURE: CPT | Mod: ZL | Performed by: FAMILY MEDICINE

## 2020-09-17 PROCEDURE — 99214 OFFICE O/P EST MOD 30 MIN: CPT | Performed by: FAMILY MEDICINE

## 2020-09-17 PROCEDURE — 86140 C-REACTIVE PROTEIN: CPT | Mod: ZL | Performed by: FAMILY MEDICINE

## 2020-09-17 PROCEDURE — 85025 COMPLETE CBC W/AUTO DIFF WBC: CPT | Mod: ZL | Performed by: FAMILY MEDICINE

## 2020-09-17 PROCEDURE — 93000 ELECTROCARDIOGRAM COMPLETE: CPT | Performed by: INTERNAL MEDICINE

## 2020-09-17 PROCEDURE — 85652 RBC SED RATE AUTOMATED: CPT | Mod: ZL | Performed by: FAMILY MEDICINE

## 2020-09-17 PROCEDURE — 84484 ASSAY OF TROPONIN QUANT: CPT | Mod: ZL | Performed by: FAMILY MEDICINE

## 2020-09-17 ASSESSMENT — ENCOUNTER SYMPTOMS
APPETITE CHANGE: 0
ACTIVITY CHANGE: 0
SHORTNESS OF BREATH: 0
FATIGUE: 0
CHILLS: 0
PALPITATIONS: 0

## 2020-09-17 ASSESSMENT — PAIN SCALES - GENERAL: PAINLEVEL: NO PAIN (0)

## 2020-09-17 NOTE — PROGRESS NOTES
"  SUBJECTIVE:   Maricarmen Ornelas is a 40 year old female who presents to clinic today for the following health issues:    HPI   Maricarmen is here for low abdomen/almost pubic/pelvic type pain.  Can move slightly from side to side (adnexal regions) of R and L - and at times seemingly involving just inside the ASIS region of hip bones.  Not associated with bowel movements, food, activity, menstrual cycle.  Described as a dull, achy pain that comes off and on.  Has used Aleve to help (especially Mon-Tues this week when pain was relatively constant), but doesn't like to use this due to stomach upset.  Has been progressively worsening over the last 3-4 months.  Remembers having some pain immediately after C/S for twins ~2 years ago; but eventually resolved.  Now wondering if she just got busy and ignored it.   Has been told she has had endometiosis her whole life; but no biopsy/surgery proven lesions.  Has had a recent colonoscopy without significant findings besides a fissure which has been healing well since that time.    Has also noticed chest pressure sensation relatively constant for the last few months as well.  Doesn't seem to improve; but hasn't had much time away from her children or for \"vacation\" amidst covid pandemic.  Now back in school, graduate classes through [employer].  Describes a tight sensation; difficulty taking a deep breath.  Worsens with anxiety.  Has been trying to do some running recently to lose weight and does not notice worsening during running.  Had one day where it was worse for a day or so afterward.    Checking on higher dose of Concerta as well - currently on Concerta 54mg daily and seems to be working well.      Patient Active Problem List    Diagnosis Date Noted     S/P  section 2018     Priority: Medium     Encounter for triage in pregnant patient 2018     Priority: Medium     Bilateral carpal tunnel syndrome 2018     Priority: Medium     Adjustment disorder with " anxious mood 2018     Priority: Medium     Acquired hypothyroidism 2018     Priority: Medium     Attention deficit disorder 2018     Priority: Medium     Twin pregnancy 2018     Priority: Medium     EDC 2018       Lumbosacral spondylosis without myelopathy 03/10/2015     Priority: Medium     Other staphylococcus infection in conditions classified elsewhere and of unspecified site 2011     Priority: Medium     Past Medical History:   Diagnosis Date     Dichorionic diamniotic twin pregnancy in third trimester 2018     Disorder of thyroid     hypo     Supervision of elderly multigravida     2018     Twin pregnancy     EDC 2018      Past Surgical History:   Procedure Laterality Date     APPENDECTOMY OPEN            SECTION N/A 2018    Procedure:  SECTION;   Section - twins;  Surgeon: Woodrow Hurtado MD;  Location:  OR     COLONOSCOPY N/A 7/10/2019    Procedure: COLONOSCOPY, WITH POLYPECTOMY AND BIOPSY;  Surgeon: Viki Villareal MD;  Location:  OR     Family History   Problem Relation Age of Onset     Substance Abuse Maternal Grandmother         Alcohol/Drug,Alcohol abuse     Cancer Maternal Grandmother         Cancer, lung cancer     Other - See Comments Maternal Grandfather         Alzheimer's disease     Diabetes Paternal Grandmother         Diabetes,type 1     No Known Problems Sister      No Known Problems Son      No Known Problems Son      Diabetes Maternal Uncle         Diabetes,type 2     No Known Problems Son         2018     No Known Problems Daughter         2018     Heart Disease No family hx of         Heart Disease     Social History     Tobacco Use     Smoking status: Never Smoker     Smokeless tobacco: Never Used   Substance Use Topics     Alcohol use: Yes     Alcohol/week: 2.0 standard drinks     Comment: one daily      Social History     Social History Narrative       - Ki Sohan      Sons: Kaveh  Franco ,    Daughter:    Father: Jose;  Mother: Bronwyn      Siblings: Sister Nydia,  Emilyther     Current Outpatient Medications   Medication Sig Dispense Refill     loratadine (CLARITIN) 10 MG tablet Take 1 tablet (10 mg) by mouth daily       medical cannabis (Patient's own supply) See Admin Instructions (The purpose of this order is to document that the patient reports taking medical cannabis.  This is not a prescription, and is not used to certify that the patient has a qualifying medical condition.)       methylcellulose (CITRUCEL) powder Take 0.3 g (1.05 teaspoonful) by mouth daily 454 g 3     methylphenidate (CONCERTA) 54 MG CR tablet Take 1 tablet (54 mg) by mouth daily 30 tablet 0     methylphenidate (CONCERTA) 54 MG CR tablet Take 1 tablet (54 mg) by mouth daily 30 tablet 0     [START ON 10/15/2020] methylphenidate (CONCERTA) 54 MG CR tablet Take 1 tablet (54 mg) by mouth daily 30 tablet 0     SYNTHROID 75 MCG tablet Take 1 tablet (75 mcg) by mouth daily 90 tablet 4     Allergies   Allergen Reactions     Seasonal Allergies Itching     Chloraprep One Step Rash     With twin C/S (9/2018)       Review of Systems   Constitutional: Negative for activity change, appetite change, chills and fatigue.   Respiratory: Negative for shortness of breath.    Cardiovascular: Negative for chest pain and palpitations.   All other systems reviewed and are negative.       OBJECTIVE:     /80   Pulse 76   Temp 97.9  F (36.6  C) (Tympanic)   Resp 16   Wt 75.5 kg (166 lb 6 oz)   LMP 08/30/2020 (Approximate)   SpO2 99%   BMI 26.85 kg/m    Body mass index is 26.85 kg/m .  Physical Exam  Vitals signs and nursing note reviewed.   Constitutional:       Appearance: Normal appearance.   HENT:      Head: Normocephalic and atraumatic.   Neck:      Musculoskeletal: Normal range of motion and neck supple.      Vascular: No carotid bruit.   Cardiovascular:      Rate and Rhythm: Normal rate and regular rhythm.      Pulses:  Normal pulses.      Heart sounds: Normal heart sounds. No murmur.   Pulmonary:      Effort: Pulmonary effort is normal.      Breath sounds: Normal breath sounds.   Abdominal:      General: Abdomen is flat. Bowel sounds are normal. There is no distension.      Palpations: There is no mass.      Tenderness: There is abdominal tenderness. There is no right CVA tenderness or left CVA tenderness.      Hernia: No hernia is present.   Skin:     General: Skin is warm.      Capillary Refill: Capillary refill takes less than 2 seconds.   Neurological:      General: No focal deficit present.      Mental Status: She is alert.   Psychiatric:         Mood and Affect: Mood normal.         Behavior: Behavior normal.       Diagnostic Test Results:  Results for orders placed or performed in visit on 09/17/20   CRP inflammation     Status: None   Result Value Ref Range    CRP Inflammation 5.0 <10.0 mg/L   Sedimentation Rate (ESR)     Status: None   Result Value Ref Range    Sed Rate 7 1 - 15 mm/h   Troponin GH     Status: None   Result Value Ref Range    Troponin <2.3 <34.0 pg/mL   Comprehensive Metabolic Panel     Status: None   Result Value Ref Range    Sodium 139 134 - 144 mmol/L    Potassium 4.2 3.5 - 5.1 mmol/L    Chloride 102 98 - 107 mmol/L    Carbon Dioxide 29 21 - 31 mmol/L    Anion Gap 8 3 - 14 mmol/L    Glucose 87 70 - 105 mg/dL    Urea Nitrogen 16 7 - 25 mg/dL    Creatinine 0.75 0.60 - 1.20 mg/dL    GFR Estimate 86 >60 mL/min/[1.73_m2]    GFR Estimate If Black >90 >60 mL/min/[1.73_m2]    Calcium 9.5 8.6 - 10.3 mg/dL    Bilirubin Total 0.4 0.3 - 1.0 mg/dL    Albumin 4.8 3.5 - 5.7 g/dL    Protein Total 7.5 6.4 - 8.9 g/dL    Alkaline Phosphatase 60 34 - 104 U/L    ALT 10 7 - 52 U/L    AST 13 13 - 39 U/L   CBC and Differential     Status: None   Result Value Ref Range    WBC 6.0 4.0 - 11.0 10e9/L    RBC Count 5.05 3.8 - 5.2 10e12/L    Hemoglobin 14.3 11.7 - 15.7 g/dL    Hematocrit 43.9 35.0 - 47.0 %    MCV 87 78 - 100 fl     MCH 28.3 26.5 - 33.0 pg    MCHC 32.6 31.5 - 36.5 g/dL    RDW 13.0 10.0 - 15.0 %    Platelet Count 373 150 - 450 10e9/L    Diff Method Automated Method     % Neutrophils 53.1 %    % Lymphocytes 36.4 %    % Monocytes 5.3 %    % Eosinophils 4.2 %    % Basophils 0.8 %    % Immature Granulocytes 0.2 %    Absolute Neutrophil 3.2 1.6 - 8.3 10e9/L    Absolute Lymphocytes 2.2 0.8 - 5.3 10e9/L    Absolute Monocytes 0.3 0.0 - 1.3 10e9/L    Absolute Eosinophils 0.3 0.0 - 0.7 10e9/L    Absolute Basophils 0.1 0.0 - 0.2 10e9/L    Abs Immature Granulocytes 0.0 0 - 0.4 10e9/L   EKG 12-lead, tracing only     Status: None (Preliminary result)   Result Value Ref Range    Interpretation ECG Click View Image link to view waveform and result      EKG: sinus bradycardia with sinus arrhythmia; HR 56 bpm.      ASSESSMENT/PLAN:     1. Atypical chest pain  Evaluation normal; likely MSK with recent activity change to include running VS stress/anxiety.  Encouraged stress reduction techniques especially as school routine returns to normal.  Monitor for change.  Consideration for stress testing or Zio patch, although low risk for abnormality, if any worsening of symptoms.  - EKG 12-lead, tracing only  - CBC and Differential; Future  - Comprehensive Metabolic Panel; Future  - Troponin GH; Future  - Sedimentation Rate (ESR); Future  - CRP inflammation; Future  - CRP inflammation  - Sedimentation Rate (ESR)  - Troponin GH  - Comprehensive Metabolic Panel  - CBC and Differential    2. Uterine adnexal pain  Adhesions from C/S more likely than endometriosis due to no time association with her menstrual cycle.  Will plan to check UA today to make sure no bladder infection - it was noted that lab did not collect UA prior to allowing patient to leave.  Therefore will need to check another day.  Will also schedule pelvic US to evaluate for endometriosis, ovarian pathology.  Discussed difficulty fully evaluating endometriomas or adhesions on this imaging  modality.  Aware of possible need for diagnostic laparoscopy for further identification of problems.  - UA reflex to Microscopic; Future  - US Pelvic Complete with Transvaginal; Future    Follow up pending above results.    Avril Sanders, [facility]

## 2020-09-17 NOTE — NURSING NOTE
"Chief Complaint   Patient presents with     Abdominal Pain       Initial /80   Pulse 76   Temp 97.9  F (36.6  C) (Tympanic)   Resp 16   Wt 75.5 kg (166 lb 6 oz)   LMP 08/30/2020 (Approximate)   SpO2 99%   BMI 26.85 kg/m   Estimated body mass index is 26.85 kg/m  as calculated from the following:    Height as of 7/10/20: 1.676 m (5' 6\").    Weight as of this encounter: 75.5 kg (166 lb 6 oz).  Medication Reconciliation: complete    Rajani Ford LPN  "

## 2020-09-18 LAB — INTERPRETATION ECG - MUSE: NORMAL

## 2020-09-20 ENCOUNTER — ALLIED HEALTH/NURSE VISIT (OUTPATIENT)
Dept: FAMILY MEDICINE | Facility: OTHER | Age: 40
End: 2020-09-20
Attending: FAMILY MEDICINE
Payer: COMMERCIAL

## 2020-09-20 DIAGNOSIS — R05.9 COUGH: Primary | ICD-10-CM

## 2020-09-20 PROCEDURE — C9803 HOPD COVID-19 SPEC COLLECT: HCPCS

## 2020-09-20 PROCEDURE — 99207 ZZC NO CHARGE NURSE ONLY: CPT

## 2020-09-20 PROCEDURE — U0003 INFECTIOUS AGENT DETECTION BY NUCLEIC ACID (DNA OR RNA); SEVERE ACUTE RESPIRATORY SYNDROME CORONAVIRUS 2 (SARS-COV-2) (CORONAVIRUS DISEASE [COVID-19]), AMPLIFIED PROBE TECHNIQUE, MAKING USE OF HIGH THROUGHPUT TECHNOLOGIES AS DESCRIBED BY CMS-2020-01-R: HCPCS | Mod: ZL | Performed by: FAMILY MEDICINE

## 2020-09-20 NOTE — NURSING NOTE
Chief Complaint   Patient presents with     Covid 19 Testing     cough     Patient swabbed for COVID-19 testing.  Fitz Benavidez LPN on 9/20/2020 at 1:13 PM

## 2020-09-22 DIAGNOSIS — E03.9 HYPOTHYROIDISM, UNSPECIFIED TYPE: ICD-10-CM

## 2020-09-22 LAB
SARS-COV-2 RNA SPEC QL NAA+PROBE: NOT DETECTED
SPECIMEN SOURCE: NORMAL

## 2020-09-23 RX ORDER — LEVOTHYROXINE SODIUM 75 MCG
75 TABLET ORAL DAILY
Qty: 90 TABLET | Refills: 0 | Status: SHIPPED | OUTPATIENT
Start: 2020-09-23 | End: 2020-12-21

## 2020-09-23 NOTE — TELEPHONE ENCOUNTER
Prescription approved per [facility] Refill Protocol.  LOV: 9/17/2020  Lasts TSH: 1/22/2020  Dina Bull RN on 9/23/2020 at 2:18 PM

## 2020-09-25 ENCOUNTER — THERAPY VISIT (OUTPATIENT)
Dept: CHIROPRACTIC MEDICINE | Facility: OTHER | Age: 40
End: 2020-09-25
Attending: CHIROPRACTOR
Payer: COMMERCIAL

## 2020-09-25 ENCOUNTER — HOSPITAL ENCOUNTER (OUTPATIENT)
Dept: ULTRASOUND IMAGING | Facility: OTHER | Age: 40
Discharge: HOME | End: 2020-09-25
Attending: FAMILY MEDICINE | Admitting: FAMILY MEDICINE
Payer: COMMERCIAL

## 2020-09-25 DIAGNOSIS — G54.0 THORACIC OUTLET SYNDROME OF RIGHT THORACIC OUTLET: Primary | ICD-10-CM

## 2020-09-25 DIAGNOSIS — M99.01 SEGMENTAL AND SOMATIC DYSFUNCTION OF CERVICAL REGION: ICD-10-CM

## 2020-09-25 DIAGNOSIS — R10.2 UTERINE ADNEXAL PAIN: ICD-10-CM

## 2020-09-25 DIAGNOSIS — M54.2 DORSALGIA OF CERVICAL REGION: ICD-10-CM

## 2020-09-25 PROCEDURE — 97810 ACUP 1/> WO ESTIM 1ST 15 MIN: CPT | Performed by: CHIROPRACTOR

## 2020-09-25 PROCEDURE — 98940 CHIROPRACT MANJ 1-2 REGIONS: CPT | Mod: AT | Performed by: CHIROPRACTOR

## 2020-09-25 PROCEDURE — 76856 US EXAM PELVIC COMPLETE: CPT

## 2020-09-25 NOTE — PROGRESS NOTES
Visit #:  7    Subjective:  Maricarmen Ornelas is a 40 year old female who is seen in f/u up for:        Thoracic outlet syndrome of right thoracic outlet  Dorsalgia of cervical region  Segmental and somatic dysfunction of cervical region.     Since last visit on 9/16/2020,  Maricarmen Ornelas reports: Noticing a mild flareup of symptoms over the weekend.  Patient states she has been quite busy with home projects and believes this increased activity have contributed to increased levels of pain.  Symptoms have been decreasing since flareup.  Currently patient notes that she is doing quite well.  Patient has not been experiencing as much numbness and tingling into her right arm.    Area of chief complaint:  Cervical and Thoracic :  Symptoms are graded at 0/10. The quality is described as tight.      Objective:  The following was observed:    Cervical AROM: right lateral flexion restriction approximately 5-10 degrees. All other ROM unremarkable    P: palpatory tendernessright CT junction:    A: static palpation demonstrates intersegmental asymmetry , cervical  R: motion palpation notes restricted motion, C7   T: muscle spasm at level(s): Alejandro and scalenes on the right:      Segmental spinal dysfunction/restrictions found at:  :  C7 Right lateral flexion restricted.      Assessment: Patient showing signs of improvement however I do believe further progress can be made.  Recommend follow-up with patient in 1 week.    Insertion of needle at GB 21 caused burning pain indicating nicking of a pacinian corpuscle. Needle immediately removed and symptoms showed improvement. After this no additional needles were placed as I believe further needling could be over stimulating to the patient.    Diagnoses:      1. Thoracic outlet syndrome of right thoracic outlet    2. Dorsalgia of cervical region    3. Segmental and somatic dysfunction of cervical region        Patient's condition:  Patient had restrictions pre-manipulation, Patient  symptoms are gradually improving and Symptoms come and go    Treatment effectiveness:  Post manipulation there is better intersegmental movement, Patient claims to feel looser post manipulation, Patients symptoms are getting better, Muscle spasm is reducing and Range of motion is gradually improving      Procedures:  CMT:  91069 Chiropractic manipulative treatment 1-2 regions performed   Cervical: Activator, C7 , Seated    Modalities:  88171: Acupuncture, for 15 minutes:  Points: GV 16, SI 15  For 15 minutes    Therapeutic procedures:  None    Response to Treatment  Reduction in symptoms as reported by patient    Prognosis: Good    Progress towards Goals: Patient will report improved right neck/upper back pain by 50%. Goal Met 9/8/2020 improve by 100%              Patient will report improved right radiculopathy by 50%.Goal Met 9/8/2020 improve by 100%              Patient will report able to sleep without painful limits.Goal Met 9/8/2020              Patient will demonstrate an improved ability to complete Activities of Daily Living   as shown by a reported 10-30% reduced score on neck  index. Not assessed              Patient will demonstrate improved ROM. In progress    Recommendations:    Instructions:Monitor symptoms and perform activities as tolerated    Follow-up:  Return to care in 1 week.

## 2020-10-01 ENCOUNTER — THERAPY VISIT (OUTPATIENT)
Dept: CHIROPRACTIC MEDICINE | Facility: OTHER | Age: 40
End: 2020-10-01
Attending: CHIROPRACTOR
Payer: COMMERCIAL

## 2020-10-01 DIAGNOSIS — G54.0 THORACIC OUTLET SYNDROME OF RIGHT THORACIC OUTLET: Primary | ICD-10-CM

## 2020-10-01 PROCEDURE — 97810 ACUP 1/> WO ESTIM 1ST 15 MIN: CPT | Mod: AT | Performed by: CHIROPRACTOR

## 2020-10-01 PROCEDURE — G0463 HOSPITAL OUTPT CLINIC VISIT: HCPCS | Mod: 25

## 2020-10-01 NOTE — PROGRESS NOTES
"Visit #:  8    Subjective:  Maricarmen Ornelas is a 40 year old female who is seen in f/u up for:     Thoracic outlet syndrome of right thoracic outlet.     Since last visit on 9/25/2020,  Maricarmen Ornelas reports:    Area of chief complaint:  Patient is showing improvement since last visit. No pain is present along the neck or upper back. Patient states \"this is the best I've felt since I was 23.\" Patient reports she's currently 40.    Still having some numb/tingling sensations into her right hand when she is doing computer work, or driving.     Objective:  The following was observed:    P: palpatory tendernessTraps R>>L  A: asymmetric joints, none apparent  R: intersegmental range of motion restriction, none detected  T: muscle spasm at level(s): Traps and scalenes :  R>>L    Segmental spinal dysfunction/restrictions found at:  None apparent.      Assessment: Patient showing signs of improvement at this time.  Patient is still having some numbness and tingling of her hands which I do believe can be further improved upon with treatment.  Tapering of patient care discussed today.  We also discussed possibility of utilizing maintenance/preventative care for symptoms.  No decisions made regarding pursuing this course of treatment.  However at this time I do not believe that patient is at MMI.    Chiropractic assessment shows no signs of segmental or somatic dysfunction present of the cervical or upper thoracic spine on today's visit.    Diagnoses:      1. Thoracic outlet syndrome of right thoracic outlet        Patient's condition:  Patient symptoms are gradually improving    Treatment effectiveness:  Patients symptoms are getting better      Procedures:  CMT:  None performed      Modalities:  Acupuncture, for 15 minutes:  Points: GB21, SI 12, 13, 15, LI 11, TW 5, 12  For 15 minutes    Therapeutic procedures:  Scalene/1st rib mobilization    Response to Treatment  Reduction in symptoms as reported by patient    Prognosis: " Excellent    Progress towards Goals: Patient will report improved right neck/upper back pain by 50%. Goal Met 9/8/2020 improve by 100%              Patient will report improved right radiculopathy by 50%.Goal Met 9/8/2020 improve by 100%              Patient will report able to sleep without painful limits.Goal Met 9/8/2020              Patient will demonstrate an improved ability to complete Activities of Daily Living   as shown by a reported 10-30% reduced score on neck  index. Not assessed              Patient will demonstrate improved ROM. In progress    Recommendations:    Instructions:stretch as instructed at visit    Follow-up:  Return to care in 1 week.

## 2020-10-12 ENCOUNTER — THERAPY VISIT (OUTPATIENT)
Dept: CHIROPRACTIC MEDICINE | Facility: OTHER | Age: 40
End: 2020-10-12
Attending: CHIROPRACTOR
Payer: COMMERCIAL

## 2020-10-12 DIAGNOSIS — G54.0 THORACIC OUTLET SYNDROME OF RIGHT THORACIC OUTLET: Primary | ICD-10-CM

## 2020-10-12 DIAGNOSIS — M54.6 PAIN IN THORACIC SPINE: ICD-10-CM

## 2020-10-12 DIAGNOSIS — M54.2 CERVICALGIA: ICD-10-CM

## 2020-10-12 PROCEDURE — 97810 ACUP 1/> WO ESTIM 1ST 15 MIN: CPT | Performed by: CHIROPRACTOR

## 2020-10-12 NOTE — PROGRESS NOTES
Visit #:  9    Subjective:  Maricarmen Ornelas is a 40 year old female who is seen in f/u up for:        Thoracic outlet syndrome of right thoracic outlet  Cervicalgia  Pain in thoracic spine.     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  Patient attempted trial away from care for 1.5 weeks.  Patient stated that this was too long as she noted approximate 1 week posttreatment symptoms seem to return and worsen.  Patient states that she has been having some more difficulty laying on her sides.  This has not been a problem in the past.  Pain is noted primarily around patient's anterior shoulder.  Patient is still experiencing some numbness following the path of the radial nerve    Area of chief complaint:  Cervical and Thoracic :  Symptoms are graded at 2-4/10. The quality is described as constantly sore.       Objective:  The following was observed:    P: palpatory tendernessTraps R>>L  A: asymmetric joints, none detected  R: restricted motion , none detected  T: muscle spasm at level(s): Trapezius right side, taut and tender fibers right scalenes, taut tender fibers also noted anterior deltoid right side:      Segmental spinal dysfunction/restrictions found at:  None apparent      Assessment: Patient appears to be experiencing mild flareup of ongoing neck and upper back pain along with thoracic outlet syndrome which we have been working with her for for quite some time.  Patient has been responding quite favorably with course of treatment and I do anticipate that to be the case continually.  As it does appear that patient still is in need of visits at approximately 1 week intervals we will recommend continuation of this.  Also recommending that patient begin external rotation/rotator cuff strengthening exercises as well as following up with physical therapy.  I will follow-up with patient's physical therapist regarding this matter as  well.    Chiropractic assessment of the patient shows no evidence of segmental somatic dysfunction of the cervical spine of the upper thoracic spine on today's visit.    Diagnoses:      1. Thoracic outlet syndrome of right thoracic outlet    2. Cervicalgia    3. Pain in thoracic spine        Patient's condition:  Patient symptoms are worsed due to time away from care.    Treatment effectiveness:  Patient claims to feel looser post manipulation      Procedures:  CMT:  None performed    Modalities:  Acupuncture, for 15 minutes:  Points: GB21, SI 12, 13, 15, LI 11, 15 TW 5, 12  For 15 minutes    Therapeutic procedures:  External rotation of the shoulder    Response to Treatment  Reduction in symptoms as reported by patient    Prognosis: Good    Progress towards Goals: Patient will report improved right neck/upper back pain by 50%. Goal Met 9/8/2020 improve by 100%              Patient will report improved right radiculopathy by 50%.Goal Met 9/8/2020 improve by 100%              Patient will report able to sleep without painful limits.Goal Met 9/8/2020              Patient will demonstrate an improved ability to complete Activities of Daily Living   as shown by a reported 10-30% reduced score on neck  index.In progress              Patient will demonstrate improved ROM. In progress    Recommendations:    Instructions:Perform shoulder external rotators as instructed    Follow-up:  Return to care in 1 week.

## 2020-10-28 ENCOUNTER — THERAPY VISIT (OUTPATIENT)
Dept: CHIROPRACTIC MEDICINE | Facility: OTHER | Age: 40
End: 2020-10-28
Attending: CHIROPRACTOR
Payer: COMMERCIAL

## 2020-10-28 DIAGNOSIS — M54.2 CERVICALGIA: ICD-10-CM

## 2020-10-28 DIAGNOSIS — M54.6 PAIN IN THORACIC SPINE: ICD-10-CM

## 2020-10-28 DIAGNOSIS — G54.0 THORACIC OUTLET SYNDROME OF RIGHT THORACIC OUTLET: Primary | ICD-10-CM

## 2020-10-28 PROCEDURE — 97810 ACUP 1/> WO ESTIM 1ST 15 MIN: CPT | Performed by: CHIROPRACTOR

## 2020-10-28 NOTE — PROGRESS NOTES
Visit #:  10    Subjective:  Maricarmen Ornelas is a 40 year old female who is seen in f/u up for:        Thoracic outlet syndrome of right thoracic outlet  Cervicalgia  Pain in thoracic spine.     Since last visit on 10/12/2020,  Maricarmen Ornelas reports: Symptoms doing quite well.  Patient states that after last visit she forgot to make an appointment.  Symptoms seemed to continue to show improvement until Monday.  Patient states that she began to experience numbness into her right hand and arm as well as some increasing neck and upper back pain symptoms.    Area of chief complaint:  Cervical and Thoracic :  Symptoms are graded at 2-3/10. The quality is described as sore and tingling.       Objective:  The following was observed:    P: palpatory tendernessTraps R>>L  A: asymmetric joints, none apparent  R: intersegmental range of motion restriction, none apparent  T: muscle spasm at level(s): Traps and scalene:  R>>L    Segmental spinal dysfunction/restrictions found at:  :.      Assessment: Patient appears to be showing signs of progress at this time.  Patient is concerned that due to increased levels of stress in her personal life her symptoms are going to respond slower.  Because of this we will follow-up with patient in 1 week.  Otherwise at this time I am recommending follow-up to begin tapering.    Chiropractic assessment of the patient showed no signs of segmental/somatic dysfunction of the cervical or upper thoracic spinal region today.    Post treatment it appeared one needle was absent. A thorough search was conducted in the treatment room. And the patient checked herself in the restroom. It is possible needle's were miscounted as no needle was found.    Diagnoses:      1. Thoracic outlet syndrome of right thoracic outlet    2. Cervicalgia    3. Pain in thoracic spine        Patient's condition:  Patient symptoms are gradually improving and Symptoms come and go    Treatment effectiveness:  Patients symptoms are  getting better      Procedures:  CMT:  None performed      Modalities:  83833: Acupuncture, for 15 minutes:  Points: SI 15, CV 14, GB 21, TW 9,14, LI 11, 15  For 15 minutes    Therapeutic procedures:  None    Response to Treatment  Reduction in symptoms as reported by patient    Prognosis: Good    Progress towards Goals:Patient will report improved right neck/upper back pain by 50%. Goal Met 9/8/2020 improve by 100%              Patient will report improved right radiculopathy by 50%.Goal Met 9/8/2020 improve by 100%              Patient will report able to sleep without painful limits.Goal Met 9/8/2020              Patient will demonstrate an improved ability to complete Activities of Daily Living   as shown by a reported 10-30% reduced score on neck  index.In progress              Patient will demonstrate improved ROM. In progress    Recommendations:    Instructions:monitor symptoms and perform activities as tolerated    Follow-up:  Return to care in 1 week.

## 2020-11-02 ENCOUNTER — ALLIED HEALTH/NURSE VISIT (OUTPATIENT)
Dept: FAMILY MEDICINE | Facility: OTHER | Age: 40
End: 2020-11-02
Payer: COMMERCIAL

## 2020-11-02 DIAGNOSIS — Z23 NEED FOR PROPHYLACTIC VACCINATION AND INOCULATION AGAINST INFLUENZA: Primary | ICD-10-CM

## 2020-11-02 PROCEDURE — 90471 IMMUNIZATION ADMIN: CPT

## 2020-11-02 PROCEDURE — 90686 IIV4 VACC NO PRSV 0.5 ML IM: CPT

## 2020-11-05 ENCOUNTER — THERAPY VISIT (OUTPATIENT)
Dept: CHIROPRACTIC MEDICINE | Facility: OTHER | Age: 40
End: 2020-11-05
Attending: CHIROPRACTOR
Payer: COMMERCIAL

## 2020-11-05 DIAGNOSIS — G54.0 THORACIC OUTLET SYNDROME OF RIGHT THORACIC OUTLET: Primary | ICD-10-CM

## 2020-11-05 DIAGNOSIS — M99.01 SEGMENTAL AND SOMATIC DYSFUNCTION OF CERVICAL REGION: ICD-10-CM

## 2020-11-05 DIAGNOSIS — M54.2 CERVICALGIA: ICD-10-CM

## 2020-11-05 DIAGNOSIS — M54.6 PAIN IN THORACIC SPINE: ICD-10-CM

## 2020-11-05 PROCEDURE — 97810 ACUP 1/> WO ESTIM 1ST 15 MIN: CPT | Performed by: CHIROPRACTOR

## 2020-11-05 PROCEDURE — 98940 CHIROPRACT MANJ 1-2 REGIONS: CPT | Mod: AT | Performed by: CHIROPRACTOR

## 2020-11-05 NOTE — PROGRESS NOTES
Visit #:  11    Subjective:  Maricarmen Ornelas is a 40 year old female who is seen in f/u up for:        Thoracic outlet syndrome of right thoracic outlet  Cervicalgia  Pain in thoracic spine  Segmental and somatic dysfunction of cervical region.     Since last visit on 10/28/2020,  Maricarmen Ornelas reports: Symptoms are increased on today's visit.  Patient states that she has been under extreme amounts of stress this past week.  Patient's child needs to undergo appendectomy.  Patient is very nervous about results of current presidential election.  Patient states that neck and upper back pain has become increasingly more painful and tight.  Patient's numbness and tingling has also returned into her right hand.  Patient interested if additional acupoints could be utilized to help with current anxiety and stress    Area of chief complaint:  Cervical and Lumbar :  Symptoms are graded at 4/10. The quality is described as pressure, tight and pulling frequently.     Objective:  The following was observed:    P: palpatory tendernessRight CT junction, primary point C7 on right:    A: static palpation demonstrates intersegmental asymmetry , cervical  R: motion palpation notes restricted motion, C7   T: muscle spasm at level(s): Upper trapezius and scalenes on right, taut and tender fibers apparent cervical paraspinal musculature and right levator scapula:      Segmental spinal dysfunction/restrictions found at:  :  C7 Left rotation restricted, Right lateral flexion restricted and Extension restriction.      Assessment: Patient appears to be experiencing flareup of symptoms which were under control.  Because of this we will follow-up with patient in 1 week.  I do anticipate that this will hinder patient's planned tapering of care.    Diagnoses:      1. Thoracic outlet syndrome of right thoracic outlet    2. Cervicalgia    3. Pain in thoracic spine    4. Segmental and somatic dysfunction of cervical region        Patient's  condition:  Patient had restrictions pre-manipulation, Symptoms come and go and Patient symptoms are worsed due to increased levels of stress this week.    Treatment effectiveness:  Post manipulation there is better intersegmental movement and Patient claims to feel looser post manipulation      Procedures:  CMT:  26004 Chiropractic manipulative treatment 1-2 regions performed   Cervical: Diversified, C7 , Supine    Modalities:  19669: Acupuncture, for 15 minutes:  Points: GB 21, TW 9, 11, 12, Auriculopoints Carey Men, Tranquilizer point, CV 24, Yin Arrieta  For 15 minutes    Therapeutic procedures:  None    Response to Treatment  Reduction in symptoms as reported by patient    Prognosis: Good    Progress towards Goals: Patient will report improved right neck/upper back pain by 50%. Goal Met 9/8/2020 improve by 100%              Patient will report improved right radiculopathy by 50%.Goal Met 9/8/2020 improve by 100%              Patient will report able to sleep without painful limits.Goal Met 9/8/2020              Patient will demonstrate an improved ability to complete Activities of Daily Living   as shown by a reported 10-30% reduced score on neck  index.In progress              Patient will demonstrate improved ROM. In progress    Recommendations:    Instructions:ice 20 minutes every other hour as needed    Follow-up:  Return to care in 1 week.

## 2020-11-09 ENCOUNTER — THERAPY VISIT (OUTPATIENT)
Dept: CHIROPRACTIC MEDICINE | Facility: OTHER | Age: 40
End: 2020-11-09
Attending: CHIROPRACTOR
Payer: COMMERCIAL

## 2020-11-09 DIAGNOSIS — M54.2 CERVICALGIA: ICD-10-CM

## 2020-11-09 DIAGNOSIS — G54.0 THORACIC OUTLET SYNDROME OF RIGHT THORACIC OUTLET: Primary | ICD-10-CM

## 2020-11-09 PROCEDURE — 97810 ACUP 1/> WO ESTIM 1ST 15 MIN: CPT | Performed by: CHIROPRACTOR

## 2020-11-09 NOTE — PROGRESS NOTES
Visit #:  12    Subjective:  Maricarmen Ornelas is a 40 year old female who is seen in f/u up for:        Thoracic outlet syndrome of right thoracic outlet  Cervicalgia.     Since last visit on 11/5/2020,  Maricarmen Ornelas reports: Symptoms seem to show improvement after last visit.  Patient states that over the weekend she experienced increasing levels of family related stress.  Patient believes these are contributing factors for recurrence of symptoms as we are seeing at this time.  Patient is also doing a fair amount of computer type work.  Patient will oftentimes find herself shrugging her shoulders up when sitting at her computer which also contributes to recurrence of symptoms.  Patient still experiencing some numbness and tingling into the right arm at this time    Area of chief complaint:  Cervical and Thoracic :  Symptoms are graded at 2-4/10. The quality is described as achey, dull, tingling and tight.      Objective:  The following was observed:    P: palpatory tendernessTraps Bilaterally  A: intersegmental range of motion restriction , none apparent  R: intersegmental range of motion restriction, mild C7 on right  T: muscle spasm at level(s): Traps and Scalenes on right side, levator scapula right side:      Segmental spinal dysfunction/restrictions found at:  None apparent.      Assessment: Subjectively pain levels showing similar levels as last visit.  No evidence of segmental and somatic dysfunction through chiropractic assessment of the patient today.  I do believe that patient is still experiencing flareup in this would benefit from continuation of care at this time.  We will plan to follow-up with patient within 1 week for additional care.    Diagnoses:      1. Thoracic outlet syndrome of right thoracic outlet    2. Cervicalgia        Patient's condition:  Patient had restrictions pre-manipulation and Symptoms come and go    Treatment effectiveness:  Post manipulation there is better intersegmental  movement, Patient states that they feel much better post manipulation but they gradually tighten up over time and Patients symptoms are getting better      Procedures:  CMT:  None performed      Modalities:  79926: Acupuncture, for 15 minutes:  Points: Bl11, SI 15, TW 5, 11, LI 11  For 15 minutes    Therapeutic procedures:  None    Response to Treatment  Reduction in symptoms as reported by patient    Prognosis: Good    Progress towards Goals: Patient will report improved right neck/upper back pain by 50%. Goal Met 9/8/2020 improve by 100%              Patient will report improved right radiculopathy by 50%.Goal Met 9/8/2020 improve by 100%              Patient will report able to sleep without painful limits.Goal Met 9/8/2020              Patient will demonstrate an improved ability to complete Activities of Daily Living   as shown by a reported 10-30% reduced score on neck  index.In progress              Patient will demonstrate improved ROM. In progress    Recommendations:    Instructions:none    Follow-up:  Return to care in 1 week.

## 2020-11-18 NOTE — PROGRESS NOTES
Outpatient Physical Therapy Discharge Note     Patient: Maricarmen Ornelas  : 1980    Beginning/End Dates of Reporting Period:  20 to 2020    Referring Provider: DO Sophia Page Diagnosis:neck pain, thoracic and cervical segmental dysfunction, myofascial tightness      Client Self Report: Arm is doing better, not much pain yesterday, able to sleep last night without meds. No pain rolling over.     Objective Measurements:  Objective Measure: postural loading     Objective Measure: joint mobility     Objective Measure: myofascial       Goals:  Goal Identifier lifting/carrying   Goal Description Patient to report 50% reduction in pain with carrying babies, using correct lifting techniques.    Target Date 20   Date Met   20   Progress:     Goal Identifier exercise   Goal Description Patient to report ability to exercise without upper back and neck pain.    Target Date 20   Date Met   20   Progress:     Goal Identifier sleep   Goal Description Patient to report ability to sleep without interupption from neck and upper back pain.   Target Date 20   Date Met   20   Progress:     Goal Identifier joint mobility   Goal Description Patient to demonstrate normal joint mechanics of thoracic and cervcal spine to allow movement without neck and upper bck pain.    Target Date 20   Date Met   20   Progress:     Goal Identifier righ tknee   Goal Description Patient to report ability to complete mobility without right knee pain.    Target Date 20   Date Met  20    Progress:     Progress Toward Goals:   Progress this reporting period: Patient was able to return to all activities without limitation from pain.           Plan:  Discharge from therapy.    Discharge:    Reason for Discharge: Patient has met all goals.    Equipment Issued: NA    Discharge Plan: Patient to continue home program.

## 2020-11-23 ENCOUNTER — TELEPHONE (OUTPATIENT)
Dept: FAMILY MEDICINE | Facility: OTHER | Age: 40
End: 2020-11-23

## 2020-11-23 DIAGNOSIS — F90.0 ATTENTION DEFICIT HYPERACTIVITY DISORDER (ADHD), PREDOMINANTLY INATTENTIVE TYPE: ICD-10-CM

## 2020-11-23 NOTE — TELEPHONE ENCOUNTER
SMS-pt aware pcp out today but requesting a work in appt tomorrow for narcotic medication visit. Also wanting to see if someone else can address-she is out as of today. Please call. Thank you.  Shonda Abbott

## 2020-11-23 NOTE — TELEPHONE ENCOUNTER
Patient was transferred to scheduling to make an appointment with an available provider for her medication refills   Dena Miranda LPN on 11/23/2020 at 2:17 PM

## 2020-11-24 ENCOUNTER — VIRTUAL VISIT (OUTPATIENT)
Dept: FAMILY MEDICINE | Facility: OTHER | Age: 40
End: 2020-11-24
Attending: FAMILY MEDICINE
Payer: COMMERCIAL

## 2020-11-24 VITALS — WEIGHT: 162 LBS | BODY MASS INDEX: 26.15 KG/M2

## 2020-11-24 DIAGNOSIS — E03.9 HYPOTHYROIDISM, UNSPECIFIED TYPE: ICD-10-CM

## 2020-11-24 DIAGNOSIS — F90.0 ATTENTION DEFICIT HYPERACTIVITY DISORDER (ADHD), PREDOMINANTLY INATTENTIVE TYPE: Primary | ICD-10-CM

## 2020-11-24 PROCEDURE — 99213 OFFICE O/P EST LOW 20 MIN: CPT | Mod: 95 | Performed by: FAMILY MEDICINE

## 2020-11-24 RX ORDER — METHYLPHENIDATE HYDROCHLORIDE 54 MG/1
54 TABLET ORAL DAILY
Qty: 30 TABLET | Refills: 0 | Status: SHIPPED | OUTPATIENT
Start: 2021-01-22 | End: 2021-02-18

## 2020-11-24 RX ORDER — METHYLPHENIDATE HYDROCHLORIDE 54 MG/1
54 TABLET ORAL DAILY
Qty: 30 TABLET | OUTPATIENT
Start: 2020-11-24

## 2020-11-24 RX ORDER — METHYLPHENIDATE HYDROCHLORIDE 54 MG/1
54 TABLET ORAL DAILY
Qty: 30 TABLET | Refills: 0 | Status: SHIPPED | OUTPATIENT
Start: 2020-11-24 | End: 2021-02-18

## 2020-11-24 RX ORDER — METHYLPHENIDATE HYDROCHLORIDE 54 MG/1
54 TABLET ORAL DAILY
Qty: 30 TABLET | Refills: 0 | Status: SHIPPED | OUTPATIENT
Start: 2020-12-23 | End: 2021-02-18

## 2020-11-24 ASSESSMENT — PAIN SCALES - GENERAL: PAINLEVEL: MILD PAIN (2)

## 2020-11-24 ASSESSMENT — PATIENT HEALTH QUESTIONNAIRE - PHQ9: SUM OF ALL RESPONSES TO PHQ QUESTIONS 1-9: 13

## 2020-11-24 NOTE — TELEPHONE ENCOUNTER
Patient has an appointment today with Dr. Sanders.  Rajani Ford LPN, CHETAN  11/24/2020  10:03 AM

## 2020-11-24 NOTE — PROGRESS NOTES
"Maricarmen Ornelas is a 40 year old female who is being evaluated via a billable telephone visit.      The patient has been notified of following:     \"This telephone visit will be conducted via a call between you and your physician/provider. We have found that certain health care needs can be provided without the need for a physical exam.  This service lets us provide the care you need with a short phone conversation.  If a prescription is necessary we can send it directly to your pharmacy.  If lab work is needed we can place an order for that and you can then stop by our lab to have the test done at a later time.    Telephone visits are billed at different rates depending on your insurance coverage. During this emergency period, for some insurers they may be billed the same as an in-person visit.  Please reach out to your insurance provider with any questions.    If during the course of the call the physician/provider feels a telephone visit is not appropriate, you will not be charged for this service.\"  Patient has given verbal consent for Telephone visit?  Yes  What phone number would you like to be contacted at? [phone]  How would you like to obtain your AVS? Yaohart    Subjective   Maricarmen Ornelas is a 40 year old female who presents via phone visit today for the following health issues:    KENN Hernadez is being contacted for a Concerta refill; this allows her to manage lifestyle at home (has young twins and two other children) and going graduate school.  Believes current dose is still helpful; is trying to sort out the school from home (which they have been doing for ~1 month and twins still attending at the [facility]).      Did have some sleep issues recently; more restless/waking up early.  Not as much difficulty falling asleep.  Taking Concerta NO later than 8AM.  1/5 cup of coffee in the AM  Melatonin and Unisom @ 9:00-9:30PM.  Feeling jittery at times; hoping to have thyroid levels checked.  Has been trying to lose " a little weight; and quit drinking alcohol.    Review of Systems   CONSTITUTIONAL: NEGATIVE for fever, chills, change in weight  ENT/MOUTH: NEGATIVE for ear, mouth and throat problems  RESP: NEGATIVE for significant cough or SOB  CV: NEGATIVE for chest pain, palpitations or peripheral edema     Objective   Vitals - Patient Reported  Pain Score: Mild Pain (2)  Pain Loc: Shoulder(right and neck)    healthy, alert and no distress  PSYCH: Alert and oriented times 3; coherent speech, normal   rate and volume, able to articulate logical thoughts, able   to abstract reason, no tangential thoughts, no hallucinations   or delusions  Her affect is normal  RESP: No cough, no audible wheezing, able to talk in full sentences  Remainder of exam unable to be completed due to telephone visits    No new diagnostics      Assessment/Plan:  1. Attention deficit hyperactivity disorder (ADHD), predominantly inattentive type  Concerta refilled at current dose; discussed that her sleep issues are more likely anxiety vs thyroid vs other - as sleep onset would be affected by her stimulant.  We will continue to monitor symptoms closely over the next three months and if still having difficulty, we could reduce dose for 1-2 months and see if any improvement.  For now - three months refilled and next appointment already scheduled.  Follow up prn.  - methylphenidate (CONCERTA) 54 MG CR tablet; Take 1 tablet (54 mg) by mouth daily  Dispense: 30 tablet; Refill: 0  - methylphenidate (CONCERTA) 54 MG CR tablet; Take 1 tablet (54 mg) by mouth daily  Dispense: 30 tablet; Refill: 0  - methylphenidate (CONCERTA) 54 MG CR tablet; Take 1 tablet (54 mg) by mouth daily  Dispense: 30 tablet; Refill: 0    2. Hypothyroidism, unspecified type  Will plan to have TSH/Free T4 levels monitored and see if dose reduction is required to help improve her jittery/restless feeling.  Continuing to learn self-care/relaxation/treatment modalities and encouraged to implement  them for calming benefits.  She states she is participating in a 15 min/day workout plan with a friend and the accountability is yang for her; encouraged her to continue this longterm as well.  - TSH Reflex GH; Future  - T4, Free; Future      Phone call duration:  20 minutes  Start: 1:26 PM  Stop: 1:46 PM    Avril Sanders, DO  Family Practice

## 2020-12-19 DIAGNOSIS — E03.9 HYPOTHYROIDISM, UNSPECIFIED TYPE: ICD-10-CM

## 2020-12-21 RX ORDER — LEVOTHYROXINE SODIUM 75 MCG
75 TABLET ORAL DAILY
Qty: 90 TABLET | Refills: 0 | Status: SHIPPED | OUTPATIENT
Start: 2020-12-21 | End: 2021-03-08

## 2020-12-21 NOTE — TELEPHONE ENCOUNTER
[id number] sent Rx request for the following:   SYNTHROID 75 MCG tablet  Sig:TAKE 1 TABLET (75 MCG) BY MOUTH DAILY    Last Prescription Date:   09/23/2020  Last Fill Qty/Refills:         90, R-0    Last Office Visit:              11/24/2020 ([facility])   Future Office visit:           02/18/2021 ([facility])   Thyroid Protocol Passed - 12/19/2020  1:12 AM     Prescription approved per [facility] Refill Protocol.  Deysi Arellano RN ....................  12/21/2020   9:36 AM

## 2020-12-22 ENCOUNTER — THERAPY VISIT (OUTPATIENT)
Dept: CHIROPRACTIC MEDICINE | Facility: OTHER | Age: 40
End: 2020-12-22
Attending: CHIROPRACTOR
Payer: COMMERCIAL

## 2020-12-22 DIAGNOSIS — M99.02 SEGMENTAL AND SOMATIC DYSFUNCTION OF THORACIC REGION: ICD-10-CM

## 2020-12-22 DIAGNOSIS — M54.2 DORSALGIA OF CERVICAL REGION: Primary | ICD-10-CM

## 2020-12-22 DIAGNOSIS — M54.12 CERVICAL RADICULOPATHY: ICD-10-CM

## 2020-12-22 PROCEDURE — 99212 OFFICE O/P EST SF 10 MIN: CPT | Mod: 25 | Performed by: CHIROPRACTOR

## 2020-12-22 PROCEDURE — 98940 CHIROPRACT MANJ 1-2 REGIONS: CPT | Mod: AT | Performed by: CHIROPRACTOR

## 2020-12-22 PROCEDURE — 97810 ACUP 1/> WO ESTIM 1ST 15 MIN: CPT | Performed by: CHIROPRACTOR

## 2020-12-22 NOTE — PROGRESS NOTES
Visit #:  13    Subjective:  Maricarmen Ornelas is a 40 year old female who is seen in f/u up for:        Dorsalgia of cervical region  Segmental and somatic dysfunction of thoracic region  Cervical radiculopathy.     Since last visit on 11/9/2020,  Maricarmen Ornelas reports: Following last visit in November patient became quite concerned with Covid exposure and opted to discontinue course of treatment.  Patient noted that right-sided neck/upper back pain along with radicular symptoms into the right arm did quite well.  Patient stated that for approximately 2 weeks she had no symptoms.  Following these 2 weeks symptoms slowly began to return to current levels.  Patient states that symptoms have worsened mostly over the past week approximately.  Patient attributes this to doing a lot of computer-based work as she has been taking several online courses currently.  No traumatic events noted in patient history.    Patient has also been experiencing increasing amounts of joint pain primarily of her hands and knees bilaterally.  Patient was unsure if this could be related to current symptoms, changes in weather patterns, changes in diet as she has currently eating quite a bit of sugar, or could there be another cause associated with increasing amounts of hand and knee pain.    Area of chief complaint:  Cervical and Thoracic :  Symptoms are graded at 4-6/10. The quality is described as constantly tight and locked up.      Objective:  The following was observed:  Neck Disability Index (  Anthony H. and Rachelle C. 1991. All rights reserved.; used with permission) 12/22/2020   SECTION 1 - PAIN INTENSITY 2   SECTION 2 - PERSONAL CARE 1   SECTION 3 - LIFTING 1   SECTION 4 - READING 1   SECTION 5 - HEADACHES 2   SECTION 6 - CONCENTRATION 3   SECTION 7 - WORK 3   SECTION 8 - DRIVING 2   SECTION 9 - SLEEPING 3   SECTION 10 - RECREATION 2   Count 10   Sum 20   Raw Score: /50 20   Neck Disability Index Score: (%) 40     Upper Extremity  Functional Index (  1996 PW [place]) 12/22/2020   a.  Any of your usual work, housework or school activities 0-Extreme Difficulty   b.  Your usual hobbies, recreational or sporting activities 0-Extreme Difficulty   c.  Lifting a bag of groceries to waist level 4-No Difficulty   d.  Placing an object onto, or removing it from an overhead shelf 4-No Difficulty   e.  Washing your hair or scalp 4-No Difficulty   f.   Pushing up on your hands (e.g., from bathtub or chair) 2-Moderate Difficulty   g.  Preparing food (e.g., peeling, cutting) 4-No Difficulty   h.  Driving 3-A Little bit of Difficulty   I.   Vacuuming, sweeping, or raking 3-A Little bit of Difficulty   j.   Dressing 3-A Little bit of Difficulty   k.  Doing up buttons 4-No Difficulty   l.   Using tools or appliances 4-No Difficulty   m. Opening doors 4-No Difficulty   n.  Cleaning 4-No Difficulty   o.  Tying or lacing shoes 4-No Difficulty   p.  Sleeping 2-Moderate Difficulty   q.  Laundering clothes. (e.g., washing, ironing, folding) 4-No Difficulty   r.   Opening a jar 3-A Little bit of Difficulty   s.  Throwing a ball 2-Moderate Difficulty   t.   Carrying a small suitcase with your affected limb  3-A Little bit of Difficulty   Column Totals: /80 61     Cervical AROM: Flexion and extension appear within normal limits.  Pain noted with flexion.  Right lateral flexion restriction approximately 10 degrees with endrange pain, left lateral flexion appears WNL, right rotation restriction approximately 5 degrees with endrange pain, left rotation appears WNL    Cervical Compression: - focal neck pain, - radicular symptoms  Cervical distraction: Positive   shoulder Depressor: - right, - left    P: palpatory tendernessPresent right side T1:    A: static palpation demonstrates intersegmental asymmetry , thoracic  R: motion palpation notes restricted motion, T1   T: muscle spasm at level(s): Trapezius on right side, scalenes right side, levator scapula right side:       Segmental spinal dysfunction/restrictions found at:  :  T1 Right lateral flexion restricted and Extension restriction.      Assessment: Flareup of ongoing neck/upper back and right arm radicular symptoms.  Patient has been under our care in the past with similar complaints and had responded very favorably with course of chiropractic care and acupuncture.  Recommend continuation of chiropractic care along with acupuncture for up to twelve visits over the next 4-6 weeks.    Regarding increased amounts of bilateral hand and knee pain.  It is quite possible that due to increased sugar this can increase pain in the joints.  Possible this could also be due to increased levels of stress as the patient seems to be experiencing.  Continue to monitor the symptoms and refer to patient's primary provider Dr. Sanders DO if hand and knee pain fail to improve.    Diagnoses:      1. Dorsalgia of cervical region    2. Segmental and somatic dysfunction of thoracic region    3. Cervical radiculopathy        Patient's condition:  Patient had restrictions pre-manipulation and Patient symptoms are worsed due to increased levels of stress.    Treatment effectiveness:  Post manipulation there is better intersegmental movement and Patient claims to feel looser post manipulation      Procedures:  E/M 76670 Established patient, straight forward complexity, 10 minutes    CMT:  16753 Chiropractic manipulative treatment 1-2 regions performed   Thoracic: Activator, T1, Prone    Modalities:  50265: Acupuncture, for 15 minutes:  Points: BL 10, 11, SI 9, TW 5, 12, LI 11  For 15 minutes    Therapeutic procedures:  None    Response to Treatment  Reduction in symptoms as reported by patient    Prognosis: Good    Progress towards Goals: Reduce neck/upper back pain and right arm symptoms by 75%  Reduce neck disability index score by 20-30%  Improved cervical AROM  Patient will be able to sleep without painful limits  Patient will be able to work  without painful limits    Recommendations:    Instructions:none    Follow-up:  Return to care in 1 week.

## 2020-12-27 ENCOUNTER — HEALTH MAINTENANCE LETTER (OUTPATIENT)
Age: 40
End: 2020-12-27

## 2020-12-30 ENCOUNTER — THERAPY VISIT (OUTPATIENT)
Dept: CHIROPRACTIC MEDICINE | Facility: OTHER | Age: 40
End: 2020-12-30
Attending: CHIROPRACTOR
Payer: COMMERCIAL

## 2020-12-30 DIAGNOSIS — M54.12 CERVICAL RADICULOPATHY: ICD-10-CM

## 2020-12-30 DIAGNOSIS — M54.2 DORSALGIA OF CERVICAL REGION: Primary | ICD-10-CM

## 2020-12-30 PROCEDURE — 97810 ACUP 1/> WO ESTIM 1ST 15 MIN: CPT | Performed by: CHIROPRACTOR

## 2020-12-30 NOTE — PROGRESS NOTES
Visit #:  14 total  2 current episode    Subjective:  Maricarmen Ornelas is a 40 year old female who is seen in f/u up for:        Dorsalgia of cervical region  Cervical radiculopathy.     Since last visit on 12/22/2020,  Maricarmen Ornelas reports: Symptoms doing quite a bit better after last visit.  Patient notes that her right hand becomes more tired especially when she is attempting to write.  Unsure if this is related to current symptoms or not.    Unrelated to neck and upper back pain patient was recently doing exercising and injured her back.  Symptoms are doing better at this time.    Area of chief complaint:  Cervical and Thoracic :  Symptoms are graded at 2-3/10. The quality is described as tight.     Objective:  The following was observed:    P: palpatory tenderness None reported:    A: asymmetric joints, none apparent  R: restricted motion , none detected  T: Spasms noted upper trapezius on right, right scalenes    Segmental spinal dysfunction/restrictions found at:  None apparent.      Assessment: Symptoms showing signs of improvement.  No evidence of segmental/somatic dysfunction from chiropractic assessment of the patient today.  Plan to continue care at once a week schedule    Diagnoses:      1. Dorsalgia of cervical region    2. Cervical radiculopathy        Patient's condition:  Patient symptoms are gradually improving    Treatment effectiveness:  Patients symptoms are getting better      Procedures:  CMT:  None performed      Modalities:  37926: Acupuncture, for 15 minutes:  Points: BL 10, 11, SI 9, TW 5, 12, LI 11  For 15 minutes     Therapeutic procedures:  None     Response to Treatment  Reduction in symptoms as reported by patient     Prognosis: Good     Progress towards Goals: Reduce neck/upper back pain and right arm symptoms by 75%  Reduce neck disability index score by 20-30%  Improved cervical AROM  Patient will be able to sleep without painful limits  Patient will be able to work without painful  limits     Recommendations:     Instructions:none     Follow-up:  Return to care in 1 week.

## 2020-12-31 ENCOUNTER — APPOINTMENT (OUTPATIENT)
Dept: FAMILY MEDICINE | Facility: OTHER | Age: 40
End: 2020-12-31
Attending: PSYCHIATRY & NEUROLOGY
Payer: COMMERCIAL

## 2021-01-11 ENCOUNTER — THERAPY VISIT (OUTPATIENT)
Dept: CHIROPRACTIC MEDICINE | Facility: OTHER | Age: 41
End: 2021-01-11
Attending: CHIROPRACTOR
Payer: COMMERCIAL

## 2021-01-11 DIAGNOSIS — M54.2 DORSALGIA OF CERVICAL REGION: Primary | ICD-10-CM

## 2021-01-11 DIAGNOSIS — M54.12 CERVICAL RADICULOPATHY: ICD-10-CM

## 2021-01-11 PROCEDURE — 97810 ACUP 1/> WO ESTIM 1ST 15 MIN: CPT | Performed by: CHIROPRACTOR

## 2021-01-11 NOTE — PROGRESS NOTES
"Visit #:  3 current episode    Subjective:  Maricarmen Ornelas is a 40 year old female who is seen in f/u up for:        Dorsalgia of cervical region  Cervical radiculopathy.     Since last visit on 12/30/2020,  Maricarmen Ornelas reports: Symptoms seem to be doing better following last visit.  Patient states that following last visit she has felt more \"balanced.\"  Patient states that she is able to have more conscious awareness of how to move and space especially with her shoulder blades.  Has been able to begin yoga practice.  This has been something the patient has been wishing to do for quite some time but has been quite limited to do because of her neck and upper back pain along with right arm radiculopathy.  Patient feels that by doing this practice her symptoms will continue to show improvement.    Area of chief complaint:  Cervical/thoracic :  Symptoms are graded at 2/10. The quality is described as constantly tight, strained and numb.     Objective:  The following was observed:    P: palpatory tendernessTraps right side   A: No asymmetry present of the cervical or upper thoracic region  R: intersegmental range of motion restriction, none apparent  T: Upper trapezius right side, right scalenes, right levator scapula    Segmental spinal dysfunction/restrictions found at:  None apparent.      Assessment: Patient continues to present with complaints of neck/upper back pain along with cervical radiculopathy into the right hand.  No evidence of segmental/somatic dysfunction of the cervical or upper thoracic spinal regions.  Very happy to hear patient has begun incorporating yoga practices and that she has begun experiencing increased levels of body awareness.  Plan to follow-up with patient again in 1 week    Diagnoses:      1. Dorsalgia of cervical region    2. Cervical radiculopathy        Patient's condition:  Patient symptoms are gradually improving and Symptoms come and go    Treatment effectiveness:  Patients " symptoms are getting better      Procedures:  CMT:  None performed      Modalities:  92226: Acupuncture, for 15 minutes:  Points: CV 14, GB 21, SI 15, TW 8, 12, 14  For 15 minutes    Therapeutic procedures:  None    Response to Treatment  Reduction in symptoms as reported by patient    Prognosis: Good    Progress towards Goals: Reduce neck/upper back pain and right arm symptoms by 75%. In progress  Reduce neck disability index score by 20-30%. In progress  Improved cervical AROM. In progress  Patient will be able to sleep without painful limits. In progress  Patient will be able to work without painful limits. In progress     Recommendations:    Instructions:continue with yoga, incorporate home exercises from physical therapy    Follow-up:  Return to care in 1 week.

## 2021-01-18 ENCOUNTER — THERAPY VISIT (OUTPATIENT)
Dept: CHIROPRACTIC MEDICINE | Facility: OTHER | Age: 41
End: 2021-01-18
Attending: CHIROPRACTOR
Payer: COMMERCIAL

## 2021-01-18 DIAGNOSIS — M54.12 CERVICAL RADICULOPATHY: ICD-10-CM

## 2021-01-18 DIAGNOSIS — M54.2 DORSALGIA OF CERVICAL REGION: Primary | ICD-10-CM

## 2021-01-18 PROCEDURE — 97810 ACUP 1/> WO ESTIM 1ST 15 MIN: CPT | Performed by: CHIROPRACTOR

## 2021-01-18 NOTE — PROGRESS NOTES
Visit #:  4 current episode    Subjective:  Maricarmen Ornelas is a 40 year old female who is seen in f/u up for:        Dorsalgia of cervical region  Cervical radiculopathy.     Since last visit on 1/11/2021,  Maricarmen Ornelas reports: Finding relief with course of treatments thus far.  Patient states that she is somewhat stiff and sore today but that is mostly because she has not been able to do her workout routine just yet.  States that she is experiencing less radiation of symptoms into the right hand and arm.  Notes that this has been very beneficial and that she has been able to perform tasks such as typing, drawing and writing more easily.    Area of chief complaint:  Cervical and Thoracic :  Symptoms are graded at 2-3/10. The quality is described as constantly achey, dull, and tight.      Objective:  The following was observed:    P: palpatory tenderness none reported:    A: static palpation demonstrates intersegmental asymmetry , none apparent  R: motion palpation notes restricted motion, none apparent  T: mild upper trapezius R, scalenes on right    Segmental spinal dysfunction/restrictions found at:        Assessment: Patient showing definite signs of progress.  At this time recommend continuation of care at once week schedule for 2 additional visits    Patient informed on today's visit that due to upcoming leave of absence I will be unable to provide additional care starting in February.  Patient provided with additional chiropractic acupuncturists Dr. Humza HECK and Dr. Denver D.C. for additional care during my absence.    Diagnoses:      1. Dorsalgia of cervical region    2. Cervical radiculopathy        Patient's condition:  Patient symptoms are gradually improving    Treatment effectiveness:  Patients symptoms are getting better      Procedures:  CMT:  None performed      Modalities:  68318: Acupuncture, for 15 minutes:  Points: CV 14, Si 15, Bl 11, TW 8, 12, 14  For 15 minutes    Therapeutic  procedures:  None    Response to Treatment  Reduction in symptoms as reported by patient    Prognosis: Good    Progress towards Goals:Reduce neck/upper back pain and right arm symptoms by 75%. In progress  Reduce neck disability index score by 20-30%. In progress  Improved cervical AROM. In progress  Patient will be able to sleep without painful limits. In progress  Patient will be able to work without painful limits. In progress             Recommendations:    Instructions:continue to remain active as symptoms allow    Follow-up:  Return to care in 1 week.

## 2021-01-29 ENCOUNTER — OFFICE VISIT (OUTPATIENT)
Dept: FAMILY MEDICINE | Facility: OTHER | Age: 41
End: 2021-01-29
Attending: FAMILY MEDICINE
Payer: COMMERCIAL

## 2021-01-29 VITALS
SYSTOLIC BLOOD PRESSURE: 122 MMHG | RESPIRATION RATE: 18 BRPM | DIASTOLIC BLOOD PRESSURE: 66 MMHG | OXYGEN SATURATION: 99 % | HEART RATE: 80 BPM | BODY MASS INDEX: 27.28 KG/M2 | WEIGHT: 169 LBS | TEMPERATURE: 98.1 F

## 2021-01-29 DIAGNOSIS — L02.439 CARBUNCLE OF AXILLA: Primary | ICD-10-CM

## 2021-01-29 PROCEDURE — 99213 OFFICE O/P EST LOW 20 MIN: CPT | Performed by: FAMILY MEDICINE

## 2021-01-29 RX ORDER — SULFAMETHOXAZOLE/TRIMETHOPRIM 800-160 MG
1 TABLET ORAL 2 TIMES DAILY
Qty: 20 TABLET | Refills: 0 | Status: SHIPPED | OUTPATIENT
Start: 2021-01-29 | End: 2021-02-08

## 2021-01-29 ASSESSMENT — PAIN SCALES - GENERAL: PAINLEVEL: MILD PAIN (2)

## 2021-01-29 NOTE — NURSING NOTE
"Patient presents to the clinic for concerns about a boil of the right armpit for the past week.  Patient also express concerns about dizzy spells.  Contract updated today.    Chief Complaint   Patient presents with     Derm Problem       Initial /66 (BP Location: Right arm, Patient Position: Sitting, Cuff Size: Adult Regular)   Pulse 80   Temp 98.1  F (36.7  C) (Tympanic)   Resp 18   Wt 76.7 kg (169 lb)   LMP 01/15/2021 (Approximate)   SpO2 99%   BMI 27.28 kg/m   Estimated body mass index is 27.28 kg/m  as calculated from the following:    Height as of 7/10/20: 1.676 m (5' 6\").    Weight as of this encounter: 76.7 kg (169 lb).  Medication Reconciliation: complete    Katarina Escobar, CHETAN    "

## 2021-01-29 NOTE — PROGRESS NOTES
SUBJECTIVE:   Maricarmen Ornelas is a 40 year old female who presents to clinic today for the following health issues:    HPI   Maricarmen is here for a boil in her right axilla.  It has been bothering her for 1 week.  She is hot packing without significant relief.  It has gotten significantly smaller, however not however not resolving.  She does not get these recurrently.  No fever/chills.    She does relate some mood concerns with her  who has an upcoming appointment; this was discussed separately.    Patient Active Problem List    Diagnosis Date Noted     S/P  section 2018     Priority: Medium     Encounter for triage in pregnant patient 2018     Priority: Medium     Bilateral carpal tunnel syndrome 2018     Priority: Medium     Adjustment disorder with anxious mood 2018     Priority: Medium     Acquired hypothyroidism 2018     Priority: Medium     Attention deficit disorder 2018     Priority: Medium     Twin pregnancy 2018     Priority: Medium     EDC 2018       Lumbosacral spondylosis without myelopathy 03/10/2015     Priority: Medium     Other staphylococcus infection in conditions classified elsewhere and of unspecified site 2011     Priority: Medium     Past Medical History:   Diagnosis Date     Dichorionic diamniotic twin pregnancy in third trimester 2018     Disorder of thyroid     hypo     Supervision of elderly multigravida     2018     Twin pregnancy     EDC 2018      Past Surgical History:   Procedure Laterality Date     APPENDECTOMY OPEN            SECTION N/A 2018    Procedure:  SECTION;   Section - twins;  Surgeon: Woodrow Hurtado MD;  Location:  OR     COLONOSCOPY N/A 7/10/2019    Procedure: COLONOSCOPY, WITH POLYPECTOMY AND BIOPSY;  Surgeon: Viki Villareal MD;  Location:  OR     Family History   Problem Relation Age of Onset     Substance Abuse Maternal Grandmother         Alcohol/Drug,Alcohol  abuse     Cancer Maternal Grandmother         Cancer, lung cancer     Other - See Comments Maternal Grandfather         Alzheimer's disease     Diabetes Paternal Grandmother         Diabetes,type 1     No Known Problems Sister      No Known Problems Son      No Known Problems Son      Diabetes Maternal Uncle         Diabetes,type 2     No Known Problems Son         2018     No Known Problems Daughter         2018     Heart Disease No family hx of         Heart Disease     Social History     Tobacco Use     Smoking status: Never Smoker     Smokeless tobacco: Never Used   Substance Use Topics     Alcohol use: Yes     Alcohol/week: 2.0 standard drinks     Comment: 1-2 times per week     Social History     Social History Narrative       - Ki Parry      Sons: Franco Linn ,    Daughter:    Father: Jose;  Mother: Bronwyn      Siblings: Sister Nydia,  Guccibrother     Current Outpatient Medications   Medication Sig Dispense Refill     sulfamethoxazole-trimethoprim (BACTRIM DS) 800-160 MG tablet Take 1 tablet by mouth 2 times daily for 10 days 20 tablet 0     loratadine (CLARITIN) 10 MG tablet Take 1 tablet (10 mg) by mouth daily       medical cannabis (Patient's own supply) See Admin Instructions (The purpose of this order is to document that the patient reports taking medical cannabis.  This is not a prescription, and is not used to certify that the patient has a qualifying medical condition.)       methylcellulose (CITRUCEL) powder Take 0.3 g (1.05 teaspoonful) by mouth daily 454 g 3     methylphenidate (CONCERTA) 54 MG CR tablet Take 1 tablet (54 mg) by mouth daily 30 tablet 0     methylphenidate (CONCERTA) 54 MG CR tablet Take 1 tablet (54 mg) by mouth daily 30 tablet 0     methylphenidate (CONCERTA) 54 MG CR tablet Take 1 tablet (54 mg) by mouth daily 30 tablet 0     SYNTHROID 75 MCG tablet TAKE 1 TABLET (75 MCG) BY MOUTH DAILY 90 tablet 0     Allergies   Allergen Reactions     Seasonal  Allergies Itching     Chloraprep One Step Rash     With twin C/S (9/2018)     Review of Systems   Constitutional: Negative for activity change, appetite change and chills.   Respiratory: Negative for cough.    All other systems reviewed and are negative.   + stress    OBJECTIVE:     /66 (BP Location: Right arm, Patient Position: Sitting, Cuff Size: Adult Regular)   Pulse 80   Temp 98.1  F (36.7  C) (Tympanic)   Resp 18   Wt 76.7 kg (169 lb)   LMP 01/15/2021 (Approximate)   SpO2 99%   BMI 27.28 kg/m    Body mass index is 27.28 kg/m .  Physical Exam  Vitals signs and nursing note reviewed.   Constitutional:       Appearance: Normal appearance.   HENT:      Head: Normocephalic and atraumatic.   Chest:          Comments: ~1.5cm subcutaneous nodule with overlying erythema of skin.  No active drainage. TTP.  Neurological:      Mental Status: She is alert.         Diagnostic Test Results:  No results found for any visits on 01/29/21.    ASSESSMENT/PLAN:     1. Carbuncle of axilla  Improved; but persistent.  No area of fluctuation for successful I&D intervention identified.  Suspect continued resolution; Rx for Bactrim to help promote.  Did discuss potential for persistent cyst despite resolution of infection.  Reasons to remove covered.  - sulfamethoxazole-trimethoprim (BACTRIM DS) 800-160 MG tablet; Take 1 tablet by mouth 2 times daily for 10 days  Dispense: 20 tablet; Refill: 0      Avril Sanders DO  [facility]

## 2021-01-31 ASSESSMENT — ENCOUNTER SYMPTOMS
CHILLS: 0
COUGH: 0
ACTIVITY CHANGE: 0
APPETITE CHANGE: 0

## 2021-02-08 ENCOUNTER — TELEPHONE (OUTPATIENT)
Dept: FAMILY MEDICINE | Facility: OTHER | Age: 41
End: 2021-02-08

## 2021-02-08 NOTE — TELEPHONE ENCOUNTER
"Patient was seen on 1/29 with a deep 3 inch cyst in right armpit, not able to I&D, using hot packs, has gotten smaller, less painful. Over the weekend developed 5 new lumps around the original area, sized 1/2-1 inch, painful, about the size of a large jelly hudson.  Dr. Sanders had sent in antibiotic but patient has not picked up. States she has had MRSA in the past and \" I can bet that is what this is.\"  She is wondering if she should start antibiotic, although is reluctant and wondering if she should continue to hot pack vs. Picking up Bactrim.    Yue Friend LPN ...... 2/8/2021 10:13 AM    "

## 2021-02-08 NOTE — TELEPHONE ENCOUNTER
I would recommend that she start bactrim, continue to hot pack, and schedule a follow up appointment since there is more involvement.  Analy Holbrook MD

## 2021-02-08 NOTE — TELEPHONE ENCOUNTER
Patient was seen last week and symptoms are not getting better.  Please call      Charley Barrientos on 2/8/2021 at 7:12 AM

## 2021-02-09 NOTE — TELEPHONE ENCOUNTER
Left message to call back....................  2/9/2021   8:56 AM  Rajani Ford LPN, LPN  2/9/2021  8:56 AM

## 2021-02-11 DIAGNOSIS — L02.439 CARBUNCLE OF AXILLA: ICD-10-CM

## 2021-02-11 RX ORDER — SULFAMETHOXAZOLE/TRIMETHOPRIM 800-160 MG
1 TABLET ORAL 2 TIMES DAILY
Qty: 20 TABLET | Refills: 0 | OUTPATIENT
Start: 2021-02-11 | End: 2021-02-21

## 2021-02-11 NOTE — TELEPHONE ENCOUNTER
Medication completed on 02/08/2021.    Corrie Trevizo RN  ....................  2/11/2021   9:31 AM

## 2021-02-12 ENCOUNTER — ALLIED HEALTH/NURSE VISIT (OUTPATIENT)
Dept: FAMILY MEDICINE | Facility: OTHER | Age: 41
End: 2021-02-12
Attending: FAMILY MEDICINE
Payer: COMMERCIAL

## 2021-02-12 DIAGNOSIS — R05.9 COUGH: Primary | ICD-10-CM

## 2021-02-12 LAB
SARS-COV-2 RNA RESP QL NAA+PROBE: NORMAL
SPECIMEN SOURCE: NORMAL

## 2021-02-12 PROCEDURE — U0003 INFECTIOUS AGENT DETECTION BY NUCLEIC ACID (DNA OR RNA); SEVERE ACUTE RESPIRATORY SYNDROME CORONAVIRUS 2 (SARS-COV-2) (CORONAVIRUS DISEASE [COVID-19]), AMPLIFIED PROBE TECHNIQUE, MAKING USE OF HIGH THROUGHPUT TECHNOLOGIES AS DESCRIBED BY CMS-2020-01-R: HCPCS | Mod: ZL | Performed by: FAMILY MEDICINE

## 2021-02-12 PROCEDURE — C9803 HOPD COVID-19 SPEC COLLECT: HCPCS

## 2021-02-12 PROCEDURE — U0005 INFEC AGEN DETEC AMPLI PROBE: HCPCS | Mod: ZL | Performed by: FAMILY MEDICINE

## 2021-02-13 LAB
LABORATORY COMMENT REPORT: NORMAL
SARS-COV-2 RNA RESP QL NAA+PROBE: NEGATIVE
SPECIMEN SOURCE: NORMAL

## 2021-02-15 ENCOUNTER — TELEPHONE (OUTPATIENT)
Dept: FAMILY MEDICINE | Facility: OTHER | Age: 41
End: 2021-02-15

## 2021-02-15 NOTE — TELEPHONE ENCOUNTER
Please call the patient back.  She is returning a call from a nurse in unit 4.  She does not know why she was called.      Brigid Gutierrez on 2/15/2021 at 3:30 PM

## 2021-02-15 NOTE — TELEPHONE ENCOUNTER
Patient states she did  the Bactrim and the rash has went away.  She will call with any other concerns.    Tracey Oliver LPN............2/15/2021 4:47 PM

## 2021-02-18 ENCOUNTER — OFFICE VISIT (OUTPATIENT)
Dept: FAMILY MEDICINE | Facility: OTHER | Age: 41
End: 2021-02-18
Attending: FAMILY MEDICINE
Payer: COMMERCIAL

## 2021-02-18 VITALS
BODY MASS INDEX: 27.16 KG/M2 | TEMPERATURE: 96.4 F | SYSTOLIC BLOOD PRESSURE: 120 MMHG | OXYGEN SATURATION: 100 % | DIASTOLIC BLOOD PRESSURE: 74 MMHG | WEIGHT: 168.25 LBS | HEART RATE: 74 BPM | RESPIRATION RATE: 14 BRPM

## 2021-02-18 DIAGNOSIS — F90.0 ATTENTION DEFICIT HYPERACTIVITY DISORDER (ADHD), PREDOMINANTLY INATTENTIVE TYPE: ICD-10-CM

## 2021-02-18 PROCEDURE — 99213 OFFICE O/P EST LOW 20 MIN: CPT | Performed by: FAMILY MEDICINE

## 2021-02-18 RX ORDER — METHYLPHENIDATE HYDROCHLORIDE 54 MG/1
54 TABLET ORAL DAILY
Qty: 30 TABLET | Refills: 0 | Status: SHIPPED | OUTPATIENT
Start: 2021-02-18 | End: 2021-05-12

## 2021-02-18 RX ORDER — METHYLPHENIDATE HYDROCHLORIDE 54 MG/1
54 TABLET ORAL DAILY
Qty: 30 TABLET | Refills: 0 | Status: SHIPPED | OUTPATIENT
Start: 2021-03-20 | End: 2021-05-12

## 2021-02-18 RX ORDER — METHYLPHENIDATE HYDROCHLORIDE 54 MG/1
54 TABLET ORAL DAILY
Qty: 30 TABLET | Refills: 0 | Status: SHIPPED | OUTPATIENT
Start: 2021-04-19 | End: 2021-05-12

## 2021-02-18 ASSESSMENT — ANXIETY QUESTIONNAIRES
7. FEELING AFRAID AS IF SOMETHING AWFUL MIGHT HAPPEN: MORE THAN HALF THE DAYS
3. WORRYING TOO MUCH ABOUT DIFFERENT THINGS: MORE THAN HALF THE DAYS
1. FEELING NERVOUS, ANXIOUS, OR ON EDGE: NEARLY EVERY DAY
GAD7 TOTAL SCORE: 14
6. BECOMING EASILY ANNOYED OR IRRITABLE: SEVERAL DAYS
2. NOT BEING ABLE TO STOP OR CONTROL WORRYING: MORE THAN HALF THE DAYS
5. BEING SO RESTLESS THAT IT IS HARD TO SIT STILL: SEVERAL DAYS
IF YOU CHECKED OFF ANY PROBLEMS ON THIS QUESTIONNAIRE, HOW DIFFICULT HAVE THESE PROBLEMS MADE IT FOR YOU TO DO YOUR WORK, TAKE CARE OF THINGS AT HOME, OR GET ALONG WITH OTHER PEOPLE: VERY DIFFICULT

## 2021-02-18 ASSESSMENT — PAIN SCALES - GENERAL: PAINLEVEL: NO PAIN (0)

## 2021-02-18 ASSESSMENT — PATIENT HEALTH QUESTIONNAIRE - PHQ9
SUM OF ALL RESPONSES TO PHQ QUESTIONS 1-9: 9
5. POOR APPETITE OR OVEREATING: NEARLY EVERY DAY

## 2021-02-18 NOTE — PROGRESS NOTES
SUBJECTIVE:   Maricarmen Ornelas is a 40 year old female who presents to clinic today for the following health issues:    HPI  Maricarmen is here for ADD medication refills.   This medication has allowed her to maintain a functional household with her older boys (Kaveh with high functioning Autism and difficult child, Kishore) and twin 2.5 year olds (Lj and Peggy).  In addition attending graduate school.   Currently some increased stressors of relationship/'s mood and insomnia have caused some strain throughout the household.  Trying to work on this.  Considering some marriage counseling.    Current dose: wonders at times if it is enough; but eventually would like to wean off.  Does not want to trial higher dose at this time.  Getting exercise: two weeks of none; but has been out skiing the last two days and that has helped immensely  Sleep: Using melatonin and unisom    PHQ 2020   PHQ-9 Total Score 3 13 9   Q9: Thoughts of better off dead/self-harm past 2 weeks Not at all Several days Not at all   F/U: Thoughts of suicide or self-harm - - -   F/U: Self harm-plan - - -   F/U: Self-harm action - - -   F/U: Safety concerns - - -     MINNIE-7 SCORE 2020   Total Score 7 16 14     PDMP Review       Value Time User    State PDMP site checked  Yes 2021  7:26 AM Avril Sanders DO              Patient Active Problem List    Diagnosis Date Noted     S/P  section 2018     Priority: Medium     Encounter for triage in pregnant patient 2018     Priority: Medium     Bilateral carpal tunnel syndrome 2018     Priority: Medium     Adjustment disorder with anxious mood 2018     Priority: Medium     Acquired hypothyroidism 2018     Priority: Medium     Attention deficit disorder 2018     Priority: Medium     Twin pregnancy 2018     Priority: Medium     EDC 2018       Lumbosacral spondylosis without myelopathy 03/10/2015      Priority: Medium     Other staphylococcus infection in conditions classified elsewhere and of unspecified site 2011     Priority: Medium     Past Medical History:   Diagnosis Date     Dichorionic diamniotic twin pregnancy in third trimester 2018     Disorder of thyroid     hypo     Supervision of elderly multigravida     2018     Twin pregnancy 2018      Past Surgical History:   Procedure Laterality Date     APPENDECTOMY OPEN            SECTION N/A 2018    Procedure:  SECTION;   Section - twins;  Surgeon: Woodrow Hurtado MD;  Location:  OR     COLONOSCOPY N/A 7/10/2019    Procedure: COLONOSCOPY, WITH POLYPECTOMY AND BIOPSY;  Surgeon: Viki Villareal MD;  Location:  OR     Family History   Problem Relation Age of Onset     Substance Abuse Maternal Grandmother         Alcohol/Drug,Alcohol abuse     Cancer Maternal Grandmother         Cancer, lung cancer     Other - See Comments Maternal Grandfather         Alzheimer's disease     Diabetes Paternal Grandmother         Diabetes,type 1     No Known Problems Sister      No Known Problems Son      No Known Problems Son      Diabetes Maternal Uncle         Diabetes,type 2     No Known Problems Son         2018     No Known Problems Daughter         2018     Heart Disease No family hx of         Heart Disease     Social History     Tobacco Use     Smoking status: Never Smoker     Smokeless tobacco: Never Used   Substance Use Topics     Alcohol use: Yes     Alcohol/week: 2.0 standard drinks     Comment: 1-2 times per week     Social History     Social History Narrative       - Ki Sohan      Sons: Franco Linn ,    Daughter:    Father: Jose;  Mother: Bronwyn      Siblings: Sister Nydia,  Sambrother     Current Outpatient Medications   Medication Sig Dispense Refill     loratadine (CLARITIN) 10 MG tablet Take 1 tablet (10 mg) by mouth daily       medical cannabis (Patient's own supply)  See Admin Instructions (The purpose of this order is to document that the patient reports taking medical cannabis.  This is not a prescription, and is not used to certify that the patient has a qualifying medical condition.)       methylcellulose (CITRUCEL) powder Take 0.3 g (1.05 teaspoonful) by mouth daily 454 g 3     methylphenidate (CONCERTA) 54 MG CR tablet Take 1 tablet (54 mg) by mouth daily 30 tablet 0     methylphenidate (CONCERTA) 54 MG CR tablet Take 1 tablet (54 mg) by mouth daily 30 tablet 0     methylphenidate (CONCERTA) 54 MG CR tablet Take 1 tablet (54 mg) by mouth daily 30 tablet 0     SYNTHROID 75 MCG tablet TAKE 1 TABLET (75 MCG) BY MOUTH DAILY 90 tablet 0     Allergies   Allergen Reactions     Seasonal Allergies Itching     Chloraprep One Step Rash     With twin C/S (9/2018)     OBJECTIVE:     /74   Pulse 74   Temp 96.4  F (35.8  C) (Tympanic)   Resp 14   Wt 76.3 kg (168 lb 4 oz)   LMP 02/14/2021 (Approximate)   SpO2 100%   BMI 27.16 kg/m    Body mass index is 27.16 kg/m .  Physical Exam  Vitals signs and nursing note reviewed.   Constitutional:       Appearance: Normal appearance.   HENT:      Head: Normocephalic and atraumatic.   Pulmonary:      Effort: Pulmonary effort is normal.   Skin:     Capillary Refill: Capillary refill takes less than 2 seconds.   Neurological:      General: No focal deficit present.      Mental Status: She is alert.   Psychiatric:         Mood and Affect: Mood normal.     Diagnostic Test Results:  No results found for any visits on 02/18/21.    ASSESSMENT/PLAN:     1. Attention deficit hyperactivity disorder (ADHD), predominantly inattentive type  Chronic; stable.  Refilled current Concerta dosing x 3 months.  Continued encouragement of relationship efforts/counseling; and time with young children.  Hopeful for improvement in Covid-19 pandemic so they can get some more help from family/others with their twins especially.  Follow up in 3 months; sooner  prn.  - methylphenidate (CONCERTA) 54 MG CR tablet; Take 1 tablet (54 mg) by mouth daily  Dispense: 30 tablet; Refill: 0  - methylphenidate (CONCERTA) 54 MG CR tablet; Take 1 tablet (54 mg) by mouth daily  Dispense: 30 tablet; Refill: 0  - methylphenidate (CONCERTA) 54 MG CR tablet; Take 1 tablet (54 mg) by mouth daily  Dispense: 30 tablet; Refill: 0      Avril Sanders DO  [facility]

## 2021-02-18 NOTE — NURSING NOTE
"Chief Complaint   Patient presents with     Recheck Medication       Initial /74   Pulse 74   Temp 96.4  F (35.8  C) (Tympanic)   Resp 14   Wt 76.3 kg (168 lb 4 oz)   LMP 02/14/2021 (Approximate)   SpO2 100%   BMI 27.16 kg/m   Estimated body mass index is 27.16 kg/m  as calculated from the following:    Height as of 7/10/20: 1.676 m (5' 6\").    Weight as of this encounter: 76.3 kg (168 lb 4 oz).  Medication Reconciliation: complete    Rajani Ford LPN  "

## 2021-02-19 ASSESSMENT — ANXIETY QUESTIONNAIRES: GAD7 TOTAL SCORE: 14

## 2021-03-03 ENCOUNTER — ALLIED HEALTH/NURSE VISIT (OUTPATIENT)
Dept: FAMILY MEDICINE | Facility: OTHER | Age: 41
End: 2021-03-03
Attending: FAMILY MEDICINE
Payer: COMMERCIAL

## 2021-03-03 DIAGNOSIS — R51.9 HEADACHE: Primary | ICD-10-CM

## 2021-03-03 LAB
SARS-COV-2 RNA RESP QL NAA+PROBE: NORMAL
SPECIMEN SOURCE: NORMAL

## 2021-03-03 PROCEDURE — C9803 HOPD COVID-19 SPEC COLLECT: HCPCS

## 2021-03-03 PROCEDURE — U0003 INFECTIOUS AGENT DETECTION BY NUCLEIC ACID (DNA OR RNA); SEVERE ACUTE RESPIRATORY SYNDROME CORONAVIRUS 2 (SARS-COV-2) (CORONAVIRUS DISEASE [COVID-19]), AMPLIFIED PROBE TECHNIQUE, MAKING USE OF HIGH THROUGHPUT TECHNOLOGIES AS DESCRIBED BY CMS-2020-01-R: HCPCS | Mod: ZL | Performed by: FAMILY MEDICINE

## 2021-03-03 PROCEDURE — U0005 INFEC AGEN DETEC AMPLI PROBE: HCPCS | Mod: ZL | Performed by: FAMILY MEDICINE

## 2021-03-15 ENCOUNTER — ALLIED HEALTH/NURSE VISIT (OUTPATIENT)
Dept: FAMILY MEDICINE | Facility: OTHER | Age: 41
End: 2021-03-15
Attending: FAMILY MEDICINE
Payer: COMMERCIAL

## 2021-03-15 DIAGNOSIS — R09.81 CONGESTION OF PARANASAL SINUS: Primary | ICD-10-CM

## 2021-03-15 PROCEDURE — U0003 INFECTIOUS AGENT DETECTION BY NUCLEIC ACID (DNA OR RNA); SEVERE ACUTE RESPIRATORY SYNDROME CORONAVIRUS 2 (SARS-COV-2) (CORONAVIRUS DISEASE [COVID-19]), AMPLIFIED PROBE TECHNIQUE, MAKING USE OF HIGH THROUGHPUT TECHNOLOGIES AS DESCRIBED BY CMS-2020-01-R: HCPCS | Mod: ZL | Performed by: FAMILY MEDICINE

## 2021-03-15 PROCEDURE — C9803 HOPD COVID-19 SPEC COLLECT: HCPCS

## 2021-03-15 PROCEDURE — U0005 INFEC AGEN DETEC AMPLI PROBE: HCPCS | Mod: ZL | Performed by: FAMILY MEDICINE

## 2021-03-16 LAB
LABORATORY COMMENT REPORT: NORMAL
SARS-COV-2 RNA RESP QL NAA+PROBE: NEGATIVE
SARS-COV-2 RNA RESP QL NAA+PROBE: NORMAL
SPECIMEN SOURCE: NORMAL
SPECIMEN SOURCE: NORMAL

## 2021-03-24 ENCOUNTER — ALLIED HEALTH/NURSE VISIT (OUTPATIENT)
Dept: FAMILY MEDICINE | Facility: OTHER | Age: 41
End: 2021-03-24
Attending: FAMILY MEDICINE
Payer: COMMERCIAL

## 2021-03-24 DIAGNOSIS — R05.9 COUGH: Primary | ICD-10-CM

## 2021-03-24 LAB
SARS-COV-2 RNA RESP QL NAA+PROBE: NORMAL
SPECIMEN SOURCE: NORMAL

## 2021-03-24 PROCEDURE — C9803 HOPD COVID-19 SPEC COLLECT: HCPCS

## 2021-03-24 PROCEDURE — U0005 INFEC AGEN DETEC AMPLI PROBE: HCPCS | Mod: ZL | Performed by: FAMILY MEDICINE

## 2021-03-24 PROCEDURE — U0003 INFECTIOUS AGENT DETECTION BY NUCLEIC ACID (DNA OR RNA); SEVERE ACUTE RESPIRATORY SYNDROME CORONAVIRUS 2 (SARS-COV-2) (CORONAVIRUS DISEASE [COVID-19]), AMPLIFIED PROBE TECHNIQUE, MAKING USE OF HIGH THROUGHPUT TECHNOLOGIES AS DESCRIBED BY CMS-2020-01-R: HCPCS | Mod: ZL | Performed by: FAMILY MEDICINE

## 2021-04-20 ENCOUNTER — THERAPY VISIT (OUTPATIENT)
Dept: CHIROPRACTIC MEDICINE | Facility: OTHER | Age: 41
End: 2021-04-20
Attending: CHIROPRACTOR
Payer: COMMERCIAL

## 2021-04-20 DIAGNOSIS — M79.602 ARM PAIN, MUSCULOSKELETAL, LEFT: Primary | ICD-10-CM

## 2021-04-20 DIAGNOSIS — M54.2 DORSALGIA OF CERVICAL REGION: ICD-10-CM

## 2021-04-20 DIAGNOSIS — M99.02 SEGMENTAL AND SOMATIC DYSFUNCTION OF THORACIC REGION: ICD-10-CM

## 2021-04-20 PROCEDURE — 97810 ACUP 1/> WO ESTIM 1ST 15 MIN: CPT | Performed by: CHIROPRACTOR

## 2021-04-20 PROCEDURE — 98940 CHIROPRACT MANJ 1-2 REGIONS: CPT | Mod: AT | Performed by: CHIROPRACTOR

## 2021-04-20 PROCEDURE — 99212 OFFICE O/P EST SF 10 MIN: CPT | Mod: 25 | Performed by: CHIROPRACTOR

## 2021-04-20 NOTE — PROGRESS NOTES
Visit #:  1/4-6  New Episode 4/20/2021    Subjective:  Maricarmen Ornelas is a 41 year old female who is seen in f/u up for:        Arm pain, musculoskeletal, left  Dorsalgia of cervical region  Segmental and somatic dysfunction of thoracic region.     Since last visit on 1/18/2021,  Maricarmen Ornelas reports: has been noticing left sided arm pain along with upper back/neck pain. Has been present approximately 1.5 months. No recent traumatic events, or overuse injuries. Pain is localized around the left anterior deltoid and biceps. It does not appear to be radicular, left arm does feel weaker. No clicking symptoms.     Patient attributes a lot of her symptoms to stress.     Continues to have some right sided symptoms but these seem to be improving since patient was last treated by our office.    Area of chief complaint:  Thoracic :  Symptoms are graded at 4/10. The quality is described as stiff and sore constantly..       Objective:  The following was observed:  Neck Disability Index (  [employer]. 1991. All rights reserved.; used with permission) 4/20/2021   SECTION 1 - PAIN INTENSITY 2   SECTION 2 - PERSONAL CARE 2   SECTION 3 - LIFTING 1   SECTION 4 - READING 4   SECTION 5 - HEADACHES 3   SECTION 6 - CONCENTRATION 1   SECTION 7 - WORK 0   SECTION 8 - DRIVING 2   SECTION 9 - SLEEPING 3   SECTION 10 - RECREATION 2   Count 10   Sum 20   Raw Score: /50 20   Neck Disability Index Score: (%) 40     Upper Extremity Functional Index (  [address] [place]) 12/22/2020   a.  Any of your usual work, housework or school activities 0-Extreme Difficulty   b.  Your usual hobbies, recreational or sporting activities 0-Extreme Difficulty   c.  Lifting a bag of groceries to waist level 4-No Difficulty   d.  Placing an object onto, or removing it from an overhead shelf 4-No Difficulty   e.  Washing your hair or scalp 4-No Difficulty   f.   Pushing up on your hands (e.g., from bathtub or chair) 2-Moderate Difficulty   g.  Preparing  food (e.g., peeling, cutting) 4-No Difficulty   h.  Driving 3-A Little bit of Difficulty   I.   Vacuuming, sweeping, or raking 3-A Little bit of Difficulty   j.   Dressing 3-A Little bit of Difficulty   k.  Doing up buttons 4-No Difficulty   l.   Using tools or appliances 4-No Difficulty   m. Opening doors 4-No Difficulty   n.  Cleaning 4-No Difficulty   o.  Tying or lacing shoes 4-No Difficulty   p.  Sleeping 2-Moderate Difficulty   q.  Laundering clothes. (e.g., washing, ironing, folding) 4-No Difficulty   r.   Opening a jar 3-A Little bit of Difficulty   s.  Throwing a ball 2-Moderate Difficulty   t.   Carrying a small suitcase with your affected limb  3-A Little bit of Difficulty   Column Totals: /80 61       Cervical AROM: Flexion restriction with crepitus approximately 5 degrees, left lateral flexion restriction approximately 5-10 degrees, rotation restriction approximately 5 degrees bilaterally    Codmans sign: -right, +left    Upper extremity strength  Biceps:5/5 right,5/5 left  Triceps: 5/5 right, 4/5 left  Deltoid: 5/5 right, 4/5 left    P: palpatory tenderness Present upper trapezius bilaterally, left side T4, tenderness following infraspinatus and supraspinatus muscles:    A: static palpation demonstrates intersegmental asymmetry , thoracic  R: motion palpation notes restricted motion, T4   T: Spasms noted upper trapezius bilaterally, left infraspinatus, left supraspinatus, tenderness present left biceps    Segmental spinal dysfunction/restrictions found at:  :  T4 Left lateral flexion restricted and Extension restriction.      Assessment: New complaint left sided neck/upper back pain and left arm pain. Last patient care episode was on the right. Patient may have suffered strain of the biceps muscle along with scapular dyskinesis from thoracic segmental/somatic dysfunction. Patient has responded to chiropractic care and acupuncture very favorably in the past and I believe that to be the case at this  time.     Current plan of care to include 4-6 visits over the next 4-6 weeks. Care will include chiropractic services and acupuncture.    Diagnoses:      1. Arm pain, musculoskeletal, left    2. Dorsalgia of cervical region    3. Segmental and somatic dysfunction of thoracic region        Patient's condition:  Patient had restrictions pre-manipulation    Treatment effectiveness:  Post manipulation there is better intersegmental movement and Patient claims to feel looser post manipulation      Procedures:  E/M: 03065 Established patient, straight forward complexity.    CMT:  07329 Chiropractic manipulative treatment 1-2 regions performed   Thoracic: Diversified, T4, Prone    Modalities:  07607: Acupuncture, for 15 minutes:  Points: GV 20, Bl 10, SI 15, Sarita 3, 4, P 2  For 15 minutes    Therapeutic procedures:  None    Response to Treatment  Reduction in symptoms as reported by patient    Prognosis: Good    Goals: Reduce left sided neck/upper back and arm pain by 50%   Reduce indices by 20%   Improved sleeping by 1 point   Improved muscle strength of the left upper arm.    Recommendations:    Instructions:ice 20 minutes every other hour as needed and heat 15 minutes every other hour as needed    Follow-up:  Return to care in 1 week.

## 2021-04-27 ENCOUNTER — THERAPY VISIT (OUTPATIENT)
Dept: CHIROPRACTIC MEDICINE | Facility: OTHER | Age: 41
End: 2021-04-27
Attending: CHIROPRACTOR
Payer: COMMERCIAL

## 2021-04-27 DIAGNOSIS — M79.602 ARM PAIN, MUSCULOSKELETAL, LEFT: Primary | ICD-10-CM

## 2021-04-27 DIAGNOSIS — M99.02 SEGMENTAL AND SOMATIC DYSFUNCTION OF THORACIC REGION: ICD-10-CM

## 2021-04-27 DIAGNOSIS — M54.2 DORSALGIA OF CERVICAL REGION: ICD-10-CM

## 2021-04-27 PROCEDURE — 97810 ACUP 1/> WO ESTIM 1ST 15 MIN: CPT | Performed by: CHIROPRACTOR

## 2021-04-27 PROCEDURE — 98940 CHIROPRACT MANJ 1-2 REGIONS: CPT | Mod: AT | Performed by: CHIROPRACTOR

## 2021-04-27 NOTE — PROGRESS NOTES
Visit #:  2/4-6    Subjective:  Maricarmen Ornelas is a 41 year old female who is seen in f/u up for:        Arm pain, musculoskeletal, left  Dorsalgia of cervical region  Segmental and somatic dysfunction of thoracic region.     Since last visit on 4/20/2021,  Maricarmen Ornelas reports: Symptoms shown quite a bit of improvement following last visit.  States that left arm pain has reduced quite a bit and that restriction is only mild especially with reaching behind her.    Area of chief complaint:  Left arm/ midback :  Symptoms are graded at 2/10. The quality is described as aching and sore.       Objective:  The following was observed:    P: palpatory tenderness left side T4:    A: static palpation demonstrates intersegmental asymmetry , thoracic  R: motion palpation notes restricted motion, T4   T: No spasms apparent.  Taut tender fibers noted upper trapezius and left rhomboids    Segmental spinal dysfunction/restrictions found at:  :  T4 Left lateral flexion restricted and Extension restriction.      Assessment: Patient is showing signs of progress regarding new onset left arm and upper back/neck pain.  Plan for up to 2 more visits for current episode.  Patient inquired today about ongoing care for maintenance.  Encourage patient to discuss this with her insurance company as maintenance care is often not covered.    Diagnoses:      1. Arm pain, musculoskeletal, left    2. Dorsalgia of cervical region    3. Segmental and somatic dysfunction of thoracic region        Patient's condition:  Patient had restrictions pre-manipulation and Patient is noticing a decrease in their symptoms    Treatment effectiveness:  Post manipulation there is better intersegmental movement and Patient claims to feel looser post manipulation      Procedures:  CMT:  31349 Chiropractic manipulative treatment 1-2 regions performed   Thoracic: Diversified, T4, Prone    Modalities:  80450: Acupuncture, for 15 minutes:  Points: SI 12, 15, TW 8, 12,  14  For 15 minutes    Therapeutic procedures:  None    Response to Treatment  Reduction in symptoms as reported by patient    Prognosis: Good    Progress towards Goals: Patient is making progress towards the goal.     Recommendations:    Instructions:none    Follow-up:  Return to care in 1 week.

## 2021-05-04 ENCOUNTER — THERAPY VISIT (OUTPATIENT)
Dept: CHIROPRACTIC MEDICINE | Facility: OTHER | Age: 41
End: 2021-05-04
Attending: CHIROPRACTOR
Payer: COMMERCIAL

## 2021-05-04 DIAGNOSIS — M54.50 ACUTE LEFT-SIDED LOW BACK PAIN WITHOUT SCIATICA: ICD-10-CM

## 2021-05-04 DIAGNOSIS — M99.04 SEGMENTAL AND SOMATIC DYSFUNCTION OF SACRAL REGION: ICD-10-CM

## 2021-05-04 DIAGNOSIS — M25.532 LEFT WRIST PAIN: ICD-10-CM

## 2021-05-04 DIAGNOSIS — M54.2 DORSALGIA OF CERVICAL REGION: ICD-10-CM

## 2021-05-04 DIAGNOSIS — M79.602 ARM PAIN, MUSCULOSKELETAL, LEFT: Primary | ICD-10-CM

## 2021-05-04 DIAGNOSIS — M99.07 SOMATIC DYSFUNCTION OF UPPER EXTREMITIES: ICD-10-CM

## 2021-05-04 PROCEDURE — 98943 CHIROPRACT MANJ XTRSPINL 1/>: CPT | Performed by: CHIROPRACTOR

## 2021-05-04 PROCEDURE — 97810 ACUP 1/> WO ESTIM 1ST 15 MIN: CPT | Performed by: CHIROPRACTOR

## 2021-05-04 PROCEDURE — 98940 CHIROPRACT MANJ 1-2 REGIONS: CPT | Mod: AT | Performed by: CHIROPRACTOR

## 2021-05-04 NOTE — PROGRESS NOTES
Visit #:  3    Subjective:  Maricarmen Ornelas is a 41 year old female who is seen in f/u up for:        Arm pain, musculoskeletal, left  Dorsalgia of cervical region  Acute left-sided low back pain without sciatica  Segmental and somatic dysfunction of sacral region  Left wrist pain  Somatic dysfunction of upper extremities.     Since last visit on 4/27/2021,  Maricarmen Ornelas reports: Symptoms are showing some improvement although less so than last visit.  States that this past week she was very busy with a project at home in which she needed to lift and move heavy landscaping bricks.  This did cause increased amounts of upper back pain as well as aggravated low left back pain symptoms.  Patient states that pain seems to extend into the upper gluteal region however not beyond.  No reported saddle paresthesia, loss of bowel or bladder, or radiculopathy into the lower extremities.    Also following this project patient states that she been experiencing left wrist pain and restriction as well.  This has been difficult for the patient as it has prevented her from being able to participate in yoga which is a practice she enjoys and finds a lot of benefit helping to manage her stress and pain levels.    Area of chief complaint:  Cervical and Thoracic :  Symptoms are graded at 2-4/10. The quality is described as constant aching.    Lumbar :  Symptoms are graded at 4/10. The quality is described as achey, constantly.   Left wrist pain note graded or qualified     Objective:  The following was observed:    Left wrist extension restriction and ulnar deviation restriction noted    Lumbar AROM: rotation restriction less than 5 degrees bilaterally  Ely's: -right, -left    P: palpatory tendernessleft capitate, left PSIS, paraspinals of the thoracic and lumbar regions bilaterally:    A: static palpation demonstrates intersegmental asymmetry , pelvis, left capitate  R: motion palpation notes restricted motion, Sacrum  and left  capitate  T: muscle spasm at level(s): paraspinals thoracic/lumbar region, left quadratus lumborum:      Segmental spinal dysfunction/restrictions found at:  :  Sacrum Left lateral flexion restricted and Extension restriction  Extra-spinal extension and ulnar deviation restriction.      Assessment: Patient is showing signs of progress albeit slower than last visit.  While this is not abnormal for 1 visit we will continue to monitor patient symptoms if improvement not seen within 2 visits consider referral to physical therapy to help find ongoing symptoms.  Evidence also present of segmental/somatic dysfunction of the left capitate and left SI joint today.  Plan to continue with patient care at once a week.  Consider reassessment of patient within next 2 weeks to determine further course of care.    Diagnoses:      1. Arm pain, musculoskeletal, left    2. Dorsalgia of cervical region    3. Acute left-sided low back pain without sciatica    4. Segmental and somatic dysfunction of sacral region    5. Left wrist pain    6. Somatic dysfunction of upper extremities        Patient's condition:  Patient had restrictions pre-manipulation and Symptoms come and go    Treatment effectiveness:  Post manipulation there is better intersegmental movement, Patient claims to feel looser post manipulation and Symptoms appear to be decreasing      Procedures:  CMT:  40091 Chiropractic manipulative treatment 1-2 regions performed   99101 Chiropractic manipulative treatment extraspinal dysfunction/restriction  Pelvis: Drop Table, Sacrum , Prone  Extra-spinal: Diversified, capitate, Seated    Modalities:  69165: Acupuncture, for 15 minutes:  Points: SI 9, 12, 15, Bl 14, 16, 20, 22, 24, 25, 46, 47,   For 15 minutes    Therapeutic procedures:  Self wrist mobilization     Response to Treatment  Reduction in symptoms as reported by patient    Prognosis: Good    Progress towards Goals: Patient is making progress towards the  goal.     Recommendations:    Instructions:stretch as instructed at visit    Follow-up:  Return to care in 1 week.

## 2021-05-11 ENCOUNTER — THERAPY VISIT (OUTPATIENT)
Dept: CHIROPRACTIC MEDICINE | Facility: OTHER | Age: 41
End: 2021-05-11
Attending: CHIROPRACTOR
Payer: COMMERCIAL

## 2021-05-11 DIAGNOSIS — M79.602 ARM PAIN, MUSCULOSKELETAL, LEFT: Primary | ICD-10-CM

## 2021-05-11 DIAGNOSIS — M25.531 RIGHT WRIST PAIN: ICD-10-CM

## 2021-05-11 DIAGNOSIS — M99.06 SOMATIC DYSFUNCTION OF LOWER EXTREMITIES: ICD-10-CM

## 2021-05-11 DIAGNOSIS — M54.2 DORSALGIA OF CERVICAL REGION: ICD-10-CM

## 2021-05-11 PROCEDURE — 98943 CHIROPRACT MANJ XTRSPINL 1/>: CPT | Mod: AT | Performed by: CHIROPRACTOR

## 2021-05-11 PROCEDURE — 97810 ACUP 1/> WO ESTIM 1ST 15 MIN: CPT | Performed by: CHIROPRACTOR

## 2021-05-11 NOTE — PROGRESS NOTES
Visit #:  4    Subjective:  Maricarmen Ornelas is a 41 year old female who is seen in f/u up for:        Arm pain, musculoskeletal, left  Dorsalgia of cervical region  Right wrist pain  Somatic dysfunction of lower extremities.     Since last visit on 5/4/2021,  Maricarmen Ornelas reports: Symptoms showing improvement after last visit.  Notes that low back pain but no longer present.  Majority of pain now localized to neck/upper back and along left arm/bicep.  Has been dealing with right wrist pain.  Has been able to perform yoga however she is still having pain which worsens when she is performing wrist flexion motions.  This makes it difficult for her to  her children.    Attempted to utilize child's cold brace, does seem to provide some relief however it is meant for a fracture and extends far up her arm.    Area of chief complaint:  Cervical :  Symptoms are graded at 1-3/10. The quality is described as stiff, and tight.       Objective:  The following was observed:    P: palpatory tenderness Right capitate:    A: static palpation demonstrates intersegmental asymmetry , right capitate  R: motion palpation notes restricted motion, right capitate with wrist flexion  T: Taut tender fibers noted upper trapezius bilaterally, left biceps, paraspinal musculature CT junction into cervical spine    Segmental spinal dysfunction/restrictions found at:  Right capitate flexion.      Assessment: Patient is showing signs of progress.  Patient continues to be active with yoga which I believe the a good home strategy for managing symptoms.  Right wrist continues to have segmental/somatic dysfunction capitate.  Provided patient with wrist brace as brace that the patient is using is not meant for what she is dealing with.  Plan to follow-up again in 1 week.    Should symptoms of wrist pain continue consider referral for physical therapy or occupational therapy.    Diagnoses:      1. Arm pain, musculoskeletal, left    2. Dorsalgia of  cervical region    3. Right wrist pain    4. Somatic dysfunction of lower extremities        Patient's condition:  Patient had restrictions pre-manipulation    Treatment effectiveness:  Post manipulation there is better intersegmental movement and Patient claims to feel looser post manipulation      Procedures:  CMT:  70954 Chiropractic manipulative treatment extraspinal dysfunction/restriction  Extra-spinal: Diversified, capitate/scaphoid, Seated    Modalities:  43813: Acupuncture, for 15 minutes:  Points: Bl 10, SI 9, 15, TW 9, LI 11, P 2  For 15 minutes    Therapeutic procedures:  Resisted finger extension    Response to Treatment  Reduction in symptoms as reported by patient    Prognosis: Good    Progress towards Goals: Patient is making progress towards the goal.    Reduce left sided neck/upper back and arm pain by 50%              Reduce indices by 20%              Improved sleeping by 1 point              Improved muscle strength of the left upper arm.    Recommendations:    Instructions:stretch as instructed at visit    Follow-up:  Return to care in 1 week.

## 2021-05-12 ENCOUNTER — OFFICE VISIT (OUTPATIENT)
Dept: FAMILY MEDICINE | Facility: OTHER | Age: 41
End: 2021-05-12
Attending: FAMILY MEDICINE
Payer: COMMERCIAL

## 2021-05-12 VITALS
SYSTOLIC BLOOD PRESSURE: 118 MMHG | TEMPERATURE: 97 F | RESPIRATION RATE: 14 BRPM | DIASTOLIC BLOOD PRESSURE: 80 MMHG | HEART RATE: 82 BPM | BODY MASS INDEX: 26.79 KG/M2 | OXYGEN SATURATION: 99 % | WEIGHT: 166 LBS

## 2021-05-12 DIAGNOSIS — Z79.899 CONTROLLED SUBSTANCE AGREEMENT SIGNED: Primary | ICD-10-CM

## 2021-05-12 DIAGNOSIS — F90.0 ATTENTION DEFICIT HYPERACTIVITY DISORDER (ADHD), PREDOMINANTLY INATTENTIVE TYPE: ICD-10-CM

## 2021-05-12 PROCEDURE — 99214 OFFICE O/P EST MOD 30 MIN: CPT | Performed by: FAMILY MEDICINE

## 2021-05-12 PROCEDURE — 80307 DRUG TEST PRSMV CHEM ANLYZR: CPT | Mod: ZL | Performed by: FAMILY MEDICINE

## 2021-05-12 RX ORDER — METHYLPHENIDATE HYDROCHLORIDE 54 MG/1
54 TABLET ORAL DAILY
Qty: 30 TABLET | Refills: 0 | Status: SHIPPED | OUTPATIENT
Start: 2021-07-10 | End: 2021-08-24

## 2021-05-12 RX ORDER — METHYLPHENIDATE HYDROCHLORIDE 54 MG/1
54 TABLET ORAL DAILY
Qty: 30 TABLET | Refills: 0 | Status: SHIPPED | OUTPATIENT
Start: 2021-06-11 | End: 2021-09-28

## 2021-05-12 RX ORDER — METHYLPHENIDATE HYDROCHLORIDE 54 MG/1
54 TABLET ORAL DAILY
Qty: 30 TABLET | Refills: 0 | Status: SHIPPED | OUTPATIENT
Start: 2021-05-12 | End: 2021-09-28

## 2021-05-12 ASSESSMENT — PAIN SCALES - GENERAL: PAINLEVEL: NO PAIN (0)

## 2021-05-12 NOTE — PROGRESS NOTES
SUBJECTIVE:   Maricarmen Ornelas is a 41 year old female who presents to clinic today for the following health issues:    HPI  Maricarmen is here for ADD medication refills.   This medication has allowed her to maintain a functional household with her older boys (Kaveh with high functioning Autism and difficult child, Kishore) and twin 2.5 year olds (Lj and Peggy).  In addition attending graduate school.  The holistic classes/skills she is learning currently have also helped greatly.  Her goal is to eventually wean off once children are a little older; more stability in her career/plans.   Currently some increased stressors of relationship/'s mood and insomnia have caused some strain throughout the household - improving slightly.  But noticing her reactions to these things are the most improved.     Current dose: doing well.  Getting exercise: two weeks of none; but has been out skiing the last two days and that has helped immensely  Sleep: Using melatonin and unisom  Reduction of her medical marijuana use over the last 3 months; even without the desire.  Rare alcohol/glass of wine.    Contract: UTD (2021)  UDS: DUE - collected today.  MN : reviewed      PHQ 2020   PHQ-9 Total Score 3 13 9   Q9: Thoughts of better off dead/self-harm past 2 weeks Not at all Several days Not at all   F/U: Thoughts of suicide or self-harm - - -   F/U: Self harm-plan - - -   F/U: Self-harm action - - -   F/U: Safety concerns - - -     MINNIE-7 SCORE 2020   Total Score 7 16 14       Seeing Jordi Pierson for some L arm/wrist pain - for adjustments and accupuncture.  Improved from last week.      Patient Active Problem List    Diagnosis Date Noted     S/P  section 2018     Priority: Medium     Encounter for triage in pregnant patient 2018     Priority: Medium     Bilateral carpal tunnel syndrome 2018     Priority: Medium     Adjustment disorder with  anxious mood 2018     Priority: Medium     Acquired hypothyroidism 2018     Priority: Medium     Attention deficit disorder 2018     Priority: Medium     Twin pregnancy 2018     Priority: Medium     EDC 2018       Lumbosacral spondylosis without myelopathy 03/10/2015     Priority: Medium     Other staphylococcus infection in conditions classified elsewhere and of unspecified site 2011     Priority: Medium     Past Medical History:   Diagnosis Date     Dichorionic diamniotic twin pregnancy in third trimester 2018     Disorder of thyroid     hypo     Supervision of elderly multigravida     2018     Twin pregnancy     EDC 2018      Past Surgical History:   Procedure Laterality Date     APPENDECTOMY OPEN            SECTION N/A 2018    Procedure:  SECTION;   Section - twins;  Surgeon: Woodrow Hurtado MD;  Location:  OR     COLONOSCOPY N/A 7/10/2019    Procedure: COLONOSCOPY, WITH POLYPECTOMY AND BIOPSY;  Surgeon: Viki Villareal MD;  Location:  OR     Family History   Problem Relation Age of Onset     Substance Abuse Maternal Grandmother         Alcohol/Drug,Alcohol abuse     Cancer Maternal Grandmother         Cancer, lung cancer     Other - See Comments Maternal Grandfather         Alzheimer's disease     Diabetes Paternal Grandmother         Diabetes,type 1     No Known Problems Sister      No Known Problems Son      No Known Problems Son      Diabetes Maternal Uncle         Diabetes,type 2     No Known Problems Son         2018     No Known Problems Daughter         2018     Heart Disease No family hx of         Heart Disease     Social History     Tobacco Use     Smoking status: Never Smoker     Smokeless tobacco: Never Used   Substance Use Topics     Alcohol use: Yes     Alcohol/week: 2.0 standard drinks     Comment: 1-2 times per week     Social History     Social History Narrative       - Ki Parry      Sons:  Kaveh Franco ,    Daughter:    Father: Jose;  Mother: Bronwyn      Siblings: Sister Nydia,  Joelle     Current Outpatient Medications   Medication Sig Dispense Refill     [START ON 7/10/2021] methylphenidate (CONCERTA) 54 MG CR tablet Take 1 tablet (54 mg) by mouth daily 30 tablet 0     [START ON 6/11/2021] methylphenidate (CONCERTA) 54 MG CR tablet Take 1 tablet (54 mg) by mouth daily 30 tablet 0     methylphenidate (CONCERTA) 54 MG CR tablet Take 1 tablet (54 mg) by mouth daily 30 tablet 0     loratadine (CLARITIN) 10 MG tablet Take 1 tablet (10 mg) by mouth daily       medical cannabis (Patient's own supply) See Admin Instructions (The purpose of this order is to document that the patient reports taking medical cannabis.  This is not a prescription, and is not used to certify that the patient has a qualifying medical condition.)       methylcellulose (CITRUCEL) powder Take 0.3 g (1.05 teaspoonful) by mouth daily 454 g 3     SYNTHROID 75 MCG tablet TAKE 1 TABLET (75 MCG) BY MOUTH DAILY 90 tablet 1     Allergies   Allergen Reactions     Seasonal Allergies Itching     Chloraprep One Step Rash     With twin C/S (9/2018)     OBJECTIVE:     /80   Pulse 82   Temp 97  F (36.1  C) (Tympanic)   Resp 14   Wt 75.3 kg (166 lb)   LMP 03/12/2021 (Approximate)   SpO2 99%   BMI 26.79 kg/m    Body mass index is 26.79 kg/m .  Physical Exam  Vitals signs reviewed.   Constitutional:       Appearance: Normal appearance. She is normal weight.   HENT:      Head: Normocephalic and atraumatic.   Cardiovascular:      Rate and Rhythm: Normal rate and regular rhythm.      Pulses: Normal pulses.   Pulmonary:      Effort: Pulmonary effort is normal.   Abdominal:      General: Abdomen is flat. Bowel sounds are normal.      Palpations: There is no mass.      Tenderness: There is no abdominal tenderness. There is no guarding.   Skin:     Capillary Refill: Capillary refill takes less than 2 seconds.   Neurological:      General:  No focal deficit present.      Mental Status: She is alert.   Psychiatric:         Mood and Affect: Mood normal.         Behavior: Behavior normal.     Diagnostic Test Results:  No results found for any visits on 05/12/21.    ASSESSMENT/PLAN:     1. Attention deficit hyperactivity disorder (ADHD), predominantly inattentive type  Stable with improvement in overall health/stress/reactions.  No changes to stimulant dose.  Rx for 3 months provided.  Follow up prn.  UDS collected today; has Rx for medical marijuana therefore am not adding the cannibinoid screening as an additional cost to the patient; as this will be positive.  If goes off of medical marijuana program at some time; would screen.  Follow up in 3 months; sooner prn.  - Drug Confirmation Panel Urine with Creat  - methylphenidate (CONCERTA) 54 MG CR tablet; Take 1 tablet (54 mg) by mouth daily  Dispense: 30 tablet; Refill: 0  - methylphenidate (CONCERTA) 54 MG CR tablet; Take 1 tablet (54 mg) by mouth daily  Dispense: 30 tablet; Refill: 0  - methylphenidate (CONCERTA) 54 MG CR tablet; Take 1 tablet (54 mg) by mouth daily  Dispense: 30 tablet; Refill: 0    2. Controlled substance agreement signed  See above.  - Drug Confirmation Panel Urine with Creat      Avril Sanders, [facility]

## 2021-05-12 NOTE — NURSING NOTE
"Chief Complaint   Patient presents with     Recheck Medication       Initial /80   Pulse 82   Temp 97  F (36.1  C) (Tympanic)   Resp 14   Wt 75.3 kg (166 lb)   LMP 03/12/2021 (Approximate)   SpO2 99%   BMI 26.79 kg/m   Estimated body mass index is 26.79 kg/m  as calculated from the following:    Height as of 7/10/20: 1.676 m (5' 6\").    Weight as of this encounter: 75.3 kg (166 lb).  Medication Reconciliation: complete    Rajani Ford LPN  "

## 2021-05-14 LAB
CREAT UR-MCNC: 40 MG/DL
ME-PHENIDATE UR CFM-MCNC: 136 NG/ML
ME-PHENIDATE UR CFM-MCNC: ABNORMAL NG/ML
ME-PHENIDATE/CREAT UR: 340 NG/MG{CREAT}
ME-PHENIDATE/CREAT UR: ABNORMAL NG/MG{CREAT}
RPT COMMENT: ABNORMAL

## 2021-06-03 ENCOUNTER — OFFICE VISIT (OUTPATIENT)
Dept: FAMILY MEDICINE | Facility: OTHER | Age: 41
End: 2021-06-03
Attending: FAMILY MEDICINE
Payer: COMMERCIAL

## 2021-06-03 VITALS
TEMPERATURE: 98.2 F | OXYGEN SATURATION: 100 % | BODY MASS INDEX: 26.65 KG/M2 | DIASTOLIC BLOOD PRESSURE: 80 MMHG | RESPIRATION RATE: 16 BRPM | HEART RATE: 102 BPM | WEIGHT: 165.13 LBS | SYSTOLIC BLOOD PRESSURE: 122 MMHG

## 2021-06-03 DIAGNOSIS — N93.9 ABNORMAL UTERINE BLEEDING (AUB): Primary | ICD-10-CM

## 2021-06-03 PROCEDURE — 99213 OFFICE O/P EST LOW 20 MIN: CPT | Performed by: FAMILY MEDICINE

## 2021-06-03 ASSESSMENT — PAIN SCALES - GENERAL: PAINLEVEL: NO PAIN (0)

## 2021-06-03 NOTE — NURSING NOTE
"Chief Complaint   Patient presents with     Vaginal Bleeding      patient presents today with frequent vaginal bleeding.   Initial /80   Pulse 102   Temp 98.2  F (36.8  C) (Tympanic)   Resp 16   Wt 74.9 kg (165 lb 2 oz)   LMP 05/27/2021 (Approximate)   SpO2 100%   BMI 26.65 kg/m   Estimated body mass index is 26.65 kg/m  as calculated from the following:    Height as of 7/10/20: 1.676 m (5' 6\").    Weight as of this encounter: 74.9 kg (165 lb 2 oz).  Medication Reconciliation: complete    Rajani Ford LPN  "

## 2021-06-03 NOTE — PROGRESS NOTES
"  SUBJECTIVE:   Maricarmen Ornelas is a 41 year old female who presents to clinic today for the following health issues:    HPI  Maricarmen is here for abnormal vaginal bleeding. She has had painful cramping/bloating even into upper abdomen intermittently through the last ~1 month, usually followed by bright red vaginal bleeding for ~24-48 hours.  The cramping/pain can last a few hours to 1/2 day.    --At least occurring weekly.  Believes her \"normal period\" was last week, although it lasted ~2x longer than previous ones.  She has had quite light periods for the last few years.    Has not taken a UPT at home, but her  had undergone vasectomy.  No changes in diet or bowel movements.    She has to be somewhere by 2pm, therefore would like to put off an exam and labs for later (can return tomorrow for labs); and has a long appointment scheduled with me in July.    Patient Active Problem List    Diagnosis Date Noted     S/P  section 2018     Priority: Medium     Encounter for triage in pregnant patient 2018     Priority: Medium     Bilateral carpal tunnel syndrome 2018     Priority: Medium     Adjustment disorder with anxious mood 2018     Priority: Medium     Acquired hypothyroidism 2018     Priority: Medium     Attention deficit disorder 2018     Priority: Medium     Twin pregnancy 2018     Priority: Medium     EDC 2018       Lumbosacral spondylosis without myelopathy 03/10/2015     Priority: Medium     Other staphylococcus infection in conditions classified elsewhere and of unspecified site 2011     Priority: Medium     Past Medical History:   Diagnosis Date     Dichorionic diamniotic twin pregnancy in third trimester 2018     Disorder of thyroid     hypo     Supervision of elderly multigravida     2018     Twin pregnancy     EDC 2018      Past Surgical History:   Procedure Laterality Date     APPENDECTOMY OPEN            SECTION N/A " 2018    Procedure:  SECTION;   Section - twins;  Surgeon: Woodrow Hurtado MD;  Location:  OR     COLONOSCOPY N/A 7/10/2019    Procedure: COLONOSCOPY, WITH POLYPECTOMY AND BIOPSY;  Surgeon: Viki Villareal MD;  Location:  OR     Family History   Problem Relation Age of Onset     Substance Abuse Maternal Grandmother         Alcohol/Drug,Alcohol abuse     Cancer Maternal Grandmother         Cancer, lung cancer     Other - See Comments Maternal Grandfather         Alzheimer's disease     Diabetes Paternal Grandmother         Diabetes,type 1     No Known Problems Sister      No Known Problems Son      No Known Problems Son      Diabetes Maternal Uncle         Diabetes,type 2     No Known Problems Son         2018     No Known Problems Daughter         2018     Heart Disease No family hx of         Heart Disease     Social History     Tobacco Use     Smoking status: Never Smoker     Smokeless tobacco: Never Used   Substance Use Topics     Alcohol use: Yes     Alcohol/week: 2.0 standard drinks     Comment: 1-2 times per week     Social History     Social History Narrative       - Ki Parry      Sons: Franco Linn ,    Daughter:    Father: Jose;  Mother: Bronwyn      Siblings: Sister Nydia,  Sambrother     Current Outpatient Medications   Medication Sig Dispense Refill     loratadine (CLARITIN) 10 MG tablet Take 1 tablet (10 mg) by mouth daily       medical cannabis (Patient's own supply) See Admin Instructions (The purpose of this order is to document that the patient reports taking medical cannabis.  This is not a prescription, and is not used to certify that the patient has a qualifying medical condition.)       methylcellulose (CITRUCEL) powder Take 0.3 g (1.05 teaspoonful) by mouth daily 454 g 3     [START ON 7/10/2021] methylphenidate (CONCERTA) 54 MG CR tablet Take 1 tablet (54 mg) by mouth daily 30 tablet 0     [START ON 2021] methylphenidate (CONCERTA) 54 MG CR  tablet Take 1 tablet (54 mg) by mouth daily 30 tablet 0     methylphenidate (CONCERTA) 54 MG CR tablet Take 1 tablet (54 mg) by mouth daily 30 tablet 0     SYNTHROID 75 MCG tablet TAKE 1 TABLET (75 MCG) BY MOUTH DAILY 90 tablet 1     Allergies   Allergen Reactions     Seasonal Allergies Itching     Chloraprep One Step Rash     With twin C/S (9/2018)     OBJECTIVE:     There were no vitals taken for this visit.  There is no height or weight on file to calculate BMI.  Physical Exam  Vitals signs and nursing note reviewed.   Constitutional:       Appearance: Normal appearance.   HENT:      Head: Normocephalic.   Skin:     General: Skin is warm.      Capillary Refill: Capillary refill takes less than 2 seconds.   Neurological:      General: No focal deficit present.      Mental Status: She is alert.     Diagnostic Test Results:  No results found for any visits on 06/03/21.    ASSESSMENT/PLAN:     1. Abnormal uterine bleeding (AUB)  Will order TSH, free T4, Hgb, FSH and estradiol; and UPT for tomorrow.  Recent pelvic US was reassuring without significant fibroid/other abnormality.  Would encourage monitoring.  Discussed options of progesterone to stop bleeding (but will develop withdrawal bleed) or OCPs which she would like to avoid.  Therefore will continue to monitor for persistence/change over the next 1-2 months and if continues - would consider OB/GYN referral for possible ablation vs other interventions.  Follow up sooner prn.  - TSH Reflex GH; Future  - T4, Free; Future  - Hemoglobin; Future  - Pregnancy, Urine (HCG); Future  - UA reflex to Microscopic; Future      Avril Sanders DO  [facility]

## 2021-06-04 DIAGNOSIS — R10.32 LLQ ABDOMINAL PAIN: Primary | ICD-10-CM

## 2021-06-04 DIAGNOSIS — Z78.0 POSTMENOPAUSAL STATE: ICD-10-CM

## 2021-06-04 DIAGNOSIS — N93.9 ABNORMAL UTERINE BLEEDING (AUB): Primary | ICD-10-CM

## 2021-06-04 DIAGNOSIS — N93.9 ABNORMAL UTERINE BLEEDING (AUB): ICD-10-CM

## 2021-06-04 DIAGNOSIS — N93.9 VAGINAL BLEEDING: ICD-10-CM

## 2021-06-04 LAB
ALBUMIN UR-MCNC: NEGATIVE MG/DL
APPEARANCE UR: CLEAR
BILIRUB UR QL STRIP: NEGATIVE
COLOR UR AUTO: YELLOW
ESTRADIOL SERPL-MCNC: <20 PG/ML
FSH SERPL-ACNC: 31.3 IU/L
GLUCOSE UR STRIP-MCNC: NEGATIVE MG/DL
HCG UR QL: NEGATIVE
HGB BLD-MCNC: 12.1 G/DL (ref 11.7–15.7)
HGB UR QL STRIP: ABNORMAL
KETONES UR STRIP-MCNC: NEGATIVE MG/DL
LEUKOCYTE ESTERASE UR QL STRIP: NEGATIVE
NITRATE UR QL: NEGATIVE
PH UR STRIP: 7.5 PH (ref 5–7)
RBC #/AREA URNS AUTO: 1 /HPF (ref 0–2)
SOURCE: ABNORMAL
SP GR UR STRIP: 1 (ref 1–1.03)
SQUAMOUS #/AREA URNS AUTO: 2 /HPF (ref 0–1)
T4 FREE SERPL-MCNC: 0.94 NG/DL (ref 0.6–1.6)
TSH SERPL DL<=0.05 MIU/L-ACNC: 1.47 IU/ML (ref 0.34–5.6)
UROBILINOGEN UR STRIP-MCNC: NORMAL MG/DL (ref 0–2)
WBC #/AREA URNS AUTO: 1 /HPF (ref 0–5)

## 2021-06-04 PROCEDURE — 84439 ASSAY OF FREE THYROXINE: CPT | Mod: ZL | Performed by: FAMILY MEDICINE

## 2021-06-04 PROCEDURE — 83001 ASSAY OF GONADOTROPIN (FSH): CPT | Mod: ZL | Performed by: FAMILY MEDICINE

## 2021-06-04 PROCEDURE — 85018 HEMOGLOBIN: CPT | Mod: ZL | Performed by: FAMILY MEDICINE

## 2021-06-04 PROCEDURE — 81001 URINALYSIS AUTO W/SCOPE: CPT | Mod: ZL | Performed by: FAMILY MEDICINE

## 2021-06-04 PROCEDURE — 84443 ASSAY THYROID STIM HORMONE: CPT | Mod: ZL | Performed by: FAMILY MEDICINE

## 2021-06-04 PROCEDURE — 82670 ASSAY OF TOTAL ESTRADIOL: CPT | Mod: ZL | Performed by: FAMILY MEDICINE

## 2021-06-04 PROCEDURE — 81025 URINE PREGNANCY TEST: CPT | Mod: ZL | Performed by: FAMILY MEDICINE

## 2021-06-04 PROCEDURE — 36415 COLL VENOUS BLD VENIPUNCTURE: CPT | Mod: ZL | Performed by: FAMILY MEDICINE

## 2021-06-07 ENCOUNTER — MYC MEDICAL ADVICE (OUTPATIENT)
Dept: FAMILY MEDICINE | Facility: OTHER | Age: 41
End: 2021-06-07

## 2021-06-21 ENCOUNTER — OFFICE VISIT (OUTPATIENT)
Dept: OBGYN | Facility: OTHER | Age: 41
End: 2021-06-21
Attending: FAMILY MEDICINE
Payer: COMMERCIAL

## 2021-06-21 VITALS
WEIGHT: 161.4 LBS | RESPIRATION RATE: 18 BRPM | BODY MASS INDEX: 26.05 KG/M2 | SYSTOLIC BLOOD PRESSURE: 122 MMHG | HEART RATE: 108 BPM | DIASTOLIC BLOOD PRESSURE: 82 MMHG

## 2021-06-21 DIAGNOSIS — Z78.0 POSTMENOPAUSAL STATE: ICD-10-CM

## 2021-06-21 DIAGNOSIS — R30.0 DYSURIA: Primary | ICD-10-CM

## 2021-06-21 DIAGNOSIS — N93.9 ABNORMAL UTERINE BLEEDING (AUB): ICD-10-CM

## 2021-06-21 LAB
ALBUMIN UR-MCNC: NEGATIVE MG/DL
APPEARANCE UR: CLEAR
BILIRUB UR QL STRIP: NEGATIVE
COLOR UR AUTO: YELLOW
GLUCOSE UR STRIP-MCNC: NEGATIVE MG/DL
HGB UR QL STRIP: NEGATIVE
KETONES UR STRIP-MCNC: NEGATIVE MG/DL
LEUKOCYTE ESTERASE UR QL STRIP: NEGATIVE
NITRATE UR QL: NEGATIVE
PH UR STRIP: 6.5 PH (ref 5–7)
SOURCE: ABNORMAL
SP GR UR STRIP: 1 (ref 1–1.03)
UROBILINOGEN UR STRIP-MCNC: NORMAL MG/DL (ref 0–2)

## 2021-06-21 PROCEDURE — 88305 TISSUE EXAM BY PATHOLOGIST: CPT

## 2021-06-21 PROCEDURE — 99214 OFFICE O/P EST MOD 30 MIN: CPT | Mod: 25 | Performed by: OBSTETRICS & GYNECOLOGY

## 2021-06-21 PROCEDURE — 81003 URINALYSIS AUTO W/O SCOPE: CPT | Mod: ZL | Performed by: OBSTETRICS & GYNECOLOGY

## 2021-06-21 PROCEDURE — 58100 BIOPSY OF UTERUS LINING: CPT | Performed by: OBSTETRICS & GYNECOLOGY

## 2021-06-21 ASSESSMENT — PAIN SCALES - GENERAL: PAINLEVEL: MILD PAIN (2)

## 2021-06-21 NOTE — NURSING NOTE
"Chief Complaint   Patient presents with     Consult     bleeding isues, cramping, hormone levels   Patient presents to clinic today to discuss bleeding issues, cramping and hormone levels. Bleeding has let up the last couple of weeks but is still having a lot of cramping and pain that she likens to ovulation pain. Seems worse with a full bladder or when has to have a bowel movement. Hormone levels checked at last visit to clinic, has some concerns with results.    Initial /82 (BP Location: Right arm, Patient Position: Sitting, Cuff Size: Adult Regular)   Pulse 108   Resp 18   Wt 73.2 kg (161 lb 6.4 oz)   LMP 05/27/2021 (Approximate)   BMI 26.05 kg/m   Estimated body mass index is 26.05 kg/m  as calculated from the following:    Height as of 7/10/20: 1.676 m (5' 6\").    Weight as of this encounter: 73.2 kg (161 lb 6.4 oz).  Medication Reconciliation: complete    ALDO HSU, LPN  "

## 2021-06-21 NOTE — PROGRESS NOTES
CC: concerns over possibility of menopause and abnormal bleeding  HPI:  Rafael is a 41 year old female who presents for evaluation of bleeding irregularly. She is concerned about premature menopause after having an elevated FSH and low estradiol last month. She is also worried about having uterine cancer after consulting the internet.  Patient's last menstrual period was 2021 (approximate).      Pap Smears:Normal in 2017    OB History    Para Term  AB Living   3 3 3 0 0 4   SAB TAB Ectopic Multiple Live Births   0 0 0 1 4      # Outcome Date GA Lbr Renny/2nd Weight Sex Delivery Anes PTL Lv   3A Term 18 37w0d  2.546 kg (5 lb 9.8 oz) F   N SMOOTH      Name: SHAYBABY1 RAFAEL      Apgar1: 8  Apgar5: 9   3B Term 18 37w0d  2.909 kg (6 lb 6.6 oz) M   N SMOOTH      Name: SHAYBABY2 RAFAEL      Apgar1: 9  Apgar5: 9   2 Term 10/16/10 40w0d   M  EPI N SMOOTH      Name: William   1 Term  40w0d   M  None N SMOOTH      Birth Comments: [place]      Name: Kaveh     Past Medical History:   Diagnosis Date     Dichorionic diamniotic twin pregnancy in third trimester 2018     Disorder of thyroid     hypo     Supervision of elderly multigravida     2018     Twin pregnancy 2018     Past Surgical History:   Procedure Laterality Date     APPENDECTOMY OPEN            SECTION N/A 2018    Procedure:  SECTION;   Section - twins;  Surgeon: Woodrow Hurtado MD;  Location:  OR     COLONOSCOPY N/A 7/10/2019    Procedure: COLONOSCOPY, WITH POLYPECTOMY AND BIOPSY;  Surgeon: Viki Villareal MD;  Location:  OR     Social History     Socioeconomic History     Marital status:      Spouse name: Refugio     Number of children: 4     Years of education: 16+     Highest education level: Not on file   Occupational History     Employer: [employer]   Social Needs     Financial resource strain: Not on file     Food insecurity     Worry: Not on file     Inability:  Not on file     Transportation needs     Medical: Not on file     Non-medical: Not on file   Tobacco Use     Smoking status: Never Smoker     Smokeless tobacco: Never Used   Substance and Sexual Activity     Alcohol use: Yes     Alcohol/week: 2.0 standard drinks     Comment: 1-2 times per week     Drug use: Not Currently     Comment: medical marijuana      Sexual activity: Yes     Partners: Male     Birth control/protection: Male Surgical     Comment: vasectomy    Lifestyle     Physical activity     Days per week: Not on file     Minutes per session: Not on file     Stress: Not on file   Relationships     Social connections     Talks on phone: Not on file     Gets together: Not on file     Attends Mandaeism service: Not on file     Active member of club or organization: Not on file     Attends meetings of clubs or organizations: Not on file     Relationship status: Not on file     Intimate partner violence     Fear of current or ex partner: Not on file     Emotionally abused: Not on file     Physically abused: Not on file     Forced sexual activity: Not on file   Other Topics Concern     Parent/sibling w/ CABG, MI or angioplasty before 65F 55M? Not Asked   Social History Narrative       - Ki Parry      Sons: Franco Linn ,    Daughter:    Father: Jose;  Mother: Bronwyn      Siblings: Sister Nydia,  Sambrother     Family History   Problem Relation Age of Onset     Substance Abuse Maternal Grandmother         Alcohol/Drug,Alcohol abuse     Cancer Maternal Grandmother         Cancer, lung cancer     Other - See Comments Maternal Grandfather         Alzheimer's disease     Diabetes Paternal Grandmother         Diabetes,type 1     No Known Problems Sister      No Known Problems Son      No Known Problems Son      Diabetes Maternal Uncle         Diabetes,type 2     No Known Problems Son         2018     No Known Problems Daughter         2018     Heart Disease No family hx of         Heart  Disease       Current Outpatient Medications   Medication     loratadine (CLARITIN) 10 MG tablet     medical cannabis (Patient's own supply)     methylcellulose (CITRUCEL) powder     [START ON 7/10/2021] methylphenidate (CONCERTA) 54 MG CR tablet     methylphenidate (CONCERTA) 54 MG CR tablet     methylphenidate (CONCERTA) 54 MG CR tablet     SYNTHROID 75 MCG tablet     No current facility-administered medications for this visit.      Allergies   Allergen Reactions     Seasonal Allergies Itching     Chloraprep One Step Rash     With twin C/S (9/2018)     /82 (BP Location: Right arm, Patient Position: Sitting, Cuff Size: Adult Regular)   Pulse 108   Resp 18   Wt 73.2 kg (161 lb 6.4 oz)   LMP 05/27/2021 (Approximate)   BMI 26.05 kg/m      REVIEW OF SYSTEMS  General: negative  GI: negative  : negative    Exam:  Constitutional: healthy, alert, active and no distress.  Pelvic: Normal BUSE, vulva appears normal, vaginal and cervix are normal. Uterus is normal size, shape position and mobility without adnexal mass or tenderness. Chaperone was present.     After consent was obtained, the cervix was prepped with betadine. The cervix was grasped with a tenaculum and gently dilated with an Os Finder. She sounded to 9 cm and two passes were performed productive of modest amount of tissue with the pipele instrument. The cervix was released and bleeding treated with pressure. Patient tolerated the procedure well.    Lab:   Results for orders placed or performed in visit on 06/21/21   UA reflex to Microscopic and Culture     Status: Abnormal    Specimen: Midstream Urine   Result Value Ref Range    Color Urine Yellow     Appearance Urine Clear     Glucose Urine Negative NEG^Negative mg/dL    Bilirubin Urine Negative NEG^Negative    Ketones Urine Negative NEG^Negative mg/dL    Specific Gravity Urine 1.002 (L) 1.003 - 1.035    Blood Urine Negative NEG^Negative    pH Urine 6.5 5.0 - 7.0 pH    Protein Albumin Urine  Negative NEG^Negative mg/dL    Urobilinogen mg/dL Normal 0.0 - 2.0 mg/dL    Nitrite Urine Negative NEG^Negative    Leukocyte Esterase Urine Negative NEG^Negative    Source Midstream Urine        ASSESSMENT/PLAN :  1. Dysuria    2. Abnormal uterine bleeding (AUB)    3. Postmenopausal state      Will notify of lab results when available.  We discussed that FSH and estradiol levels are not particularly helpful for menopause diagnosis as it is a clinical diagnosis of no bleeding after a year.   Will rule out the low likelihood of uterine cancer with the biopsy today. If she does go into clinical menopause would then advise screening for anti-adrenal antibodies as 3% of patients with premature ovarian failure will develop adrenal insufficiency, and she already has thyroid disease.    Woodrow Hurtado MD FACOG  11:08 AM 6/21/2021

## 2021-06-28 ENCOUNTER — ALLIED HEALTH/NURSE VISIT (OUTPATIENT)
Dept: FAMILY MEDICINE | Facility: OTHER | Age: 41
End: 2021-06-28
Attending: FAMILY MEDICINE
Payer: COMMERCIAL

## 2021-06-28 DIAGNOSIS — R05.9 COUGH: Primary | ICD-10-CM

## 2021-06-28 LAB
SARS-COV-2 RNA RESP QL NAA+PROBE: NORMAL
SPECIMEN SOURCE: NORMAL

## 2021-06-28 PROCEDURE — C9803 HOPD COVID-19 SPEC COLLECT: HCPCS

## 2021-06-28 PROCEDURE — U0003 INFECTIOUS AGENT DETECTION BY NUCLEIC ACID (DNA OR RNA); SEVERE ACUTE RESPIRATORY SYNDROME CORONAVIRUS 2 (SARS-COV-2) (CORONAVIRUS DISEASE [COVID-19]), AMPLIFIED PROBE TECHNIQUE, MAKING USE OF HIGH THROUGHPUT TECHNOLOGIES AS DESCRIBED BY CMS-2020-01-R: HCPCS | Mod: ZL | Performed by: FAMILY MEDICINE

## 2021-06-28 PROCEDURE — U0005 INFEC AGEN DETEC AMPLI PROBE: HCPCS | Mod: ZL | Performed by: FAMILY MEDICINE

## 2021-07-01 ENCOUNTER — OFFICE VISIT (OUTPATIENT)
Dept: FAMILY MEDICINE | Facility: OTHER | Age: 41
End: 2021-07-01
Attending: NURSE PRACTITIONER
Payer: COMMERCIAL

## 2021-07-01 ENCOUNTER — TELEPHONE (OUTPATIENT)
Dept: FAMILY MEDICINE | Facility: OTHER | Age: 41
End: 2021-07-01

## 2021-07-01 VITALS
OXYGEN SATURATION: 100 % | RESPIRATION RATE: 19 BRPM | DIASTOLIC BLOOD PRESSURE: 82 MMHG | HEIGHT: 66 IN | HEART RATE: 84 BPM | BODY MASS INDEX: 25.62 KG/M2 | TEMPERATURE: 97.2 F | WEIGHT: 159.4 LBS | SYSTOLIC BLOOD PRESSURE: 124 MMHG

## 2021-07-01 DIAGNOSIS — R10.2 PELVIC PAIN: Primary | ICD-10-CM

## 2021-07-01 DIAGNOSIS — R30.0 DYSURIA: ICD-10-CM

## 2021-07-01 LAB
ALBUMIN SERPL-MCNC: 4.5 G/DL (ref 3.5–5.7)
ALBUMIN UR-MCNC: NEGATIVE MG/DL
ALP SERPL-CCNC: 56 U/L (ref 34–104)
ALT SERPL W P-5'-P-CCNC: 10 U/L (ref 7–52)
ANION GAP SERPL CALCULATED.3IONS-SCNC: 6 MMOL/L (ref 3–14)
APPEARANCE UR: CLEAR
AST SERPL W P-5'-P-CCNC: 11 U/L (ref 13–39)
BASOPHILS # BLD AUTO: 0.1 10E9/L (ref 0–0.2)
BASOPHILS NFR BLD AUTO: 0.8 %
BILIRUB SERPL-MCNC: 0.4 MG/DL (ref 0.3–1)
BILIRUB UR QL STRIP: NEGATIVE
BUN SERPL-MCNC: 9 MG/DL (ref 7–25)
CALCIUM SERPL-MCNC: 9.1 MG/DL (ref 8.6–10.3)
CHLORIDE SERPL-SCNC: 105 MMOL/L (ref 98–107)
CO2 SERPL-SCNC: 27 MMOL/L (ref 21–31)
COLOR UR AUTO: ABNORMAL
CREAT SERPL-MCNC: 0.67 MG/DL (ref 0.6–1.2)
DIFFERENTIAL METHOD BLD: ABNORMAL
EOSINOPHIL # BLD AUTO: 0.3 10E9/L (ref 0–0.7)
EOSINOPHIL NFR BLD AUTO: 4.2 %
ERYTHROCYTE [DISTWIDTH] IN BLOOD BY AUTOMATED COUNT: 12.9 % (ref 10–15)
GFR SERPL CREATININE-BSD FRML MDRD: >90 ML/MIN/{1.73_M2}
GLUCOSE SERPL-MCNC: 92 MG/DL (ref 70–105)
GLUCOSE UR STRIP-MCNC: NEGATIVE MG/DL
HCT VFR BLD AUTO: 39.2 % (ref 35–47)
HGB BLD-MCNC: 12.3 G/DL (ref 11.7–15.7)
HGB UR QL STRIP: ABNORMAL
IMM GRANULOCYTES # BLD: 0 10E9/L (ref 0–0.4)
IMM GRANULOCYTES NFR BLD: 0.4 %
KETONES UR STRIP-MCNC: NEGATIVE MG/DL
LEUKOCYTE ESTERASE UR QL STRIP: NEGATIVE
LYMPHOCYTES # BLD AUTO: 1.8 10E9/L (ref 0.8–5.3)
LYMPHOCYTES NFR BLD AUTO: 24.7 %
MCH RBC QN AUTO: 26.9 PG (ref 26.5–33)
MCHC RBC AUTO-ENTMCNC: 31.4 G/DL (ref 31.5–36.5)
MCV RBC AUTO: 86 FL (ref 78–100)
MONOCYTES # BLD AUTO: 0.4 10E9/L (ref 0–1.3)
MONOCYTES NFR BLD AUTO: 6 %
MUCOUS THREADS #/AREA URNS LPF: PRESENT /LPF
NEUTROPHILS # BLD AUTO: 4.6 10E9/L (ref 1.6–8.3)
NEUTROPHILS NFR BLD AUTO: 63.9 %
NITRATE UR QL: NEGATIVE
PH UR STRIP: 7.5 PH (ref 5–7)
PLATELET # BLD AUTO: 382 10E9/L (ref 150–450)
POTASSIUM SERPL-SCNC: 4.1 MMOL/L (ref 3.5–5.1)
PROT SERPL-MCNC: 7.4 G/DL (ref 6.4–8.9)
RBC # BLD AUTO: 4.58 10E12/L (ref 3.8–5.2)
RBC #/AREA URNS AUTO: 0 /HPF (ref 0–2)
SODIUM SERPL-SCNC: 138 MMOL/L (ref 134–144)
SOURCE: ABNORMAL
SP GR UR STRIP: 1.01 (ref 1–1.03)
SPECIMEN SOURCE: NORMAL
UROBILINOGEN UR STRIP-MCNC: NORMAL MG/DL (ref 0–2)
WBC # BLD AUTO: 7.2 10E9/L (ref 4–11)
WBC #/AREA URNS AUTO: 2 /HPF (ref 0–5)
WET PREP SPEC: NORMAL

## 2021-07-01 PROCEDURE — 87210 SMEAR WET MOUNT SALINE/INK: CPT | Mod: ZL | Performed by: NURSE PRACTITIONER

## 2021-07-01 PROCEDURE — 99213 OFFICE O/P EST LOW 20 MIN: CPT | Performed by: NURSE PRACTITIONER

## 2021-07-01 PROCEDURE — 85025 COMPLETE CBC W/AUTO DIFF WBC: CPT | Mod: ZL | Performed by: NURSE PRACTITIONER

## 2021-07-01 PROCEDURE — 81001 URINALYSIS AUTO W/SCOPE: CPT | Mod: ZL | Performed by: NURSE PRACTITIONER

## 2021-07-01 PROCEDURE — 80053 COMPREHEN METABOLIC PANEL: CPT | Mod: ZL | Performed by: NURSE PRACTITIONER

## 2021-07-01 PROCEDURE — 36415 COLL VENOUS BLD VENIPUNCTURE: CPT | Mod: ZL | Performed by: NURSE PRACTITIONER

## 2021-07-01 ASSESSMENT — PAIN SCALES - GENERAL: PAINLEVEL: EXTREME PAIN (8)

## 2021-07-01 ASSESSMENT — MIFFLIN-ST. JEOR: SCORE: 1409.78

## 2021-07-01 NOTE — NURSING NOTE
"Chief Complaint   Patient presents with     Dysuria     Patient presents to clinic for abdominal pain. She states she had some spotting yesterday and hasn't had a period since the end of May. She has been feeling dizzy, and has had 3 BM's since 3am last night, all normal, but painful to pass.     Initial /82 (BP Location: Right arm, Patient Position: Sitting, Cuff Size: Adult Regular)   Pulse 84   Temp 97.2  F (36.2  C) (Temporal)   Resp 19   Ht 1.676 m (5' 6\")   Wt 72.3 kg (159 lb 6.4 oz)   SpO2 100%   Breastfeeding No   BMI 25.73 kg/m   Estimated body mass index is 25.73 kg/m  as calculated from the following:    Height as of this encounter: 1.676 m (5' 6\").    Weight as of this encounter: 72.3 kg (159 lb 6.4 oz).     FOOD SECURITY SCREENING QUESTIONS  Hunger Vital Signs:  Within the past 12 months we worried whether our food would run out before we got money to buy more. Never  Within the past 12 months the food we bought just didn't last and we didn't have money to get more. Never      Medication Reconciliation: Complete      Meche Mckinnon   "

## 2021-07-01 NOTE — TELEPHONE ENCOUNTER
Noted. Would still recommend symptomatic management through weekend and f/u next week if not better, sooner over weekend if new/concerning symptoms occur. FLAVIO Tovar CNP on 7/1/2021 at 1:02 PM

## 2021-07-01 NOTE — TELEPHONE ENCOUNTER
Spoke with patient and notified her of providers note. She started crying than asked why do I have to wait. She is wondering why this can't be done NOW!! Relayed  the providers note to her again. She seemed to calm down.   Roro Beckham LPN ....................  7/1/2021   1:22 PM

## 2021-07-01 NOTE — TELEPHONE ENCOUNTER
Patient got her test results and wanted to mention that she was moving large rocks in her garden all week.   Meche Mckinnon LPN.......  7/1/2021  1:00 PM

## 2021-07-01 NOTE — PROGRESS NOTES
HPI:    Maricarmen Ornelas is a 41 year old female who presents to clinic today for pelvic pain.  She reports last week she was seen by gynecology and had a uterine biopsy and UA.  These were both negative.  She had her menses through the month of May.  Since then she has not had any menstrual bleeding until yesterday she started spotting.  Last night during the night she had contraction type pain that woke her up.  She did have a movement that decreased the pain mildly.  She is also had 2 other normal bowel movements since.  She denies any diarrhea or constipation concerns.  She reports she has had some mild dysuria but feels more of a pelvic and bladder pain and spasming.  Denies any fevers.  This morning she did feel little bit dizzy.  She has not used any Tylenol or ibuprofen for her pelvic pain.    Past Medical History:   Diagnosis Date     Dichorionic diamniotic twin pregnancy in third trimester 4/4/2018     Disorder of thyroid     hypo     Supervision of elderly multigravida     2/6/2018     Twin pregnancy 2018 EDC 9/2018       Current Outpatient Medications   Medication Sig Dispense Refill     loratadine (CLARITIN) 10 MG tablet Take 1 tablet (10 mg) by mouth daily       medical cannabis (Patient's own supply) See Admin Instructions (The purpose of this order is to document that the patient reports taking medical cannabis.  This is not a prescription, and is not used to certify that the patient has a qualifying medical condition.)       methylcellulose (CITRUCEL) powder Take 0.3 g (1.05 teaspoonful) by mouth daily 454 g 3     [START ON 7/10/2021] methylphenidate (CONCERTA) 54 MG CR tablet Take 1 tablet (54 mg) by mouth daily 30 tablet 0     methylphenidate (CONCERTA) 54 MG CR tablet Take 1 tablet (54 mg) by mouth daily 30 tablet 0     methylphenidate (CONCERTA) 54 MG CR tablet Take 1 tablet (54 mg) by mouth daily 30 tablet 0     SYNTHROID 75 MCG tablet TAKE 1 TABLET (75 MCG) BY MOUTH DAILY 90 tablet 1  "      Allergies   Allergen Reactions     Seasonal Allergies Itching     Chloraprep One Step Rash     With twin C/S (9/2018)       ROS:  Pertinent positives and negatives are noted in HPI.    EXAM:  /82 (BP Location: Right arm, Patient Position: Sitting, Cuff Size: Adult Regular)   Pulse 84   Temp 97.2  F (36.2  C) (Temporal)   Resp 19   Ht 1.676 m (5' 6\")   Wt 72.3 kg (159 lb 6.4 oz)   SpO2 100%   Breastfeeding No   BMI 25.73 kg/m    General appearance: well appearing female, in no acute distress  Respiratory: clear to auscultation bilaterally  Cardiac: RRR with no murmurs  Abdomen: soft, lower abdominal and suprapubic pain with palpation, no masses or organomegaly, normal bowel sounds  Genitourinary Exam Female: External genitalia, vulva and vagina appear normal. Wet prep was obtained and Q-tip had moderate amount of dark brown blood  Psychological: normal affect, alert and pleasant  Results for orders placed or performed in visit on 07/01/21   UA reflex to Microscopic and Culture     Status: Abnormal    Specimen: Midstream Urine   Result Value Ref Range    Color Urine Light Yellow     Appearance Urine Clear     Glucose Urine Negative NEG^Negative mg/dL    Bilirubin Urine Negative NEG^Negative    Ketones Urine Negative NEG^Negative mg/dL    Specific Gravity Urine 1.014 1.003 - 1.035    Blood Urine Small (A) NEG^Negative    pH Urine 7.5 (H) 5.0 - 7.0 pH    Protein Albumin Urine Negative NEG^Negative mg/dL    Urobilinogen mg/dL Normal 0.0 - 2.0 mg/dL    Nitrite Urine Negative NEG^Negative    Leukocyte Esterase Urine Negative NEG^Negative    Source Midstream Urine     RBC Urine 0 0 - 2 /HPF    WBC Urine 2 0 - 5 /HPF    Mucous Urine Present (A) NEG^Negative /LPF   CBC and Differential     Status: Abnormal   Result Value Ref Range    WBC 7.2 4.0 - 11.0 10e9/L    RBC Count 4.58 3.8 - 5.2 10e12/L    Hemoglobin 12.3 11.7 - 15.7 g/dL    Hematocrit 39.2 35.0 - 47.0 %    MCV 86 78 - 100 fl    MCH 26.9 26.5 - " 33.0 pg    MCHC 31.4 (L) 31.5 - 36.5 g/dL    RDW 12.9 10.0 - 15.0 %    Platelet Count 382 150 - 450 10e9/L    Diff Method Automated Method     % Neutrophils 63.9 %    % Lymphocytes 24.7 %    % Monocytes 6.0 %    % Eosinophils 4.2 %    % Basophils 0.8 %    % Immature Granulocytes 0.4 %    Absolute Neutrophil 4.6 1.6 - 8.3 10e9/L    Absolute Lymphocytes 1.8 0.8 - 5.3 10e9/L    Absolute Monocytes 0.4 0.0 - 1.3 10e9/L    Absolute Eosinophils 0.3 0.0 - 0.7 10e9/L    Absolute Basophils 0.1 0.0 - 0.2 10e9/L    Abs Immature Granulocytes 0.0 0 - 0.4 10e9/L   Comprehensive Metabolic Panel     Status: Abnormal   Result Value Ref Range    Sodium 138 134 - 144 mmol/L    Potassium 4.1 3.5 - 5.1 mmol/L    Chloride 105 98 - 107 mmol/L    Carbon Dioxide 27 21 - 31 mmol/L    Anion Gap 6 3 - 14 mmol/L    Glucose 92 70 - 105 mg/dL    Urea Nitrogen 9 7 - 25 mg/dL    Creatinine 0.67 0.60 - 1.20 mg/dL    GFR Estimate >90 >60 mL/min/[1.73_m2]    GFR Estimate If Black >90 >60 mL/min/[1.73_m2]    Calcium 9.1 8.6 - 10.3 mg/dL    Bilirubin Total 0.4 0.3 - 1.0 mg/dL    Albumin 4.5 3.5 - 5.7 g/dL    Protein Total 7.4 6.4 - 8.9 g/dL    Alkaline Phosphatase 56 34 - 104 U/L    ALT 10 7 - 52 U/L    AST 11 (L) 13 - 39 U/L   Wet prep     Status: None    Specimen: Vagina   Result Value Ref Range    Specimen Description Vagina     Wet Prep No yeast seen     Wet Prep No clue cells seen     Wet Prep No Trichomonas seen        ASSESSMENT AND PLAN:    1. Pelvic pain    2. Dysuria          Labs completed including wet prep, comprehensive metabolic panel, CBC and UA.  These are essentially negative.  I suspect her pain is related to menstrual cramping.  I did review the case with her primary care provider.  At this time would recommend ongoing symptomatic management including Tylenol and ibuprofen as well as warm compresses for pain and to follow-up with gynecology next week, sooner if she is having concerns that are worsening.  Peri Interiano, APRN  CNP..................7/1/2021 10:47 AM

## 2021-07-02 ENCOUNTER — MYC MEDICAL ADVICE (OUTPATIENT)
Dept: FAMILY MEDICINE | Facility: OTHER | Age: 41
End: 2021-07-02

## 2021-07-02 ENCOUNTER — HOSPITAL ENCOUNTER (OUTPATIENT)
Dept: ULTRASOUND IMAGING | Facility: OTHER | Age: 41
Discharge: HOME | End: 2021-07-02
Attending: FAMILY MEDICINE | Admitting: FAMILY MEDICINE
Payer: COMMERCIAL

## 2021-07-02 DIAGNOSIS — L76.82 INCISIONAL PAIN: ICD-10-CM

## 2021-07-02 DIAGNOSIS — N80.03 ADENOMYOSIS OF UTERUS: ICD-10-CM

## 2021-07-02 DIAGNOSIS — N93.9 VAGINAL BLEEDING: ICD-10-CM

## 2021-07-02 DIAGNOSIS — N93.9 ABNORMAL UTERINE BLEEDING (AUB): ICD-10-CM

## 2021-07-02 DIAGNOSIS — R10.32 LLQ ABDOMINAL PAIN: ICD-10-CM

## 2021-07-02 DIAGNOSIS — R10.2 PELVIC PAIN IN FEMALE: Primary | ICD-10-CM

## 2021-07-02 PROCEDURE — 76830 TRANSVAGINAL US NON-OB: CPT

## 2021-07-14 ENCOUNTER — OFFICE VISIT (OUTPATIENT)
Dept: FAMILY MEDICINE | Facility: OTHER | Age: 41
End: 2021-07-14
Attending: FAMILY MEDICINE
Payer: COMMERCIAL

## 2021-07-14 VITALS
BODY MASS INDEX: 25.82 KG/M2 | RESPIRATION RATE: 14 BRPM | WEIGHT: 160 LBS | OXYGEN SATURATION: 99 % | HEART RATE: 75 BPM | TEMPERATURE: 97.5 F | SYSTOLIC BLOOD PRESSURE: 106 MMHG | DIASTOLIC BLOOD PRESSURE: 78 MMHG

## 2021-07-14 DIAGNOSIS — N80.03 ADENOMYOSIS OF UTERUS: Primary | ICD-10-CM

## 2021-07-14 PROCEDURE — 99213 OFFICE O/P EST LOW 20 MIN: CPT | Performed by: FAMILY MEDICINE

## 2021-07-14 ASSESSMENT — PAIN SCALES - GENERAL: PAINLEVEL: MILD PAIN (2)

## 2021-07-14 NOTE — NURSING NOTE
"Chief Complaint   Patient presents with     Vaginal Bleeding       Initial /78   Pulse 75   Temp 97.5  F (36.4  C) (Tympanic)   Resp 14   Wt 72.6 kg (160 lb)   LMP 07/01/2021 (Approximate)   SpO2 99%   BMI 25.82 kg/m   Estimated body mass index is 25.82 kg/m  as calculated from the following:    Height as of 7/1/21: 1.676 m (5' 6\").    Weight as of this encounter: 72.6 kg (160 lb).  Medication Reconciliation: complete    Rajani Ford LPN     FOOD SECURITY SCREENING QUESTIONS  Hunger Vital Signs:  Within the past 12 months we worried whether our food would run out before we got money to buy more. Never  Within the past 12 months the food we bought just didn't last and we didn't have money to get more. Never  Rajani Ford LPN 7/14/2021 10:16 AM    "

## 2021-07-14 NOTE — PROGRESS NOTES
SUBJECTIVE:   Maricarmen Ornelas is a 41 year old female who presents to clinic today for the following health issues:    HPI  Maricarmen is here for continued pelvic pains and to review recent pelvic US results.  Results from 2021 US were reviewed with Maricarmen - indicating a likely presence of Adenomyosis.  Consistent with her severe abdominal/pelvic pains with menses.    Patient Active Problem List    Diagnosis Date Noted     S/P  section 2018     Priority: Medium     Encounter for triage in pregnant patient 2018     Priority: Medium     Bilateral carpal tunnel syndrome 2018     Priority: Medium     Adjustment disorder with anxious mood 2018     Priority: Medium     Acquired hypothyroidism 2018     Priority: Medium     Attention deficit disorder 2018     Priority: Medium     Twin pregnancy 2018     Priority: Medium     EDC 2018       Lumbosacral spondylosis without myelopathy 03/10/2015     Priority: Medium     Other staphylococcus infection in conditions classified elsewhere and of unspecified site 2011     Priority: Medium     Past Medical History:   Diagnosis Date     Dichorionic diamniotic twin pregnancy in third trimester 2018     Disorder of thyroid     hypo     Supervision of elderly multigravida     2018     Twin pregnancy     EDC 2018      Past Surgical History:   Procedure Laterality Date     APPENDECTOMY OPEN            SECTION N/A 2018    Procedure:  SECTION;   Section - twins;  Surgeon: Woodrow Hurtaod MD;  Location:  OR     COLONOSCOPY N/A 7/10/2019    Procedure: COLONOSCOPY, WITH POLYPECTOMY AND BIOPSY;  Surgeon: Viki Villareal MD;  Location:  OR     Family History   Problem Relation Age of Onset     Substance Abuse Maternal Grandmother         Alcohol/Drug,Alcohol abuse     Cancer Maternal Grandmother         Cancer, lung cancer     Other - See Comments Maternal Grandfather         Alzheimer's disease      Diabetes Paternal Grandmother         Diabetes,type 1     No Known Problems Sister      No Known Problems Son      No Known Problems Son      Diabetes Maternal Uncle         Diabetes,type 2     No Known Problems Son         2018     No Known Problems Daughter         2018     Heart Disease No family hx of         Heart Disease     Social History     Tobacco Use     Smoking status: Never Smoker     Smokeless tobacco: Never Used   Substance Use Topics     Alcohol use: Yes     Alcohol/week: 2.0 standard drinks     Comment: 1-2 times per week     Social History     Social History Narrative       - Ki Parry      Sons: Franco Linn ,    Daughter:    Father: Jose;  Mother: Bronwyn      Siblings: Sister Nydia,  Emilyther     Current Outpatient Medications   Medication Sig Dispense Refill     loratadine (CLARITIN) 10 MG tablet Take 1 tablet (10 mg) by mouth daily       medical cannabis (Patient's own supply) See Admin Instructions (The purpose of this order is to document that the patient reports taking medical cannabis.  This is not a prescription, and is not used to certify that the patient has a qualifying medical condition.)       methylcellulose (CITRUCEL) powder Take 0.3 g (1.05 teaspoonful) by mouth daily 454 g 3     methylphenidate (CONCERTA) 54 MG CR tablet Take 1 tablet (54 mg) by mouth daily 30 tablet 0     methylphenidate (CONCERTA) 54 MG CR tablet Take 1 tablet (54 mg) by mouth daily 30 tablet 0     methylphenidate (CONCERTA) 54 MG CR tablet Take 1 tablet (54 mg) by mouth daily 30 tablet 0     SYNTHROID 75 MCG tablet TAKE 1 TABLET (75 MCG) BY MOUTH DAILY 90 tablet 1     Allergies   Allergen Reactions     Seasonal Allergies Itching     Chloraprep One Step Rash     With twin C/S (9/2018)       OBJECTIVE:     /78   Pulse 75   Temp 97.5  F (36.4  C) (Tympanic)   Resp 14   Wt 72.6 kg (160 lb)   LMP 07/01/2021 (Approximate)   SpO2 99%   BMI 25.82 kg/m    Body mass index is  25.82 kg/m .  Physical Exam  Vitals and nursing note reviewed.   Constitutional:       Appearance: Normal appearance. She is normal weight.   Pulmonary:      Effort: Pulmonary effort is normal.   Abdominal:      General: Abdomen is flat. Bowel sounds are normal.      Palpations: There is no mass.      Hernia: No hernia is present.   Skin:     Capillary Refill: Capillary refill takes less than 2 seconds.   Neurological:      General: No focal deficit present.      Mental Status: She is alert.   Psychiatric:         Mood and Affect: Mood normal.         Behavior: Behavior normal.     Diagnostic Test Results:  No results found for any visits on 07/14/21.     7/2/2021: pelvic us - more enlarged and some heterogenicity seen throughout the myometrium.  Reviewed pictures with patient in the office today.    ASSESSMENT/PLAN:     1. Adenomyosis of uterus  Per US findings, questions and clinical course discussed; has OB/GYN consultation scheduled for discussion re: treatment options.  She has completed childbearing.      Avril Sanders, [facility] AND [facility]

## 2021-07-17 PROBLEM — O30.009 TWIN PREGNANCY: Status: RESOLVED | Noted: 2018-01-01 | Resolved: 2021-07-17

## 2021-07-17 PROBLEM — Z36.89 ENCOUNTER FOR TRIAGE IN PREGNANT PATIENT: Status: RESOLVED | Noted: 2018-07-20 | Resolved: 2021-07-17

## 2021-07-21 ENCOUNTER — OFFICE VISIT (OUTPATIENT)
Dept: OBGYN | Facility: OTHER | Age: 41
End: 2021-07-21
Attending: STUDENT IN AN ORGANIZED HEALTH CARE EDUCATION/TRAINING PROGRAM
Payer: COMMERCIAL

## 2021-07-21 VITALS
WEIGHT: 161.8 LBS | HEART RATE: 92 BPM | BODY MASS INDEX: 26.12 KG/M2 | DIASTOLIC BLOOD PRESSURE: 80 MMHG | SYSTOLIC BLOOD PRESSURE: 118 MMHG

## 2021-07-21 DIAGNOSIS — R10.2 PELVIC PAIN IN FEMALE: ICD-10-CM

## 2021-07-21 DIAGNOSIS — N80.03 ADENOMYOSIS OF UTERUS: ICD-10-CM

## 2021-07-21 DIAGNOSIS — L76.82 INCISIONAL PAIN: ICD-10-CM

## 2021-07-21 PROCEDURE — 99215 OFFICE O/P EST HI 40 MIN: CPT | Performed by: STUDENT IN AN ORGANIZED HEALTH CARE EDUCATION/TRAINING PROGRAM

## 2021-07-21 PROCEDURE — 99417 PROLNG OP E/M EACH 15 MIN: CPT | Performed by: STUDENT IN AN ORGANIZED HEALTH CARE EDUCATION/TRAINING PROGRAM

## 2021-07-21 ASSESSMENT — PAIN SCALES - GENERAL: PAINLEVEL: MILD PAIN (2)

## 2021-07-21 NOTE — PROGRESS NOTES
"Gynecology Office Visit    Chief Complaint: Pelvic pain    HPI:    Maricarmen Ornelas is a 41 year old , here for discussion of pelvic pain. She has noticed this abdominal and pelvic pain for many years. When she was younger, she associated it with stress. She has had a  three years ago for twins and her periods have changed significantly since then. They have become much more irregular, hard to predict and painful. The pain is located around the time of her menses each month, the the duration of the pain has seemed to increase. She gets about 5 days of relief each month.     Every time she bleeds it is one very heavy day, she needs to change a tampon and pad almost every hour-- but then the bleeding resolves after that day. The cramping is significant as well. A few weeks ago she woke up with strong contraction like pain that went away after a few hours, but kept her awake most of the night. The pain was also associated with bowel and bladder function.    She is always tender to the touch across the lower abdomen since the time of her . On US she could hardly bare the pressure of the US probe across that spot and a small region of fluid was noted under the skin in that region. She was also on her period at the time of her most recent US.     In discussion of what she has tried for the pain in the past, she notes that she can't take ibuprofen or aleve due to history of stomach ulcers. She is about to start a different NSAID and smooth muscle relaxant prescribed by Dr. Sanders to see if this improves her symptoms. She is not interested in hormonal control as hormones in the past have caused symptoms she did not like, in a general sense she did not feel \"right\" she noted.      Her mother went through menopause in her mid-50s. Her maternal aunt went through it in her 40s. She notes she has had intermittent hot flashes already and feels as though she might be in perimenopause at this time.    She has " had a recent endometrial biopsy which showed benign tissue    A pelvic US shows UT measuring 8.7x 5.3 x 7.8 cm with ES 6 mm with uniform echogenicity. Echogenicity in the myometrium is heterogenous, potentially concerning for adenomyosis. We discussed that adenomyosis is a pathologic diagnosis.    OBHx  OB History    Para Term  AB Living   3 3 3 0 0 4   SAB TAB Ectopic Multiple Live Births   0 0 0 1 4      # Outcome Date GA Lbr Renny/2nd Weight Sex Delivery Anes PTL Lv   3A Term 18 37w0d  2.546 kg (5 lb 9.8 oz) F   N SMOOTH      Name: MYNOR DAY1 RAFAEL      Apgar1: 8  Apgar5: 9   3B Term 18 37w0d  2.909 kg (6 lb 6.6 oz) M   N SMOOTH      Name: MYNOR DAY2 RAFAEL      Apgar1: 9  Apgar5: 9   2 Term 10/16/10 40w0d   M  EPI N SMOOTH      Name: William   1 Term  40w0d   M  None N SMOOTH      Birth Comments: [place]      Name: Kaveh       GYN history:   No history of STIs   Last pap smear: Dec 2017: NIL, HPV negative (Due Dec 2022) , No history of abnormal pap smears       Past medical history:  Past Medical History:   Diagnosis Date     Dichorionic diamniotic twin pregnancy in third trimester 2018     Disorder of thyroid     hypo     Supervision of elderly multigravida     2018     Twin pregnancy 2018     Specifically denies VTE, DM, HTN or bleeding disorders    Past Surgical History:  Past Surgical History:   Procedure Laterality Date     APPENDECTOMY OPEN            SECTION N/A 2018    Procedure:  SECTION;   Section - twins;  Surgeon: Woodrow Hurtado MD;  Location:  OR     COLONOSCOPY N/A 7/10/2019    Procedure: COLONOSCOPY, WITH POLYPECTOMY AND BIOPSY;  Surgeon: Viki Villareal MD;  Location:  OR         Medications:  Current Outpatient Medications   Medication     loratadine (CLARITIN) 10 MG tablet     medical cannabis (Patient's own supply)     methylcellulose (CITRUCEL) powder     methylphenidate (CONCERTA) 54 MG CR tablet      methylphenidate (CONCERTA) 54 MG CR tablet     methylphenidate (CONCERTA) 54 MG CR tablet     SYNTHROID 75 MCG tablet     No current facility-administered medications for this visit.         Allergies:       Allergies   Allergen Reactions     Seasonal Allergies Itching     Chloraprep One Step Rash     With twin C/S (9/2018)         Social History:  Social History     Tobacco Use     Smoking status: Never Smoker     Smokeless tobacco: Never Used   Substance Use Topics     Alcohol use: Yes     Alcohol/week: 2.0 standard drinks     Comment: 1-2 times per week     Drug use: Not Currently     Comment: medical marijuana        Family History:  Family History   Problem Relation Age of Onset     Substance Abuse Maternal Grandmother         Alcohol/Drug,Alcohol abuse     Cancer Maternal Grandmother         Cancer, lung cancer     Other - See Comments Maternal Grandfather         Alzheimer's disease     Diabetes Paternal Grandmother         Diabetes,type 1     No Known Problems Sister      No Known Problems Son      No Known Problems Son      Diabetes Maternal Uncle         Diabetes,type 2     No Known Problems Son         2018     No Known Problems Daughter         2018     Heart Disease No family hx of         Heart Disease     Specifically denies breast, ovarian, colon, pancreatic cancers  Specifically denies VTE, known familial thrombophilias and coagulopathies    ROS:   Respiratory: No shortness of breath, dyspnea on exertion, cough, or hemoptysis  Cardiovascular: negative for palpitations, chest pain, lower extremity edema and syncope or near-syncope  Gastrointestinal: negative for, nausea, vomiting and hematemesis  Genitourinary: negative for, dysuria, frequency and urgency, +pelvic pain during menses, +pain with lower abdominal palpation  Musculoskeletal: negative for, back pain and muscular weakness  Psychiatric: negative for, anxiety, depression and hallucinations  Hematologic/Lymphatic/Immunologic: negative for,  "anemia, chills and fever      Physical Exam  /80   Pulse 92   Wt 73.4 kg (161 lb 12.8 oz)   LMP 2021 (Approximate)   Breastfeeding No   BMI 26.12 kg/m    Gen: Well-appearing, no acute distressed, well-groomed, alert  Cardiovascular: Regular rate.No peripheral edema, normal peripheral circulation  Pulm: Symmetric chest rise, non-labored respirations  Pelvic: Deferred as we went over options today, will be planned for surgery pre-op if desired      Assessment/Plan  Maricarmen Ornelas is a 41 year old  female here for discussion of persistent pelvic pain.    Plan for repeat labs to see if she is showing trends towards menopause: We discussed that menopause is hard to estimate with labs and that there are no true lab values that can tell us what point of her menopausal transition she is in and the only way to get diagnosis is to retrospectively see 12 months without menses.     -Day 3 FSH, E2, LH    Different options discussed: hormonal options including OCPs or Lupron to see if quieting down the ovarian function can improve her pain. This could be tried as a \"test\" so to speak before surgery in that if it helps, it could be reflective that the removal of her ovaries. If surgery is desired, noted that we may benefit from an abdominal approach to be able to address this painful region of suspected endometriosis in her  scar.     We also discussed that her pain may go away when she reaches menopause, and if she is already in perimenopause, this may not be very long and she may be able to avoid surgery etc. Unfortunately there is no way to know how long exactly she would still have menses.      We spent a significant amount of time discussing potential removal of ovaries at the time of hysterectomy vs. Leaving them. We reviewed that premature menopause is associated with a slight higher rate of all-cause mortality (HR 1.67), increased risk of cardiovascular events. Contrastly, if a procedure is " performed for suspected endometriosis, chronic pelvic pain, it is often practiced for ovarian removal at the same time as hysterectomy as the risk for surgical re-operation is high for persistent pain. She understands this will be a patient autonomy joint decision on removal or not.    Total amount of time spent during today's encounter was 80 minutes    SHUN QUINTANILLA MD on 7/21/2021 at 9:35 AM

## 2021-07-21 NOTE — NURSING NOTE
Pt presents to clinic today for follow up ultrasound and pelvic pain.      Medication Reconciliation: complete  Dasia Interiano LPN

## 2021-07-31 ENCOUNTER — MYC MEDICAL ADVICE (OUTPATIENT)
Dept: FAMILY MEDICINE | Facility: OTHER | Age: 41
End: 2021-07-31

## 2021-07-31 DIAGNOSIS — R10.2 PELVIC PAIN IN FEMALE: Primary | ICD-10-CM

## 2021-08-03 RX ORDER — DICYCLOMINE HYDROCHLORIDE 10 MG/1
10 CAPSULE ORAL 4 TIMES DAILY PRN
Qty: 120 CAPSULE | Refills: 2 | Status: SHIPPED | OUTPATIENT
Start: 2021-08-03 | End: 2022-03-17

## 2021-08-03 RX ORDER — NABUMETONE 500 MG/1
500 TABLET, FILM COATED ORAL 2 TIMES DAILY
Qty: 30 TABLET | Refills: 2 | Status: SHIPPED | OUTPATIENT
Start: 2021-08-03 | End: 2021-12-22

## 2021-08-06 ENCOUNTER — LAB (OUTPATIENT)
Dept: LAB | Facility: OTHER | Age: 41
End: 2021-08-06
Attending: STUDENT IN AN ORGANIZED HEALTH CARE EDUCATION/TRAINING PROGRAM
Payer: COMMERCIAL

## 2021-08-06 DIAGNOSIS — R10.2 PELVIC PAIN IN FEMALE: ICD-10-CM

## 2021-08-06 LAB
ESTRADIOL SERPL-MCNC: 30 PG/ML
FSH SERPL-ACNC: 15.3 PG/ML
LH SERPL-ACNC: 8.1 IU/L

## 2021-08-06 PROCEDURE — 83001 ASSAY OF GONADOTROPIN (FSH): CPT | Mod: ZL

## 2021-08-06 PROCEDURE — 82670 ASSAY OF TOTAL ESTRADIOL: CPT | Mod: ZL

## 2021-08-06 PROCEDURE — 36415 COLL VENOUS BLD VENIPUNCTURE: CPT | Mod: ZL

## 2021-08-06 PROCEDURE — 83002 ASSAY OF GONADOTROPIN (LH): CPT | Mod: ZL

## 2021-08-23 ENCOUNTER — TELEPHONE (OUTPATIENT)
Dept: FAMILY MEDICINE | Facility: OTHER | Age: 41
End: 2021-08-23

## 2021-08-23 NOTE — TELEPHONE ENCOUNTER
methylphenidate (CONCERTA) 54 MG CR tablet    Patient needs a refill on this med.  Please call patient back and call med into [facility] by [facility].       thank you   Lorena Price on 8/23/2021 at 10:09 AM

## 2021-08-24 DIAGNOSIS — F90.0 ATTENTION DEFICIT HYPERACTIVITY DISORDER (ADHD), PREDOMINANTLY INATTENTIVE TYPE: ICD-10-CM

## 2021-08-24 RX ORDER — METHYLPHENIDATE HYDROCHLORIDE 54 MG/1
54 TABLET ORAL DAILY
Qty: 30 TABLET | Refills: 0 | Status: SHIPPED | OUTPATIENT
Start: 2021-08-24 | End: 2021-09-23

## 2021-08-24 NOTE — TELEPHONE ENCOUNTER
Call to patient, verified name/.     Next available appt end of Sept.   Pt scheduled: 2021.   Ritika Shields RN ,....................  2021   4:06 PM

## 2021-08-24 NOTE — TELEPHONE ENCOUNTER
methylphenidate (CONCERTA) 54 MG CR tablet          Sig: Take 1 tablet (54 mg) by mouth daily           Last Written Prescription Date:  7/10/21  Last Fill Quantity: 30,   # refills: 0  Last Office Visit: 7/14/21  Future Office visit:       Routing refill request to provider for review/approval because:  Unable to refill per protocol Irene Stover RN on 8/24/2021 at 8:59 AM

## 2021-09-22 DIAGNOSIS — F90.0 ATTENTION DEFICIT HYPERACTIVITY DISORDER (ADHD), PREDOMINANTLY INATTENTIVE TYPE: ICD-10-CM

## 2021-09-23 RX ORDER — METHYLPHENIDATE HYDROCHLORIDE 54 MG/1
54 TABLET ORAL DAILY
Qty: 30 TABLET | Refills: 0 | Status: SHIPPED | OUTPATIENT
Start: 2021-09-23 | End: 2021-09-28

## 2021-09-23 NOTE — TELEPHONE ENCOUNTER
sent Rx request for the following:     Requested Prescriptions   Pending Prescriptions Disp Refills     methylphenidate (CONCERTA) 54 MG CR tablet [Pharmacy Med Name: METHYLPHENIDATE 54MG ER TABLET] 30 tablet 0     Sig: TAKE 1 TABLET (54 MG) BY MOUTH DAILY     Last Prescription Date:   8/24/2021  Last Fill Qty/Refills:         30, R-0    Last Office Visit:              7/14/2021  Future Office visit:           9/29/2021    Routing refill request to provider for review/approval because:      There is no refill protocol information for this order        Unable to complete prescription refill per RN Medication Refill Policy.................... Ritika Shields RN ....................  9/23/2021   12:35 PM

## 2021-09-28 NOTE — PROGRESS NOTES
SUBJECTIVE:   Maricarmen Ornelas is a 41 year old female who presents to clinic today for the following health issues:    HPI  ADHD Follow-Up (Adult)  Concerns: None  Changes since last visit: Stable  Taking controlled (daily) medications as prescribed: Yes  Sleep: no new problems  Adult ADHD Self-Reporting form given to patient?:  No  Currently in counseling: No    Medication Benefits:   Controlled symptoms: Attention span, Distractability, Finishing tasks, Impulse control and Frustration tolerance  Uncontrolled symptoms:  None     Medication Side Effects:  Reports:  none  Sleep Problems? no  ++++++++++++++++++++++++++++++++++++++++++++++++    Employer Concerns/Feedback: None  Coworker Concerns:   None  Home/Family Concerns: None    THYROID:  Needing refill of thyroid medication.  Levels just checked in 2021 and stable.   No new symptoms.    ILLNESS:  Saturday: sore throat x 2-3 days.  Congestion x couple days; and now cough has developed.  Rapid Covid testing returned negative.  Family has one by one gotten more symptoms.      Patient Active Problem List    Diagnosis Date Noted     S/P  section 2018     Priority: Medium     Bilateral carpal tunnel syndrome 2018     Priority: Medium     Adjustment disorder with anxious mood 2018     Priority: Medium     Acquired hypothyroidism 2018     Priority: Medium     Attention deficit disorder 2018     Priority: Medium     Lumbosacral spondylosis without myelopathy 03/10/2015     Priority: Medium     Other staphylococcus infection in conditions classified elsewhere and of unspecified site 2011     Priority: Medium     Past Medical History:   Diagnosis Date     Dichorionic diamniotic twin pregnancy in third trimester 2018     Disorder of thyroid     hypo     Supervision of elderly multigravida     2018     Twin pregnancy 2018    EDC 2018      Past Surgical History:   Procedure Laterality Date     APPENDECTOMY OPEN             SECTION N/A 2018    Procedure:  SECTION;   Section - twins;  Surgeon: Woodrow Hurtado MD;  Location:  OR     COLONOSCOPY N/A 7/10/2019    Procedure: COLONOSCOPY, WITH POLYPECTOMY AND BIOPSY;  Surgeon: Viki Villareal MD;  Location:  OR     Family History   Problem Relation Age of Onset     Substance Abuse Maternal Grandmother         Alcohol/Drug,Alcohol abuse     Cancer Maternal Grandmother         Cancer, lung cancer     Other - See Comments Maternal Grandfather         Alzheimer's disease     Diabetes Paternal Grandmother         Diabetes,type 1     No Known Problems Sister      No Known Problems Son      No Known Problems Son      Diabetes Maternal Uncle         Diabetes,type 2     No Known Problems Son         2018     No Known Problems Daughter         2018     Heart Disease No family hx of         Heart Disease     Social History     Tobacco Use     Smoking status: Never Smoker     Smokeless tobacco: Never Used   Substance Use Topics     Alcohol use: Yes     Alcohol/week: 2.0 standard drinks     Comment: 1-2 times per week     Social History     Social History Narrative       - Ki Parry      Sons: Franco Linn ,    Daughter:    Father: Jose;  Mother: Bronwyn      Siblings: Sister Nydia,  Guccibrother     Current Outpatient Medications   Medication Sig Dispense Refill     [START ON 2021] methylphenidate (CONCERTA) 54 MG CR tablet Take 1 tablet (54 mg) by mouth daily 30 tablet 0     [START ON 2021] methylphenidate (CONCERTA) 54 MG CR tablet Take 1 tablet (54 mg) by mouth daily 30 tablet 0     [START ON 10/23/2021] methylphenidate (CONCERTA) 54 MG CR tablet Take 1 tablet (54 mg) by mouth daily 30 tablet 0     SYNTHROID 75 MCG tablet Take 1 tablet (75 mcg) by mouth daily 90 tablet 4     dicyclomine (BENTYL) 10 MG capsule Take 1 capsule (10 mg) by mouth 4 times daily as needed (abdominal cramping) 120 capsule 2     loratadine (CLARITIN) 10  MG tablet Take 1 tablet (10 mg) by mouth daily       medical cannabis (Patient's own supply) See Admin Instructions (The purpose of this order is to document that the patient reports taking medical cannabis.  This is not a prescription, and is not used to certify that the patient has a qualifying medical condition.)       methylcellulose (CITRUCEL) powder Take 0.3 g (1.05 teaspoonful) by mouth daily 454 g 3     nabumetone (RELAFEN) 500 MG tablet Take 1 tablet (500 mg) by mouth 2 times daily 30 tablet 2     Allergies   Allergen Reactions     Seasonal Allergies Itching     Chloraprep One Step Rash     With twin C/S (9/2018)     OBJECTIVE:     /80   Pulse 65   Temp 97.9  F (36.6  C) (Tympanic)   Resp 14   Wt 72.8 kg (160 lb 8 oz)   LMP 09/15/2021 (Approximate)   SpO2 98%   BMI 25.91 kg/m    Body mass index is 25.91 kg/m .  Physical Exam  Vitals reviewed.   Constitutional:       Appearance: Normal appearance.   HENT:      Head: Normocephalic and atraumatic.      Right Ear: A middle ear effusion is present. Tympanic membrane is not erythematous, retracted or bulging.      Left Ear: A middle ear effusion is present. Tympanic membrane is not erythematous, retracted or bulging.      Nose: Nose normal.      Mouth/Throat:      Lips: Pink.      Mouth: Mucous membranes are moist.      Tongue: No lesions.      Palate: No mass.      Pharynx: Posterior oropharyngeal erythema present. No pharyngeal swelling, oropharyngeal exudate or uvula swelling.   Cardiovascular:      Rate and Rhythm: Normal rate and regular rhythm.      Pulses: Normal pulses.   Musculoskeletal:      Cervical back: Normal range of motion and neck supple.   Lymphadenopathy:      Cervical: No cervical adenopathy.   Skin:     Capillary Refill: Capillary refill takes less than 2 seconds.   Neurological:      General: No focal deficit present.      Mental Status: She is alert.   Psychiatric:         Mood and Affect: Mood normal.         Behavior:  Behavior normal.     Diagnostic Test Results:  Results for orders placed or performed in visit on 09/29/21   Group A Streptococcus PCR Throat Swab     Status: Normal    Specimen: Throat; Swab   Result Value Ref Range    Group A strep by PCR Not Detected Not Detected    Narrative    The Xpert Xpress Strep A test, performed on the [employer], is a rapid, qualitative in vitro diagnostic test for the detection of Streptococcus pyogenes (Group A ß-hemolytic Streptococcus, Strep A) in throat swab specimens from patients with signs and symptoms of pharyngitis. The Xpert Xpress Strep A test can be used as an aid in the diagnosis of Group A Streptococcal pharyngitis. The assay is not intended to monitor treatment for Group A Streptococcus infections. The Xpert Xpress Strep A test utilizes an automated real-time polymerase chain reaction (PCR) to detect Streptococcus pyogenes DNA.       ASSESSMENT/PLAN:     1. Attention deficit hyperactivity disorder (ADHD), predominantly inattentive type  Chronic; stable.  Contract UTD.  [place]  accessed and appropriate.  Will set up next appointment by Mid-Jan.  [facility] UTD.  - methylphenidate (CONCERTA) 54 MG CR tablet; Take 1 tablet (54 mg) by mouth daily  Dispense: 30 tablet; Refill: 0  - methylphenidate (CONCERTA) 54 MG CR tablet; Take 1 tablet (54 mg) by mouth daily  Dispense: 30 tablet; Refill: 0  - methylphenidate (CONCERTA) 54 MG CR tablet; Take 1 tablet (54 mg) by mouth daily  Dispense: 30 tablet; Refill: 0    2. Hypothyroidism, unspecified type  Chronic; with stable thyroid labs in June.  Refilled x 1 year.  - SYNTHROID 75 MCG tablet; Take 1 tablet (75 mcg) by mouth daily  Dispense: 90 tablet; Refill: 4    3. Viral upper respiratory tract infection  New; mild.  Strep testing returned negative.  Supportive cares.   Clinical expectation of recovery reviewed.  - Group A Streptococcus PCR Throat Swab        Avril Sanders [facility] AND [facility]

## 2021-09-29 ENCOUNTER — OFFICE VISIT (OUTPATIENT)
Dept: FAMILY MEDICINE | Facility: OTHER | Age: 41
End: 2021-09-29
Attending: FAMILY MEDICINE
Payer: COMMERCIAL

## 2021-09-29 VITALS
SYSTOLIC BLOOD PRESSURE: 124 MMHG | WEIGHT: 160.5 LBS | BODY MASS INDEX: 25.91 KG/M2 | HEART RATE: 65 BPM | TEMPERATURE: 97.9 F | RESPIRATION RATE: 14 BRPM | DIASTOLIC BLOOD PRESSURE: 80 MMHG | OXYGEN SATURATION: 98 %

## 2021-09-29 DIAGNOSIS — E03.9 HYPOTHYROIDISM, UNSPECIFIED TYPE: ICD-10-CM

## 2021-09-29 DIAGNOSIS — J06.9 VIRAL UPPER RESPIRATORY TRACT INFECTION: Primary | ICD-10-CM

## 2021-09-29 DIAGNOSIS — F90.0 ATTENTION DEFICIT HYPERACTIVITY DISORDER (ADHD), PREDOMINANTLY INATTENTIVE TYPE: ICD-10-CM

## 2021-09-29 LAB — GROUP A STREP BY PCR: NOT DETECTED

## 2021-09-29 PROCEDURE — 87651 STREP A DNA AMP PROBE: CPT | Mod: ZL | Performed by: FAMILY MEDICINE

## 2021-09-29 PROCEDURE — 99214 OFFICE O/P EST MOD 30 MIN: CPT | Performed by: FAMILY MEDICINE

## 2021-09-29 RX ORDER — METHYLPHENIDATE HYDROCHLORIDE 54 MG/1
54 TABLET ORAL DAILY
Qty: 30 TABLET | Refills: 0 | Status: SHIPPED | OUTPATIENT
Start: 2021-10-23 | End: 2021-12-21

## 2021-09-29 RX ORDER — LEVOTHYROXINE SODIUM 75 MCG
75 TABLET ORAL DAILY
Qty: 90 TABLET | Refills: 4 | Status: SHIPPED | OUTPATIENT
Start: 2021-09-29 | End: 2022-03-17

## 2021-09-29 RX ORDER — METHYLPHENIDATE HYDROCHLORIDE 54 MG/1
54 TABLET ORAL DAILY
Qty: 30 TABLET | Refills: 0 | Status: SHIPPED | OUTPATIENT
Start: 2021-12-22 | End: 2021-12-21

## 2021-09-29 RX ORDER — METHYLPHENIDATE HYDROCHLORIDE 54 MG/1
54 TABLET ORAL DAILY
Qty: 30 TABLET | Refills: 0 | Status: SHIPPED | OUTPATIENT
Start: 2021-11-22 | End: 2021-12-21

## 2021-09-29 ASSESSMENT — PAIN SCALES - GENERAL: PAINLEVEL: NO PAIN (1)

## 2021-09-29 NOTE — NURSING NOTE
"Chief Complaint   Patient presents with     Recheck Medication     Patient presents today as a follow-up on meds and she would like to discuss a sore throat. Did a rapid Covid swab that was negative yesterday.   Initial /80   Pulse 65   Temp 97.9  F (36.6  C) (Tympanic)   Resp 14   Wt 72.8 kg (160 lb 8 oz)   LMP 09/15/2021 (Approximate)   SpO2 98%   BMI 25.91 kg/m   Estimated body mass index is 25.91 kg/m  as calculated from the following:    Height as of 7/1/21: 1.676 m (5' 6\").    Weight as of this encounter: 72.8 kg (160 lb 8 oz).  Medication Reconciliation: complete    Rajani Ford LPN  "

## 2021-10-09 ENCOUNTER — HEALTH MAINTENANCE LETTER (OUTPATIENT)
Age: 41
End: 2021-10-09

## 2021-10-23 ENCOUNTER — ALLIED HEALTH/NURSE VISIT (OUTPATIENT)
Dept: FAMILY MEDICINE | Facility: OTHER | Age: 41
End: 2021-10-23
Attending: FAMILY MEDICINE
Payer: COMMERCIAL

## 2021-10-23 DIAGNOSIS — R09.89 CHEST CONGESTION: Primary | ICD-10-CM

## 2021-10-23 DIAGNOSIS — Z20.822 EXPOSURE TO 2019 NOVEL CORONAVIRUS: ICD-10-CM

## 2021-10-23 PROCEDURE — U0003 INFECTIOUS AGENT DETECTION BY NUCLEIC ACID (DNA OR RNA); SEVERE ACUTE RESPIRATORY SYNDROME CORONAVIRUS 2 (SARS-COV-2) (CORONAVIRUS DISEASE [COVID-19]), AMPLIFIED PROBE TECHNIQUE, MAKING USE OF HIGH THROUGHPUT TECHNOLOGIES AS DESCRIBED BY CMS-2020-01-R: HCPCS | Mod: ZL

## 2021-10-23 PROCEDURE — C9803 HOPD COVID-19 SPEC COLLECT: HCPCS

## 2021-10-24 LAB — SARS-COV-2 RNA RESP QL NAA+PROBE: NEGATIVE

## 2021-10-29 ENCOUNTER — ALLIED HEALTH/NURSE VISIT (OUTPATIENT)
Dept: FAMILY MEDICINE | Facility: OTHER | Age: 41
End: 2021-10-29
Attending: FAMILY MEDICINE
Payer: COMMERCIAL

## 2021-10-29 DIAGNOSIS — Z23 NEED FOR PROPHYLACTIC VACCINATION AND INOCULATION AGAINST INFLUENZA: Primary | ICD-10-CM

## 2021-10-29 PROCEDURE — 90471 IMMUNIZATION ADMIN: CPT

## 2021-10-29 PROCEDURE — 90686 IIV4 VACC NO PRSV 0.5 ML IM: CPT

## 2021-12-03 ENCOUNTER — HOSPITAL ENCOUNTER (OUTPATIENT)
Dept: MAMMOGRAPHY | Facility: OTHER | Age: 41
Discharge: HOME | End: 2021-12-03
Attending: FAMILY MEDICINE | Admitting: FAMILY MEDICINE
Payer: COMMERCIAL

## 2021-12-03 DIAGNOSIS — Z12.31 VISIT FOR SCREENING MAMMOGRAM: ICD-10-CM

## 2021-12-03 PROCEDURE — 77063 BREAST TOMOSYNTHESIS BI: CPT

## 2021-12-20 ENCOUNTER — TELEPHONE (OUTPATIENT)
Dept: FAMILY MEDICINE | Facility: OTHER | Age: 41
End: 2021-12-20
Payer: COMMERCIAL

## 2021-12-21 ENCOUNTER — OFFICE VISIT (OUTPATIENT)
Dept: FAMILY MEDICINE | Facility: OTHER | Age: 41
End: 2021-12-21
Attending: FAMILY MEDICINE
Payer: COMMERCIAL

## 2021-12-21 VITALS
DIASTOLIC BLOOD PRESSURE: 74 MMHG | HEART RATE: 79 BPM | SYSTOLIC BLOOD PRESSURE: 122 MMHG | WEIGHT: 167.13 LBS | RESPIRATION RATE: 16 BRPM | OXYGEN SATURATION: 100 % | BODY MASS INDEX: 26.97 KG/M2 | TEMPERATURE: 97.5 F

## 2021-12-21 DIAGNOSIS — F90.0 ATTENTION DEFICIT HYPERACTIVITY DISORDER (ADHD), PREDOMINANTLY INATTENTIVE TYPE: ICD-10-CM

## 2021-12-21 DIAGNOSIS — R10.2 PELVIC PAIN IN FEMALE: ICD-10-CM

## 2021-12-21 PROCEDURE — 99214 OFFICE O/P EST MOD 30 MIN: CPT | Performed by: FAMILY MEDICINE

## 2021-12-21 RX ORDER — METHYLPHENIDATE HYDROCHLORIDE 54 MG/1
54 TABLET ORAL DAILY
Qty: 30 TABLET | Refills: 0 | Status: SHIPPED | OUTPATIENT
Start: 2021-12-21 | End: 2022-03-17

## 2021-12-21 RX ORDER — METHYLPHENIDATE HYDROCHLORIDE 54 MG/1
54 TABLET ORAL DAILY
Qty: 30 TABLET | Refills: 0 | Status: SHIPPED | OUTPATIENT
Start: 2022-01-21 | End: 2022-03-17

## 2021-12-21 RX ORDER — METHYLPHENIDATE HYDROCHLORIDE 54 MG/1
54 TABLET ORAL DAILY
Qty: 30 TABLET | Refills: 0 | Status: SHIPPED | OUTPATIENT
Start: 2022-02-20 | End: 2022-03-17

## 2021-12-21 ASSESSMENT — PAIN SCALES - GENERAL: PAINLEVEL: NO PAIN (0)

## 2021-12-21 NOTE — NURSING NOTE
"Chief Complaint   Patient presents with     Recheck Medication       Initial /74   Pulse 79   Temp 97.5  F (36.4  C) (Tympanic)   Resp 16   Wt 75.8 kg (167 lb 2 oz)   LMP 11/29/2021 (Approximate)   SpO2 100%   BMI 26.97 kg/m   Estimated body mass index is 26.97 kg/m  as calculated from the following:    Height as of 7/1/21: 1.676 m (5' 6\").    Weight as of this encounter: 75.8 kg (167 lb 2 oz).  Medication Reconciliation: complete    Rajani Ford LPN     Advanced Care Directive reviewed.     "

## 2021-12-21 NOTE — PROGRESS NOTES
"  Assessment & Plan     Attention deficit hyperactivity disorder (ADHD), predominantly inattentive type  Chronic, unchanged.  Could consider a dose reduction once school requirements are completed.  Mindfulness for sleep.  - methylphenidate (CONCERTA) 54 MG CR tablet; Take 1 tablet (54 mg) by mouth daily  - methylphenidate (CONCERTA) 54 MG CR tablet; Take 1 tablet (54 mg) by mouth daily  - methylphenidate (CONCERTA) 54 MG CR tablet; Take 1 tablet (54 mg) by mouth daily    Reassurance on axillary fullness.  No palpable mass.  If persistent - US can be ordered before next visit.  Suspect deep LN vs MSK.       BMI:   Estimated body mass index is 26.97 kg/m  as calculated from the following:    Height as of 7/1/21: 1.676 m (5' 6\").    Weight as of this encounter: 75.8 kg (167 lb 2 oz).   Weight management plan: Discussed healthy diet and exercise guidelines        Return in about 3 months (around 3/21/2022) for Controlled Substance/Medications.    Avril Sanders, [facility]   Maricarmen is a 41 year old who presents for the following health issues:    HPI   ADHD Follow-Up (Adult)  Concerns: None  Changes since last visit: Stable  Taking controlled (daily) medications as prescribed: Yes  Sleep: trouble falling asleep and trouble staying asleep.  Was slightly better when missed a dose.  Adult ADHD Self-Reporting form given to patient?:  No  Currently in counseling: Yes    Medication Benefits:   Controlled symptoms: Attention span, Distractability, Finishing tasks, Impulse control and Frustration tolerance  Uncontrolled symptoms:  None    Medication Side Effects:  Reports:  insomnia  Sleep Problems? Yes Details: did notice slight improvement with the day she missed a dose; but minimal.  Usually not sleeping well due to twins, other reasons as well.  ++++++++++++++++++++++++++++++++++++++++++++++++    Employer Concerns/Feedback: None  Coworker Concerns:   None  Home/Family Concerns: " "None      Has \"fullness\" feeling to R axilla, like something is stuck under her arm at times.  Waxes and wanes x 3-4 months.  Had covid booster 12/1/2021.  More noticeable since then.  No night sweats, weight loss, fever/chills.      Review of Systems   CONSTITUTIONAL: NEGATIVE for fever, chills, change in weight  ENT/MOUTH: NEGATIVE for ear, mouth and throat problems  RESP: NEGATIVE for significant cough or SOB  CV: NEGATIVE for chest pain, palpitations or peripheral edema      Objective    /74   Pulse 79   Temp 97.5  F (36.4  C) (Tympanic)   Resp 16   Wt 75.8 kg (167 lb 2 oz)   LMP 11/29/2021 (Approximate)   SpO2 100%   BMI 26.97 kg/m    Body mass index is 26.97 kg/m .  Physical Exam   GENERAL: healthy, alert and no distress  EYES: Eyes grossly normal to inspection, PERRL and conjunctivae and sclerae normal  HENT: ear canals and TM's normal, nose and mouth without ulcers or lesions  NECK: no adenopathy, no asymmetry, masses, or scars and thyroid normal to palpation  RESP: lungs clear to auscultation - no rales, rhonchi or wheezes  CV: regular rate and rhythm, normal S1 S2, no S3 or S4, no murmur, click or rub, no peripheral edema and peripheral pulses strong  MS: no gross musculoskeletal defects noted, no edema.  Slight taut msk in R axilla.  No discrete mass or skin changes.   PSYCH: mentation appears normal, affect normal/bright    No results found for any visits on 12/21/21.        "

## 2021-12-22 ENCOUNTER — MYC MEDICAL ADVICE (OUTPATIENT)
Dept: FAMILY MEDICINE | Facility: OTHER | Age: 41
End: 2021-12-22
Payer: COMMERCIAL

## 2021-12-22 DIAGNOSIS — R10.2 PELVIC PAIN IN FEMALE: ICD-10-CM

## 2021-12-22 RX ORDER — NABUMETONE 500 MG/1
500 TABLET, FILM COATED ORAL 2 TIMES DAILY
Qty: 30 TABLET | Refills: 2 | Status: SHIPPED | OUTPATIENT
Start: 2021-12-22 | End: 2022-03-17

## 2021-12-23 RX ORDER — NABUMETONE 500 MG/1
500 TABLET, FILM COATED ORAL 2 TIMES DAILY
Qty: 30 TABLET | Refills: 1 | OUTPATIENT
Start: 2021-12-23

## 2021-12-23 NOTE — TELEPHONE ENCOUNTER
[id number] of [place] sent Rx request for the following:      Requested Prescriptions   Pending Prescriptions Disp Refills     nabumetone (RELAFEN) 500 MG tablet [Pharmacy Med Name: NABUMETONE 500MG TABLET] 30 tablet 1     Sig: TAKE 1 TABLET (500 MG) BY MOUTH 2 TIMES DAILY       NSAID Medications Failed - 12/23/2021  8:29 AM        Failed - Normal AST on file in past 12 months     Recent Labs   Lab Test 07/01/21  1111   AST 11*           Medication filled on 12/22/2021. Refilled 30 tabs and 2 refills to  728. Pati Dominguez RN on 12/23/2021 at 8:31 AM

## 2022-03-17 ENCOUNTER — OFFICE VISIT (OUTPATIENT)
Dept: FAMILY MEDICINE | Facility: OTHER | Age: 42
End: 2022-03-17
Attending: FAMILY MEDICINE
Payer: COMMERCIAL

## 2022-03-17 VITALS
TEMPERATURE: 97.8 F | OXYGEN SATURATION: 98 % | SYSTOLIC BLOOD PRESSURE: 128 MMHG | HEART RATE: 79 BPM | BODY MASS INDEX: 27.34 KG/M2 | WEIGHT: 169.38 LBS | DIASTOLIC BLOOD PRESSURE: 80 MMHG | RESPIRATION RATE: 16 BRPM

## 2022-03-17 DIAGNOSIS — E03.9 HYPOTHYROIDISM, UNSPECIFIED TYPE: ICD-10-CM

## 2022-03-17 DIAGNOSIS — R10.2 PELVIC PAIN IN FEMALE: ICD-10-CM

## 2022-03-17 DIAGNOSIS — F90.0 ATTENTION DEFICIT HYPERACTIVITY DISORDER (ADHD), PREDOMINANTLY INATTENTIVE TYPE: Primary | ICD-10-CM

## 2022-03-17 DIAGNOSIS — E03.9 ACQUIRED HYPOTHYROIDISM: ICD-10-CM

## 2022-03-17 LAB
T4 FREE SERPL-MCNC: 1.18 NG/DL (ref 0.6–1.6)
TSH SERPL DL<=0.005 MIU/L-ACNC: 2.23 MU/L (ref 0.4–4)

## 2022-03-17 PROCEDURE — 84439 ASSAY OF FREE THYROXINE: CPT | Mod: ZL | Performed by: FAMILY MEDICINE

## 2022-03-17 PROCEDURE — 84443 ASSAY THYROID STIM HORMONE: CPT | Mod: ZL | Performed by: FAMILY MEDICINE

## 2022-03-17 PROCEDURE — 99214 OFFICE O/P EST MOD 30 MIN: CPT | Performed by: FAMILY MEDICINE

## 2022-03-17 PROCEDURE — 36415 COLL VENOUS BLD VENIPUNCTURE: CPT | Mod: ZL | Performed by: FAMILY MEDICINE

## 2022-03-17 RX ORDER — LEVOTHYROXINE SODIUM 75 MCG
75 TABLET ORAL DAILY
Qty: 90 TABLET | Refills: 4 | Status: SHIPPED | OUTPATIENT
Start: 2022-03-17 | End: 2023-03-07

## 2022-03-17 RX ORDER — METHYLPHENIDATE HYDROCHLORIDE 54 MG/1
54 TABLET ORAL DAILY
Qty: 30 TABLET | Refills: 0 | Status: SHIPPED | OUTPATIENT
Start: 2022-03-17 | End: 2022-06-15

## 2022-03-17 RX ORDER — DICYCLOMINE HYDROCHLORIDE 10 MG/1
10 CAPSULE ORAL 4 TIMES DAILY PRN
Qty: 180 CAPSULE | Refills: 1 | Status: SHIPPED | OUTPATIENT
Start: 2022-03-17 | End: 2023-03-07

## 2022-03-17 RX ORDER — NABUMETONE 500 MG/1
500 TABLET, FILM COATED ORAL 2 TIMES DAILY
Qty: 180 TABLET | Refills: 4 | Status: SHIPPED | OUTPATIENT
Start: 2022-03-17 | End: 2023-03-07

## 2022-03-17 ASSESSMENT — PAIN SCALES - GENERAL: PAINLEVEL: MILD PAIN (2)

## 2022-03-17 NOTE — NURSING NOTE
"Chief Complaint   Patient presents with     Recheck Medication       Initial /80   Pulse 79   Temp 97.8  F (36.6  C) (Tympanic)   Resp 16   Wt 76.8 kg (169 lb 6 oz)   LMP 03/10/2022 (Exact Date)   SpO2 98%   BMI 27.34 kg/m   Estimated body mass index is 27.34 kg/m  as calculated from the following:    Height as of 7/1/21: 1.676 m (5' 6\").    Weight as of this encounter: 76.8 kg (169 lb 6 oz).  Medication Reconciliation: complete    Rajani Ford LPN     Advanced Care Directive reviewed.     "

## 2022-03-17 NOTE — PROGRESS NOTES
1. Attention deficit hyperactivity disorder (ADHD), predominantly inattentive type  Chronic/stable; contract updated.  Rx renewed x 3 months.  - methylphenidate (CONCERTA) 54 MG CR tablet; Take 1 tablet (54 mg) by mouth daily  Dispense: 30 tablet; Refill: 0  - methylphenidate (CONCERTA) 54 MG CR tablet; Take 1 tablet (54 mg) by mouth daily  Dispense: 30 tablet; Refill: 0  - methylphenidate (CONCERTA) 54 MG CR tablet; Take 1 tablet (54 mg) by mouth daily  Dispense: 30 tablet; Refill: 0    2. Pelvic pain in female  Persists with menses; improved with plan of nabumetone/dicyclomine (although may not be as effective).  Not ready for more invasive intervention (surgery).  - nabumetone (RELAFEN) 500 MG tablet; Take 1 tablet (500 mg) by mouth 2 times daily  Dispense: 180 tablet; Refill: 4  - dicyclomine (BENTYL) 10 MG capsule; Take 1 capsule (10 mg) by mouth 4 times daily as needed (abdominal cramping)  Dispense: 180 capsule; Refill: 1    3. Hypothyroidism, unspecified type  4. Acquired hypothyroidism  Monitoring labs today.  Rx renewal x 1 year if stable.  - SYNTHROID 75 MCG tablet; Take 1 tablet (75 mcg) by mouth daily  Dispense: 90 tablet; Refill: 4  - TSH; Future  - T4, Free; Future  - TSH  - T4, Free      Subjective   Maricarmen is a 41 year old who presents for the following health issues:    History of Present Illness       Reason for visit:  Med check    She eats 2-3 servings of fruits and vegetables daily.She consumes 0 sweetened beverage(s) daily.She exercises with enough effort to increase her heart rate 30 to 60 minutes per day.  She exercises with enough effort to increase her heart rate 3 or less days per week.   She is taking medications regularly.       ADHD Follow-Up (Adult)  Concerns: none  Changes since last visit: Stable  Taking controlled (daily) medications as prescribed: Yes  Sleep: no problems  Adult ADHD Self-Reporting form given to patient?:  No  Currently in counseling: No    Medication Benefits:    Controlled symptoms: Finishing tasks, Impulse control, Frustration tolerance and Accepting limits  Uncontrolled symptoms:  Attention span and Distractability    Medication Side Effects:  Reports:  none  Sleep Problems? no  ++++++++++++++++++++++++++++++++++++++++++++++++    Employer Concerns/Feedback: None  Coworker Concerns:   None  Home/Family Concerns: None    Pelvic pain/dysmenorrhea improved with current treatment.     Review of Systems   CONSTITUTIONAL: NEGATIVE for fever, chills, change in weight  ENT/MOUTH: NEGATIVE for ear, mouth and throat problems  RESP: NEGATIVE for significant cough or SOB  CV: NEGATIVE for chest pain, palpitations or peripheral edema      Objective    /80   Pulse 79   Temp 97.8  F (36.6  C) (Tympanic)   Resp 16   Wt 76.8 kg (169 lb 6 oz)   LMP 03/10/2022 (Exact Date)   SpO2 98%   BMI 27.34 kg/m    Body mass index is 27.34 kg/m .  Physical Exam   GENERAL: healthy, alert and no distress  EYES: Eyes grossly normal to inspection, PERRL and conjunctivae and sclerae normal  HENT: ear canals and TM's normal, nose and mouth without ulcers or lesions  NECK: no adenopathy, no asymmetry, masses, or scars and thyroid normal to palpation  RESP: lungs clear to auscultation - no rales, rhonchi or wheezes  CV: regular rate and rhythm, normal S1 S2, no S3 or S4, no murmur, click or rub, no peripheral edema and peripheral pulses strong  ABDOMEN: soft, nontender, no hepatosplenomegaly, no masses and bowel sounds normal  MS: no gross musculoskeletal defects noted, no edema  SKIN: no suspicious lesions or rashes  PSYCH: mentation appears normal, affect normal/bright    Results for orders placed or performed in visit on 03/17/22   TSH     Status: Normal   Result Value Ref Range    TSH 2.23 0.40 - 4.00 mU/L   T4, Free     Status: Normal   Result Value Ref Range    Free T4 1.18 0.60 - 1.60 ng/dL

## 2022-03-17 NOTE — LETTER
[facility]  -- Controlled Medication Agreement    3/17/2022   Maricarmen Ornelas   1980   [id number]       I understand that my provider is prescribing controlled medications to assist me in managing my ADHD.  The risks, benefits, and side effects of these medications have been explained to me and I agree to the following conditions for this type of treatment.    Stimulant Medication Prescribed: Concerta    1.  I will take my medications exactly as prescribed and will not change the medication dosage or schedule without my provider's approval.  Refills will not be given if I  runs out early.     2.  I will keep all regular appointments at this clinic.  If there are three or more missed appointments or appointments canceled less than 2 hours before the scheduled time, my medication may be discontinued.    3.  I understand that prescriptions may only be written for one month at a time, and a written prescription is required each month.  Prescriptions cannot be called in or faxed to the pharmacy.    4.  If the prescription is lost or stolen, replacement is at the discretion of my provider.  I understand that this may mean the prescription might not be replaced.    5.  If I am late for scheduled follow up, I understand that I must make an appointment and that another refill is at the discretion of my provider.  This may mean a prescription for only the amount required until the appointment, regardless of prescription co-pay.  For example, if an appointment is made in 1 week, a prescription might only be written for 7 pills.      I understand that if I violate any of the above conditions, my prescription medications and/or treatment may be terminated.  If the violation includes providing controlled substances to anyone other than to whom the medication is prescribed, a report may be made to my child's physician, pharmacy, and other authorities, including the police.    I have read this contract and it has  been explained to me.  I fully understand the consequences of violating this agreement.    _________________________________/______________/____________________________    Patient signature/Date/Witness

## 2022-06-14 NOTE — PROGRESS NOTES
Assessment & Plan     1. Attention deficit hyperactivity disorder (ADHD), predominantly inattentive type  Chronic, stable.  Three months provided as she likely won't consider wean/stopping until at least fall.  MN  appropriate.  - methylphenidate (CONCERTA) 54 MG CR tablet; Take 1 tablet (54 mg) by mouth daily  Dispense: 30 tablet; Refill: 0  - methylphenidate (CONCERTA) 54 MG CR tablet; Take 1 tablet (54 mg) by mouth daily  Dispense: 30 tablet; Refill: 0  - methylphenidate (CONCERTA) 54 MG CR tablet; Take 1 tablet (54 mg) by mouth daily  Dispense: 30 tablet; Refill: 0    2. Adenomyosis of uterus  Plan to repeat pelvic US in Sept/Oct.  Ordered.  - US Pelvic Complete with Transvaginal; Future    3. Papule of skin  Does not have appearance of pathological/cancerous lesion.  Would like to consider removal; therefore referral to Dermatology due to facial location.  - Adult Dermatology Referral; Future    Follow up in 3 months; sooner prn.    Avril Sanders, [facility]    Subjective   Maricarmen is a 42 year old who presents for the following health issues:    History of Present Illness       Reason for visit:  Med check    She eats 2-3 servings of fruits and vegetables daily.She consumes 0 sweetened beverage(s) daily.She exercises with enough effort to increase her heart rate 10 to 19 minutes per day.  She exercises with enough effort to increase her heart rate 3 or less days per week.   She is taking medications regularly.       ADHD Follow-Up (Adult)  Concerns: None; would like to consider going off meds possibly as early as fall/winter  Changes since last visit: Stable  Taking controlled (daily) medications as prescribed: Yes  Sleep: no problems; but when she had Covid ~1-2 months ago - noticed she was able to nap, even after she recovered before she restarted her Concerta.  Liked that feeling.  Adult ADHD Self-Reporting form given to patient?:  No  Currently in counseling: No    Medication  Benefits:   Controlled symptoms: Attention span, Finishing tasks, Frustration tolerance and Accepting limits  Uncontrolled symptoms:  Distractability    Medication Side Effects:  Reports:  none  Sleep Problems? no  ++++++++++++++++++++++++++++++++++++++++++++++++    Employer Concerns/Feedback: NA.  Coworker Concerns:   NA.  Home/Family Concerns: None    Having more HA's since Covid.  Improving now.  Temporal, pressure like.  Improving over the last couple weeks now.  Feeling some PND.      Has spot on face.  Flesh colored.  Itchy.  Interested in having it removed; came on pretty suddenly.    Hasn't had BRBPR x 6 months now (history of fissure after constipation/pain meds/delivery of twins).    Still getting cramping/pain with menses.  Will find that if it's not excruciating, will be trying to avoid bending over or moving a lot within her lower abdominal area.  Would like to repeat imaging in the fall to determine if growth of her fibroids.    Review of Systems   CONSTITUTIONAL: NEGATIVE for fever, chills, change in weight  ENT/MOUTH: NEGATIVE for ear, mouth and throat problems  RESP: NEGATIVE for significant cough or SOB  CV: NEGATIVE for chest pain, palpitations or peripheral edema      Objective    /76   Pulse 70   Temp 97.1  F (36.2  C) (Tympanic)   Resp 16   Wt 76 kg (167 lb 8 oz)   LMP  (LMP Unknown)   SpO2 99%   BMI 27.04 kg/m    Body mass index is 27.04 kg/m .  Physical Exam   GENERAL: healthy, alert and no distress  NECK: no adenopathy, no asymmetry, masses, or scars and thyroid normal to palpation  RESP: lungs clear to auscultation - no rales, rhonchi or wheezes  CV: regular rate and rhythm, normal S1 S2, no S3 or S4, no murmur, click or rub, no peripheral edema and peripheral pulses strong  PSYCH: mentation appears normal, affect normal/bright  SKIN: ovoid flesh colored papule in mid L cheek - measuring ~8mm x 4mm.    No results found for any visits on 06/15/22.

## 2022-06-15 ENCOUNTER — OFFICE VISIT (OUTPATIENT)
Dept: FAMILY MEDICINE | Facility: OTHER | Age: 42
End: 2022-06-15
Attending: FAMILY MEDICINE
Payer: COMMERCIAL

## 2022-06-15 VITALS
HEART RATE: 70 BPM | DIASTOLIC BLOOD PRESSURE: 76 MMHG | BODY MASS INDEX: 27.04 KG/M2 | SYSTOLIC BLOOD PRESSURE: 120 MMHG | RESPIRATION RATE: 16 BRPM | OXYGEN SATURATION: 99 % | TEMPERATURE: 97.1 F | WEIGHT: 167.5 LBS

## 2022-06-15 DIAGNOSIS — N80.03 ADENOMYOSIS OF UTERUS: Primary | ICD-10-CM

## 2022-06-15 DIAGNOSIS — F90.0 ATTENTION DEFICIT HYPERACTIVITY DISORDER (ADHD), PREDOMINANTLY INATTENTIVE TYPE: ICD-10-CM

## 2022-06-15 DIAGNOSIS — R23.8 PAPULE OF SKIN: ICD-10-CM

## 2022-06-15 PROCEDURE — 99214 OFFICE O/P EST MOD 30 MIN: CPT | Performed by: FAMILY MEDICINE

## 2022-06-15 RX ORDER — METHYLPHENIDATE HYDROCHLORIDE 54 MG/1
54 TABLET ORAL DAILY
Qty: 30 TABLET | Refills: 0 | Status: SHIPPED | OUTPATIENT
Start: 2022-08-13 | End: 2022-09-07

## 2022-06-15 RX ORDER — METHYLPHENIDATE HYDROCHLORIDE 54 MG/1
54 TABLET ORAL DAILY
Qty: 30 TABLET | Refills: 0 | Status: SHIPPED | OUTPATIENT
Start: 2022-07-15 | End: 2022-09-07

## 2022-06-15 RX ORDER — METHYLPHENIDATE HYDROCHLORIDE 54 MG/1
54 TABLET ORAL DAILY
Qty: 30 TABLET | Refills: 0 | Status: SHIPPED | OUTPATIENT
Start: 2022-06-15 | End: 2022-09-07

## 2022-06-15 ASSESSMENT — PAIN SCALES - GENERAL: PAINLEVEL: NO PAIN (0)

## 2022-06-15 NOTE — NURSING NOTE
"Chief Complaint   Patient presents with     Recheck Medication       Initial /76   Pulse 70   Temp 97.1  F (36.2  C) (Tympanic)   Resp 16   Wt 76 kg (167 lb 8 oz)   LMP  (LMP Unknown)   SpO2 99%   BMI 27.04 kg/m   Estimated body mass index is 27.04 kg/m  as calculated from the following:    Height as of 7/1/21: 1.676 m (5' 6\").    Weight as of this encounter: 76 kg (167 lb 8 oz).  Medication Reconciliation: complete    Rajani Ford LPN     Advanced Care Directive reviewed.     "

## 2022-06-21 ENCOUNTER — THERAPY VISIT (OUTPATIENT)
Dept: CHIROPRACTIC MEDICINE | Facility: OTHER | Age: 42
End: 2022-06-21
Attending: CHIROPRACTOR
Payer: COMMERCIAL

## 2022-06-21 VITALS
TEMPERATURE: 98.4 F | RESPIRATION RATE: 18 BRPM | DIASTOLIC BLOOD PRESSURE: 62 MMHG | SYSTOLIC BLOOD PRESSURE: 118 MMHG | OXYGEN SATURATION: 98 % | HEART RATE: 61 BPM

## 2022-06-21 DIAGNOSIS — M54.2 DORSALGIA OF CERVICAL REGION: Primary | ICD-10-CM

## 2022-06-21 DIAGNOSIS — M25.562 PAIN IN BOTH KNEES, UNSPECIFIED CHRONICITY: ICD-10-CM

## 2022-06-21 DIAGNOSIS — M62.838 TRAPEZIUS MUSCLE SPASM: ICD-10-CM

## 2022-06-21 DIAGNOSIS — M25.561 PAIN IN BOTH KNEES, UNSPECIFIED CHRONICITY: ICD-10-CM

## 2022-06-21 PROCEDURE — 99212 OFFICE O/P EST SF 10 MIN: CPT | Mod: 25 | Performed by: CHIROPRACTOR

## 2022-06-21 PROCEDURE — 97810 ACUP 1/> WO ESTIM 1ST 15 MIN: CPT | Performed by: CHIROPRACTOR

## 2022-06-21 NOTE — PROGRESS NOTES
Started three months ago. Neck, left and right shoulder pain is constant aching and stabbing. Rates 3/10 W24 5/10. Using Ibuprofen it helps a little bit.  Started about two months ago.  Right and left knees is intermittent aching and stabbing more when using stairs. Rates 0/10 W24 5/10. No interventions.  Fliplauri Jones on 6/21/2022 at 10:49 AM    Visit #:  1/6-8    Subjective:  Maricarmen Ornelas is a 42 year old female who is seen in f/u up for:        Dorsalgia of cervical region  Trapezius muscle spasm  Pain in both knees, unspecified chronicity.     Since last visit on Visit date not found,  Maricarmen Ornelas reports:    Neck/shoulders:symptoms worsened about 2-3 months ago. Reports she was unable to follow up with our office due to kids and activities. Trying to move/perform self care. Affecting sleep, cant lay on right side. No reported weakness of the upper extremities, or traumatic events around time of onset.     Knees: has been experiencing worsening bilateral knee pain. Worked with Mony Avalos who believed these symptoms due totight quads. No clicks, catching, pops. Feels weak going up stairs. No traumatic events around time of onset.      (DVPRS) Pain Rating Score : No pain (W24 5/10) (06/21/22 1043)     Objective:  The following was observed:  /62 (BP Location: Right arm, Patient Position: Sitting, Cuff Size: Adult Regular)   Pulse 61   Temp 98.4  F (36.9  C) (Tympanic)   Resp 18   LMP  (LMP Unknown)   SpO2 98%    Neck Disability Index (  Anthony H. and Rachelle C. 1991. All rights reserved.; used with permission) 6/21/2022   SECTION 1 - PAIN INTENSITY 1   SECTION 2 - PERSONAL CARE 1   SECTION 3 - LIFTING 1   SECTION 4 - READING 0   SECTION 5 - HEADACHES 3   SECTION 6 - CONCENTRATION 0   SECTION 7 - WORK 2   SECTION 8 - DRIVING 2   SECTION 9 - SLEEPING 3   SECTION 10 - RECREATION 0   Count 10   Sum 13   Raw Score: /50 13   Neck Disability Index Score: (%) 26     Cervical AROM: flexion WNL, extension  restriction mild, right lateral flexion restriction mild/moderate, left lateral flexion mild, right rotation restriction mild/moderate, left rotation WNL     Cervical compression: -  Cervical distraction: -    LEFS  ( 1996 GORGE Dozier: Used with Permission) 6/21/2022   a. Any of your usual work, housework, or school activities. 2-Moderate Difficulty   b. Your usual hobbies, recreational or sporting activities.  3-A Little Bit of Difficulty   c. Getting into or out of the bath. 4-No Difficulty   d. Walking between rooms. 4-No Difficulty   e. Putting on your shoes or socks. 4-No Difficulty   f. Squatting. 2-Moderate Difficulty   g. Lifting an object, like a bag of groceries from the floor.  3-A Little Bit of Difficulty   h. Performing light activities around your home.  2-Moderate Difficulty   i. Performing heavy activities around your home. 2-Moderate Difficulty   j. Getting into or out of a car. 4-No Difficulty   k. Walking 2 blocks. 4-No Difficulty   l. Walking a mile. 4-No Difficulty   m. Going up or down 10 stairs (about 1 flight of stairs). 1-Quite a Bit of Difficulty   n. Standing for 1 hour. 3-A Little Bit of Difficulty   o. Sitting for 1 hour. 4-No Difficulty   p. Running on even ground. 1-Quite a Bit of Difficulty   q. Running on uneven ground. 1-Quite a Bit of Difficulty   r. Making sharp turns while running fast. 0-Extreme Difficulty or Unable to Perform Activity   s. Hopping. 0-Extreme Difficulty or Unable to Perform Activity   t. Rolling over in bed. 4-No Difficulty   Column Totals: /80 52     Apley's compression: -right, - left  Anterior drawer sign: -right, - left  Posterior drawer sign: -right, -left  Medial stress test: -right, -left  Lateral stress test: -right, - left    P: palpatory tendernessSub-occipital, T-spine paraspinal, Traps and right distal IT band:    A: asymmetric joints, none apparent  R: restricted motion , none apparent  T: muscle spasm at level(s): Rhomboids, Sub-occipital, T-spine  paraspinal, Traps and vastus lateralis:  Bilaterally    Segmental spinal dysfunction/restrictions found at:  None apparent.      Assessment: chiropractic assessment of the patient does not suggest segmental/somatic dysfunction present at this time. There is a moderate/marked amount of muscular spasm of the neck/upper back and quadriceps muscles. Acupuncture has been utilized in the past with very good results for the patient's neck and upper back.     Encouraged patient to stretch and foam roll her quad muscles, if this does not help acupuncture will be pursued.     Plan of care to include 6-8 visits in the next 4-6 weeks.    Diagnoses:      1. Dorsalgia of cervical region    2. Trapezius muscle spasm    3. Pain in both knees, unspecified chronicity        Procedures:  E/M 59512    CMT:  None performed      Modalities:  86311: Acupuncture, for 15 minutes:  Points: Bl 10, 11, GB 20, 21  For 15 minutes    Therapeutic procedures:  Foam rolling for the quadriceps    Response to Treatment  Reduction in symptoms as reported by patient    Prognosis: Good    Goals: reduce pain levels by 50%  Improve cervical spine AROM  Reduce/improve index scores 20-30%    Recommendations:    Instructions:foam roll the quads as discussed    Follow-up:  Return to care in 1 week.

## 2022-06-28 ENCOUNTER — THERAPY VISIT (OUTPATIENT)
Dept: CHIROPRACTIC MEDICINE | Facility: OTHER | Age: 42
End: 2022-06-28
Attending: CHIROPRACTOR
Payer: COMMERCIAL

## 2022-06-28 VITALS
SYSTOLIC BLOOD PRESSURE: 128 MMHG | HEART RATE: 68 BPM | OXYGEN SATURATION: 98 % | TEMPERATURE: 98.3 F | DIASTOLIC BLOOD PRESSURE: 72 MMHG | RESPIRATION RATE: 18 BRPM

## 2022-06-28 DIAGNOSIS — M79.2 RADICULAR PAIN IN RIGHT ARM: ICD-10-CM

## 2022-06-28 DIAGNOSIS — M54.2 DORSALGIA OF CERVICAL REGION: Primary | ICD-10-CM

## 2022-06-28 DIAGNOSIS — M62.838 TRAPEZIUS MUSCLE SPASM: ICD-10-CM

## 2022-06-28 PROCEDURE — 97810 ACUP 1/> WO ESTIM 1ST 15 MIN: CPT | Performed by: CHIROPRACTOR

## 2022-06-28 NOTE — PROGRESS NOTES
Neck and shoulder on both sides but mostly on the right. With constant stiffness. Radiating down in the right arm and hand with stiffness and aching. 4/10 W24 4/10. Using yoga with some decrease.   Flores Solano on 6/28/2022 at 10:44 AM    Reviewed by EW    Visit #:  2/6-8    Subjective:  Maricarmen Ornelas is a 42 year old female who is seen in f/u up for:        Dorsalgia of cervical region  Trapezius muscle spasm  Radicular pain in right arm.     Since last visit on 6/21/2022,  Maricarmen Ornelas reports: Symptoms showed improvement for approximately 1-2 days after last encounter.  Reports stress has been a major contributing factor.  Continue to perform yoga with beneficial results.  Notes that she has been able to perform this which was noticeably difficult on last encounter.  Symptoms most notable on right side which do seem to be extending into the right arm lateral aspect.    See prior notes regarding similar treatment when patient was being treated for thoracic outlet syndrome.         (DVPRS) Pain Rating Score : Distracts me, can do usual activities (W24 4/10) (06/28/22 0900)     Objective:  The following was observed:  /72 (BP Location: Right arm, Patient Position: Sitting, Cuff Size: Adult Regular)   Pulse 68   Temp 98.3  F (36.8  C) (Tympanic)   Resp 18   LMP  (LMP Unknown)   SpO2 98%      P: palpatory tenderness right upper trapezius  A: asymmetric joints, none apparent  R: restricted motion , not palpated  T: muscle spasm at level(s): Moderate/marked spasms noted right upper trapezius, scalene musculature right side, moderate spasms paraspinals thoracic and cervical region bilateral, suboccipitals bilateral, right triceps:      Segmental spinal dysfunction/restrictions found at:  None apparent.      Assessment: Symptoms are showing signs of progress.  This was very similarly seen on previous episodes with same patient.  Provided patient reassurance that progress is still being noted despite temporary  improvement noted on last encounter.  Possible patient is showing early signs of thoracic outlet syndrome again.  Acupuncture treatment on previous episode regarding this did quite well.  It is also possible that this is referred myofascial pain and not radiating from brachial plexus at this time, continue to monitor for signs of thoracic outlet syndrome or radiculopathy from the cervical thoracic region.    Discussed use of ASP needles for auricular acupuncture.  Patient is interested in utilizing this additional form of treatment next encounter.  Patient provided with basic information regarding ear care and Liberal acupuncture approach.    Chiropractic assessment does not show signs of segmental/somatic dysfunction.  Most of patient's symptoms appear to be soft tissue in nature.    Diagnoses:      1. Dorsalgia of cervical region    2. Trapezius muscle spasm    3. Radicular pain in right arm        Patient's condition:  Symptoms come and go    Treatment effectiveness:  Patient claims to feel looser post manipulation and Patients symptoms are getting better      Procedures:  CMT:  None performed      Modalities:  14800: Acupuncture, for 15 minutes:  Points: Bl 10,11, GB 21,  TW 9 *, 11*, 13*, SI 8*, LI 11* (*right only)  For 15 minutes    Therapeutic procedures:  None    Response to Treatment  Reduction in symptoms as reported by patient    Prognosis: Good    Progress towards Goals: Patient is making progress towards the goal.   reduce pain levels by 50%  Improve cervical spine AROM  Reduce/improve index scores 20-30%    Recommendations:    Instructions:ice 20 minutes every other hour as needed and heat 15 minutes every other hour as needed    Follow-up:  Return to care in 1 week, sooner if symptoms do not stay improved.

## 2022-06-28 NOTE — PATIENT INSTRUCTIONS
ice 20 minutes every other hour as needed and heat 15 minutes every other hour as needed    Return to care within 1 week or sooner if symptoms fail to improve

## 2022-08-23 NOTE — NURSING NOTE
"Date of Surgery: 18  Type of Surgery: Primary  Section   Surgeon: Dr. Woodrow Hurtado MD, FACOG     Patient's consents were signed and appropriate appointments were scheduled by the [facility] . Patient was given surgical folder which includes pre-operative bathing instructions related to the two packets of Hibiclens surgical prep provided. Surgical forms were faxed to [phone], [phone] and [phone], copies made and kept for informative purposes, and originals were delivered to Day-surgery. Patient denies questions at this time.        STOP BANG    Fever/Chills or other infectious symptoms in past month? no  >10 pound weight loss in the past 2 months? no  Health Care Directive on file? no  History of blood transfusions? no  Td up to date? yes  History of VRE/MRSA? MRSA 7 years ago, has not had follow up      Obstructive Sleep Apnea screening    Preoperative Evaluation: Obstructive Sleep Apnea screening    S: Snore -  Do you snore loudly? (louder than talking or loud enough to be heard through closed doors)no  T: Tired - Do you often feel tired, fatigued, or sleepy during the daytime?no  O: Observed - Has anyone ever observed you stop breathing during your sleep?no  P: Pressure - Do you have or are you being treated for high blood pressure?yes, not medicated  B: BMI - BMI greater than 35kg/m2?yes  A: Age - Age over 50 years old?no  N: Neck - Neck circumference greater than 40 cm?no  G: Gender - Gender: Male?no    Total number of \"YES\" responses:  1    Scoring: Low risk of ANTONIO 0-2  At Risk of ANTONIO: >3 High Risk of ANTONIO: 5-8      Total yes answers in ANTONIO section:    Low risk 0-2  At risk 3-4  High risk 5-8    Kell Gibson............. 2018 4:01 PM     " sensation intact/responds to pain/responds to verbal commands/cranial nerves intact sensation intact/responds to pain/responds to verbal commands/cranial nerves intact sensation intact/responds to pain/responds to verbal commands/cranial nerves intact sensation intact/responds to pain/responds to verbal commands/cranial nerves intact

## 2022-09-07 NOTE — PROGRESS NOTES
Assessment & Plan     1. Attention deficit hyperactivity disorder (ADHD), predominantly inattentive type  Chronic, stable.  Update contract and UDS today.   Continue Concerta 54mg daily.   - Drug Confirmation Panel Urine with Creat  - methylphenidate (CONCERTA) 54 MG CR tablet; Take 1 tablet (54 mg) by mouth daily  Dispense: 30 tablet; Refill: 0  - methylphenidate (CONCERTA) 54 MG CR tablet; Take 1 tablet (54 mg) by mouth daily  Dispense: 30 tablet; Refill: 0  - methylphenidate (CONCERTA) 54 MG CR tablet; Take 1 tablet (54 mg) by mouth daily  Dispense: 30 tablet; Refill: 0  - Ob/Gyn Referral; Future    2. Adenomyosis of uterus  Has pelvic US scheduled.  Continues with dysmenorrhea.  Considering options as have previously been discussed - OB/GYN.       Depression Screening Follow Up  PHQ 9/8/2022   PHQ-9 Total Score 8   Q9: Thoughts of better off dead/self-harm past 2 weeks Several days   F/U: Thoughts of suicide or self-harm No   F/U: Self harm-plan -   F/U: Self-harm action -   F/U: Safety concerns No       Follow Up      Follow Up Actions Taken  Discussed today    Discussed the following ways the patient can remain in a safe environment:  be around others      Return in about 3 months (around 12/8/2022) for Controlled Substance/Medications.    Avril Sanders, [facility]   Maricarmen is a 42 year old, presenting for the following health issues:  Recheck Medication      History of Present Illness       Hypothyroidism:     Since last visit, patient describes the following symptoms::  Anxiety and Weight gain    Weight gain::  5 lbs.    She eats 4 or more servings of fruits and vegetables daily.She consumes 1 sweetened beverage(s) daily.She exercises with enough effort to increase her heart rate 10 to 19 minutes per day.  She exercises with enough effort to increase her heart rate 3 or less days per week.   She is taking medications regularly.    Today's PHQ-9         PHQ-9 Total  Score: 8    PHQ-9 Q9 Thoughts of better off dead/self-harm past 2 weeks :   Several days  Thoughts of suicide or self harm: (P) No  Self-harm Plan:     Self-harm Action:       Safety concerns for self or others: (P) No    How difficult have these problems made it for you to do your work, take care of things at home, or get along with other people: Somewhat difficult     ADHD Follow-Up (Adult)  Concerns: No  Changes since last visit: None  Taking controlled (daily) medications as prescribed: Yes  Sleep: no problems  Adult ADHD Self-Reporting form given to patient?:  No  Currently in counseling: No    Medication Benefits:   Controlled symptoms: Attention span, Distractability, Impulse control, Frustration tolerance and Accepting limits  Uncontrolled symptoms:  Finishing tasks    Medication Side Effects:  Reports:  none  Sleep Problems? no  ++++++++++++++++++++++++++++++++++++++++++++++++    Employer Concerns/Feedback: None  Coworker Concerns:   None  Home/Family Concerns: None    CONTRACT: DUE  UDS: DUE    Review of Systems   CONSTITUTIONAL: NEGATIVE for fever, chills, change in weight  ENT/MOUTH: NEGATIVE for ear, mouth and throat problems  RESP: NEGATIVE for significant cough or SOB  CV: NEGATIVE for chest pain, palpitations or peripheral edema      Objective    /60   Pulse 71   Temp 96.9  F (36.1  C) (Tympanic)   Resp 16   Wt 77.6 kg (171 lb)   LMP 09/08/2022 (Exact Date)   SpO2 98%   Breastfeeding No   BMI 27.60 kg/m    Body mass index is 27.6 kg/m .  Physical Exam   GENERAL: healthy, alert and no distress  NECK: MMM  RESP: CTAB  CV: RRR s M  MS: CHAIDEZ    Results for orders placed or performed in visit on 09/08/22   Urine Creatinine for Drug Screen Panel     Status: None   Result Value Ref Range    Creatinine Urine for Drug Screen 14 mg/dL   Drug Confirmation Panel Urine with Creat     Status: None (In process)    Narrative    The following orders were created for panel order Drug Confirmation Panel  Urine with Creat.  Procedure                               Abnormality         Status                     ---------                               -----------         ------                     Urine Drug Confirmation ...[[id number]]                      In process                 Urine Creatinine for Gage...[[id number]]                      Final result                 Please view results for these tests on the individual orders.

## 2022-09-08 ENCOUNTER — OFFICE VISIT (OUTPATIENT)
Dept: FAMILY MEDICINE | Facility: OTHER | Age: 42
End: 2022-09-08
Attending: FAMILY MEDICINE
Payer: COMMERCIAL

## 2022-09-08 VITALS
RESPIRATION RATE: 16 BRPM | BODY MASS INDEX: 27.6 KG/M2 | SYSTOLIC BLOOD PRESSURE: 104 MMHG | DIASTOLIC BLOOD PRESSURE: 60 MMHG | HEART RATE: 71 BPM | OXYGEN SATURATION: 98 % | WEIGHT: 171 LBS | TEMPERATURE: 96.9 F

## 2022-09-08 DIAGNOSIS — F90.0 ATTENTION DEFICIT HYPERACTIVITY DISORDER (ADHD), PREDOMINANTLY INATTENTIVE TYPE: Primary | ICD-10-CM

## 2022-09-08 DIAGNOSIS — N80.03 ADENOMYOSIS OF UTERUS: ICD-10-CM

## 2022-09-08 LAB — CREAT UR-MCNC: 14 MG/DL

## 2022-09-08 PROCEDURE — 80307 DRUG TEST PRSMV CHEM ANLYZR: CPT | Mod: ZL | Performed by: FAMILY MEDICINE

## 2022-09-08 PROCEDURE — 99214 OFFICE O/P EST MOD 30 MIN: CPT | Performed by: FAMILY MEDICINE

## 2022-09-08 RX ORDER — METHYLPHENIDATE HYDROCHLORIDE 54 MG/1
54 TABLET ORAL DAILY
Qty: 30 TABLET | Refills: 0 | Status: SHIPPED | OUTPATIENT
Start: 2022-10-12 | End: 2022-12-08

## 2022-09-08 RX ORDER — METHYLPHENIDATE HYDROCHLORIDE 54 MG/1
54 TABLET ORAL DAILY
Qty: 30 TABLET | Refills: 0 | Status: SHIPPED | OUTPATIENT
Start: 2022-11-11 | End: 2022-12-08

## 2022-09-08 RX ORDER — METHYLPHENIDATE HYDROCHLORIDE 54 MG/1
54 TABLET ORAL DAILY
Qty: 30 TABLET | Refills: 0 | Status: SHIPPED | OUTPATIENT
Start: 2022-09-12 | End: 2022-12-08

## 2022-09-08 ASSESSMENT — PATIENT HEALTH QUESTIONNAIRE - PHQ9
SUM OF ALL RESPONSES TO PHQ QUESTIONS 1-9: 8
SUM OF ALL RESPONSES TO PHQ QUESTIONS 1-9: 8
10. IF YOU CHECKED OFF ANY PROBLEMS, HOW DIFFICULT HAVE THESE PROBLEMS MADE IT FOR YOU TO DO YOUR WORK, TAKE CARE OF THINGS AT HOME, OR GET ALONG WITH OTHER PEOPLE: SOMEWHAT DIFFICULT

## 2022-09-08 ASSESSMENT — PAIN SCALES - GENERAL: PAINLEVEL: MILD PAIN (3)

## 2022-09-08 NOTE — LETTER
[facility]  -- Controlled Medication Agreement    9/8/2022   Maricarmen Ornelas   1980   [id number]       I understand that my provider is prescribing controlled medications to assist me in managing my ADHD.  The risks, benefits, and side effects of these medications have been explained to me and I agree to the following conditions for this type of treatment.    Stimulant Medication Prescribed: Concerta 54mg daily    1.  I will take my medications exactly as prescribed and will not change the medication dosage or schedule without my provider's approval.  Refills will not be given if I  runs out early.     2.  I will keep all regular appointments at this clinic.  If there are three or more missed appointments or appointments canceled less than 2 hours before the scheduled time, my medication may be discontinued.    3.  I understand that prescriptions may only be written for one month at a time, and a written prescription is required each month.  Prescriptions cannot be called in or faxed to the pharmacy.    4.  If the prescription is lost or stolen, replacement is at the discretion of my provider.  I understand that this may mean the prescription might not be replaced.    5.  If I am late for scheduled follow up, I understand that I must make an appointment and that another refill is at the discretion of my provider.  This may mean a prescription for only the amount required until the appointment, regardless of prescription co-pay.  For example, if an appointment is made in 1 week, a prescription might only be written for 7 pills.      I understand that if I violate any of the above conditions, my prescription medications and/or treatment may be terminated.  If the violation includes providing controlled substances to anyone other than to whom the medication is prescribed, a report may be made to my child's physician, pharmacy, and other authorities, including the police.    I have read this contract  and it has been explained to me.  I fully understand the consequences of violating this agreement.    _________________________________/______________/____________________________    Patient signature/Date/Witness

## 2022-09-08 NOTE — NURSING NOTE
"Chief Complaint   Patient presents with     Recheck Medication       Initial /60   Pulse 71   Temp 96.9  F (36.1  C) (Tympanic)   Resp 16   Wt 77.6 kg (171 lb)   LMP 09/08/2022 (Exact Date)   SpO2 98%   Breastfeeding No   BMI 27.60 kg/m   Estimated body mass index is 27.6 kg/m  as calculated from the following:    Height as of 7/1/21: 1.676 m (5' 6\").    Weight as of this encounter: 77.6 kg (171 lb).  Medication Reconciliation: complete    Rajani Ford LPN     Advanced Care Directive reviewed.     "

## 2022-09-12 LAB
ME-PHENIDATE UR CFM-MCNC: 57 NG/ML
ME-PHENIDATE UR CFM-MCNC: 7160 NG/ML
ME-PHENIDATE/CREAT UR: 407 NG/MG {CREAT}
ME-PHENIDATE/CREAT UR: ABNORMAL NG/MG {CREAT}

## 2022-10-03 ENCOUNTER — HOSPITAL ENCOUNTER (OUTPATIENT)
Dept: ULTRASOUND IMAGING | Facility: OTHER | Age: 42
Discharge: HOME | End: 2022-10-03
Attending: FAMILY MEDICINE | Admitting: FAMILY MEDICINE
Payer: COMMERCIAL

## 2022-10-03 DIAGNOSIS — N80.03 ADENOMYOSIS OF UTERUS: ICD-10-CM

## 2022-10-03 PROCEDURE — 76830 TRANSVAGINAL US NON-OB: CPT

## 2022-10-18 ENCOUNTER — OFFICE VISIT (OUTPATIENT)
Dept: OBGYN | Facility: OTHER | Age: 42
End: 2022-10-18
Attending: STUDENT IN AN ORGANIZED HEALTH CARE EDUCATION/TRAINING PROGRAM
Payer: COMMERCIAL

## 2022-10-18 ENCOUNTER — TELEPHONE (OUTPATIENT)
Dept: FAMILY MEDICINE | Facility: OTHER | Age: 42
End: 2022-10-18

## 2022-10-18 VITALS
HEART RATE: 96 BPM | WEIGHT: 172.4 LBS | BODY MASS INDEX: 27.83 KG/M2 | SYSTOLIC BLOOD PRESSURE: 108 MMHG | DIASTOLIC BLOOD PRESSURE: 64 MMHG

## 2022-10-18 DIAGNOSIS — N80.03 ADENOMYOSIS OF UTERUS: ICD-10-CM

## 2022-10-18 DIAGNOSIS — F90.0 ATTENTION DEFICIT HYPERACTIVITY DISORDER (ADHD), PREDOMINANTLY INATTENTIVE TYPE: ICD-10-CM

## 2022-10-18 DIAGNOSIS — Z12.4 ENCOUNTER FOR PAPANICOLAOU SMEAR FOR CERVICAL CANCER SCREENING: Primary | ICD-10-CM

## 2022-10-18 DIAGNOSIS — R10.2 PELVIC PAIN IN FEMALE: ICD-10-CM

## 2022-10-18 LAB
ALBUMIN UR-MCNC: NEGATIVE MG/DL
APPEARANCE UR: CLEAR
BILIRUB UR QL STRIP: NEGATIVE
COLOR UR AUTO: YELLOW
GLUCOSE UR STRIP-MCNC: NEGATIVE MG/DL
HGB UR QL STRIP: NEGATIVE
KETONES UR STRIP-MCNC: NEGATIVE MG/DL
LEUKOCYTE ESTERASE UR QL STRIP: NEGATIVE
NITRATE UR QL: NEGATIVE
PH UR STRIP: 5.5 [PH] (ref 5–9)
SP GR UR STRIP: 1.01 (ref 1–1.03)
UROBILINOGEN UR STRIP-MCNC: NORMAL MG/DL

## 2022-10-18 PROCEDURE — 87624 HPV HI-RISK TYP POOLED RSLT: CPT | Mod: ZL | Performed by: STUDENT IN AN ORGANIZED HEALTH CARE EDUCATION/TRAINING PROGRAM

## 2022-10-18 PROCEDURE — 81003 URINALYSIS AUTO W/O SCOPE: CPT | Mod: ZL | Performed by: STUDENT IN AN ORGANIZED HEALTH CARE EDUCATION/TRAINING PROGRAM

## 2022-10-18 PROCEDURE — G0123 SCREEN CERV/VAG THIN LAYER: HCPCS | Performed by: STUDENT IN AN ORGANIZED HEALTH CARE EDUCATION/TRAINING PROGRAM

## 2022-10-18 PROCEDURE — 99214 OFFICE O/P EST MOD 30 MIN: CPT | Performed by: STUDENT IN AN ORGANIZED HEALTH CARE EDUCATION/TRAINING PROGRAM

## 2022-10-18 ASSESSMENT — PAIN SCALES - GENERAL: PAINLEVEL: MILD PAIN (2)

## 2022-10-18 NOTE — NURSING NOTE
Pt presents to clinic today for consult on hysterectomy.      Medication Reconciliation: complete  Dasia Interiano LPN

## 2022-10-18 NOTE — TELEPHONE ENCOUNTER
Keagan Green told patient that her insurance is now requiring a prior authorization for her Concerta. Patient has been out for days.     Ashely Nelson on 10/18/2022 at 8:02 AM

## 2022-10-19 NOTE — PROGRESS NOTES
"Gynecology Office Visit    Chief Complaint: Pelvic pressure    HPI:    Rafael Ornelas is a 42 year old , here for discussion and follow up of suspected adenomyosis and pelvic pressure. We first met last summer as she has had a long-standing history of painful menses and pelvic pain ever since her prior .    She continues to endorse pelvic pain and dysmenorrhea. The pain is described as a pressure and like \"there is a ball or pumpkin\" lower in my pelvis. She also describes a tenderness in the lower abdomen that never seems to go away. She notes that occasionally the pain is exacerbated when she as a full bladder and she notes some relief after voiding. The worst time of pain seems to be in the morning when her bladder is most full.  He denies any significant dysuria.  She denies any fevers, chills, or symptoms of urinary tract infection    She has not tried any hormonal symptom control techniques at this time and is nervous about doing that in light of keeping mental health at a steady state. She has been treating symptoms with nabumetone and bentyl and notes that these do help her symptoms.       OBHx  OB History    Para Term  AB Living   3 3 3 0 0 4   SAB IAB Ectopic Multiple Live Births   0 0 0 1 4      # Outcome Date GA Lbr Renny/2nd Weight Sex Delivery Anes PTL Lv   3A Term 18 37w0d  2.546 kg (5 lb 9.8 oz) F   N SMOOTH      Name: MYNOR ORNELAS1 RAFAEL      Apgar1: 8  Apgar5: 9   3B Term 18 37w0d  2.909 kg (6 lb 6.6 oz) M   N SMOOTH      Name: MYNOR ORNELAS2 RAFAEL      Apgar1: 9  Apgar5: 9   2 Term 10/16/10 40w0d   M  EPI N SMOOTH      Name: William   1 Term  40w0d   M  None N SMOOTH      Birth Comments: [place]      Name: Kaveh       GYN history:   No history of STIs  Last pap smear: Dec 2017: NIL, HPV negative (Due Dec 2022- collected today) , No history of abnormal pap smears     Menses occur q 28-30 days and usually last 4-7 days    Past medical history:  Past Medical " History:   Diagnosis Date     Dichorionic diamniotic twin pregnancy in third trimester 2018     Disorder of thyroid     hypo     Supervision of elderly multigravida     2018     Twin pregnancy 2018   Current active problem list includes ADHD which she is following closely with Dr. Sanders  Specifically denies VTE, DM, HTN or bleeding disorders    Past Surgical History:  Past Surgical History:   Procedure Laterality Date     APPENDECTOMY OPEN            SECTION N/A 2018    Procedure:  SECTION;   Section - twins;  Surgeon: Woodrow Hurtado MD;  Location:  OR     COLONOSCOPY N/A 7/10/2019    Procedure: COLONOSCOPY, WITH POLYPECTOMY AND BIOPSY;  Surgeon: Viki Villareal MD;  Location:  OR         Medications:  Current Outpatient Medications   Medication     dicyclomine (BENTYL) 10 MG capsule     loratadine (CLARITIN) 10 MG tablet     medical cannabis (Patient's own supply)     methylcellulose (CITRUCEL) powder     [START ON 2022] methylphenidate (CONCERTA) 54 MG CR tablet     methylphenidate (CONCERTA) 54 MG CR tablet     methylphenidate (CONCERTA) 54 MG CR tablet     nabumetone (RELAFEN) 500 MG tablet     SYNTHROID 75 MCG tablet     No current facility-administered medications for this visit.       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  Some relationship stress with her partner right now, but feels safe at home      Family History:  Family History   Problem  Relation Age of Onset     Substance Abuse Maternal Grandmother         Alcohol/Drug,Alcohol abuse     Cancer Maternal Grandmother         Cancer, lung cancer     Other - See Comments Maternal Grandfather         Alzheimer's disease     Diabetes Paternal Grandmother         Diabetes,type 1     No Known Problems Sister      No Known Problems Son      No Known Problems Son      Diabetes Maternal Uncle         Diabetes,type 2     No Known Problems Son         2018     No Known Problems Daughter         2018     Heart Disease No family hx of         Heart Disease     Specifically denies breast, ovarian, colon, pancreatic cancers  Specifically denies VTE, known familial thrombophilias and coagulopathies    ROS:   Respiratory: No shortness of breath, dyspnea on exertion, cough, or hemoptysis  Cardiovascular: negative for palpitations, chest pain, lower extremity edema and syncope or near-syncope  Gastrointestinal: negative for, nausea, vomiting and hematemesis  Genitourinary: negative for, dysuria, frequency and urgency  Musculoskeletal: negative for, back pain and muscular weakness  Psychiatric: negative for, anxiety, depression and hallucinations  Hematologic/Lymphatic/Immunologic: negative for, anemia, chills and fever      Physical Exam  /64   Pulse 96   Wt 78.2 kg (172 lb 6.4 oz)   LMP 09/08/2022 (Exact Date)   BMI 27.83 kg/m    Gen: Well-appearing, no acute distressed, well-groomed, alert  HEENT: Normocephalic, atraumatic  Cardiovascular: Regular rate, No peripheral edema, normal peripheral circulation  Pulm: Symmetric chest rise, non-labored respirations  Abd: Soft, non-tender, non-distended  Ext: No LE edema, extremities warm and well perfused  Pelvic:  Normal appearing external female genitalia. Normal hair distribution. Vagina is without lesions with moist, pink ruggae. There is no vaginal discharge. Cervix appears stenotic, but able to gently be dilated with os finder forf pap smear. no cervical motion  tenderness. Uterus is approximately 8-9 cm, mobile, and is tender to palpation across the fundus and left  Side. No adnexal masses noted.      Assessment/Plan  Maricarmen Ornelas is a 42 year old  female here for surgical discussion in the setting of adenomyosis.     # Suspected adenomyosis  -- Discusses symptom suppressive therapy with hormone options vs. definitve management with hysterectomy. Discussed that symptoms would likely resolve In menopause, however it is hard to know when that transition will occur  -- She would be a candidate for TLH, BS, cystoscopy due to prior  (she was hoping for oophorectomy as well- discussed the recommendation for ovarian retention until age 60, but recent studies have shown they can be removed after age 50 without adverse effects- a joint decision making discussion at time of surgery scheduling as needed).    She will call to schedule if desired.    Uterine size has grown 2.5 cm from imaging in 2020 which can correspond with  The increased pressure she feels in her pelvis. We discussed that suppressive therapy will not help with pressure type symptoms, but could reduce pain during menses and amount of bleeding during menses.    # Cervical cancer  Screening  -- Pap with HPV collected today    Total amount of time spent during today's encounter including chart prep, face to face, documentation was 40 minutes.    SHUN QUINTANILLA MD on 10/19/2022 at 10:55 AM

## 2022-10-20 LAB
BKR LAB AP GYN ADEQUACY: NORMAL
BKR LAB AP GYN INTERPRETATION: NORMAL
BKR LAB AP HPV REFLEX: NORMAL
BKR LAB AP PREVIOUS ABNORMAL: NORMAL
PATH REPORT.COMMENTS IMP SPEC: NORMAL
PATH REPORT.COMMENTS IMP SPEC: NORMAL
PATH REPORT.RELEVANT HX SPEC: NORMAL

## 2022-10-25 LAB
HUMAN PAPILLOMA VIRUS 16 DNA: NEGATIVE
HUMAN PAPILLOMA VIRUS 18 DNA: NEGATIVE
HUMAN PAPILLOMA VIRUS FINAL DIAGNOSIS: NORMAL
HUMAN PAPILLOMA VIRUS OTHER HR: NEGATIVE

## 2022-11-03 ENCOUNTER — ALLIED HEALTH/NURSE VISIT (OUTPATIENT)
Dept: FAMILY MEDICINE | Facility: OTHER | Age: 42
End: 2022-11-03
Attending: FAMILY MEDICINE
Payer: COMMERCIAL

## 2022-11-03 DIAGNOSIS — Z23 NEED FOR PROPHYLACTIC VACCINATION AND INOCULATION AGAINST INFLUENZA: Primary | ICD-10-CM

## 2022-11-03 PROCEDURE — 90471 IMMUNIZATION ADMIN: CPT

## 2022-11-03 PROCEDURE — 90686 IIV4 VACC NO PRSV 0.5 ML IM: CPT

## 2022-11-09 ENCOUNTER — IMMUNIZATION (OUTPATIENT)
Dept: FAMILY MEDICINE | Facility: OTHER | Age: 42
End: 2022-11-09
Attending: PSYCHIATRY & NEUROLOGY
Payer: COMMERCIAL

## 2022-11-09 DIAGNOSIS — Z23 NEED FOR VACCINATION: Primary | ICD-10-CM

## 2022-11-09 PROCEDURE — 91312 COVID-19,PF,PFIZER BOOSTER BIVALENT: CPT

## 2022-11-09 PROCEDURE — 0124A COVID-19,PF,PFIZER BOOSTER BIVALENT: CPT

## 2022-11-09 NOTE — PROGRESS NOTES
Immunization Documentation  Verified patient's first and last name, and . Stated reason for visit today is to receive COVID vaccine. Accompained to clinic visit with SELF. Denied any concerns with previous immunizations. Allergies reviewed. VIS handout(s) reviewed and given to take home. VACCINE prepared and administered per standing order. Administration documented in IMMUNIZATIONS (see flowsheet and order for further information). Instructed to wait in lobby for 15 minutes post-injection and notify staff immediately of any reaction.     Jailyn Delgado RN ....................  2022   7:56 AM

## 2022-12-08 ENCOUNTER — VIRTUAL VISIT (OUTPATIENT)
Dept: FAMILY MEDICINE | Facility: OTHER | Age: 42
End: 2022-12-08
Attending: FAMILY MEDICINE
Payer: COMMERCIAL

## 2022-12-08 DIAGNOSIS — F90.0 ATTENTION DEFICIT HYPERACTIVITY DISORDER (ADHD), PREDOMINANTLY INATTENTIVE TYPE: ICD-10-CM

## 2022-12-08 PROCEDURE — 99213 OFFICE O/P EST LOW 20 MIN: CPT | Performed by: FAMILY MEDICINE

## 2022-12-08 RX ORDER — METHYLPHENIDATE HYDROCHLORIDE 54 MG/1
54 TABLET ORAL DAILY
Qty: 30 TABLET | Refills: 0 | Status: SHIPPED | OUTPATIENT
Start: 2023-01-27 | End: 2023-03-07

## 2022-12-08 RX ORDER — METHYLPHENIDATE HYDROCHLORIDE 54 MG/1
54 TABLET ORAL DAILY
Qty: 30 TABLET | Refills: 0 | Status: SHIPPED | OUTPATIENT
Start: 2023-02-26 | End: 2023-03-07

## 2022-12-08 RX ORDER — METHYLPHENIDATE HYDROCHLORIDE 54 MG/1
54 TABLET ORAL DAILY
Qty: 30 TABLET | Refills: 0 | Status: SHIPPED | OUTPATIENT
Start: 2022-12-28 | End: 2023-03-07

## 2022-12-08 NOTE — NURSING NOTE
"Chief Complaint   Patient presents with     Recheck Medication     Patient states that she is fine with either brand name concerta or generic. If insurance needs a PA.   Initial LMP 10/03/2022 (Approximate)  Estimated body mass index is 27.83 kg/m  as calculated from the following:    Height as of 7/1/21: 1.676 m (5' 6\").    Weight as of 10/18/22: 78.2 kg (172 lb 6.4 oz).  Medication Reconciliation: complete    Rajani Ford LPN   Advanced Care Directive reviewed.     "

## 2022-12-08 NOTE — PROGRESS NOTES
Assessment & Plan     1. Attention deficit hyperactivity disorder (ADHD), predominantly inattentive type  Chronic, stable.  [place]  appropriate, noting a difficulty in filling medications previously.  Three months of Rx sent to pharmacy.  OK to try and see if different pharmacy would be more consistent in fills - aware to notify the office and we can send somewhere different temporarily.  - methylphenidate (CONCERTA) 54 MG CR tablet; Take 1 tablet (54 mg) by mouth daily  Dispense: 30 tablet; Refill: 0  - methylphenidate (CONCERTA) 54 MG CR tablet; Take 1 tablet (54 mg) by mouth daily  Dispense: 30 tablet; Refill: 0  - methylphenidate (CONCERTA) 54 MG CR tablet; Take 1 tablet (54 mg) by mouth daily  Dispense: 30 tablet; Refill: 0    MDM:  Prescription drug management    Follow up in 3 months.    Avril Sanders, [facility] [place]      Subjective   Maricarmen is a 42 year old, presenting for the following health issues:  Recheck Medication      History of Present Illness       Reason for visit:  Med check    She eats 2-3 servings of fruits and vegetables daily.She consumes 0 sweetened beverage(s) daily.She exercises with enough effort to increase her heart rate 60 or more minutes per day.  She exercises with enough effort to increase her heart rate 5 days per week.   She is taking medications regularly.     ADHD Follow-Up (Adult)  Concerns: Difficulty obtaining Concerta at times due to shortage.  Has thought about calling other pharmacies.  Ended up without x ~2 weeks.  Realizing she would like to be off this medication as soon as she can, hopeful once her twins are into .  Changes since last visit: Stable  Taking controlled (daily) medications as prescribed: Yes  Sleep: no problems  Adult ADHD Self-Reporting form given to patient?:  No  Currently in counseling: No    Medication Benefits:   Controlled symptoms: Distractability, Impulse control, Frustration tolerance and Accepting  limits  Uncontrolled symptoms:  Finishing tasks at times    Medication Side Effects:  Reports:  none  Sleep Problems? no  ++++++++++++++++++++++++++++++++++++++++++++++++    Employer Concerns/Feedback: None  Coworker Concerns:   None  Home/Family Concerns: None    Objective    LMP 10/03/2022 (Approximate)   There is no height or weight on file to calculate BMI.  Physical Exam   No exam performed over phone visit.  Able to complete full sentences without seemingly in distress.    No results found for any visits on 12/08/22.     Call duration: 13 minutes

## 2022-12-12 ENCOUNTER — NURSE TRIAGE (OUTPATIENT)
Dept: FAMILY MEDICINE | Facility: OTHER | Age: 42
End: 2022-12-12

## 2022-12-12 NOTE — TELEPHONE ENCOUNTER
S-(situation): Patient has cough     B-(background): brother had symptoms that started Friday and tested negative for COVID.  developed symptoms yesterday and tested negative for COVID    A-(assessment): patient states cough started yesterday.  States today feels short of breath, hard to take in a deep breath.  States short of breath could be from fatigue.  Denies fever  States one son has been sick since Friday and other kids are sick today.  states she is looking for Rx for Tamiflu or should she be seen      R-(recommendations): informed mom will route to covering provider for review and consideration.    Dina Bull RN on 12/12/2022 at 3:59 PM        Reason for Disposition    All other patients with chest pain (Exception: fleeting chest pain lasting a few seconds)    Additional Information    Negative: SEVERE difficulty breathing (e.g., struggling for each breath, speaks in single words)    Negative: Passed out (i.e., fainted, collapsed and was not responding)    Negative: Difficult to awaken or acting confused (e.g., disoriented, slurred speech)    Negative: Shock suspected (e.g., cold/pale/clammy skin, too weak to stand, low BP, rapid pulse)    Negative: Chest pain lasting longer than 5 minutes and ANY of the following:* Over 44 years old* Over 30 years old and at least one cardiac risk factor (e.g., diabetes mellitus, high blood pressure, high cholesterol, smoker, or strong family history of heart disease)* History of heart disease (i.e., angina, heart attack, heart failure, bypass surgery, takes nitroglycerin)* Pain is crushing, pressure-like, or heavy    Negative: Heart beating < 50 beats per minute OR > 140 beats per minute    Negative: Visible sweat on face or sweat dripping down face    Negative: Sounds like a life-threatening emergency to the triager    Negative: Followed an injury to chest    Negative: SEVERE chest pain    Negative: Pain also in shoulder(s) or arm(s) or jaw    Negative: Difficulty  breathing    Negative: Cocaine use within last 3 days    Negative: Major surgery in the past month    Negative: Hip or leg fracture (broken bone) in past month (or had cast on leg or ankle in past month)    Negative: Illness requiring prolonged bedrest in past month (e.g., immobilization, long hospital stay)    Negative: Long-distance travel in past month (e.g., car, bus, train, plane; with trip lasting 6 or more hours)    Negative: History of prior 'blood clot' in leg or lungs (i.e., deep vein thrombosis, pulmonary embolism)    Negative: History of inherited increased risk of blood clots (e.g., Factor 5 Leiden, Anti-thrombin 3, Protein C or Protein S deficiency, Prothrombin mutation)    Negative: Cancer treatment in the past two months (or has cancer now)    Negative: Heart beating irregularly or very rapidly    Negative: Rash in same area as pain (may be described as 'small blisters')    Negative: Chest pain(s) lasting a few seconds persists > 3 days    Negative: Fever > 100.4 F (38.0 C)    Negative: Patient says chest pain feels exactly the same as previously diagnosed 'heartburn' and describes burning in chest and accompanying sour taste in mouth    Negative: Chest pain lasting longer than 5 minutes and occurred in last 3 days (72 hours) (Exception: feels exactly the same as previously diagnosed heartburn and has accompanying sour taste in mouth)    Negative: Chest pain or 'angina' comes and goes and is happening more often (increasing in frequency) or getting worse (increasing in severity) (Exception: chest pains that last only a few seconds)    Negative: Dizziness or lightheadedness    Negative: Coughing up blood    Negative: Patient sounds very sick or weak to the triager    Protocols used: CHEST PAIN-A-OH

## 2022-12-12 NOTE — TELEPHONE ENCOUNTER
Patient and her whole family has the following symptoms:  Fever, cough, headache, sore throat.    She is wanting to know if they can have Tamiflu prescribed without coming in since she is home with 4 sick kids and no help till father gets back in town.    Alyson Everett on 12/12/2022 at 3:03 PM;

## 2022-12-12 NOTE — TELEPHONE ENCOUNTER
Sounds consistent with influenza A which we are seeing a ton of.     We were notified last week that the pharmacies are currently short on tamiflu so prescriptions should be used for higher risk people.     tamiflu is really minimally effective. So doesn't really make a big difference in symptoms (decreases by a day at most) and has side effects (increased GI symptoms and nightmares).     No need to be seen. Just focus on hydration. More than you think necessary as fevers are very dehydrating.     Symptoms typically last 7-10 days.

## 2022-12-13 ENCOUNTER — TELEPHONE (OUTPATIENT)
Dept: FAMILY MEDICINE | Facility: OTHER | Age: 42
End: 2022-12-13

## 2022-12-13 DIAGNOSIS — N80.03 ADENOMYOSIS OF UTERUS: Primary | ICD-10-CM

## 2022-12-13 DIAGNOSIS — J11.1 INFLUENZA-LIKE ILLNESS: Primary | ICD-10-CM

## 2022-12-13 RX ORDER — DROSPIRENONE AND ETHINYL ESTRADIOL 0.02-3(28)
1 KIT ORAL DAILY
Qty: 30 TABLET | Refills: 3 | Status: SHIPPED | OUTPATIENT
Start: 2022-12-13 | End: 2023-03-07

## 2022-12-13 NOTE — TELEPHONE ENCOUNTER
Patient's children have tested positive for Influenza A. She is now having symptoms. Her PCP is not in today. She is wondering if another provider could send a prescription for Tamiflu to [facility] for her. Please advise.    Lucia Stoddard on 12/13/2022 at 4:31 PM

## 2022-12-13 NOTE — TELEPHONE ENCOUNTER
"Patient has four children that have all been diagnosed with influenza A in the past four days. Patient states her symptoms of cough, sore through, muscle aches, congestion, fatigue began this morning. Patient is wondering if she can have a prescription of Tamiflu called into the standalone [facility] or if she will need to come in and be seen.    It was noted in telephone encounter from 12.12.22 by Dr. Rich: \"Sounds consistent with influenza A which we are seeing a ton of. We were notified last week that the pharmacies are currently short on tamiflu so prescriptions should be used for higher risk people. tamiflu is really minimally effective. So doesn't really make a big difference in symptoms (decreases by a day at most) and has side effects (increased GI symptoms and nightmares). No need to be seen. Just focus on hydration. More than you think necessary as fevers are very dehydrating.  Symptoms typically last 7-10 days.\"    Due to time of call, patient was notified that call may not be addressed until morning when primary is available- verbalized understanding. Please advise on next steps    Corinne R Thayer, RN on 12/13/2022 at 4:54 PM      "

## 2022-12-14 RX ORDER — OSELTAMIVIR PHOSPHATE 75 MG/1
75 CAPSULE ORAL 2 TIMES DAILY
Qty: 10 CAPSULE | Refills: 0 | Status: SHIPPED | OUTPATIENT
Start: 2022-12-14 | End: 2022-12-19

## 2022-12-14 NOTE — TELEPHONE ENCOUNTER
Rx sent.    If you are feeling better at all, I would not pick it up from the pharmacy,  as our supply is very limited in our community and we are hoping to have the medication available mostly for our patients with significant medical comorbidities or lung disease that puts them at increased risks of the impact/symptoms/hospitalization, etc of influenza.    Avril Sanders, DO

## 2022-12-14 NOTE — TELEPHONE ENCOUNTER
Left message to call back....................  12/14/2022   8:03 AM  Rajani Ford LPN 12/14/2022  8:03 AM

## 2023-01-11 ENCOUNTER — TRANSFERRED RECORDS (OUTPATIENT)
Dept: MULTI SPECIALTY CLINIC | Facility: CLINIC | Age: 43
End: 2023-01-11

## 2023-01-24 ENCOUNTER — OFFICE VISIT (OUTPATIENT)
Dept: OBGYN | Facility: OTHER | Age: 43
End: 2023-01-24
Attending: STUDENT IN AN ORGANIZED HEALTH CARE EDUCATION/TRAINING PROGRAM
Payer: COMMERCIAL

## 2023-01-24 VITALS
SYSTOLIC BLOOD PRESSURE: 124 MMHG | HEART RATE: 94 BPM | OXYGEN SATURATION: 99 % | DIASTOLIC BLOOD PRESSURE: 82 MMHG | BODY MASS INDEX: 27.76 KG/M2 | WEIGHT: 172 LBS

## 2023-01-24 DIAGNOSIS — N80.03 ADENOMYOSIS OF UTERUS: Primary | ICD-10-CM

## 2023-01-24 PROCEDURE — 99214 OFFICE O/P EST MOD 30 MIN: CPT | Performed by: STUDENT IN AN ORGANIZED HEALTH CARE EDUCATION/TRAINING PROGRAM

## 2023-01-24 ASSESSMENT — PAIN SCALES - GENERAL: PAINLEVEL: MILD PAIN (2)

## 2023-01-24 NOTE — NURSING NOTE
Patient presents to clinic for follow up from concerns of birth control, patient states pill was making her feel extremely depressed, so she has discontinued taking medication and states is having some relief with depression but abdominal pain is back. She states that while she was on the birth control she had no pain- but states the emotional aspect of the pill was not sustainable.  /82   Pulse 94   Wt 78 kg (172 lb)   SpO2 99%   BMI 27.76 kg/m    Fanny Interiano LPN on 1/24/2023 at 2:01 PM

## 2023-01-24 NOTE — PROGRESS NOTES
Follow-Up Visit    S: Ms. Maricarmen Ornelas is a 42 year old  here for follow-up regarding her chronic pelvic pain.  We have been following with H other closely for suspected adenomyosis.  At her last visit we discussed the option of suppressive therapy with hormonal contraceptive methods versus definitive management with a hysterectomy.  She opted to undergo a few months of suppressive therapy and notes that this made a significant difference in her symptoms.  As soon as she got her period again after stopping the therapy her pelvic pain cramping and significant pressure returned.  Unfortunately she said her depression had worsened significantly over the past few months while using this birth control.    So much so that she felt as though she was not herself and was pondering whether or not she should begin another antidepressant.  This is not something she is willing to continue to feel and has decided to discontinue the medication altogether.    With this new information that her symptoms did improve when her uterus and ovaries were suppressed we discussed that it could be reasonable to infer that by removing the uterus her symptoms would improve without needing the suppressive hormone therapy.    We discussed in detail the current recommendation for retaining her ovaries if they are normal in appearance until the age of at least 65 due to ongoing evidence in support in heart and bone health.  The average risk of ovarian cancer is 1 and 80 women.  This alone is not usually high enough for a woman to remove just for prophylaxis altogether.  They have found that removing ovaries prior to the age of 65 can decrease the lifespan and O and increase overall morbidity for women.  We also discussed that this is very much a personal decision and if she was interested in having ovaries removed I would be happy to perform that for her during the surgery.  In that context we would use an estrogen replacement for  replacement hormones until the average age of menopause around 50 years old.    She would not like to proceed with surgery until next fall when her twins are in full-time     O:  /82   Pulse 94   Wt 78 kg (172 lb)   SpO2 99%   BMI 27.76 kg/m    Gen: Well-appearing, NAD  Remainder of exam deferred at this time as it was purely just counseling and discussion for upcoming plans in the fall      Assessment/Plan  Maricarmen Ornelas is a 42 year old  female here for surgical discussion in the setting of adenomyosis.      # Suspected adenomyosis  -- Reviewed her options with continued suppressive hormone therapy versus definitive management in the form of hysterectomy, bilateral salpingectomy.  The suppressive hormone therapy worked however was creating side effects that were too extreme on her mental health to be continued.  -- After much discussion and consideration she has decided on proceeding with definitive therapy in the form of a hysterectomy.  She is a candidate for TLH, BS, cystoscopy due to her prior  history.     We discussed that closer to the time of surgery we would proceed with a repeat ultrasound to assess uterine size.  In the context of significantly enlarged uterus it may be possible that we would proceed with an abdominal hysterectomy through her prior  scar line.  This will be further discussed after an exam and ultrasound closer to the time of surgery in many months.    We discussed that if it was a laparoscopic surgery we could plan on a same-day discharge depending on how she is feeling after the procedure.  If she had an abdominal hysterectomy we will plan on staying at least 1 night.    At this time she would plan on leaving both ovaries in place as she does endorse a family history of osteoporosis in women who have had early menopause around the early 40s.     Uterine size has grown 2.5 cm from imaging in 2020 which can correspond with  The increased  pressure she feels in her pelvis. We discussed that suppressive therapy will not help with pressure type symptoms, but could reduce pain during menses and amount of bleeding during menses.     Other surgical considerations:  -- She is allergic to ChloraPrep will not use during the preop procedure.  We will use Betadine instead  -- Primary care provider clearance prior to surgery  -- Schedule follow-up with me in August 2023 with planned procedure sometime in September  -- Will need ultrasound prior to or as a result of our August 2023 visit      Total amount of time spent today during our discussion, review of images, and documentation was 35 minutes  SHUN QUINTANILLA MD on 1/24/2023 at 3:11 PM

## 2023-03-07 ENCOUNTER — OFFICE VISIT (OUTPATIENT)
Dept: FAMILY MEDICINE | Facility: OTHER | Age: 43
End: 2023-03-07
Attending: FAMILY MEDICINE
Payer: COMMERCIAL

## 2023-03-07 VITALS
HEART RATE: 90 BPM | WEIGHT: 168.38 LBS | BODY MASS INDEX: 27.18 KG/M2 | RESPIRATION RATE: 16 BRPM | OXYGEN SATURATION: 99 % | TEMPERATURE: 96.9 F | SYSTOLIC BLOOD PRESSURE: 122 MMHG | DIASTOLIC BLOOD PRESSURE: 80 MMHG

## 2023-03-07 DIAGNOSIS — F90.0 ATTENTION DEFICIT HYPERACTIVITY DISORDER (ADHD), PREDOMINANTLY INATTENTIVE TYPE: ICD-10-CM

## 2023-03-07 DIAGNOSIS — R10.2 PELVIC PAIN IN FEMALE: ICD-10-CM

## 2023-03-07 DIAGNOSIS — E03.9 HYPOTHYROIDISM, UNSPECIFIED TYPE: ICD-10-CM

## 2023-03-07 PROCEDURE — 99214 OFFICE O/P EST MOD 30 MIN: CPT | Performed by: FAMILY MEDICINE

## 2023-03-07 RX ORDER — DICYCLOMINE HYDROCHLORIDE 10 MG/1
10 CAPSULE ORAL 4 TIMES DAILY PRN
Qty: 180 CAPSULE | Refills: 1 | Status: SHIPPED | OUTPATIENT
Start: 2023-03-07 | End: 2023-11-02

## 2023-03-07 RX ORDER — NABUMETONE 500 MG/1
500 TABLET, FILM COATED ORAL 2 TIMES DAILY
Qty: 180 TABLET | Refills: 4 | Status: SHIPPED | OUTPATIENT
Start: 2023-03-07 | End: 2023-09-07

## 2023-03-07 RX ORDER — LEVOTHYROXINE SODIUM 75 MCG
75 TABLET ORAL DAILY
Qty: 90 TABLET | Refills: 4 | Status: SHIPPED | OUTPATIENT
Start: 2023-03-07 | End: 2023-11-02

## 2023-03-07 RX ORDER — METHYLPHENIDATE HYDROCHLORIDE 54 MG/1
54 TABLET ORAL DAILY
Qty: 30 TABLET | Refills: 0 | Status: SHIPPED | OUTPATIENT
Start: 2023-05-31 | End: 2023-07-06

## 2023-03-07 RX ORDER — METHYLPHENIDATE HYDROCHLORIDE 54 MG/1
54 TABLET ORAL DAILY
Qty: 30 TABLET | Refills: 0 | Status: SHIPPED | OUTPATIENT
Start: 2023-04-01 | End: 2023-07-14

## 2023-03-07 RX ORDER — METHYLPHENIDATE HYDROCHLORIDE 54 MG/1
54 TABLET ORAL DAILY
Qty: 30 TABLET | Refills: 0 | Status: SHIPPED | OUTPATIENT
Start: 2023-05-01 | End: 2023-07-14

## 2023-03-07 ASSESSMENT — PAIN SCALES - GENERAL: PAINLEVEL: NO PAIN (0)

## 2023-03-07 NOTE — PROGRESS NOTES
Assessment & Plan     1. Attention deficit hyperactivity disorder (ADHD), predominantly inattentive type  Chronic, stable.  Is having some difficulty obtaining Concerta from the pharmacies at this time; due to the increased usage of stimulants in the adult population.  Ultimate goal is to be able to wean back off of it.  Is having increased success with meditation; and recently returned from a silent retreat in [place].  Contract/UDS UTD.  [place]  accessed during appointment and appropriate.  Follow up in 3 monhts; sooner prn.  - methylphenidate (CONCERTA) 54 MG CR tablet; Take 1 tablet (54 mg) by mouth daily  Dispense: 30 tablet; Refill: 0  - methylphenidate (CONCERTA) 54 MG CR tablet; Take 1 tablet (54 mg) by mouth daily  Dispense: 30 tablet; Refill: 0  - methylphenidate (CONCERTA) 54 MG CR tablet; Take 1 tablet (54 mg) by mouth daily  Dispense: 30 tablet; Refill: 0    2. Pelvic pain in female  Chronic, waxes and wanes with menses and coping/meditation.  Has been following with OB/GYN re: possible hysterectomy.  She continues to decide when she would like to proceed with this.  - dicyclomine (BENTYL) 10 MG capsule; Take 1 capsule (10 mg) by mouth 4 times daily as needed (abdominal cramping)  Dispense: 180 capsule; Refill: 1  - nabumetone (RELAFEN) 500 MG tablet; Take 1 tablet (500 mg) by mouth 2 times daily  Dispense: 180 tablet; Refill: 4    3. Hypothyroidism, unspecified type  Chronic, stable symptomatology.  Does need to leave today, but will collect labs in 3 months for monitoring purposes.   Refilled at current dosing for now.  - SYNTHROID 75 MCG tablet; Take 1 tablet (75 mcg) by mouth daily  Dispense: 90 tablet; Refill: 4    MDM:  Prescription drug management    No follow-ups on file.    Avril Sanders, [facility]   Maricarmen is a 42 year old, presenting for the following health issues:  Recheck Medication      History of Present Illness       Hypothyroidism:     Since  last visit, patient describes the following symptoms::  Constipation and Fatigue    Reason for visit:  Med check    She eats 4 or more servings of fruits and vegetables daily.She consumes 1 sweetened beverage(s) daily.She exercises with enough effort to increase her heart rate 20 to 29 minutes per day.  She exercises with enough effort to increase her heart rate 4 days per week.   She is taking medications regularly.       Has met with OB/GYN re: Adenomyosis of uterus and dysmenorrhea.  She is likely planning hysterectomy come fall when her twins are in full time .  Is doing more meditation to help with her pain, which has made an effect.  But still not as beneficial as when she was on the [place].     ADHD Follow-Up (Adult)  Concerns: None  Changes since last visit: Stable  Taking controlled (daily) medications as prescribed: Yes  Sleep: no problems  Adult ADHD Self-Reporting form given to patient?:  No  Currently in counseling: No; continues to do her own meditation, healing energy (like Reiki therapy), more     Medication Benefits:   Controlled symptoms: Finishing tasks, Impulse control, Frustration tolerance and Accepting limits  Uncontrolled symptoms:  None    Medication Side Effects:  Reports:  none  Sleep Problems? no  ++++++++++++++++++++++++++++++++++++++++++++++++    Employer Concerns/Feedback: Stable  Coworker Concerns:   Stable  Home/Family Concerns: Stable        Objective    /80   Pulse 90   Temp 96.9  F (36.1  C) (Tympanic)   Resp 16   Wt 76.4 kg (168 lb 6 oz)   LMP 01/11/2023 (Approximate)   SpO2 99%   BMI 27.18 kg/m    Body mass index is 27.18 kg/m .  Physical Exam   GENERAL: healthy, alert and no distress  SKIN: no suspicious lesions or rashes  NEURO: Normal strength and tone, mentation intact and speech normal  PSYCH: mentation appears normal, affect normal/bright    No results found for any visits on 03/07/23.

## 2023-03-07 NOTE — NURSING NOTE
"Chief Complaint   Patient presents with     Recheck Medication       Initial /80   Pulse 90   Temp 96.9  F (36.1  C) (Tympanic)   Resp 16   Wt 76.4 kg (168 lb 6 oz)   LMP 01/11/2023 (Approximate)   SpO2 99%   BMI 27.18 kg/m   Estimated body mass index is 27.18 kg/m  as calculated from the following:    Height as of 7/1/21: 1.676 m (5' 6\").    Weight as of this encounter: 76.4 kg (168 lb 6 oz).  Medication Reconciliation: complete    Rajani Ford LPN     Advanced Care Directive reviewed.     "

## 2023-05-06 ENCOUNTER — HEALTH MAINTENANCE LETTER (OUTPATIENT)
Age: 43
End: 2023-05-06

## 2023-05-31 DIAGNOSIS — F90.0 ATTENTION DEFICIT HYPERACTIVITY DISORDER (ADHD), PREDOMINANTLY INATTENTIVE TYPE: ICD-10-CM

## 2023-06-02 NOTE — TELEPHONE ENCOUNTER
Had one to fill at the pharmacy for 5/31/23 as prescribed at her last ADHD visit.    Avril Sanders, DO on 6/2/2023 at 7:10 AM

## 2023-06-05 RX ORDER — METHYLPHENIDATE HYDROCHLORIDE 54 MG/1
TABLET ORAL
Qty: 30 TABLET | Refills: 0 | OUTPATIENT
Start: 2023-06-05

## 2023-06-05 NOTE — TELEPHONE ENCOUNTER
methylphenidate (CONCERTA) 54 MG CR tablet 30 tablet 0 2023  No   Sig - Route: Take 1 tablet (54 mg) by mouth daily - Oral   Sent to pharmacy as: Methylphenidate HCl ER (OSM) 54 MG Oral Tablet Extended Release (CONCERTA)   Class: E-Prescribe   Earliest Fill Date: 2023   Order: [id number]   E-Prescribing Status: Receipt confirmed by pharmacy (3/7/2023  2:17 PM CST)     [id number] [place], [place] - [address]   Signed 3/7/23.    Called [facility] and spoke with pharmacist Yeimy, after verifying Pt's last name and . She confirmed prescription is in their system and verbalized plan to get this ready for Pt to .     Called and spoke to Patient after verifying last name and date of birth. Pt notified. Pt states she might run out before her next appointment, because she couldn't get in until:    Next 5 appointments (look out 90 days)    2023  9:20 AM  SHORT with Avril Sanders,   [facility] ([facility] ) [address]  [place] [address] [phone]        Pt has other issues she would like to discuss, so plans to call early each day, to see if she can get in any sooner.     Tracey Castro RN .............. 2023  2:31 PM

## 2023-07-05 DIAGNOSIS — F90.0 ATTENTION DEFICIT HYPERACTIVITY DISORDER (ADHD), PREDOMINANTLY INATTENTIVE TYPE: ICD-10-CM

## 2023-07-06 DIAGNOSIS — F90.0 ATTENTION DEFICIT HYPERACTIVITY DISORDER (ADHD), PREDOMINANTLY INATTENTIVE TYPE: ICD-10-CM

## 2023-07-06 RX ORDER — METHYLPHENIDATE HYDROCHLORIDE 54 MG/1
54 TABLET ORAL DAILY
Qty: 30 TABLET | Refills: 0 | Status: SHIPPED | OUTPATIENT
Start: 2023-07-06 | End: 2023-07-14

## 2023-07-06 NOTE — TELEPHONE ENCOUNTER
Requested Prescriptions   Pending Prescriptions Disp Refills     methylphenidate HCl ER (OSM) (CONCERTA) 54 MG CR tablet 30 tablet 0     Sig: Take 1 tablet (54 mg) by mouth daily   Last Prescription Date:   5/31/23  Last Fill Qty/Refills:         30, R-0    Last Office Visit:              3/7/23   Future Office visit:             Next 5 appointments (look out 90 days)    Jul 13, 2023  9:20 AM  SHORT with Avril Sanders DO  [facility] ([facility] ) [address]  [place] [address]  [phone]        In clinical absence of patient's primary, Avril Sanders, patient is requesting that this message be sent to the covering provider for consideration please.    Tracey Castro RN .............. 7/6/2023  2:47 PM

## 2023-07-06 NOTE — TELEPHONE ENCOUNTER
1 month refill given in absence of PCP.   reviewed and appears appropriate. Follow up with PCP scheduled for 7/13/2023.  Yenni Baldwin PA-C on 7/6/2023 at 2:58 PM

## 2023-07-06 NOTE — TELEPHONE ENCOUNTER
Reason for call: Medication or medication refill    Name of medication requested: Methylphenidate; has been unable to get an appointment sooner than 7/13/23.    Are you out of the medication? Yes    What pharmacy do you use? [facility]    Preferred method for responding to this message: Telephone Call    Phone number patient can be reached at: Cell number on file:    Telephone Information:   Mobile [phone]       If we cannot reach you directly, may we leave a detailed response at the number you provided? Yes     Lucia Stoddard on 7/6/2023 at 2:39 PM

## 2023-07-10 RX ORDER — METHYLPHENIDATE HYDROCHLORIDE 54 MG/1
TABLET ORAL
Qty: 30 TABLET | Refills: 0 | OUTPATIENT
Start: 2023-07-10

## 2023-07-10 NOTE — TELEPHONE ENCOUNTER
[id number] of [place] sent Rx request for the following:      Requested Prescriptions   Pending Prescriptions Disp Refills     methylphenidate HCl ER (OSM) (CONCERTA) 54 MG CR tablet [Pharmacy Med Name: METHYLPHENIDATE 54MG ER TABLET] 30 tablet 0     Sig: TAKE 1 TABLET (54 MG) BY MOUTH DAILY EFFECTIVE DATE: 5/31/23     Approved 7/6/23. Pharmacy notified. Tracey Castro RN .............. 7/10/2023  11:50 AM

## 2023-07-13 NOTE — PROGRESS NOTES
Assessment & Plan     1. Attention deficit hyperactivity disorder (ADHD), predominantly inattentive type  Chronic, stable on therapy.  Refilled Concerta x 3 months.  - methylphenidate HCl ER, OSM, (CONCERTA) 54 MG CR tablet; Take 1 tablet (54 mg) by mouth daily  Dispense: 30 tablet; Refill: 0  - methylphenidate HCl ER, OSM, (CONCERTA) 54 MG CR tablet; Take 1 tablet (54 mg) by mouth daily  Dispense: 30 tablet; Refill: 0  - methylphenidate HCl ER, OSM, (CONCERTA) 54 MG CR tablet; Take 1 tablet (54 mg) by mouth daily  Dispense: 30 tablet; Refill: 0    2. Anxiety  Chronic, worsening.  Rx for Cymbalta renewed.  - DULoxetine (CYMBALTA) 20 MG capsule; Take 1 capsule (20 mg) by mouth daily  Dispense: 90 capsule; Refill: 4    3. Adenomyosis of uterus  Chronic, still deciding on hysterectomy.  - US Pelvic Complete with Transvaginal; Future    4. Acquired hypothyroidism  Chronic, stable on Synthroid.  Due for monitoring labs; but will return at a later time when she does not have to go get her kids.  - TSH; Future  - T4, Free; Future    5. Lipid screening  Will collect with future labs.  - Lipid Panel; Future    6. Diabetes mellitus screening  Will collect with future labs.  - Basic Metabolic Panel; Future    MDM:  Ordering of each unique test  Prescription drug management     No follow-ups on file.    Avril Sanders, [facility] AND [facility]   Maricarmen is a 43 year old, presenting for the following health issues:  Recheck Medication    History of Present Illness       Reason for visit:  Med check    She eats 2-3 servings of fruits and vegetables daily.She consumes 0 sweetened beverage(s) daily.She exercises with enough effort to increase her heart rate 20 to 29 minutes per day.  She exercises with enough effort to increase her heart rate 5 days per week.   She is taking medications regularly.    Today's PHQ-9         PHQ-9 Total Score: 12    PHQ-9 Q9 Thoughts of better off dead/self-harm past 2  "weeks :   Not at all    How difficult have these problems made it for you to do your work, take care of things at home, or get along with other people: Somewhat difficult  Today's MINNIE-7 Score: 14       ADHD Follow-Up (Adult)  Concerns: None  Changes since last visit: Stable  Taking controlled (daily) medications as prescribed: Yes  Sleep: no problems  Adult ADHD Self-Reporting form given to patient?:  No  Currently in counseling: No; continues to do her own meditation, healing energy (like Reiki therapy), more      Medication Benefits:   Controlled symptoms: Finishing tasks, Impulse control, Frustration tolerance and Accepting limits  Uncontrolled symptoms:  None     Medication Side Effects:  Reports:  none  Sleep Problems? no  ++++++++++++++++++++++++++++++++++++++++++++++++     Employer Concerns/Feedback: Stable  Coworker Concerns:   Stable  Home/Family Concerns: Stable     PDMP Review       Value Time User    State PDMP site checked  Yes 7/6/2023  2:57 PM Yenni Baldwin PA-C            Anxiety: was having a difficult time this spring.  Had some duloxetine at home from previous and started that.  Has been extremely helpful; would like to continue.   Light at the end of the tunnel.  Twins will be going to full day  this fall.    PHQ-9 score:        7/14/2023     8:18 AM   PHQ   PHQ-9 Total Score 12   Q9: Thoughts of better off dead/self-harm past 2 weeks Not at all         7/17/2020     1:36 PM 2/18/2021     2:57 PM 7/14/2023     8:18 AM   MINNIE-7 SCORE   Total Score   14 (moderate anxiety)   Total Score 16 14 14     Would like thyroid levels checked; but will return for labs as she has to go get her kids after this.         Objective    /76   Pulse 76   Temp (!) 96.7  F (35.9  C) (Tympanic)   Resp 16   Ht 1.664 m (5' 5.5\")   Wt 76.7 kg (169 lb 2 oz)   LMP 05/15/2023 (Approximate)   SpO2 99%   BMI 27.72 kg/m    Body mass index is 27.72 kg/m .  Physical Exam   GENERAL: healthy, alert and no " distress  NECK: no adenopathy, no asymmetry, masses, or scars and thyroid normal to palpation  RESP: lungs clear to auscultation - no rales, rhonchi or wheezes  CV: regular rate and rhythm, normal S1 S2, no S3 or S4, no murmur, click or rub, no peripheral edema and peripheral pulses strong  MS: no gross musculoskeletal defects noted, no edema  PSYCH: mentation appears normal, affect normal/bright    No results found for any visits on 07/14/23.

## 2023-07-14 ENCOUNTER — OFFICE VISIT (OUTPATIENT)
Dept: FAMILY MEDICINE | Facility: OTHER | Age: 43
End: 2023-07-14
Attending: FAMILY MEDICINE
Payer: COMMERCIAL

## 2023-07-14 VITALS
SYSTOLIC BLOOD PRESSURE: 120 MMHG | DIASTOLIC BLOOD PRESSURE: 76 MMHG | OXYGEN SATURATION: 99 % | BODY MASS INDEX: 27.18 KG/M2 | TEMPERATURE: 96.7 F | RESPIRATION RATE: 16 BRPM | WEIGHT: 169.13 LBS | HEART RATE: 76 BPM | HEIGHT: 66 IN

## 2023-07-14 DIAGNOSIS — Z13.1 DIABETES MELLITUS SCREENING: ICD-10-CM

## 2023-07-14 DIAGNOSIS — E03.9 ACQUIRED HYPOTHYROIDISM: ICD-10-CM

## 2023-07-14 DIAGNOSIS — F41.9 ANXIETY: ICD-10-CM

## 2023-07-14 DIAGNOSIS — N80.03 ADENOMYOSIS OF UTERUS: ICD-10-CM

## 2023-07-14 DIAGNOSIS — Z13.220 LIPID SCREENING: ICD-10-CM

## 2023-07-14 DIAGNOSIS — F90.0 ATTENTION DEFICIT HYPERACTIVITY DISORDER (ADHD), PREDOMINANTLY INATTENTIVE TYPE: Primary | ICD-10-CM

## 2023-07-14 PROCEDURE — 99214 OFFICE O/P EST MOD 30 MIN: CPT | Performed by: FAMILY MEDICINE

## 2023-07-14 RX ORDER — METHYLPHENIDATE HYDROCHLORIDE 54 MG/1
54 TABLET ORAL DAILY
Qty: 30 TABLET | Refills: 0 | Status: SHIPPED | OUTPATIENT
Start: 2023-08-05 | End: 2023-11-02

## 2023-07-14 RX ORDER — METHYLPHENIDATE HYDROCHLORIDE 54 MG/1
54 TABLET ORAL DAILY
Qty: 30 TABLET | Refills: 0 | Status: SHIPPED | OUTPATIENT
Start: 2023-09-03 | End: 2023-10-13

## 2023-07-14 RX ORDER — METHYLPHENIDATE HYDROCHLORIDE 54 MG/1
54 TABLET ORAL DAILY
Qty: 30 TABLET | Refills: 0 | Status: SHIPPED | OUTPATIENT
Start: 2023-10-04 | End: 2023-11-02

## 2023-07-14 RX ORDER — DULOXETIN HYDROCHLORIDE 20 MG/1
20 CAPSULE, DELAYED RELEASE ORAL DAILY
Qty: 90 CAPSULE | Refills: 4 | Status: SHIPPED | OUTPATIENT
Start: 2023-07-14 | End: 2023-11-02

## 2023-07-14 ASSESSMENT — ANXIETY QUESTIONNAIRES
1. FEELING NERVOUS, ANXIOUS, OR ON EDGE: MORE THAN HALF THE DAYS
GAD7 TOTAL SCORE: 14
GAD7 TOTAL SCORE: 14
2. NOT BEING ABLE TO STOP OR CONTROL WORRYING: NEARLY EVERY DAY
5. BEING SO RESTLESS THAT IT IS HARD TO SIT STILL: MORE THAN HALF THE DAYS
4. TROUBLE RELAXING: MORE THAN HALF THE DAYS
7. FEELING AFRAID AS IF SOMETHING AWFUL MIGHT HAPPEN: MORE THAN HALF THE DAYS
IF YOU CHECKED OFF ANY PROBLEMS ON THIS QUESTIONNAIRE, HOW DIFFICULT HAVE THESE PROBLEMS MADE IT FOR YOU TO DO YOUR WORK, TAKE CARE OF THINGS AT HOME, OR GET ALONG WITH OTHER PEOPLE: SOMEWHAT DIFFICULT
6. BECOMING EASILY ANNOYED OR IRRITABLE: SEVERAL DAYS
3. WORRYING TOO MUCH ABOUT DIFFERENT THINGS: MORE THAN HALF THE DAYS

## 2023-07-14 ASSESSMENT — PAIN SCALES - GENERAL: PAINLEVEL: NO PAIN (0)

## 2023-07-14 ASSESSMENT — PATIENT HEALTH QUESTIONNAIRE - PHQ9
SUM OF ALL RESPONSES TO PHQ QUESTIONS 1-9: 12
SUM OF ALL RESPONSES TO PHQ QUESTIONS 1-9: 12
10. IF YOU CHECKED OFF ANY PROBLEMS, HOW DIFFICULT HAVE THESE PROBLEMS MADE IT FOR YOU TO DO YOUR WORK, TAKE CARE OF THINGS AT HOME, OR GET ALONG WITH OTHER PEOPLE: SOMEWHAT DIFFICULT

## 2023-07-14 NOTE — NURSING NOTE
"Chief Complaint   Patient presents with     Recheck Medication       Initial /76   Pulse 76   Temp (!) 96.7  F (35.9  C) (Tympanic)   Resp 16   Ht 1.664 m (5' 5.5\")   Wt 76.7 kg (169 lb 2 oz)   LMP 05/15/2023 (Approximate)   SpO2 99%   BMI 27.72 kg/m   Estimated body mass index is 27.72 kg/m  as calculated from the following:    Height as of this encounter: 1.664 m (5' 5.5\").    Weight as of this encounter: 76.7 kg (169 lb 2 oz).  Medication Reconciliation: complete    Rajani Ford LPN     [facility] Directive reviewed.     "

## 2023-08-04 ENCOUNTER — HOSPITAL ENCOUNTER (OUTPATIENT)
Dept: ULTRASOUND IMAGING | Facility: OTHER | Age: 43
Discharge: HOME | End: 2023-08-04
Attending: FAMILY MEDICINE | Admitting: FAMILY MEDICINE
Payer: COMMERCIAL

## 2023-08-04 DIAGNOSIS — N80.03 ADENOMYOSIS OF UTERUS: ICD-10-CM

## 2023-08-04 PROCEDURE — 76830 TRANSVAGINAL US NON-OB: CPT

## 2023-08-15 ENCOUNTER — OFFICE VISIT (OUTPATIENT)
Dept: OBGYN | Facility: OTHER | Age: 43
End: 2023-08-15
Attending: STUDENT IN AN ORGANIZED HEALTH CARE EDUCATION/TRAINING PROGRAM
Payer: COMMERCIAL

## 2023-08-15 VITALS
DIASTOLIC BLOOD PRESSURE: 72 MMHG | HEART RATE: 65 BPM | WEIGHT: 173 LBS | SYSTOLIC BLOOD PRESSURE: 120 MMHG | BODY MASS INDEX: 28.35 KG/M2

## 2023-08-15 DIAGNOSIS — N80.03 ADENOMYOSIS OF THE UTERUS: ICD-10-CM

## 2023-08-15 DIAGNOSIS — R10.2 CHRONIC PELVIC PAIN IN FEMALE: ICD-10-CM

## 2023-08-15 DIAGNOSIS — Z12.31 ENCOUNTER FOR SCREENING MAMMOGRAM FOR BREAST CANCER: Primary | ICD-10-CM

## 2023-08-15 DIAGNOSIS — G89.29 CHRONIC PELVIC PAIN IN FEMALE: ICD-10-CM

## 2023-08-15 PROCEDURE — 99215 OFFICE O/P EST HI 40 MIN: CPT | Performed by: STUDENT IN AN ORGANIZED HEALTH CARE EDUCATION/TRAINING PROGRAM

## 2023-08-15 ASSESSMENT — PAIN SCALES - GENERAL: PAINLEVEL: MILD PAIN (2)

## 2023-08-15 NOTE — PROGRESS NOTES
"Date of Surgery: 09/14/23  Type of Surgery: TLH, BS, unilateral oophorectomy, cystoscopy   Surgeon: Dr. Tish Morrison     Patient was given surgical folder  which includes pre-operative bathing instructions related to the two packets of Hibiclens surgical prep provided. appropriate appointments were scheduled by the [facility] [address] .. Surgical forms were copied and kept for informative purposes. Originals were delivered to Day-surgery. Questions were answered to the best of this nurse's ability.        STOP BANG    Fever/Chills or other infectious symptoms in past month? No  >10 pound weight loss in the past 2 months? No  Health Care Directive on file? No   History of blood transfusions? No   Td up to date? No  History of VRE/MRSA? Yes       Obstructive Sleep Apnea screening    Preoperative Evaluation: Obstructive Sleep Apnea screening    S: Snore -  Do you snore loudly? (louder than talking or loud enough to be heard through closed doors) No  T: Tired - Do you often feel tired, fatigued, or sleepy during the daytime?Yes  O: Observed - Has anyone ever observed you stop breathing during your sleep?No  P: Pressure - Do you have or are you being treated for high blood pressure?No  B: BMI - BMI greater than 35kg/m2?No  A: Age - Age over 50 years old?No  N: Neck - Neck circumference greater than 40 cm?No  G: Gender - Gender: Male?No    Total number of \"YES\" responses:  1    Scoring: Low risk of ANTONIO 0-2  At Risk of ANTONIO: >3 High Risk of ANTONIO: 5-8      Total yes answers in ANTONIO section:    Low risk 0-2  At risk 3-4  High risk 5-8    Brea Bedoya RN............. 8/15/2023 12:37 PM    "

## 2023-08-15 NOTE — NURSING NOTE
"Chief Complaint   Patient presents with    Consult     Adenomyosis     Patient is here to discuss a hyst in sept. For adenomyosis,.   Initial /72   Pulse 65   Wt 78.5 kg (173 lb)   LMP 05/15/2023 (Approximate)   BMI 28.35 kg/m   Estimated body mass index is 28.35 kg/m  as calculated from the following:    Height as of 7/14/23: 1.664 m (5' 5.5\").    Weight as of this encounter: 78.5 kg (173 lb).  Medication Reconciliation: complete        Dia Gomez, CHETAN    "

## 2023-08-15 NOTE — PROGRESS NOTES
Follow-Up Visit    S: Ms. Maricarmen Ornelas is a 43 year old  here for follow-up and surgical planning in the setting of adenomyosis and chronic pelvic pain.  We first met at the beginning of the spring at which time her pelvic pain was significantly bothersome, however she was unable to be in a place for surgical intervention at that time in her life.  We discussed and offered suppressive therapy however politely declined.  Over the summer she went many months without getting a period as long as 4 months at a time.  During that time she did not have the chronic pelvic pain and pressure that she has been notoriously noticing.  She does still have pain with intercourse that happens both when she is menstruating as well as when she had had the long many month break.    We reviewed again the pros and cons of surgery versus suppressive therapy.  She notes she really is ready for surgery at this time.  We discussed that the goal of surgery would be to remove the uterus and fallopian tubes.  We again discussed ovarian preservation versus removal.  At the time of our initial consultation the recommendation was that if she was under age 65 the ovary should be remained in place.  She is nervous about ovarian cancer.  She does not have a family history of this.  We discussed that the newer recommendations are that there may not be as much benefit as we originally thinking and it can be appropriate to remove the ovaries at the age of 50.  As she is still premenopausal we discussed that opting automatically for surgical menopause without any endometriosis present in the pelvis would not be the recommendation.  We discussed that the natural estrogen is utilized by her body better than the synthetic transdermal approach for HRT.  She was in agreement and notes that she would like 1 ovary removed and 1 ovary to remain.  At the time of surgery it will be at the surgeon's discretion as to ease of removal, any abnormalities, or  any other indication as to indicate which ovary in particular would be removed. We reviewed the average lifetime risk of ovarian cancer in a woman's 1 and 80 women without any risk factors, which she does not have at this time.  Comparison the risk of breast cancer in women is 1 and 8.  She herself does have risk factors for decreased bone density in a premature menopause setting with significant family history of osteopenia and osteoporosis.      In preparation for surgery she had a repeat pelvic ultrasound performed last month which shows that the uterus is the same size as it was when we originally planning.  The uterus measures 9.5 cm with normal-appearing right and left ovaries.  There is heterogenous echotexture of the myometrium consistent with adenomyosis within the uterus.  Also potentially small fibroids measuring up to 1.5 cm.  We discussed that as the uterus has not grown significantly in size this is a very appropriate and amenable size of uterus to perform with a minimally invasive laparoscopic approach.  A laparoscopic approach is favored over vaginal approach as she has had a prior  as well as prior open appendectomy.  I would like to be able to utilize the camera to visualize and take down any adhesions.    Prior surgeries:  Past Surgical History:   Procedure Laterality Date    APPENDECTOMY OPEN           SECTION N/A 2018    Procedure:  SECTION;   Section - twins;  Surgeon: Woodrow Hurtado MD;  Location:  OR    COLONOSCOPY N/A 7/10/2019    Procedure: COLONOSCOPY, WITH POLYPECTOMY AND BIOPSY;  Surgeon: Viki Villareal MD;  Location:  OR       O:  /72   Pulse 65   Wt 78.5 kg (173 lb)   LMP 05/15/2023 (Approximate)   BMI 28.35 kg/m    Gen: Well-appearing, NAD  Pulm: nonlabored    Assessment/Plan  Maricarmen Ornelas is a 42 year old  female here for surgical discussion in the setting of adenomyosis.      # Suspected adenomyosis  -- After much  discussion and consideration she has decided on proceeding with definitive therapy in the form of a hysterectomy.  She is a candidate for TLH, BS, unilateral oophorectomy (per patient desire, see note above), cystoscopy due to her prior  history.      We discussed that if it was a laparoscopic surgery we could plan on a same-day discharge depending on how she is feeling after the procedure.      Other surgical considerations:  -- She is allergic to ChloraPrep will not use during the preop procedure.  We will use Betadine instead  -- Primary care provider clearance prior to surgery  -- Plan for proactive bowel regimen if opioids used: history of bad reaction with opioids and constipation. Will minimally use opioids.    Planning on , early morning surgery.    All of her questions were answered and she was encouraged to reach out if any further questions arise.    Total amount of time spent during today's encounter including chart prep, ultrasound prep, counseling and discussion of surgery, documentation was 45 minutes  Dia Morrison MD on 8/15/2023 at 2:46 PM

## 2023-08-22 ENCOUNTER — TELEPHONE (OUTPATIENT)
Dept: OBGYN | Facility: OTHER | Age: 43
End: 2023-08-22
Payer: COMMERCIAL

## 2023-08-22 NOTE — TELEPHONE ENCOUNTER
Patient called. Spoke to patient after verifying last name and date of birth. Patient requesting to change the date of her surgery from 09/14/23 to 09/21/23. Patient informed that surgery date could be changed but other appointments would also need to be changed. Informed patient that the  would be calling her to make new appointments. She verbalized understanding and had no other questions.        Brea Bedoya RN on 8/22/2023 at 3:03 PM

## 2023-08-24 NOTE — TELEPHONE ENCOUNTER
Noted that appointments had been changed for patient and surgery day had been changed.       Brea Bedoya RN on 8/24/2023 at 9:35 AM

## 2023-09-04 ENCOUNTER — OFFICE VISIT (OUTPATIENT)
Dept: FAMILY MEDICINE | Facility: OTHER | Age: 43
End: 2023-09-04
Payer: COMMERCIAL

## 2023-09-04 VITALS
SYSTOLIC BLOOD PRESSURE: 114 MMHG | DIASTOLIC BLOOD PRESSURE: 75 MMHG | BODY MASS INDEX: 28.19 KG/M2 | OXYGEN SATURATION: 100 % | HEART RATE: 56 BPM | TEMPERATURE: 97 F | RESPIRATION RATE: 12 BRPM | HEIGHT: 66 IN | WEIGHT: 175.4 LBS

## 2023-09-04 DIAGNOSIS — Z87.19 HISTORY OF ANAL FISSURES: ICD-10-CM

## 2023-09-04 DIAGNOSIS — K62.5 RECTAL BLEEDING: ICD-10-CM

## 2023-09-04 DIAGNOSIS — K62.89 RECTAL PAIN: Primary | ICD-10-CM

## 2023-09-04 DIAGNOSIS — K64.4 EXTERNAL HEMORRHOIDS: ICD-10-CM

## 2023-09-04 DIAGNOSIS — N80.9 ENDOMETRIOSIS: ICD-10-CM

## 2023-09-04 LAB
ANION GAP SERPL CALCULATED.3IONS-SCNC: 8 MMOL/L (ref 7–15)
BASOPHILS # BLD AUTO: 0.1 10E3/UL (ref 0–0.2)
BASOPHILS NFR BLD AUTO: 1 %
BUN SERPL-MCNC: 8.9 MG/DL (ref 6–20)
CALCIUM SERPL-MCNC: 9.3 MG/DL (ref 8.6–10)
CHLORIDE SERPL-SCNC: 102 MMOL/L (ref 98–107)
CREAT SERPL-MCNC: 0.64 MG/DL (ref 0.51–0.95)
DEPRECATED HCO3 PLAS-SCNC: 27 MMOL/L (ref 22–29)
EOSINOPHIL # BLD AUTO: 0.2 10E3/UL (ref 0–0.7)
EOSINOPHIL NFR BLD AUTO: 4 %
ERYTHROCYTE [DISTWIDTH] IN BLOOD BY AUTOMATED COUNT: 13.9 % (ref 10–15)
GFR SERPL CREATININE-BSD FRML MDRD: >90 ML/MIN/1.73M2
GLUCOSE SERPL-MCNC: 92 MG/DL (ref 70–99)
HCT VFR BLD AUTO: 38.7 % (ref 35–47)
HGB BLD-MCNC: 12.2 G/DL (ref 11.7–15.7)
IMM GRANULOCYTES # BLD: 0 10E3/UL
IMM GRANULOCYTES NFR BLD: 0 %
LYMPHOCYTES # BLD AUTO: 1.9 10E3/UL (ref 0.8–5.3)
LYMPHOCYTES NFR BLD AUTO: 32 %
MCH RBC QN AUTO: 26.2 PG (ref 26.5–33)
MCHC RBC AUTO-ENTMCNC: 31.5 G/DL (ref 31.5–36.5)
MCV RBC AUTO: 83 FL (ref 78–100)
MONOCYTES # BLD AUTO: 0.4 10E3/UL (ref 0–1.3)
MONOCYTES NFR BLD AUTO: 7 %
NEUTROPHILS # BLD AUTO: 3.4 10E3/UL (ref 1.6–8.3)
NEUTROPHILS NFR BLD AUTO: 56 %
NRBC # BLD AUTO: 0 10E3/UL
NRBC BLD AUTO-RTO: 0 /100
PLATELET # BLD AUTO: 370 10E3/UL (ref 150–450)
POTASSIUM SERPL-SCNC: 4.3 MMOL/L (ref 3.4–5.3)
RBC # BLD AUTO: 4.66 10E6/UL (ref 3.8–5.2)
SODIUM SERPL-SCNC: 137 MMOL/L (ref 136–145)
WBC # BLD AUTO: 6.1 10E3/UL (ref 4–11)

## 2023-09-04 PROCEDURE — 36415 COLL VENOUS BLD VENIPUNCTURE: CPT | Mod: ZL | Performed by: NURSE PRACTITIONER

## 2023-09-04 PROCEDURE — 99214 OFFICE O/P EST MOD 30 MIN: CPT | Performed by: NURSE PRACTITIONER

## 2023-09-04 PROCEDURE — 82310 ASSAY OF CALCIUM: CPT | Mod: ZL | Performed by: NURSE PRACTITIONER

## 2023-09-04 PROCEDURE — 85025 COMPLETE CBC W/AUTO DIFF WBC: CPT | Mod: ZL | Performed by: NURSE PRACTITIONER

## 2023-09-04 RX ORDER — HYDROCORTISONE ACETATE 25 MG/1
25 SUPPOSITORY RECTAL 2 TIMES DAILY
Qty: 30 SUPPOSITORY | Refills: 0 | Status: SHIPPED | OUTPATIENT
Start: 2023-09-04 | End: 2023-09-07

## 2023-09-04 RX ORDER — DIBUCAINE 1 G/100G
OINTMENT TOPICAL 3 TIMES DAILY PRN
Qty: 60 G | Refills: 0 | Status: SHIPPED | OUTPATIENT
Start: 2023-09-04 | End: 2023-09-07

## 2023-09-04 ASSESSMENT — ENCOUNTER SYMPTOMS
FATIGUE: 1
BLOOD IN STOOL: 1
FEVER: 0
RECTAL PAIN: 1

## 2023-09-04 ASSESSMENT — PAIN SCALES - GENERAL: PAINLEVEL: MODERATE PAIN (4)

## 2023-09-04 NOTE — PROGRESS NOTES
"Maricarmen Ornelas  1980    ASSESSMENT/PLAN  1. Rectal pain  2. Rectal bleeding  3. History of anal fissures    Patient presents with 4 to 5 months of persistent rectal pain and bleeding with every bowel movement, twice a day without improvement.  History of anal fissure and hemorrhoids.  Patient has followed a high-fiber diet without improvement of symptoms.  Patient states she has been prolonging being evaluated as this has become \" her normal\".  Over the past 48 hours she has stopped having rectal bleeding and is now having worsening persistent constant and not just during defecation.  Patient denies any dizziness, lightheadedness.  Digital rectal exam revealed external and likely internal hemorrhoids.  Hemorrhoids did not appear thrombosed or engorged, no rectal bleeding with exam.  No visible external anal fissure.  Recommend obtaining CBC and BMP due to longevity of rectal bleeding.  CBC returned with normal WBC count, 6.1, stable hemoglobin and hematocrit, 12.2/38.7.  I consulted general surgeon on-call to discuss patient's presenting symptoms, rectal bleeding and severity of pain.  General surgeon recommended topical dibucaine rectal ointment and rectal suppository to help with discomfort.  I recommended patient schedule diagnostic colonoscopy as soon as possible, referral placed.  Patient knows to seek further evaluation should she have worsening rectal bleeding, pain or treatment options today are not improving.  - CBC and Differential; Future  - Basic Metabolic Panel; Future  - Adult GI  Referral - Procedure Only; Future  - dibucaine 1 % OINT rectal ointment; Place rectally 3 times daily as needed for hemorrhoids  Dispense: 60 g; Refill: 0  - hydrocortisone (ANUSOL-HC) 25 MG suppository; Place 1 suppository (25 mg) rectally 2 times daily  Dispense: 30 suppository; Refill: 0    4. External hemorrhoids  - dibucaine 1 % OINT rectal ointment; Place rectally 3 times daily as needed for hemorrhoids  " Dispense: 60 g; Refill: 0  - hydrocortisone (ANUSOL-HC) 25 MG suppository; Place 1 suppository (25 mg) rectally 2 times daily  Dispense: 30 suppository; Refill: 0    5. Endometriosis  Continue following with PCP, scheduled for hysterectomy on 2023.    Patient agrees with plan of care and verbalizes understating. AVS printed. Patient education provided verbally and written instructions provided as requested. Patient made aware of emergent signs and symptoms to monitor for and when to seek additional care/follow up.     SUBJECTIVE:   CHIEF COMPLAINT/ REASON FOR VISIT  Patient presents with:  pain when going to the bathroom     HISTORY OF PRESENT ILLNESS  Maricarmen Ornelas is a pleasant 43 year old female presents [facility] with concerns of rectal pain and bleeding.  Patient states she has constant rectal bleeding with every bowel movement for the past 4 to 5 months.  She usually has 2 bowel movements a day.  She will have blood in the toilet water and when she wipes, but denies any maroon or tarry colored stools.  She does have pain with bowel movements.  Over the past week her symptoms have worsened, and now over the past 2 days she is having constant rectal pain at all times even without passing a bowel movement.    She reports that 5 years ago she had a  section and did not have a bowel movement for about 3 weeks.  When she did have a bowel movement she had a large amount of rectal bleeding and pain.  She had a colonoscopy 2019 which did confirm anal fissure.  She follows a high-fiber diet and takes Citrucel to manage her bowels to prevent constipation and have a soft bowel movement.  Intermittently over the past 5 years she has had rectal bleeding and pain however usually this resolves with at home treatment.  She has not had persistent bloody stools and rectal bleeding for the past 4 to 5 months without any improvement and now worsening symptoms.    Patient that she has history of  "endometriosis, and is scheduled for hysterectomy in 2 weeks.    I have reviewed the nursing notes.  I have reviewed allergies, medication list, problem list, and past medical history.    REVIEW OF SYSTEMS  Review of Systems   Constitutional:  Positive for fatigue. Negative for fever.   Gastrointestinal:  Positive for blood in stool and rectal pain.        VITAL SIGNS  Vitals:    09/04/23 1022   BP: 114/75   Pulse: 56   Resp: 12   Temp: 97  F (36.1  C)   TempSrc: Tympanic   SpO2: 100%   Weight: 79.6 kg (175 lb 6.4 oz)   Height: 1.676 m (5' 6\")      Body mass index is 28.31 kg/m .    OBJECTIVE:   PHYSICAL EXAM  Physical Exam  Vitals reviewed. Exam conducted with a chaperone present.   Constitutional:       Appearance: Normal appearance. She is not ill-appearing or toxic-appearing.   HENT:      Head: Normocephalic and atraumatic.      Nose: Nose normal.   Eyes:      Conjunctiva/sclera: Conjunctivae normal.   Cardiovascular:      Rate and Rhythm: Normal rate and regular rhythm.      Pulses: Normal pulses.      Heart sounds: Normal heart sounds.   Pulmonary:      Effort: Pulmonary effort is normal.      Breath sounds: Normal breath sounds. No wheezing.   Abdominal:      General: Abdomen is flat.      Palpations: There is no hepatomegaly or splenomegaly.      Tenderness: There is abdominal tenderness in the right lower quadrant and suprapubic area.   Genitourinary:     Rectum: Tenderness, external hemorrhoid and internal hemorrhoid present.      Comments: Two visible external hemorrhoids, nonthrombosed or engorged, no bloody drainage.  Digital rectal exam completed and several small palpable nodules palpated.  Normal anal sphincter tone.  Neurological:      General: No focal deficit present.      Mental Status: She is alert and oriented to person, place, and time.   Psychiatric:         Mood and Affect: Mood normal.         Behavior: Behavior normal.         Thought Content: Thought content normal.         Judgment: " Judgment normal.        DIAGNOSTICS  Results for orders placed or performed in visit on 09/04/23   Basic Metabolic Panel     Status: Normal   Result Value Ref Range    Sodium 137 136 - 145 mmol/L    Potassium 4.3 3.4 - 5.3 mmol/L    Chloride 102 98 - 107 mmol/L    Carbon Dioxide (CO2) 27 22 - 29 mmol/L    Anion Gap 8 7 - 15 mmol/L    Urea Nitrogen 8.9 6.0 - 20.0 mg/dL    Creatinine 0.64 0.51 - 0.95 mg/dL    Calcium 9.3 8.6 - 10.0 mg/dL    Glucose 92 70 - 99 mg/dL    GFR Estimate >90 >60 mL/min/1.73m2   CBC with platelets and differential     Status: Abnormal   Result Value Ref Range    WBC Count 6.1 4.0 - 11.0 10e3/uL    RBC Count 4.66 3.80 - 5.20 10e6/uL    Hemoglobin 12.2 11.7 - 15.7 g/dL    Hematocrit 38.7 35.0 - 47.0 %    MCV 83 78 - 100 fL    MCH 26.2 (L) 26.5 - 33.0 pg    MCHC 31.5 31.5 - 36.5 g/dL    RDW 13.9 10.0 - 15.0 %    Platelet Count 370 150 - 450 10e3/uL    % Neutrophils 56 %    % Lymphocytes 32 %    % Monocytes 7 %    % Eosinophils 4 %    % Basophils 1 %    % Immature Granulocytes 0 %    NRBCs per 100 WBC 0 <1 /100    Absolute Neutrophils 3.4 1.6 - 8.3 10e3/uL    Absolute Lymphocytes 1.9 0.8 - 5.3 10e3/uL    Absolute Monocytes 0.4 0.0 - 1.3 10e3/uL    Absolute Eosinophils 0.2 0.0 - 0.7 10e3/uL    Absolute Basophils 0.1 0.0 - 0.2 10e3/uL    Absolute Immature Granulocytes 0.0 <=0.4 10e3/uL    Absolute NRBCs 0.0 10e3/uL   CBC and Differential     Status: Abnormal    Narrative    The following orders were created for panel order CBC and Differential.  Procedure                               Abnormality         Status                     ---------                               -----------         ------                     CBC with platelets and d...[[id number]]  Abnormal            Final result                 Please view results for these tests on the individual orders.        Niki Rousseau NP  [facility]

## 2023-09-04 NOTE — NURSING NOTE
"Chief Complaint   Patient presents with    pain when going to the bathroom   Patient presents to clinic for pain with bowel movements - along with a lot of blood, not in the stool but from the anus. She said after having her twins 5 years ago, via , She has dealt with this problem since then. She had a colonoscopy 7/10/2019 where they found a carreno. She states that the the pain gets so bad she feels like crying.      Ana Rosa Tse on 2023 at 10:25 AM        Initial /75   Pulse 56   Temp 97  F (36.1  C) (Tympanic)   Resp 12   Ht 1.676 m (5' 6\")   Wt 79.6 kg (175 lb 6.4 oz)   LMP 2023 (Approximate)   SpO2 100%   BMI 28.31 kg/m   Estimated body mass index is 28.31 kg/m  as calculated from the following:    Height as of this encounter: 1.676 m (5' 6\").    Weight as of this encounter: 79.6 kg (175 lb 6.4 oz).       FOOD SECURITY SCREENING QUESTIONS:    The next two questions are to help us understand your food security.  If you are feeling you need any assistance in this area, we have resources available to support you today.    Hunger Vital Signs:  Within the past 12 months we worried whether our food would run out before we got money to buy more. Never  Within the past 12 months the food we bought just didn't last and we didn't have money to get more. Never      Ana Rosa Tse     "

## 2023-09-05 ENCOUNTER — TELEPHONE (OUTPATIENT)
Dept: SURGERY | Facility: OTHER | Age: 43
End: 2023-09-05
Payer: COMMERCIAL

## 2023-09-05 NOTE — TELEPHONE ENCOUNTER
[facility] Diagnostic Referral    Patient has an urgent referral for a c-scope with a diagnosis of Rectal pain and Rectal bleeding .    Please advise.    Thank you,  Alyson Everett on 9/5/2023 at 9:11 AM

## 2023-09-06 NOTE — H&P (VIEW-ONLY)
[facility]  [address]  [place] [address]  Phone: [phone]  Fax: [phone]  Primary Provider: Avril Sanders  Pre-op Performing Provider: OLIMPIA ZAVALA      PREOPERATIVE EVALUATION:  Today's date: 9/7/2023    Maricarmen Ornelas is a 43 year old female who presents for a preoperative evaluation.      Surgical Information:  Surgery/Procedure: HYSTERECTOMY, TOTAL, LAPAROSCOPIC, WITH BILATERAL SALPINGECTOMY, unilateral oophorectomy, cystoscopy   Surgery Location: [facility]  Surgeon: SANJU  Surgery Date: 9/21/2023  Time of Surgery: TBD  Where patient plans to recover: At home with family  Fax number for surgical facility: Note does not need to be faxed, will be available electronically in Epic.    Assessment & Plan     The proposed surgical procedure is considered INTERMEDIATE risk.    1. Preop general physical exam    2. Adenomyosis of uterus    3. Acquired hypothyroidism    4. Adjustment disorder with anxious mood    5. Attention deficit hyperactivity disorder (ADHD), predominantly inattentive type    6. Lipid screening      Vitals and exam stable. Labs from 9/4 stable. EKG not indicated based on patient age and health status.          - No identified additional risk factors other than previously addressed    Antiplatelet or Anticoagulation Medication Instructions:   - Patient is on no antiplatelet or anticoagulation medications.    Additional Medication Instructions:  Patient is to take all scheduled medications on the day of surgery    RECOMMENDATION:  APPROVAL GIVEN to proceed with proposed procedure, without further diagnostic evaluation.      Subjective       HPI related to upcoming procedure:   Chronic pelvic pain, adenomyosis.  Met with OB/GYN on 8/15 who agreed upon hysterectomy for definitive management.          9/7/2023    12:47 PM   Preop Questions   1. Have you ever had a heart attack or stroke? No   2. Have you ever had surgery on your heart or blood vessels, such  as a stent placement, a coronary artery bypass, or surgery on an artery in your head, neck, heart, or legs? No   3. Do you have chest pain with activity? No   4. Do you have a history of  heart failure? No   5. Do you currently have a cold, bronchitis or symptoms of other infection? No- sinus allergy symptoms.    6. Do you have a cough, shortness of breath, or wheezing? No   7. Do you or anyone in your family have previous history of blood clots? No   8. Do you or does anyone in your family have a serious bleeding problem such as prolonged bleeding following surgeries or cuts? No   9. Have you ever had problems with anemia or been told to take iron pills? YES - with pregnancy   10. Have you had any abnormal blood loss such as black, tarry or bloody stools, or abnormal vaginal bleeding? YES - related to adenomyosis   11. Have you ever had a blood transfusion? No   12. Are you willing to have a blood transfusion if it is medically needed before, during, or after your surgery? Yes   13. Have you or any of your relatives ever had problems with anesthesia? No   14. Do you have sleep apnea, excessive snoring or daytime drowsiness? No   15. Do you have any artifical heart valves or other implanted medical devices like a pacemaker, defibrillator, or continuous glucose monitor? No   16. Do you have artificial joints? No   17. Are you allergic to latex? No   18. Is there any chance that you may be pregnant? No     Health Care Directive:  Patient does not have a Health Care Directive or Living Will: Discussed advance care planning with patient; however, patient declined at this time.    Preoperative Review of :   reviewed - controlled substances reflected in medication list.      Status of Chronic Conditions:  See problem list for active medical problems.  Problems all longstanding and stable, except as noted/documented.  See ROS for pertinent symptoms related to these conditions.    Review of Systems  CONSTITUTIONAL:  NEGATIVE for fever, chills, change in weight  INTEGUMENTARY/SKIN: NEGATIVE for worrisome rashes, moles or lesions  EYES: NEGATIVE for vision changes or irritation  ENT/MOUTH: NEGATIVE for ear, mouth and throat problems  RESP: NEGATIVE for significant cough or SOB  CV: NEGATIVE for chest pain, palpitations or peripheral edema  GI: NEGATIVE for nausea, abdominal pain, heartburn, or change in bowel habits   female: chronic pelvic pain  MUSCULOSKELETAL: NEGATIVE for significant arthralgias or myalgia  NEURO: NEGATIVE for weakness, dizziness or paresthesias  ENDOCRINE: NEGATIVE for temperature intolerance, skin/hair changes  HEME: NEGATIVE for bleeding problems  PSYCHIATRIC: NEGATIVE for changes in mood or affect    Patient Active Problem List    Diagnosis Date Noted    S/P  section 2018     Priority: Medium    Bilateral carpal tunnel syndrome 2018     Priority: Medium    Adjustment disorder with anxious mood 2018     Priority: Medium    Acquired hypothyroidism 2018     Priority: Medium    Attention deficit disorder 2018     Priority: Medium    Lumbosacral spondylosis without myelopathy 03/10/2015     Priority: Medium    Other staphylococcus infection in conditions classified elsewhere and of unspecified site 2011     Priority: Medium      Past Medical History:   Diagnosis Date    Dichorionic diamniotic twin pregnancy in third trimester 2018    Disorder of thyroid     hypo    Supervision of elderly multigravida     2018    Twin pregnancy 2018     Past Surgical History:   Procedure Laterality Date    APPENDECTOMY OPEN           SECTION N/A 2018    Procedure:  SECTION;   Section - twins;  Surgeon: Woodrow Hurtado MD;  Location:  OR    COLONOSCOPY N/A 7/10/2019    Procedure: COLONOSCOPY, WITH POLYPECTOMY AND BIOPSY;  Surgeon: Viki Villareal MD;  Location:  OR     Current Outpatient Medications   Medication Sig Dispense  Refill    dibucaine 1 % OINT rectal ointment Place rectally 3 times daily as needed for hemorrhoids 60 g 0    dicyclomine (BENTYL) 10 MG capsule Take 1 capsule (10 mg) by mouth 4 times daily as needed (abdominal cramping) 180 capsule 1    diltiazem 2% 2% OINT ointment Apply 1 g topically 2 times daily as needed (anal fissure) 60 g 1    doxylamine (UNISOM) 25 MG TABS tablet Take 25 mg by mouth At Bedtime      DULoxetine (CYMBALTA) 20 MG capsule Take 1 capsule (20 mg) by mouth daily 90 capsule 4    loratadine (CLARITIN) 10 MG tablet Take 1 tablet (10 mg) by mouth daily      medical cannabis (Patient's own supply) See Admin Instructions (The purpose of this order is to document that the patient reports taking medical cannabis.  This is not a prescription, and is not used to certify that the patient has a qualifying medical condition.)      methylcellulose (CITRUCEL) powder Take 0.3 g (1.05 teaspoonful) by mouth daily 454 g 3    [START ON 10/4/2023] methylphenidate HCl ER, OSM, (CONCERTA) 54 MG CR tablet Take 1 tablet (54 mg) by mouth daily 30 tablet 0    methylphenidate HCl ER, OSM, (CONCERTA) 54 MG CR tablet Take 1 tablet (54 mg) by mouth daily 30 tablet 0    methylphenidate HCl ER, OSM, (CONCERTA) 54 MG CR tablet Take 1 tablet (54 mg) by mouth daily 30 tablet 0    nabumetone (RELAFEN) 500 MG tablet Take 1 tablet (500 mg) by mouth 2 times daily (Patient taking differently: Take 500 mg by mouth 2 times daily as needed) 180 tablet 4    SYNTHROID 75 MCG tablet Take 1 tablet (75 mcg) by mouth daily 90 tablet 4    hydrocortisone (ANUSOL-HC) 25 MG suppository Place 1 suppository (25 mg) rectally 2 times daily (Patient not taking: Reported on 9/7/2023) 30 suppository 0       Allergies   Allergen Reactions    Chlorhexidine Gluconate Rash     With twin C/S (9/2018)    Seasonal Allergies Itching    Chlorhexidine Gluconate Rash     With twin C/S (9/2018)        Social History     Tobacco Use    Smoking status: Never    Smokeless  "tobacco: Never   Substance Use Topics    Alcohol use: Yes     Alcohol/week: 2.0 standard drinks of alcohol     Comment: 1-2 times per month     Family History   Problem Relation Age of Onset    Substance Abuse Maternal Grandmother         Alcohol/Drug,Alcohol abuse    Cancer Maternal Grandmother         Cancer, lung cancer    Other - See Comments Maternal Grandfather         Alzheimer's disease    Diabetes Paternal Grandmother         Diabetes,type 1    No Known Problems Sister     No Known Problems Son     No Known Problems Son     Diabetes Maternal Uncle         Diabetes,type 2    No Known Problems Son         2018    No Known Problems Daughter         2018    Heart Disease No family hx of         Heart Disease     History   Drug Use    Types: Marijuana     Comment: medical marijuana          Objective     /68   Pulse 80   Temp 97.6  F (36.4  C)   Resp 12   Ht 1.676 m (5' 6\")   Wt 80.6 kg (177 lb 9.6 oz)   LMP 08/04/2023 (Exact Date)   SpO2 99%   BMI 28.67 kg/m      Physical Exam    GENERAL APPEARANCE: healthy, alert and no distress     EYES: EOMI, PERRL     HENT: ear canals and TM's normal and nose and mouth without ulcers or lesions     NECK: no adenopathy, no asymmetry, masses, or scars and thyroid normal to palpation     RESP: lungs clear to auscultation - no rales, rhonchi or wheezes     CV: regular rates and rhythm, normal S1 S2, no S3 or S4 and no murmur, click or rub     ABDOMEN:  soft, nontender, no HSM or masses and bowel sounds normal     MS: extremities normal- no gross deformities noted, no evidence of inflammation in joints, FROM in all extremities.     SKIN: no suspicious lesions or rashes     NEURO: Normal strength and tone, sensory exam grossly normal, mentation intact and speech normal     PSYCH: mentation appears normal. and affect normal/bright     LYMPHATICS: No cervical adenopathy    Recent Labs   Lab Test 09/04/23  1119   HGB 12.2         POTASSIUM 4.3   CR 0.64    "     Diagnostics:  Recent Results (from the past 168 hour(s))   Basic Metabolic Panel    Collection Time: 09/04/23 11:19 AM   Result Value Ref Range    Sodium 137 136 - 145 mmol/L    Potassium 4.3 3.4 - 5.3 mmol/L    Chloride 102 98 - 107 mmol/L    Carbon Dioxide (CO2) 27 22 - 29 mmol/L    Anion Gap 8 7 - 15 mmol/L    Urea Nitrogen 8.9 6.0 - 20.0 mg/dL    Creatinine 0.64 0.51 - 0.95 mg/dL    Calcium 9.3 8.6 - 10.0 mg/dL    Glucose 92 70 - 99 mg/dL    GFR Estimate >90 >60 mL/min/1.73m2   CBC with platelets and differential    Collection Time: 09/04/23 11:19 AM   Result Value Ref Range    WBC Count 6.1 4.0 - 11.0 10e3/uL    RBC Count 4.66 3.80 - 5.20 10e6/uL    Hemoglobin 12.2 11.7 - 15.7 g/dL    Hematocrit 38.7 35.0 - 47.0 %    MCV 83 78 - 100 fL    MCH 26.2 (L) 26.5 - 33.0 pg    MCHC 31.5 31.5 - 36.5 g/dL    RDW 13.9 10.0 - 15.0 %    Platelet Count 370 150 - 450 10e3/uL    % Neutrophils 56 %    % Lymphocytes 32 %    % Monocytes 7 %    % Eosinophils 4 %    % Basophils 1 %    % Immature Granulocytes 0 %    NRBCs per 100 WBC 0 <1 /100    Absolute Neutrophils 3.4 1.6 - 8.3 10e3/uL    Absolute Lymphocytes 1.9 0.8 - 5.3 10e3/uL    Absolute Monocytes 0.4 0.0 - 1.3 10e3/uL    Absolute Eosinophils 0.2 0.0 - 0.7 10e3/uL    Absolute Basophils 0.1 0.0 - 0.2 10e3/uL    Absolute Immature Granulocytes 0.0 <=0.4 10e3/uL    Absolute NRBCs 0.0 10e3/uL      No EKG required, no history of coronary heart disease, significant arrhythmia, peripheral arterial disease or other structural heart disease.    Revised Cardiac Risk Index (RCRI):  The patient has the following serious cardiovascular risks for perioperative complications:   - No serious cardiac risks = 0 points     RCRI Interpretation: 0 points: Class I (very low risk - 0.4% complication rate)         Signed Electronically by: Yenni Baldwin PA-C  Copy of this evaluation report is provided to requesting physician.

## 2023-09-06 NOTE — PATIENT INSTRUCTIONS
For informational purposes only. Not to replace the advice of your health care provider. Copyright   2003,  [facility]. All rights reserved. Clinically reviewed by Elizabeth Vergara MD. [employer] [id number] - REV .  Preparing for Your Surgery  Getting started  A nurse will call you to review your health history and instructions. They will give you an arrival time based on your scheduled surgery time. Please be ready to share:  Your doctor's clinic name and phone number  Your medical, surgical, and anesthesia history  A list of allergies and sensitivities  A list of medicines, including herbal treatments and over-the-counter drugs  Whether the patient has a legal guardian (ask how to send us the papers in advance)  Please tell us if you're pregnant--or if there's any chance you might be pregnant. Some surgeries may injure a fetus (unborn baby), so they require a pregnancy test. Surgeries that are safe for a fetus don't always need a test, and you can choose whether to have one.   If you have a child who's having surgery, please ask for a copy of Preparing for Your Child's Surgery.    Preparing for surgery  Within 10 to 30 days of surgery: Have a pre-op exam (sometimes called an H&P, or History and Physical). This can be done at a clinic or pre-operative center.  If you're having a , you may not need this exam. Talk to your care team.  At your pre-op exam, talk to your care team about all medicines you take. If you need to stop any medicines before surgery, ask when to start taking them again.  We do this for your safety. Many medicines can make you bleed too much during surgery. Some change how well surgery (anesthesia) drugs work.  Call your insurance company to let them know you're having surgery. (If you don't have insurance, call [phone].)  Call your clinic if there's any change in your health. This includes signs of a cold or flu (sore throat, runny nose, cough, rash, fever). It also  includes a scrape or scratch near the surgery site.  If you have questions on the day of surgery, call your hospital or surgery center.  Eating and drinking guidelines  For your safety: Unless your surgeon tells you otherwise, follow the guidelines below.  Eat and drink as usual until 8 hours before you arrive for surgery. After that, no food or milk.  Drink clear liquids until 2 hours before you arrive. These are liquids you can see through, like water, Gatorade, and Propel Water. They also include plain black coffee and tea (no cream or milk), candy, and breath mints. You can spit out gum when you arrive.  If you drink alcohol: Stop drinking it the night before surgery.  If your care team tells you to take medicine on the morning of surgery, it's okay to take it with a sip of water.  Preventing infection  Shower or bathe the night before and morning of your surgery. Follow the instructions your clinic gave you. (If no instructions, use regular soap.)  Don't shave or clip hair near your surgery site. We'll remove the hair if needed.  Don't smoke or vape the morning of surgery. You may chew nicotine gum up to 2 hours before surgery. A nicotine patch is okay.  Note: Some surgeries require you to completely quit smoking and nicotine. Check with your surgeon.  Your care team will make every effort to keep you safe from infection. We will:  Clean our hands often with soap and water (or an alcohol-based hand rub).  Clean the skin at your surgery site with a special soap that kills germs.  Give you a special gown to keep you warm. (Cold raises the risk of infection.)  Wear special hair covers, masks, gowns and gloves during surgery.  Give antibiotic medicine, if prescribed. Not all surgeries need antibiotics.  What to bring on the day of surgery  Photo ID and insurance card  Copy of your health care directive, if you have one  Glasses and hearing aids (bring cases)  You can't wear contacts during surgery  Inhaler and eye  drops, if you use them (tell us about these when you arrive)  CPAP machine or breathing device, if you use them  A few personal items, if spending the night  If you have . . .  A pacemaker, ICD (cardiac defibrillator) or other implant: Bring the ID card.  An implanted stimulator: Bring the remote control.  A legal guardian: Bring a copy of the certified (court-stamped) guardianship papers.  Please remove any jewelry, including body piercings. Leave jewelry and other valuables at home.  If you're going home the day of surgery  You must have a responsible adult drive you home. They should stay with you overnight as well.  If you don't have someone to stay with you, and you aren't safe to go home alone, we may keep you overnight. Insurance often won't pay for this.  After surgery  If it's hard to control your pain or you need more pain medicine, please call your surgeon's office.  Questions?   If you have any questions for your care team, list them here: _________________________________________________________________________________________________________________________________________________________________________ ____________________________________ ____________________________________ ____________________________________    How to Take Your Medication Before Surgery  - Take all of your medications before surgery as usual

## 2023-09-06 NOTE — PROGRESS NOTES
[facility] AND [facility]  [address]  [place] [address]  Phone: [phone]  Fax: [phone]  Primary Provider: Avril Sanders  Pre-op Performing Provider: OLIMPIA ZAVALA      PREOPERATIVE EVALUATION:  Today's date: 9/7/2023    Maricarmen Ornelas is a 43 year old female who presents for a preoperative evaluation.      Surgical Information:  Surgery/Procedure: HYSTERECTOMY, TOTAL, LAPAROSCOPIC, WITH BILATERAL SALPINGECTOMY, unilateral oophorectomy, cystoscopy   Surgery Location: [facility]  Surgeon: SANJU  Surgery Date: 9/21/2023  Time of Surgery: TBD  Where patient plans to recover: At home with family  Fax number for surgical facility: Note does not need to be faxed, will be available electronically in Epic.    Assessment & Plan     The proposed surgical procedure is considered INTERMEDIATE risk.    1. Preop general physical exam    2. Adenomyosis of uterus    3. Acquired hypothyroidism    4. Adjustment disorder with anxious mood    5. Attention deficit hyperactivity disorder (ADHD), predominantly inattentive type    6. Lipid screening      Vitals and exam stable. Labs from 9/4 stable. EKG not indicated based on patient age and health status.          - No identified additional risk factors other than previously addressed    Antiplatelet or Anticoagulation Medication Instructions:   - Patient is on no antiplatelet or anticoagulation medications.    Additional Medication Instructions:  Patient is to take all scheduled medications on the day of surgery    RECOMMENDATION:  APPROVAL GIVEN to proceed with proposed procedure, without further diagnostic evaluation.      Subjective       HPI related to upcoming procedure:   Chronic pelvic pain, adenomyosis.  Met with OB/GYN on 8/15 who agreed upon hysterectomy for definitive management.          9/7/2023    12:47 PM   Preop Questions   1. Have you ever had a heart attack or stroke? No   2. Have you ever had surgery on your heart or blood vessels, such  as a stent placement, a coronary artery bypass, or surgery on an artery in your head, neck, heart, or legs? No   3. Do you have chest pain with activity? No   4. Do you have a history of  heart failure? No   5. Do you currently have a cold, bronchitis or symptoms of other infection? No- sinus allergy symptoms.    6. Do you have a cough, shortness of breath, or wheezing? No   7. Do you or anyone in your family have previous history of blood clots? No   8. Do you or does anyone in your family have a serious bleeding problem such as prolonged bleeding following surgeries or cuts? No   9. Have you ever had problems with anemia or been told to take iron pills? YES - with pregnancy   10. Have you had any abnormal blood loss such as black, tarry or bloody stools, or abnormal vaginal bleeding? YES - related to adenomyosis   11. Have you ever had a blood transfusion? No   12. Are you willing to have a blood transfusion if it is medically needed before, during, or after your surgery? Yes   13. Have you or any of your relatives ever had problems with anesthesia? No   14. Do you have sleep apnea, excessive snoring or daytime drowsiness? No   15. Do you have any artifical heart valves or other implanted medical devices like a pacemaker, defibrillator, or continuous glucose monitor? No   16. Do you have artificial joints? No   17. Are you allergic to latex? No   18. Is there any chance that you may be pregnant? No     Health Care Directive:  Patient does not have a Health Care Directive or Living Will: Discussed advance care planning with patient; however, patient declined at this time.    Preoperative Review of :   reviewed - controlled substances reflected in medication list.      Status of Chronic Conditions:  See problem list for active medical problems.  Problems all longstanding and stable, except as noted/documented.  See ROS for pertinent symptoms related to these conditions.    Review of Systems  CONSTITUTIONAL:  NEGATIVE for fever, chills, change in weight  INTEGUMENTARY/SKIN: NEGATIVE for worrisome rashes, moles or lesions  EYES: NEGATIVE for vision changes or irritation  ENT/MOUTH: NEGATIVE for ear, mouth and throat problems  RESP: NEGATIVE for significant cough or SOB  CV: NEGATIVE for chest pain, palpitations or peripheral edema  GI: NEGATIVE for nausea, abdominal pain, heartburn, or change in bowel habits   female: chronic pelvic pain  MUSCULOSKELETAL: NEGATIVE for significant arthralgias or myalgia  NEURO: NEGATIVE for weakness, dizziness or paresthesias  ENDOCRINE: NEGATIVE for temperature intolerance, skin/hair changes  HEME: NEGATIVE for bleeding problems  PSYCHIATRIC: NEGATIVE for changes in mood or affect    Patient Active Problem List    Diagnosis Date Noted    S/P  section 2018     Priority: Medium    Bilateral carpal tunnel syndrome 2018     Priority: Medium    Adjustment disorder with anxious mood 2018     Priority: Medium    Acquired hypothyroidism 2018     Priority: Medium    Attention deficit disorder 2018     Priority: Medium    Lumbosacral spondylosis without myelopathy 03/10/2015     Priority: Medium    Other staphylococcus infection in conditions classified elsewhere and of unspecified site 2011     Priority: Medium      Past Medical History:   Diagnosis Date    Dichorionic diamniotic twin pregnancy in third trimester 2018    Disorder of thyroid     hypo    Supervision of elderly multigravida     2018    Twin pregnancy 2018     Past Surgical History:   Procedure Laterality Date    APPENDECTOMY OPEN           SECTION N/A 2018    Procedure:  SECTION;   Section - twins;  Surgeon: Woodrow Hurtado MD;  Location:  OR    COLONOSCOPY N/A 7/10/2019    Procedure: COLONOSCOPY, WITH POLYPECTOMY AND BIOPSY;  Surgeon: Viki Villareal MD;  Location:  OR     Current Outpatient Medications   Medication Sig Dispense  Refill    dibucaine 1 % OINT rectal ointment Place rectally 3 times daily as needed for hemorrhoids 60 g 0    dicyclomine (BENTYL) 10 MG capsule Take 1 capsule (10 mg) by mouth 4 times daily as needed (abdominal cramping) 180 capsule 1    diltiazem 2% 2% OINT ointment Apply 1 g topically 2 times daily as needed (anal fissure) 60 g 1    doxylamine (UNISOM) 25 MG TABS tablet Take 25 mg by mouth At Bedtime      DULoxetine (CYMBALTA) 20 MG capsule Take 1 capsule (20 mg) by mouth daily 90 capsule 4    loratadine (CLARITIN) 10 MG tablet Take 1 tablet (10 mg) by mouth daily      medical cannabis (Patient's own supply) See Admin Instructions (The purpose of this order is to document that the patient reports taking medical cannabis.  This is not a prescription, and is not used to certify that the patient has a qualifying medical condition.)      methylcellulose (CITRUCEL) powder Take 0.3 g (1.05 teaspoonful) by mouth daily 454 g 3    [START ON 10/4/2023] methylphenidate HCl ER, OSM, (CONCERTA) 54 MG CR tablet Take 1 tablet (54 mg) by mouth daily 30 tablet 0    methylphenidate HCl ER, OSM, (CONCERTA) 54 MG CR tablet Take 1 tablet (54 mg) by mouth daily 30 tablet 0    methylphenidate HCl ER, OSM, (CONCERTA) 54 MG CR tablet Take 1 tablet (54 mg) by mouth daily 30 tablet 0    nabumetone (RELAFEN) 500 MG tablet Take 1 tablet (500 mg) by mouth 2 times daily (Patient taking differently: Take 500 mg by mouth 2 times daily as needed) 180 tablet 4    SYNTHROID 75 MCG tablet Take 1 tablet (75 mcg) by mouth daily 90 tablet 4    hydrocortisone (ANUSOL-HC) 25 MG suppository Place 1 suppository (25 mg) rectally 2 times daily (Patient not taking: Reported on 9/7/2023) 30 suppository 0       Allergies   Allergen Reactions    Chlorhexidine Gluconate Rash     With twin C/S (9/2018)    Seasonal Allergies Itching    Chlorhexidine Gluconate Rash     With twin C/S (9/2018)        Social History     Tobacco Use    Smoking status: Never    Smokeless  "tobacco: Never   Substance Use Topics    Alcohol use: Yes     Alcohol/week: 2.0 standard drinks of alcohol     Comment: 1-2 times per month     Family History   Problem Relation Age of Onset    Substance Abuse Maternal Grandmother         Alcohol/Drug,Alcohol abuse    Cancer Maternal Grandmother         Cancer, lung cancer    Other - See Comments Maternal Grandfather         Alzheimer's disease    Diabetes Paternal Grandmother         Diabetes,type 1    No Known Problems Sister     No Known Problems Son     No Known Problems Son     Diabetes Maternal Uncle         Diabetes,type 2    No Known Problems Son         2018    No Known Problems Daughter         2018    Heart Disease No family hx of         Heart Disease     History   Drug Use    Types: Marijuana     Comment: medical marijuana          Objective     /68   Pulse 80   Temp 97.6  F (36.4  C)   Resp 12   Ht 1.676 m (5' 6\")   Wt 80.6 kg (177 lb 9.6 oz)   LMP 08/04/2023 (Exact Date)   SpO2 99%   BMI 28.67 kg/m      Physical Exam    GENERAL APPEARANCE: healthy, alert and no distress     EYES: EOMI, PERRL     HENT: ear canals and TM's normal and nose and mouth without ulcers or lesions     NECK: no adenopathy, no asymmetry, masses, or scars and thyroid normal to palpation     RESP: lungs clear to auscultation - no rales, rhonchi or wheezes     CV: regular rates and rhythm, normal S1 S2, no S3 or S4 and no murmur, click or rub     ABDOMEN:  soft, nontender, no HSM or masses and bowel sounds normal     MS: extremities normal- no gross deformities noted, no evidence of inflammation in joints, FROM in all extremities.     SKIN: no suspicious lesions or rashes     NEURO: Normal strength and tone, sensory exam grossly normal, mentation intact and speech normal     PSYCH: mentation appears normal. and affect normal/bright     LYMPHATICS: No cervical adenopathy    Recent Labs   Lab Test 09/04/23  1119   HGB 12.2         POTASSIUM 4.3   CR 0.64    "     Diagnostics:  Recent Results (from the past 168 hour(s))   Basic Metabolic Panel    Collection Time: 09/04/23 11:19 AM   Result Value Ref Range    Sodium 137 136 - 145 mmol/L    Potassium 4.3 3.4 - 5.3 mmol/L    Chloride 102 98 - 107 mmol/L    Carbon Dioxide (CO2) 27 22 - 29 mmol/L    Anion Gap 8 7 - 15 mmol/L    Urea Nitrogen 8.9 6.0 - 20.0 mg/dL    Creatinine 0.64 0.51 - 0.95 mg/dL    Calcium 9.3 8.6 - 10.0 mg/dL    Glucose 92 70 - 99 mg/dL    GFR Estimate >90 >60 mL/min/1.73m2   CBC with platelets and differential    Collection Time: 09/04/23 11:19 AM   Result Value Ref Range    WBC Count 6.1 4.0 - 11.0 10e3/uL    RBC Count 4.66 3.80 - 5.20 10e6/uL    Hemoglobin 12.2 11.7 - 15.7 g/dL    Hematocrit 38.7 35.0 - 47.0 %    MCV 83 78 - 100 fL    MCH 26.2 (L) 26.5 - 33.0 pg    MCHC 31.5 31.5 - 36.5 g/dL    RDW 13.9 10.0 - 15.0 %    Platelet Count 370 150 - 450 10e3/uL    % Neutrophils 56 %    % Lymphocytes 32 %    % Monocytes 7 %    % Eosinophils 4 %    % Basophils 1 %    % Immature Granulocytes 0 %    NRBCs per 100 WBC 0 <1 /100    Absolute Neutrophils 3.4 1.6 - 8.3 10e3/uL    Absolute Lymphocytes 1.9 0.8 - 5.3 10e3/uL    Absolute Monocytes 0.4 0.0 - 1.3 10e3/uL    Absolute Eosinophils 0.2 0.0 - 0.7 10e3/uL    Absolute Basophils 0.1 0.0 - 0.2 10e3/uL    Absolute Immature Granulocytes 0.0 <=0.4 10e3/uL    Absolute NRBCs 0.0 10e3/uL      No EKG required, no history of coronary heart disease, significant arrhythmia, peripheral arterial disease or other structural heart disease.    Revised Cardiac Risk Index (RCRI):  The patient has the following serious cardiovascular risks for perioperative complications:   - No serious cardiac risks = 0 points     RCRI Interpretation: 0 points: Class I (very low risk - 0.4% complication rate)         Signed Electronically by: Yenni Baldwin PA-C  Copy of this evaluation report is provided to requesting physician.

## 2023-09-07 ENCOUNTER — OFFICE VISIT (OUTPATIENT)
Dept: SURGERY | Facility: OTHER | Age: 43
End: 2023-09-07
Attending: SURGERY
Payer: COMMERCIAL

## 2023-09-07 ENCOUNTER — OFFICE VISIT (OUTPATIENT)
Dept: FAMILY MEDICINE | Facility: OTHER | Age: 43
End: 2023-09-07
Attending: PHYSICIAN ASSISTANT
Payer: COMMERCIAL

## 2023-09-07 VITALS
OXYGEN SATURATION: 99 % | RESPIRATION RATE: 12 BRPM | HEART RATE: 80 BPM | DIASTOLIC BLOOD PRESSURE: 68 MMHG | BODY MASS INDEX: 28.54 KG/M2 | SYSTOLIC BLOOD PRESSURE: 118 MMHG | HEIGHT: 66 IN | TEMPERATURE: 97.6 F | WEIGHT: 177.6 LBS

## 2023-09-07 VITALS
BODY MASS INDEX: 28.57 KG/M2 | SYSTOLIC BLOOD PRESSURE: 112 MMHG | DIASTOLIC BLOOD PRESSURE: 70 MMHG | RESPIRATION RATE: 14 BRPM | HEART RATE: 71 BPM | WEIGHT: 177 LBS | TEMPERATURE: 98.4 F | OXYGEN SATURATION: 99 %

## 2023-09-07 DIAGNOSIS — N80.03 ADENOMYOSIS OF UTERUS: ICD-10-CM

## 2023-09-07 DIAGNOSIS — F90.0 ATTENTION DEFICIT HYPERACTIVITY DISORDER (ADHD), PREDOMINANTLY INATTENTIVE TYPE: ICD-10-CM

## 2023-09-07 DIAGNOSIS — K64.4 EXTERNAL HEMORRHOIDS: ICD-10-CM

## 2023-09-07 DIAGNOSIS — K60.2 ANAL FISSURE: Primary | ICD-10-CM

## 2023-09-07 DIAGNOSIS — Z13.220 LIPID SCREENING: ICD-10-CM

## 2023-09-07 DIAGNOSIS — F43.22 ADJUSTMENT DISORDER WITH ANXIOUS MOOD: ICD-10-CM

## 2023-09-07 DIAGNOSIS — Z01.818 PREOP GENERAL PHYSICAL EXAM: Primary | ICD-10-CM

## 2023-09-07 DIAGNOSIS — K62.89 RECTAL PAIN: ICD-10-CM

## 2023-09-07 DIAGNOSIS — E03.9 ACQUIRED HYPOTHYROIDISM: ICD-10-CM

## 2023-09-07 LAB
CHOLEST SERPL-MCNC: 178 MG/DL
HDLC SERPL-MCNC: 57 MG/DL
LDLC SERPL CALC-MCNC: 103 MG/DL
NONHDLC SERPL-MCNC: 121 MG/DL
T4 FREE SERPL-MCNC: 1.17 NG/DL (ref 0.9–1.7)
TRIGL SERPL-MCNC: 91 MG/DL
TSH SERPL DL<=0.005 MIU/L-ACNC: 1.24 UIU/ML (ref 0.3–4.2)

## 2023-09-07 PROCEDURE — 99214 OFFICE O/P EST MOD 30 MIN: CPT | Performed by: PHYSICIAN ASSISTANT

## 2023-09-07 PROCEDURE — 84439 ASSAY OF FREE THYROXINE: CPT | Mod: ZL | Performed by: PHYSICIAN ASSISTANT

## 2023-09-07 PROCEDURE — 99203 OFFICE O/P NEW LOW 30 MIN: CPT | Performed by: SURGERY

## 2023-09-07 PROCEDURE — 36415 COLL VENOUS BLD VENIPUNCTURE: CPT | Mod: ZL | Performed by: PHYSICIAN ASSISTANT

## 2023-09-07 PROCEDURE — 84443 ASSAY THYROID STIM HORMONE: CPT | Mod: ZL | Performed by: PHYSICIAN ASSISTANT

## 2023-09-07 PROCEDURE — 80061 LIPID PANEL: CPT | Mod: ZL | Performed by: PHYSICIAN ASSISTANT

## 2023-09-07 RX ORDER — HYDROCORTISONE ACETATE 25 MG/1
25 SUPPOSITORY RECTAL 2 TIMES DAILY
Qty: 30 SUPPOSITORY | Refills: 0 | Status: CANCELLED | OUTPATIENT
Start: 2023-09-07

## 2023-09-07 RX ORDER — DIBUCAINE 1 G/100G
OINTMENT TOPICAL 3 TIMES DAILY PRN
Qty: 60 G | Refills: 0 | Status: SHIPPED | OUTPATIENT
Start: 2023-09-07 | End: 2023-11-02

## 2023-09-07 RX ORDER — ACETAMINOPHEN 325 MG/1
975 TABLET ORAL ONCE
Status: CANCELLED | OUTPATIENT
Start: 2023-09-07 | End: 2023-09-07

## 2023-09-07 ASSESSMENT — PAIN SCALES - GENERAL
PAINLEVEL: MILD PAIN (2)
PAINLEVEL: MILD PAIN (3)

## 2023-09-07 NOTE — NURSING NOTE
Patient presents to clinic for Pre-Op exam.  Roro Beckham LPN ....................  9/7/2023   12:48 PM  EXT [id number]

## 2023-09-07 NOTE — H&P (VIEW-ONLY)
Primary Care Physician: Avril Sanders    I was requested to see this patient in consultation by Avril Sanders DO for evaluation of anal pain/bleeding. A copy of this note will be sent to Avril Sanders DO.    HPI:   The patient is 43 year old female with anal pain that has been present for years intermittently but over the last week has been severe. She had a colonoscopy for bleeding and pain in 2019. She was found to have a fissure. It improved. It bothers her intermittently with the bleeding. Recently the bleeding increased but over the last couple of days, it stopped but the pain is ongoing. There hasn't been any stool or mucus drainage drainage. Bowel movements are soft and frequent as she has been using fiber and miralax to avoid constipation.  She never has had to use the topical cream for the fissure.   She was seen in the [facility] and prescribed some topical pain medication and steroid-those were sent to the wrong pharmacy so she hasn't started using them.     CONSULTATION ASSESSMENT AND PLAN/RECOMMENDATIONS:   Assessment: anal fissure-acute exacerbation of chronic problem  Plan:   I discussed with the patient the pathophysiology of anal fissure. We discussed the risks, benefits and alternatives to exam under anesthesia possible banding of internal hemorrhoids to treat bleeding. I specifically explained the risks of infection, bleeding, pain and hemorrhoid recurrence.The patient expressed understanding and wishes to proceed. She is scheduled 9/21/23 for hysterectomy and will combine the exam at that time. Discussed with Dr. Fermin. Informed consent discussion was completed.  Patient will start topical pain medication and calcium channel blocker in the meantime. Prescription sent to pharmacy here with patient's permission.  The patient will call with questions or concerns.The patient denies questions at this time.  I discussed the possible need for specialty care with colorectal surgery if the  fissure doesn't improve, patient would to initiate a referral to expedite the process. Will order referral.     REVIEW OF SYSTEMS  GENERAL: No fevers or chills. Denies fatigue, recent weight loss.  HEENT: No sinus drainage. No changes with vision or hearing. No difficultyswallowing.   LYMPHATICS:  No swollen nodes in axilla, neck or groin.  CARDIOVASCULAR: Denies chest pain, palpitations and dyspnea on exertion.  PULMONARY: No shortness of breath or cough. No increase in sputum production.  GI: Denies melena. No hematemesis. No constipation or diarrhea.  : No dysuria or hematuria.  SKIN: No recent rashes or ulcers.   HEMATOLOGY:  No history of easy bruising or bleeding.  ENDOCRINE:  No history of diabetes or thyroid problems.  NEUROLOGY:  No history of seizures or headaches. No motor or sensory changes.    Past Medical History:   Diagnosis Date    Dichorionic diamniotic twin pregnancy in third trimester 2018    Disorder of thyroid     hypo    Supervision of elderly multigravida     2018    Twin pregnancy 2018     Past Surgical History:   Procedure Laterality Date    APPENDECTOMY OPEN           SECTION N/A 2018    Procedure:  SECTION;   Section - twins;  Surgeon: Woodrow Hurtado MD;  Location:  OR    COLONOSCOPY N/A 7/10/2019    Procedure: COLONOSCOPY, WITH POLYPECTOMY AND BIOPSY;  Surgeon: Viki Villareal MD;  Location:  OR     Current Outpatient Medications   Medication    dicyclomine (BENTYL) 10 MG capsule    doxylamine (UNISOM) 25 MG TABS tablet    DULoxetine (CYMBALTA) 20 MG capsule    loratadine (CLARITIN) 10 MG tablet    medical cannabis (Patient's own supply)    methylcellulose (CITRUCEL) powder    [START ON 10/4/2023] methylphenidate HCl ER, OSM, (CONCERTA) 54 MG CR tablet    methylphenidate HCl ER, OSM, (CONCERTA) 54 MG CR tablet    methylphenidate HCl ER, OSM, (CONCERTA) 54 MG CR tablet    nabumetone (RELAFEN) 500 MG tablet    SYNTHROID 75 MCG tablet     dibucaine 1 % OINT rectal ointment    hydrocortisone (ANUSOL-HC) 25 MG suppository     No current facility-administered medications for this visit.     Allergies   Allergen Reactions    Chlorhexidine Gluconate Rash     With twin C/S (9/2018)    Seasonal Allergies Itching    Chlorhexidine Gluconate Rash     With twin C/S (9/2018)     Family History   Problem Relation Age of Onset    Substance Abuse Maternal Grandmother         Alcohol/Drug,Alcohol abuse    Cancer Maternal Grandmother         Cancer, lung cancer    Other - See Comments Maternal Grandfather         Alzheimer's disease    Diabetes Paternal Grandmother         Diabetes,type 1    No Known Problems Sister     No Known Problems Son     No Known Problems Son     Diabetes Maternal Uncle         Diabetes,type 2    No Known Problems Son         2018    No Known Problems Daughter         2018    Heart Disease No family hx of         Heart Disease      Social History     Socioeconomic History    Marital status:      Spouse name: Refugio    Number of children: 4    Years of education: 16+    Highest education level: None   Occupational History     Employer: [employer]   Tobacco Use    Smoking status: Never    Smokeless tobacco: Never   Vaping Use    Vaping Use: Never used   Substance and Sexual Activity    Alcohol use: Yes     Alcohol/week: 2.0 standard drinks of alcohol     Comment: 1-2 times per month    Drug use: Yes     Comment: medical marijuana     Sexual activity: Yes     Partners: Male     Birth control/protection: Male Surgical     Comment: vasectomy    Social History Narrative       - Kipretty Parry      Sons: Franco Linn ,    Daughter:    Father: Jose;  Mother: Bronwyn      Siblings: Sister Nydia,  Emilyther     The above history was reviewed today 9/7/2023  PHYSICAL EXAM  Vitals: /70 (BP Location: Right arm, Patient Position: Sitting, Cuff Size: Adult Regular)   Pulse 71   Temp 98.4  F (36.9  C)  (Tympanic)   Resp 14   Wt 80.3 kg (177 lb)   LMP 08/04/2023 (Approximate)   SpO2 99%   BMI 28.57 kg/m    BMI= Body mass index is 28.57 kg/m .  GENERAL: Healthy appearing patient in no acute distress. Pleasant and cooperative with exam and interview.   HEENT: Head-normocephalic. Eyes-no scleral icterus. Nose-no nasal drainage. No lesions. Mouth-oral mucosa pink and moist, no lesions.  NECK: Supple. No thyroid nodules. Trachea midline.  LYMPHATICS:  No cervical, axillary or supraclavicular adenopathy.  CV: Regular rate and rhythm, no murmurs. No peripheral edema.  LUNGS:  No respiratory distress. Clear bilaterally to auscultation.  ABDOMEN: Non distended. Bowel sounds active. Soft, non-tender, no hepatosplenomegaly or hernias.No peritoneal signs.  SKIN: Pink, warm and dry. No jaundice. No rash.  NEURO:  Cranial nerves II-XII grossly intact. Alert and oriented.  PSYCH: Appropriate mood and affect.  RECTAL: no external hemorrhoids noted. No bleeding or mass, pain with palpation, deferred internal exam.    I personally reviewed patient's previous colonoscopy images and reports. .

## 2023-09-07 NOTE — NURSING NOTE
"Chief Complaint   Patient presents with    Consult     Rectal pain and bloody stools       Initial /70 (BP Location: Right arm, Patient Position: Sitting, Cuff Size: Adult Regular)   Pulse 71   Temp 98.4  F (36.9  C) (Tympanic)   Resp 14   Wt 80.3 kg (177 lb)   LMP 08/04/2023 (Approximate)   SpO2 99%   BMI 28.57 kg/m   Estimated body mass index is 28.57 kg/m  as calculated from the following:    Height as of 9/4/23: 1.676 m (5' 6\").    Weight as of this encounter: 80.3 kg (177 lb).  Medication Reconciliation: complete    Katarina Jerez LPN    "

## 2023-09-12 NOTE — PATIENT INSTRUCTIONS
Use the diltiazem twice a day. Use the dibucaine three times a day. I'll see you on the 21st to do an exam while you are asleep for your surgery with Dr. Fermin.  Call for concerns or questions before that.

## 2023-09-14 ENCOUNTER — TELEPHONE (OUTPATIENT)
Dept: FAMILY MEDICINE | Facility: OTHER | Age: 43
End: 2023-09-14
Payer: COMMERCIAL

## 2023-09-14 DIAGNOSIS — K62.89 RECTAL PAIN: ICD-10-CM

## 2023-09-14 DIAGNOSIS — K60.2 ANAL FISSURE: ICD-10-CM

## 2023-09-14 NOTE — TELEPHONE ENCOUNTER
Prescription brought to [facility] pharmacy today.  Left message for patient that rx was brought to [facility] and that it hopefully will be ready for her today. She can call back if any questions or concerns. Katarina Jerez LPN .......9/14/2023 11:49 AM

## 2023-09-14 NOTE — TELEPHONE ENCOUNTER
"Attn: Viki Villareal    Message from pharmacy, regarding:    diltiazem 2% 2% OINT ointment 60 g 1 9/7/2023  No   Sig - Route: Apply 1 g topically 2 times daily as needed (anal fissure) - Topical     \"We don't do this here.\"    Tracey Castro RN .............. 9/14/2023  10:01 AM    "

## 2023-09-20 ENCOUNTER — ANESTHESIA EVENT (OUTPATIENT)
Dept: SURGERY | Facility: OTHER | Age: 43
End: 2023-09-20
Payer: COMMERCIAL

## 2023-09-20 RX ORDER — ONDANSETRON 2 MG/ML
4 INJECTION INTRAMUSCULAR; INTRAVENOUS EVERY 30 MIN PRN
Status: CANCELLED | OUTPATIENT
Start: 2023-09-20

## 2023-09-20 RX ORDER — ONDANSETRON 4 MG/1
4 TABLET, ORALLY DISINTEGRATING ORAL EVERY 30 MIN PRN
Status: CANCELLED | OUTPATIENT
Start: 2023-09-20

## 2023-09-20 RX ORDER — FENTANYL CITRATE 50 UG/ML
25 INJECTION, SOLUTION INTRAMUSCULAR; INTRAVENOUS
Status: CANCELLED | OUTPATIENT
Start: 2023-09-20

## 2023-09-20 RX ORDER — OXYCODONE HYDROCHLORIDE 5 MG/1
5 TABLET ORAL
Status: CANCELLED | OUTPATIENT
Start: 2023-09-20

## 2023-09-20 RX ORDER — OXYCODONE HYDROCHLORIDE 5 MG/1
10 TABLET ORAL
Status: CANCELLED | OUTPATIENT
Start: 2023-09-20

## 2023-09-21 ENCOUNTER — HOSPITAL ENCOUNTER (OUTPATIENT)
Facility: OTHER | Age: 43
Discharge: HOME | End: 2023-09-21
Attending: STUDENT IN AN ORGANIZED HEALTH CARE EDUCATION/TRAINING PROGRAM | Admitting: STUDENT IN AN ORGANIZED HEALTH CARE EDUCATION/TRAINING PROGRAM
Payer: COMMERCIAL

## 2023-09-21 ENCOUNTER — ANESTHESIA (OUTPATIENT)
Dept: SURGERY | Facility: OTHER | Age: 43
End: 2023-09-21
Payer: COMMERCIAL

## 2023-09-21 VITALS
BODY MASS INDEX: 28.45 KG/M2 | WEIGHT: 177 LBS | OXYGEN SATURATION: 98 % | SYSTOLIC BLOOD PRESSURE: 96 MMHG | HEART RATE: 71 BPM | DIASTOLIC BLOOD PRESSURE: 58 MMHG | TEMPERATURE: 98.1 F | HEIGHT: 66 IN | RESPIRATION RATE: 14 BRPM

## 2023-09-21 DIAGNOSIS — N93.9 ABNORMAL UTERINE BLEEDING: ICD-10-CM

## 2023-09-21 DIAGNOSIS — R10.2 CHRONIC PELVIC PAIN IN FEMALE: Primary | ICD-10-CM

## 2023-09-21 DIAGNOSIS — G89.29 CHRONIC PELVIC PAIN IN FEMALE: Primary | ICD-10-CM

## 2023-09-21 DIAGNOSIS — K60.2 ANAL FISSURE: ICD-10-CM

## 2023-09-21 LAB
ABO/RH(D): NORMAL
ANTIBODY SCREEN: NEGATIVE
ERYTHROCYTE [DISTWIDTH] IN BLOOD BY AUTOMATED COUNT: 14.6 % (ref 10–15)
GLUCOSE BLDC GLUCOMTR-MCNC: 88 MG/DL (ref 70–99)
HCG UR QL: NEGATIVE
HCT VFR BLD AUTO: 37.2 % (ref 35–47)
HGB BLD-MCNC: 12 G/DL (ref 11.7–15.7)
HOLD SPECIMEN: NORMAL
MCH RBC QN AUTO: 26.2 PG (ref 26.5–33)
MCHC RBC AUTO-ENTMCNC: 32.3 G/DL (ref 31.5–36.5)
MCV RBC AUTO: 81 FL (ref 78–100)
PLATELET # BLD AUTO: 388 10E3/UL (ref 150–450)
RBC # BLD AUTO: 4.58 10E6/UL (ref 3.8–5.2)
SPECIMEN EXPIRATION DATE: NORMAL
WBC # BLD AUTO: 6.4 10E3/UL (ref 4–11)

## 2023-09-21 PROCEDURE — 85027 COMPLETE CBC AUTOMATED: CPT | Performed by: STUDENT IN AN ORGANIZED HEALTH CARE EDUCATION/TRAINING PROGRAM

## 2023-09-21 PROCEDURE — 36415 COLL VENOUS BLD VENIPUNCTURE: CPT | Performed by: STUDENT IN AN ORGANIZED HEALTH CARE EDUCATION/TRAINING PROGRAM

## 2023-09-21 PROCEDURE — 88307 TISSUE EXAM BY PATHOLOGIST: CPT

## 2023-09-21 PROCEDURE — 250N000026 HC DESFLURANE, PER MIN: Performed by: STUDENT IN AN ORGANIZED HEALTH CARE EDUCATION/TRAINING PROGRAM

## 2023-09-21 PROCEDURE — 710N000010 HC RECOVERY PHASE 1, LEVEL 2, PER MIN: Performed by: STUDENT IN AN ORGANIZED HEALTH CARE EDUCATION/TRAINING PROGRAM

## 2023-09-21 PROCEDURE — 86901 BLOOD TYPING SEROLOGIC RH(D): CPT | Performed by: STUDENT IN AN ORGANIZED HEALTH CARE EDUCATION/TRAINING PROGRAM

## 2023-09-21 PROCEDURE — 258N000001 HC RX 258: Performed by: STUDENT IN AN ORGANIZED HEALTH CARE EDUCATION/TRAINING PROGRAM

## 2023-09-21 PROCEDURE — 250N000009 HC RX 250: Performed by: NURSE ANESTHETIST, CERTIFIED REGISTERED

## 2023-09-21 PROCEDURE — 58571 TLH W/T/O 250 G OR LESS: CPT | Performed by: STUDENT IN AN ORGANIZED HEALTH CARE EDUCATION/TRAINING PROGRAM

## 2023-09-21 PROCEDURE — 86850 RBC ANTIBODY SCREEN: CPT | Performed by: STUDENT IN AN ORGANIZED HEALTH CARE EDUCATION/TRAINING PROGRAM

## 2023-09-21 PROCEDURE — 58571 TLH W/T/O 250 G OR LESS: CPT | Performed by: NURSE ANESTHETIST, CERTIFIED REGISTERED

## 2023-09-21 PROCEDURE — 250N000011 HC RX IP 250 OP 636: Mod: JZ | Performed by: NURSE ANESTHETIST, CERTIFIED REGISTERED

## 2023-09-21 PROCEDURE — 81025 URINE PREGNANCY TEST: CPT | Performed by: STUDENT IN AN ORGANIZED HEALTH CARE EDUCATION/TRAINING PROGRAM

## 2023-09-21 PROCEDURE — 370N000017 HC ANESTHESIA TECHNICAL FEE, PER MIN: Performed by: STUDENT IN AN ORGANIZED HEALTH CARE EDUCATION/TRAINING PROGRAM

## 2023-09-21 PROCEDURE — 250N000011 HC RX IP 250 OP 636: Performed by: STUDENT IN AN ORGANIZED HEALTH CARE EDUCATION/TRAINING PROGRAM

## 2023-09-21 PROCEDURE — 250N000013 HC RX MED GY IP 250 OP 250 PS 637: Performed by: STUDENT IN AN ORGANIZED HEALTH CARE EDUCATION/TRAINING PROGRAM

## 2023-09-21 PROCEDURE — 272N000001 HC OR GENERAL SUPPLY STERILE: Performed by: STUDENT IN AN ORGANIZED HEALTH CARE EDUCATION/TRAINING PROGRAM

## 2023-09-21 PROCEDURE — 258N000003 HC RX IP 258 OP 636

## 2023-09-21 PROCEDURE — 250N000025 HC SEVOFLURANE, PER MIN: Performed by: STUDENT IN AN ORGANIZED HEALTH CARE EDUCATION/TRAINING PROGRAM

## 2023-09-21 PROCEDURE — 999N000141 HC STATISTIC PRE-PROCEDURE NURSING ASSESSMENT: Performed by: STUDENT IN AN ORGANIZED HEALTH CARE EDUCATION/TRAINING PROGRAM

## 2023-09-21 PROCEDURE — 360N000077 HC SURGERY LEVEL 4, PER MIN: Performed by: STUDENT IN AN ORGANIZED HEALTH CARE EDUCATION/TRAINING PROGRAM

## 2023-09-21 PROCEDURE — 250N000011 HC RX IP 250 OP 636

## 2023-09-21 PROCEDURE — 82962 GLUCOSE BLOOD TEST: CPT

## 2023-09-21 PROCEDURE — 46600 DIAGNOSTIC ANOSCOPY SPX: CPT | Performed by: SURGERY

## 2023-09-21 RX ORDER — FENTANYL CITRATE 50 UG/ML
INJECTION, SOLUTION INTRAMUSCULAR; INTRAVENOUS PRN
Status: DISCONTINUED | OUTPATIENT
Start: 2023-09-21 | End: 2023-09-21

## 2023-09-21 RX ORDER — IBUPROFEN 600 MG/1
600 TABLET, FILM COATED ORAL EVERY 6 HOURS PRN
Qty: 30 TABLET | Refills: 0 | Status: SHIPPED | OUTPATIENT
Start: 2023-09-21 | End: 2024-01-03

## 2023-09-21 RX ORDER — OXYCODONE HYDROCHLORIDE 5 MG/1
10 TABLET ORAL EVERY 4 HOURS PRN
Status: DISCONTINUED | OUTPATIENT
Start: 2023-09-21 | End: 2023-09-21 | Stop reason: HOSPADM

## 2023-09-21 RX ORDER — METHYLPHENIDATE HYDROCHLORIDE 54 MG/1
54 TABLET ORAL DAILY
Status: DISCONTINUED | OUTPATIENT
Start: 2023-09-21 | End: 2023-09-21

## 2023-09-21 RX ORDER — ONDANSETRON 4 MG/1
4 TABLET, ORALLY DISINTEGRATING ORAL EVERY 30 MIN PRN
Status: DISCONTINUED | OUTPATIENT
Start: 2023-09-21 | End: 2023-09-21 | Stop reason: HOSPADM

## 2023-09-21 RX ORDER — ACETAMINOPHEN 325 MG/1
650 TABLET ORAL EVERY 4 HOURS PRN
Qty: 100 TABLET | Refills: 0 | Status: SHIPPED | OUTPATIENT
Start: 2023-09-21

## 2023-09-21 RX ORDER — NALOXONE HYDROCHLORIDE 0.4 MG/ML
0.4 INJECTION, SOLUTION INTRAMUSCULAR; INTRAVENOUS; SUBCUTANEOUS
Status: DISCONTINUED | OUTPATIENT
Start: 2023-09-21 | End: 2023-09-21 | Stop reason: HOSPADM

## 2023-09-21 RX ORDER — NALOXONE HYDROCHLORIDE 0.4 MG/ML
0.2 INJECTION, SOLUTION INTRAMUSCULAR; INTRAVENOUS; SUBCUTANEOUS
Status: DISCONTINUED | OUTPATIENT
Start: 2023-09-21 | End: 2023-09-21

## 2023-09-21 RX ORDER — CEFAZOLIN SODIUM/WATER 2 G/20 ML
2 SYRINGE (ML) INTRAVENOUS
Status: COMPLETED | OUTPATIENT
Start: 2023-09-21 | End: 2023-09-21

## 2023-09-21 RX ORDER — ONDANSETRON 2 MG/ML
4 INJECTION INTRAMUSCULAR; INTRAVENOUS ONCE
Status: COMPLETED | OUTPATIENT
Start: 2023-09-21 | End: 2023-09-21

## 2023-09-21 RX ORDER — FENTANYL CITRATE 50 UG/ML
50 INJECTION, SOLUTION INTRAMUSCULAR; INTRAVENOUS EVERY 5 MIN PRN
Status: DISCONTINUED | OUTPATIENT
Start: 2023-09-21 | End: 2023-09-21 | Stop reason: HOSPADM

## 2023-09-21 RX ORDER — HYDROXYZINE PAMOATE 25 MG/1
25 CAPSULE ORAL EVERY 6 HOURS PRN
Status: DISCONTINUED | OUTPATIENT
Start: 2023-09-21 | End: 2023-09-21 | Stop reason: HOSPADM

## 2023-09-21 RX ORDER — DULOXETIN HYDROCHLORIDE 20 MG/1
20 CAPSULE, DELAYED RELEASE ORAL DAILY
Status: DISCONTINUED | OUTPATIENT
Start: 2023-09-22 | End: 2023-09-21 | Stop reason: HOSPADM

## 2023-09-21 RX ORDER — KETAMINE HYDROCHLORIDE 10 MG/ML
INJECTION INTRAMUSCULAR; INTRAVENOUS PRN
Status: DISCONTINUED | OUTPATIENT
Start: 2023-09-21 | End: 2023-09-21

## 2023-09-21 RX ORDER — OXYCODONE HYDROCHLORIDE 5 MG/1
5-10 TABLET ORAL
Qty: 20 TABLET | Refills: 0 | Status: SHIPPED | OUTPATIENT
Start: 2023-09-21 | End: 2023-12-22

## 2023-09-21 RX ORDER — HYDROMORPHONE HCL IN WATER/PF 6 MG/30 ML
0.2 PATIENT CONTROLLED ANALGESIA SYRINGE INTRAVENOUS
Status: DISCONTINUED | OUTPATIENT
Start: 2023-09-21 | End: 2023-09-21 | Stop reason: HOSPADM

## 2023-09-21 RX ORDER — IBUPROFEN 600 MG/1
600 TABLET, FILM COATED ORAL EVERY 6 HOURS PRN
Status: DISCONTINUED | OUTPATIENT
Start: 2023-09-21 | End: 2023-09-21 | Stop reason: HOSPADM

## 2023-09-21 RX ORDER — NALOXONE HYDROCHLORIDE 0.4 MG/ML
0.2 INJECTION, SOLUTION INTRAMUSCULAR; INTRAVENOUS; SUBCUTANEOUS
Status: DISCONTINUED | OUTPATIENT
Start: 2023-09-21 | End: 2023-09-21 | Stop reason: HOSPADM

## 2023-09-21 RX ORDER — AMOXICILLIN 250 MG
1-2 CAPSULE ORAL 2 TIMES DAILY
Qty: 30 TABLET | Refills: 0 | Status: SHIPPED | OUTPATIENT
Start: 2023-09-21 | End: 2024-09-14

## 2023-09-21 RX ORDER — NALOXONE HYDROCHLORIDE 0.4 MG/ML
0.4 INJECTION, SOLUTION INTRAMUSCULAR; INTRAVENOUS; SUBCUTANEOUS
Status: DISCONTINUED | OUTPATIENT
Start: 2023-09-21 | End: 2023-09-21

## 2023-09-21 RX ORDER — CEFAZOLIN SODIUM/WATER 2 G/20 ML
2 SYRINGE (ML) INTRAVENOUS SEE ADMIN INSTRUCTIONS
Status: DISCONTINUED | OUTPATIENT
Start: 2023-09-21 | End: 2023-09-21 | Stop reason: HOSPADM

## 2023-09-21 RX ORDER — OXYCODONE HYDROCHLORIDE 5 MG/1
5 TABLET ORAL EVERY 4 HOURS PRN
Status: DISCONTINUED | OUTPATIENT
Start: 2023-09-21 | End: 2023-09-21 | Stop reason: HOSPADM

## 2023-09-21 RX ORDER — LEVOTHYROXINE SODIUM 75 UG/1
75 TABLET ORAL DAILY
Status: DISCONTINUED | OUTPATIENT
Start: 2023-09-22 | End: 2023-09-21 | Stop reason: HOSPADM

## 2023-09-21 RX ORDER — SODIUM CHLORIDE, SODIUM LACTATE, POTASSIUM CHLORIDE, CALCIUM CHLORIDE 600; 310; 30; 20 MG/100ML; MG/100ML; MG/100ML; MG/100ML
INJECTION, SOLUTION INTRAVENOUS CONTINUOUS
Status: DISCONTINUED | OUTPATIENT
Start: 2023-09-21 | End: 2023-09-21 | Stop reason: HOSPADM

## 2023-09-21 RX ORDER — KETOROLAC TROMETHAMINE 30 MG/ML
INJECTION, SOLUTION INTRAMUSCULAR; INTRAVENOUS PRN
Status: DISCONTINUED | OUTPATIENT
Start: 2023-09-21 | End: 2023-09-21

## 2023-09-21 RX ORDER — LIDOCAINE 40 MG/G
CREAM TOPICAL
Status: DISCONTINUED | OUTPATIENT
Start: 2023-09-21 | End: 2023-09-21 | Stop reason: HOSPADM

## 2023-09-21 RX ORDER — HYDROMORPHONE HCL IN WATER/PF 6 MG/30 ML
0.4 PATIENT CONTROLLED ANALGESIA SYRINGE INTRAVENOUS
Status: DISCONTINUED | OUTPATIENT
Start: 2023-09-21 | End: 2023-09-21 | Stop reason: HOSPADM

## 2023-09-21 RX ORDER — BUPIVACAINE HYDROCHLORIDE 2.5 MG/ML
INJECTION, SOLUTION INFILTRATION; PERINEURAL PRN
Status: DISCONTINUED | OUTPATIENT
Start: 2023-09-21 | End: 2023-09-21

## 2023-09-21 RX ORDER — DEXAMETHASONE SODIUM PHOSPHATE 4 MG/ML
INJECTION, SOLUTION INTRA-ARTICULAR; INTRALESIONAL; INTRAMUSCULAR; INTRAVENOUS; SOFT TISSUE PRN
Status: DISCONTINUED | OUTPATIENT
Start: 2023-09-21 | End: 2023-09-21

## 2023-09-21 RX ORDER — HYDROMORPHONE HCL IN WATER/PF 6 MG/30 ML
0.4 PATIENT CONTROLLED ANALGESIA SYRINGE INTRAVENOUS EVERY 5 MIN PRN
Status: DISCONTINUED | OUTPATIENT
Start: 2023-09-21 | End: 2023-09-21 | Stop reason: HOSPADM

## 2023-09-21 RX ORDER — PROPOFOL 10 MG/ML
INJECTION, EMULSION INTRAVENOUS PRN
Status: DISCONTINUED | OUTPATIENT
Start: 2023-09-21 | End: 2023-09-21

## 2023-09-21 RX ORDER — FENTANYL CITRATE 50 UG/ML
25 INJECTION, SOLUTION INTRAMUSCULAR; INTRAVENOUS EVERY 5 MIN PRN
Status: DISCONTINUED | OUTPATIENT
Start: 2023-09-21 | End: 2023-09-21 | Stop reason: HOSPADM

## 2023-09-21 RX ORDER — ONDANSETRON 2 MG/ML
4 INJECTION INTRAMUSCULAR; INTRAVENOUS EVERY 30 MIN PRN
Status: DISCONTINUED | OUTPATIENT
Start: 2023-09-21 | End: 2023-09-21 | Stop reason: HOSPADM

## 2023-09-21 RX ORDER — ACETAMINOPHEN 325 MG/1
975 TABLET ORAL ONCE
Status: COMPLETED | OUTPATIENT
Start: 2023-09-21 | End: 2023-09-21

## 2023-09-21 RX ORDER — LIDOCAINE HYDROCHLORIDE 20 MG/ML
INJECTION, SOLUTION INFILTRATION; PERINEURAL PRN
Status: DISCONTINUED | OUTPATIENT
Start: 2023-09-21 | End: 2023-09-21

## 2023-09-21 RX ORDER — HYDROMORPHONE HCL IN WATER/PF 6 MG/30 ML
0.2 PATIENT CONTROLLED ANALGESIA SYRINGE INTRAVENOUS EVERY 5 MIN PRN
Status: DISCONTINUED | OUTPATIENT
Start: 2023-09-21 | End: 2023-09-21 | Stop reason: HOSPADM

## 2023-09-21 RX ORDER — ONDANSETRON 2 MG/ML
INJECTION INTRAMUSCULAR; INTRAVENOUS PRN
Status: DISCONTINUED | OUTPATIENT
Start: 2023-09-21 | End: 2023-09-21

## 2023-09-21 RX ORDER — HYDROXYZINE HYDROCHLORIDE 25 MG/1
25 TABLET, FILM COATED ORAL EVERY 6 HOURS PRN
Qty: 30 TABLET | Refills: 0 | Status: SHIPPED | OUTPATIENT
Start: 2023-09-21 | End: 2024-01-03

## 2023-09-21 RX ORDER — ONDANSETRON 4 MG/1
4 TABLET, FILM COATED ORAL EVERY 6 HOURS PRN
Qty: 10 TABLET | Refills: 0 | Status: SHIPPED | OUTPATIENT
Start: 2023-09-21 | End: 2024-01-03

## 2023-09-21 RX ORDER — PHENAZOPYRIDINE HYDROCHLORIDE 100 MG/1
200 TABLET, FILM COATED ORAL ONCE
Status: COMPLETED | OUTPATIENT
Start: 2023-09-21 | End: 2023-09-21

## 2023-09-21 RX ADMIN — OXYCODONE HYDROCHLORIDE 5 MG: 5 TABLET ORAL at 17:08

## 2023-09-21 RX ADMIN — KETOROLAC TROMETHAMINE 30 MG: 30 INJECTION, SOLUTION INTRAMUSCULAR at 08:27

## 2023-09-21 RX ADMIN — ONDANSETRON HYDROCHLORIDE 4 MG: 2 SOLUTION INTRAMUSCULAR; INTRAVENOUS at 08:27

## 2023-09-21 RX ADMIN — Medication 2 G: at 07:55

## 2023-09-21 RX ADMIN — HYDROMORPHONE HYDROCHLORIDE 0.4 MG: 0.2 INJECTION, SOLUTION INTRAMUSCULAR; INTRAVENOUS; SUBCUTANEOUS at 12:27

## 2023-09-21 RX ADMIN — ROCURONIUM BROMIDE 50 MG: 50 INJECTION, SOLUTION INTRAVENOUS at 08:21

## 2023-09-21 RX ADMIN — FENTANYL CITRATE 25 MCG: 50 INJECTION, SOLUTION INTRAMUSCULAR; INTRAVENOUS at 11:06

## 2023-09-21 RX ADMIN — FENTANYL CITRATE 100 MCG: 50 INJECTION, SOLUTION INTRAMUSCULAR; INTRAVENOUS at 08:21

## 2023-09-21 RX ADMIN — PHENAZOPYRIDINE 200 MG: 100 TABLET ORAL at 07:31

## 2023-09-21 RX ADMIN — OXYCODONE HYDROCHLORIDE 5 MG: 5 TABLET ORAL at 13:26

## 2023-09-21 RX ADMIN — PROPOFOL 200 MG: 10 INJECTION, EMULSION INTRAVENOUS at 08:21

## 2023-09-21 RX ADMIN — MIDAZOLAM HYDROCHLORIDE 2 MG: 1 INJECTION, SOLUTION INTRAMUSCULAR; INTRAVENOUS at 08:16

## 2023-09-21 RX ADMIN — LIDOCAINE HYDROCHLORIDE 60 MG: 20 INJECTION, SOLUTION INFILTRATION; PERINEURAL at 08:21

## 2023-09-21 RX ADMIN — SODIUM CHLORIDE, SODIUM LACTATE, POTASSIUM CHLORIDE, AND CALCIUM CHLORIDE: 600; 310; 30; 20 INJECTION, SOLUTION INTRAVENOUS at 09:15

## 2023-09-21 RX ADMIN — IBUPROFEN 600 MG: 600 TABLET, FILM COATED ORAL at 16:14

## 2023-09-21 RX ADMIN — DEXAMETHASONE SODIUM PHOSPHATE 4 MG: 4 INJECTION, SOLUTION INTRA-ARTICULAR; INTRALESIONAL; INTRAMUSCULAR; INTRAVENOUS; SOFT TISSUE at 08:27

## 2023-09-21 RX ADMIN — ROCURONIUM BROMIDE 20 MG: 50 INJECTION, SOLUTION INTRAVENOUS at 09:44

## 2023-09-21 RX ADMIN — ONDANSETRON 4 MG: 2 INJECTION INTRAMUSCULAR; INTRAVENOUS at 14:01

## 2023-09-21 RX ADMIN — ACETAMINOPHEN 975 MG: 325 TABLET ORAL at 07:30

## 2023-09-21 RX ADMIN — SUGAMMADEX 200 MG: 100 INJECTION, SOLUTION INTRAVENOUS at 10:12

## 2023-09-21 RX ADMIN — Medication 30 MG: at 08:21

## 2023-09-21 RX ADMIN — SODIUM CHLORIDE, SODIUM LACTATE, POTASSIUM CHLORIDE, AND CALCIUM CHLORIDE: 600; 310; 30; 20 INJECTION, SOLUTION INTRAVENOUS at 11:18

## 2023-09-21 RX ADMIN — SODIUM CHLORIDE, SODIUM LACTATE, POTASSIUM CHLORIDE, AND CALCIUM CHLORIDE: 600; 310; 30; 20 INJECTION, SOLUTION INTRAVENOUS at 07:55

## 2023-09-21 RX ADMIN — HYDROMORPHONE HYDROCHLORIDE 1 MG: 1 INJECTION, SOLUTION INTRAMUSCULAR; INTRAVENOUS; SUBCUTANEOUS at 09:44

## 2023-09-21 ASSESSMENT — ACTIVITIES OF DAILY LIVING (ADL)
TOILETING_ISSUES: NO
DRESSING/BATHING_DIFFICULTY: NO
ADLS_ACUITY_SCORE: 22
HEARING_DIFFICULTY_OR_DEAF: NO
ADLS_ACUITY_SCORE: 22
ADLS_ACUITY_SCORE: 37
FALL_HISTORY_WITHIN_LAST_SIX_MONTHS: NO
DIFFICULTY_EATING/SWALLOWING: NO
CHANGE_IN_FUNCTIONAL_STATUS_SINCE_ONSET_OF_CURRENT_ILLNESS/INJURY: NO
CONCENTRATING,_REMEMBERING_OR_MAKING_DECISIONS_DIFFICULTY: NO
ADLS_ACUITY_SCORE: 37
DOING_ERRANDS_INDEPENDENTLY_DIFFICULTY: NO
WALKING_OR_CLIMBING_STAIRS_DIFFICULTY: NO
DIFFICULTY_COMMUNICATING: NO
ADLS_ACUITY_SCORE: 22
WEAR_GLASSES_OR_BLIND: YES

## 2023-09-21 NOTE — OR NURSING
.PACU Transfer Note    Maricarmen Ornelas was transferred to [facility] via bed.  Equipment used for transport:  bed.  Accompanied by:  Leelee Parson  Prescriptions were: none    PACU Respiratory Event Documentation     1) Episodes of Apnea greater than or equal to 10 seconds: no    2) Bradypnea - less than 8 breaths per minute: no    3) Pain score on 0 to 10 scale: 4/10    4) Pain-sedation mismatch (yes or no): no    5) Repeated 02 desaturation less than 90% (yes or no): no    Anesthesia notified? (yes or no): no    Any of the above events occuring repeatedly in separate 30 minute intervals may be considered recurrent PACU respiratory events.    Patient stable and meets phase 1 discharge criteria for transport from PACU.     Report called to Eli AGUIRRE @ [phone] .

## 2023-09-21 NOTE — OP NOTE
Preoperative Diagnosis: anal fissure    Postoperative Diagnosis: same     Procedure Planned: exam under anesthesia    Procedure Performed: exam under anesthesia    Surgeon: Viki Villareal MD   Circulator: Haylie Castano RN  Relief Circulator: Smitha Tony RN  Scrub Person: Renuka Cat  First Assistant: Niki Fraga RN    Anesthesia: general    Specimen: none    Estimated Blood Loss: none     INDICATIONS   Please see the H&P. The patient presents with anal pain and bleeding. The risks, benefits and alternatives to exam under anesthesia and possible hemorrhoid banding were discussed with the patient. We specifically discussed the risks of infection, bleeding, scarring, recurrence and the possible need for further procedures. The patient expressed understanding and questions were answered. Informed consent paperwork was completed.     Procedure  The patient was brought to the operating room and placed in a supine positionon the operating table. Appropriate monitors were attached. After anesthesia was administered, the patient was positioned in lithotomy position and draped in the standard fashion.  Time out was performed confirming the patient's identity and procedure to be performed.    External exam revealed no mass or external hemorrhoid.  Digital exam revealed no masses. Anoscopy revealed a healing posterior anal fissure. No extensive internal hemorrhoids were noted. Hemostasis was excellent. The patient tolerated the procedure with immediately apparent complications. Dr. Fermin assumed care of the patient for her procedure.

## 2023-09-21 NOTE — ANESTHESIA PREPROCEDURE EVALUATION
Anesthesia Pre-Procedure Evaluation    Patient: Maricarmen Ornelas   MRN: [id number] : 1980        Procedure : Procedure(s):  Total Laparoscopic Hysterectomy, with Bilateral Salpingectomy, unilateral oophorectomy, cystoscopy  Exam under anesthesia anus          Past Medical History:   Diagnosis Date    Dichorionic diamniotic twin pregnancy in third trimester 2018    Disorder of thyroid     hypo    Supervision of elderly multigravida     2018    Twin pregnancy 2018    EDC 2018      Past Surgical History:   Procedure Laterality Date    APPENDECTOMY OPEN           SECTION N/A 2018    Procedure:  SECTION;   Section - twins;  Surgeon: Woodrow Hurtado MD;  Location:  OR    COLONOSCOPY N/A 7/10/2019    Procedure: COLONOSCOPY, WITH POLYPECTOMY AND BIOPSY;  Surgeon: Viki Villareal MD;  Location:  OR      Allergies   Allergen Reactions    Chlorhexidine Gluconate Rash     With twin C/S (2018)    Seasonal Allergies Itching      Social History     Tobacco Use    Smoking status: Never    Smokeless tobacco: Never   Substance Use Topics    Alcohol use: Yes     Alcohol/week: 2.0 standard drinks of alcohol     Comment: 1-2 times per month      Wt Readings from Last 1 Encounters:   23 80.6 kg (177 lb 9.6 oz)        Anesthesia Evaluation   Pt has had prior anesthetic.     No history of anesthetic complications       ROS/MED HX  ENT/Pulmonary:  - neg pulmonary ROS     Neurologic: Comment: Hx Bilateral CTS      Cardiovascular:  - neg cardiovascular ROS     METS/Exercise Tolerance: >4 METS    Hematologic:  - neg hematologic  ROS     Musculoskeletal:  - neg musculoskeletal ROS     GI/Hepatic:  - neg GI/hepatic ROS     Renal/Genitourinary:  - neg Renal ROS     Endo:     (+)          thyroid problem, hypothyroidism,           Psychiatric/Substance Use: Comment: ADD    (+) psychiatric history anxiety   Recreational drug usage: Cannabis.    Infectious Disease:  - neg infectious disease ROS      Malignancy:  - neg malignancy ROS     Other:  - neg other ROS          Physical Exam    Airway        Mallampati: I   TM distance: > 3 FB   Neck ROM: full   Mouth opening: > 3 cm    Respiratory Devices and Support         Dental       (+) Completely normal teeth      Cardiovascular   cardiovascular exam normal       Rhythm and rate: regular and normal     Pulmonary   pulmonary exam normal        breath sounds clear to auscultation           OUTSIDE LABS:  CBC:   Lab Results   Component Value Date    WBC 6.1 09/04/2023    WBC 7.2 07/01/2021    HGB 12.2 09/04/2023    HGB 12.3 07/01/2021    HCT 38.7 09/04/2023    HCT 39.2 07/01/2021     09/04/2023     07/01/2021     BMP:   Lab Results   Component Value Date     09/04/2023     07/01/2021    POTASSIUM 4.3 09/04/2023    POTASSIUM 4.1 07/01/2021    CHLORIDE 102 09/04/2023    CHLORIDE 105 07/01/2021    CO2 27 09/04/2023    CO2 27 07/01/2021    BUN 8.9 09/04/2023    BUN 9 07/01/2021    CR 0.64 09/04/2023    CR 0.67 07/01/2021    GLC 92 09/04/2023    GLC 92 07/01/2021     COAGS: No results found for: PTT, INR, FIBR  POC:   Lab Results   Component Value Date    HCG Negative 06/04/2021     HEPATIC:   Lab Results   Component Value Date    ALBUMIN 4.5 07/01/2021    PROTTOTAL 7.4 07/01/2021    ALT 10 07/01/2021    AST 11 (L) 07/01/2021    ALKPHOS 56 07/01/2021    BILITOTAL 0.4 07/01/2021     OTHER:   Lab Results   Component Value Date    A1C 5.2 06/02/2020    MARIANELA 9.3 09/04/2023    TSH 1.24 09/07/2023    T4 1.17 09/07/2023    CRP 5.0 09/17/2020    SED 7 09/17/2020       Anesthesia Plan    ASA Status:  2    NPO Status:  NPO Appropriate    Anesthesia Type: General.     - Airway: ETT   Induction: Intravenous, Propofol.   Maintenance: Balanced.        Consents    Anesthesia Plan(s) and associated risks, benefits, and realistic alternatives discussed. Questions answered and patient/representative(s) expressed understanding.     - Discussed: Risks, Benefits  and Alternatives for BOTH SEDATION and the PROCEDURE were discussed     - Discussed with:  Patient      - Extended Intubation/Ventilatory Support Discussed: No.      - Patient is DNR/DNI Status: No     Use of blood products discussed: No .     Postoperative Care    Pain management: IV analgesics.   PONV prophylaxis: Ondansetron (or other 5HT-3), Dexamethasone or Solumedrol     Comments:                FLAVIO Patel CRNA

## 2023-09-21 NOTE — INTERVAL H&P NOTE
"I have reviewed the surgical (or preoperative) H&P that is linked to this encounter, and examined the patient. There are no significant changes  Dia Morrison MD on 9/21/2023 at 8:03 AM      Clinical Conditions Present on Arrival:  Clinically Significant Risk Factors Present on Admission                  # Overweight: Estimated body mass index is 28.57 kg/m  as calculated from the following:    Height as of this encounter: 1.676 m (5' 6\").    Weight as of this encounter: 80.3 kg (177 lb).       "

## 2023-09-21 NOTE — ANESTHESIA PROCEDURE NOTES
Airway         Procedure Start/Stop Times: 9/21/2023 8:23 AM  Staff -        CRNA: Lukasz Woods APRN CRNA       Performed By: CRNA  Consent for Airway        Urgency: elective  Indications and Patient Condition       Indications for airway management: skye-procedural       Induction type:intravenous       Mask difficulty assessment: 1 - vent by mask    Final Airway Details       Final airway type: endotracheal airway       Successful airway: ETT - single  Endotracheal Airway Details        ETT size (mm): 7.0       Cuffed: yes       Cuff volume (mL): 7       Successful intubation technique: direct laryngoscopy       DL Blade Type: Olsen 2       Grade View of Cords: 1       Position: Center       Measured from: gums/teeth       Secured at (cm): 22       Bite block used: None    Post intubation assessment        Placement verified by: capnometry, equal breath sounds and chest rise        Number of attempts at approach: 1       Number of other approaches attempted: 0       Secured with: silk tape       Ease of procedure: easy       Dentition: Intact and Unchanged    Medication(s) Administered   Medication Administration Time: 9/21/2023 8:23 AM

## 2023-09-21 NOTE — PHARMACY-ADMISSION MEDICATION HISTORY
Pharmacist Admission Medication History    Admission medication history is complete. The information provided in this note is only as accurate as the sources available at the time of the update.    Medication reconciliation/reorder completed by provider prior to medication history? Yes    Information Source(s): Patient via in-person, [employer]    Pertinent Information: None at this time    Changes made to PTA medication list:  Added: None  Deleted: None  Changed: Loratidine to PRN, completed melatonin sig    Medication Affordability: No issues per patient    Allergies reviewed with patient and updates made in EHR: yes    Medication History Completed By: Marcela Maria RPH 9/21/2023 1:17 PM    Prior to Admission medications    Medication Sig Last Dose Taking? Auth Provider Long Term End Date   dibucaine 1 % OINT rectal ointment Place rectally 3 times daily as needed for hemorrhoids Past Month at PRN Yes Viki Villareal MD     dicyclomine (BENTYL) 10 MG capsule Take 1 capsule (10 mg) by mouth 4 times daily as needed (abdominal cramping) More than a month at PRN Yes Avril Sanders,      diltiazem 2% 2% OINT ointment Apply 1 g topically 2 times daily as needed (anal fissure) 9/20/2023 at PM Yes Viki Villareal MD     doxylamine (UNISOM) 25 MG TABS tablet Take 25 mg by mouth At Bedtime 9/20/2023 at PM Yes Reported, Patient No    DULoxetine (CYMBALTA) 20 MG capsule Take 1 capsule (20 mg) by mouth daily 9/20/2023 at PM (normally AM) Yes Avril Sanders DO Yes    loratadine (CLARITIN) 10 MG tablet Take 10 mg by mouth daily as needed for allergies 9/20/2023 at PM Yes Avril Sanders DO     medical cannabis (Patient's own supply) See Admin Instructions (The purpose of this order is to document that the patient reports taking medical cannabis.  This is not a prescription, and is not used to certify that the patient has a qualifying medical condition.) 9/20/2023 at 1200 Yes Reported, Patient     Melatonin 2.5 MG CHEW Take  1 tablet by mouth At Bedtime 9/20/2023 at PM Yes Reported, Patient     methylcellulose (CITRUCEL) powder Take 0.3 g (1.05 teaspoonful) by mouth daily 9/20/2023 at AM Yes Avril Sanders, DO     methylphenidate HCl ER, OSM, (CONCERTA) 54 MG CR tablet Take 1 tablet (54 mg) by mouth daily 9/20/2023 at AM Yes Avril Sanders, DO     methylphenidate HCl ER, OSM, (CONCERTA) 54 MG CR tablet Take 1 tablet (54 mg) by mouth daily Duplicate Yes Avril Sanders DO     methylphenidate HCl ER, OSM, (CONCERTA) 54 MG CR tablet Take 1 tablet (54 mg) by mouth daily Duplicate Yes Avril Sanders DO     SYNTHROID 75 MCG tablet Take 1 tablet (75 mcg) by mouth daily 9/20/2023 at AM Yes Avril Sanders, DO Yes

## 2023-09-21 NOTE — PROGRESS NOTES
[facility] DISCHARGE NOTE    Patient discharged to home at 5:04 PM via ambulation. Accompanied by spouse and staff. Discharge instructions reviewed with patient, opportunity offered to ask questions. Prescriptions sent to patients preferred pharmacy. All belongings sent with patient.    Kendy Espinosa RN

## 2023-09-21 NOTE — ANESTHESIA CARE TRANSFER NOTE
Patient: Maricarmen Ornelas    Procedure: Procedure(s):  Total Laparoscopic Hysterectomy, with Bilateral Salpingectomy, right oophorectomy, cystoscopy  Exam under anesthesia anus       Diagnosis: Adenomyosis of the uterus [N80.03]  Chronic pelvic pain in female [R10.2, G89.29]  Diagnosis Additional Information: No value filed.    Anesthesia Type:   General     Note:    Oropharynx: oropharynx clear of all foreign objects and spontaneously breathing  Level of Consciousness: drowsy  Oxygen Supplementation: face mask  Level of Supplemental Oxygen (L/min / FiO2): 8  Independent Airway: airway patency satisfactory and stable  Dentition: dentition unchanged  Vital Signs Stable: post-procedure vital signs reviewed and stable  Report to RN Given: handoff report given  Patient transferred to: PACU    Handoff Report: Identifed the Patient, Identified the Reponsible Provider, Reviewed the pertinent medical history, Discussed the surgical course, Reviewed Intra-OP anesthesia mangement and issues during anesthesia, Set expectations for post-procedure period and Allowed opportunity for questions and acknowledgement of understanding    Vitals:  Vitals Value Taken Time   /57 09/21/23 1026   Temp     Pulse 66 09/21/23 1028   Resp 7 09/21/23 1028   SpO2 100 % 09/21/23 1028   Vitals shown include unvalidated device data.    Electronically Signed By: FLAVIO Patel CRNA  September 21, 2023  10:29 AM

## 2023-09-21 NOTE — PROVIDER NOTIFICATION
Admission:  I am responsible for any personal items that are not sent to the safe or pharmacy.  [facility] is not responsible for loss, theft or damage of any property in my possession.    Signature:  _________________________________ Date: _______  Time: _____                                              Staff Signature:  ____________________________ Date: ________  Time: _____      2nd Staff person, if patient is unable/unwilling to sign:    Signature: ________________________________ Date: ________  Time: _____     Discharge:  [facility] has returned all of my personal belongings:    Signature: _________________________________ Date: ________  Time: _____                                          Staff Signature:  ____________________________ Date: ________  Time: _____        09/21/23 1211   Valuables   Patient Belongings remains with patient   Patient Belongings Remaining with Patient money (see comment);clothing;purse/wallet;ring;jewelry;glasses;cash/credit card;bracelet   Did you bring any home meds/supplements to the hospital?  No

## 2023-09-21 NOTE — INTERVAL H&P NOTE
"I have reviewed the surgical (or preoperative) H&P that is linked to this encounter, and examined the patient. There are no significant changes. She is a bit better since starting the topical medication. We discussed exam under anesthesia questions were answered.    Clinical Conditions Present on Arrival:  Clinically Significant Risk Factors Present on Admission                  # Overweight: Estimated body mass index is 28.57 kg/m  as calculated from the following:    Height as of this encounter: 1.676 m (5' 6\").    Weight as of this encounter: 80.3 kg (177 lb).       "

## 2023-09-21 NOTE — PLAN OF CARE
"Pt discharging  in room. VSS, Blood pressure 96/58, pulse 71, temperature 98.1  F (36.7  C), temperature source Tympanic, resp. rate 14, height 1.676 m (5' 6\"), weight 80.3 kg (177 lb), last menstrual period 09/21/2023, SpO2 98 %, not currently breastfeeding. 3/10 pain administered oxy 5 mg @ 1704. Incisions CDI     Problem: Plan of Care - These are the overarching goals to be used throughout the patient stay.    Goal: Plan of Care Review  Description: The Plan of Care Review/Shift note should be completed every shift.  The Outcome Evaluation is a brief statement about your assessment that the patient is improving, declining, or no change.  This information will be displayed automatically on your shift note.  Outcome: Adequate for Care Transition  Flowsheets (Taken 9/21/2023 1703)  Plan of Care Reviewed With: patient  Overall Patient Progress: improving  Goal: Patient-Specific Goal (Individualized)  Description: You can add care plan individualizations to a care plan. Examples of Individualization might be:  \"Parent requests to be called daily at 9am for status\", \"I have a hard time hearing out of my right ear\", or \"Do not touch me to wake me up as it startles me\".  Outcome: Adequate for Care Transition  Goal: Absence of Hospital-Acquired Illness or Injury  Outcome: Adequate for Care Transition  Intervention: Identify and Manage Fall Risk  Recent Flowsheet Documentation  Taken 9/21/2023 1607 by Kendy Espinosa RN  Safety Promotion/Fall Prevention: safety round/check completed  Taken 9/21/2023 1207 by Kendy Espinosa RN  Safety Promotion/Fall Prevention: safety round/check completed  Intervention: Prevent Skin Injury  Recent Flowsheet Documentation  Taken 9/21/2023 1153 by Kendy Espinosa RN  Body Position: legs elevated  Intervention: Prevent and Manage VTE (Venous Thromboembolism) Risk  Recent Flowsheet Documentation  Taken 9/21/2023 1607 by Kendy Espinosa RN  VTE Prevention/Management: SCDs " (sequential compression devices) on  Taken 9/21/2023 1153 by Kendy Espinosa, RN  VTE Prevention/Management: SCDs (sequential compression devices) on  Goal: Optimal Comfort and Wellbeing  Outcome: Adequate for Care Transition  Goal: Readiness for Transition of Care  Outcome: Adequate for Care Transition  Intervention: Mutually Develop Transition Plan  Recent Flowsheet Documentation  Taken 9/21/2023 1213 by Kendy Espinosa, RN  Equipment Currently Used at Home: none   Goal Outcome Evaluation:      Plan of Care Reviewed With: patient    Overall Patient Progress: improvingOverall Patient Progress: improving  Kendy Espinosa RN on 9/21/2023 at 5:04 PM

## 2023-09-21 NOTE — OP NOTE
Gynecologic Operative Note   Maricarmen Ornelas  [id number]  2023    Preoperative Diagnosis:   Abnormal uterine bleeding  Chronic pelvic pain  Suspected adenomyosis  History of  section     Postoperative Diagnosis:   1.Same     Procedure:   1. Total laparoscopic hysterectomy with bilateral salpingectomy  2. Right oophorectomy  3. Cystoscopy    Surgeon: Dia Morrison MD   Assist: Dr. Diann Bull, Renuka Mercado, MEGGAN    Anesthesia: general endotracheal   Specimens: uterus, cervix, bilateral fallopian tubes, right ovary  Complications: None     EBL: 100 mL     Findings: Exam under anesthesia revealed an anteverted uterus.  Upon entry of the abdomen with the laparoscope the uterus was anteverted with some scar tissue noted along the anterior uterine wall and bladder.  A survey of the upper abdomen showed normal anatomy. The posterior culdesac had a small region along the left uterosacral ligament of endometriosis. Bilateral ovaries were normal and bilateral fallopian tubes were normal.    Indications: Ms. Ornelas is a 43 year old female who presented to clinic with a long-standing history of chronic pelvic pain and abnormal uterine bleeding.  A total laparoscopic hysterectomy with bilateral salpingectomy was recommended at that time. She also expressed interest in wanting ovaries removed for reduction of ovarian cancer risk-- we discussed that in her low 40s, it is not recommended to perform surgical menopause if ovaries are normal due to decreased life expectancy and loss of estrogen benefit for cardiovascular and skeletal health. We compromised on plan for removing one ovary. All risks, benefits and alternatives were discussed and written informed consent was obtained.     Procedure: The patient was taken to the operating room where general anesthesia was induced without difficulty. She was then examined under anesthesia with the above noted findings. She was then prepared and draped in the normal sterile  fashion in the dorsal lithotomy position using yellow fin stirrups. Attention was turned to the vagina. A bivalve speculum was placed for visualization of the cervix and the anterior lip of the cervix was then grasped with a single-tooth tenaculum. The cervix was then serially dilated and the V-Care uterine manipulator was placed through the cervix.     Attention was then turned to the abdomen where a 10 mm infra-umbilical skin incision was made after infiltration with local anesthetic. The veres needle was then introduced into the peritoneal cavity while tenting the abdominal wall. The gas was turned on and pneumoperitoneum was achieved using CO2 gas, opening pressure less than 5mmHg. The trocar and sleeve were then advanced without difficulty into the abdomen where intra-abdominal placement was confirmed with the laparoscope. A second 5mm skin incision was then made in the LLQ after infiltration with local anesthetic and the trocar and sleeve advanced under direct visualization. A third skin incision, 5 mm, was made in the LLQ in a triangulation pattern after infiltration with local anesthetic and the trocar and sleeve advanced under direct visualization. The final 5 mm port was placed in the RLQ.    A survey of the patient's abdomen and pelvis was made with the above noted findings. Attention was then turned to the pelvis. Bilateral ureters were identified. The right fallopian tube was elevated and the mesosalpinx divided with cautery toward the cornua. This tube was transected and removed through the port, labeled for pathology review.  The right round ligament was then grasped and transected using the Ligasure. The right uteroovarian ligament was then cauterized and divided with the Ligasure device.  The posterior leaf of the broad ligament was then incised toward the uterine vessels. The anterior leaf of the broad ligament was then incised along the bladder reflection to the midline. The same procedure was  then completed on the left. From this side, the anterior leaf of the broad ligament was incised along the bladder reflection to the midline. A bladder flap was mobilized and the peritoneum on the vesicouterine fold was incised to mobilize the bladder. The uterine arteries were then transected and ligated using the Ligasure device, down to the level of the colpotomy ring. The vagina was transected along the V-care cup using the monopolar cautery, resulting in separation of the uterus. The uterus was delivered through the vagina, and the pneumo-occluder balloon was reinserted to maintain pneumoperitoneum. Attention was then turned to the ovaries and both appeared normal in appearance. The decision was made to take the right ovary for her goal of removal of one ovary. The right ureter was again identified and noted to be safely inferior to the region of interest. The IP ligament was cauterized and transected with the LigaSure device. The right ovary was removed through the vagina. The vaginal vault was closed with running 0-Vicryl VLock using the endostitch device. The abdomen was irrigated, and excellent hemostasis was noted. A final look at the surgical sites showed excellent hemostasis and the 11mm port was closed using the Tony Ratna. Then, the abdomen was the desufflated and all trocars were then removed. The skin incisions were then closed using 3-0 monocryl in a subcuticular fashion.     A cystoscopy was performed that showed bilateral ureteral jets, confirming ureteral patency. No evidence of bladder injury or foreign material.    All instruments were removed from the vagina and the pratt catheter was replaced.     The patient tolerated the procedure well. Sponge, lap and needle counts were correct. The patient was taken to the recovery area in stable condition.    First Assist: Dr Diann Bull was requested to be present to assist with hemostasis and visualization.    Dia Morrison MD 10:37 AM  9/21/2023

## 2023-09-21 NOTE — ANESTHESIA POSTPROCEDURE EVALUATION
Patient: Maricarmen Ornelas    Procedure: Procedure(s):  Total Laparoscopic Hysterectomy, with Bilateral Salpingectomy, right oophorectomy, cystoscopy  Exam under anesthesia anus       Anesthesia Type:  General    Note:  Disposition: Outpatient   Postop Pain Control: Uneventful            Sign Out: Well controlled pain   PONV: No   Neuro/Psych: Uneventful            Sign Out: Acceptable/Baseline neuro status   Airway/Respiratory: Uneventful            Sign Out: AIRWAY IN SITU/Resp. Support   CV/Hemodynamics: Uneventful            Sign Out: Acceptable CV status; No obvious hypovolemia; No obvious fluid overload   Other NRE: NONE   DID A NON-ROUTINE EVENT OCCUR?            Last vitals:  Vitals Value Taken Time   BP 98/56 09/21/23 1140   Temp 98.2  F (36.8  C) 09/21/23 1130   Pulse 61 09/21/23 1140   Resp 8 09/21/23 1140   SpO2 97 % 09/21/23 1140   Vitals shown include unvalidated device data.    Electronically Signed By: FLAVIO Patel CRNA  September 21, 2023  1:14 PM

## 2023-09-22 ENCOUNTER — PATIENT OUTREACH (OUTPATIENT)
Dept: FAMILY MEDICINE | Facility: OTHER | Age: 43
End: 2023-09-22
Payer: COMMERCIAL

## 2023-09-22 NOTE — TELEPHONE ENCOUNTER
Surgical. Patient discharged with surgical follow-up only. No TCM call required per policy.      JOCELIN LARSEN RN on 9/22/2023 at 9:27 AM

## 2023-09-26 ENCOUNTER — TELEPHONE (OUTPATIENT)
Dept: FAMILY MEDICINE | Facility: OTHER | Age: 43
End: 2023-09-26
Payer: COMMERCIAL

## 2023-09-26 ENCOUNTER — MYC MEDICAL ADVICE (OUTPATIENT)
Dept: FAMILY MEDICINE | Facility: OTHER | Age: 43
End: 2023-09-26
Payer: COMMERCIAL

## 2023-09-26 DIAGNOSIS — R05.1 ACUTE COUGH: Primary | ICD-10-CM

## 2023-09-26 DIAGNOSIS — F41.9 ANXIETY: ICD-10-CM

## 2023-09-26 DIAGNOSIS — Z90.710 S/P HYSTERECTOMY: Primary | ICD-10-CM

## 2023-09-26 DIAGNOSIS — E03.9 HYPOTHYROIDISM, UNSPECIFIED TYPE: ICD-10-CM

## 2023-09-26 LAB
PATH REPORT.COMMENTS IMP SPEC: NORMAL
PATH REPORT.FINAL DX SPEC: NORMAL
PATH REPORT.RELEVANT HX SPEC: NORMAL
PHOTO IMAGE: NORMAL

## 2023-09-26 RX ORDER — BENZONATATE 100 MG/1
100 CAPSULE ORAL 3 TIMES DAILY PRN
Qty: 60 CAPSULE | Refills: 0 | Status: SHIPPED | OUTPATIENT
Start: 2023-09-26 | End: 2023-12-22

## 2023-09-26 NOTE — TELEPHONE ENCOUNTER
Reason for call: Medication or medication refill    Name of medication requested: methylphenidate, duloxetine, and synthroid    How many days of medication do you have left? about TEN    What pharmacy do you use? [facility]    Preferred method for responding to this message: Telephone Call    Phone number patient can be reached at: Cell number on file:    Telephone Information:   Mobile [phone]       If we cannot reach you directly, may we leave a detailed response at the number you provided? Yes      Patient has a refill appointment 11/02/2023 with [facility].      Paola Hastings on 9/26/2023 at 4:11 PM

## 2023-09-26 NOTE — TELEPHONE ENCOUNTER
Call to patient, notified her of these being all at the pharmacy.  Yakelin Granados RN on 9/26/2023 at 4:17 PM

## 2023-09-27 ENCOUNTER — TELEPHONE (OUTPATIENT)
Dept: OBGYN | Facility: OTHER | Age: 43
End: 2023-09-27
Payer: COMMERCIAL

## 2023-09-27 NOTE — TELEPHONE ENCOUNTER
"Per Dr. Morrison, \"I'm so sorry for her to be dealing with this on the timing that she is. She is doing almost all of the things I know to help symptomatic relief. I would encourage continued supportive symptomatic care-- extra fluids, tylenol, ibuprofen for the aching. The cough suppressants may not help right away at the height of the virus, but can continue to be used. Cough drops can help between doses, but she will likely still have coughing for a few days with this new virus. Neti pot for sinus congestion beyond her medications currently.     If she wants to get tested for covid, and is positive, we can add paxlovid.\"    Patient notified and all immediate questions were answered. She had a negative home COVID test today.    She now reports concern of vaginal odor. Patient was offered and accepted appointment tomorrow AM.    Kell Gibson RN...........9/27/2023 3:46 PM    "

## 2023-09-27 NOTE — TELEPHONE ENCOUNTER
Patient reports nasal congestion, sinus pressure, coughing up phlegm and difficulty getting full breaths. She states she feels hot and cold, as well as achy, but denies fever. She is taking Zyrtec, Delsym and Tessalon with little relief. She notes her kids have similar URI symptoms. Cough is particularly bothersome d/t recent hysterectomy. Patient wondering what else to do.    Kell Gibson RN...................9/27/2023 8:30 AM

## 2023-09-28 ENCOUNTER — OFFICE VISIT (OUTPATIENT)
Dept: OBGYN | Facility: OTHER | Age: 43
End: 2023-09-28
Payer: COMMERCIAL

## 2023-09-28 VITALS
DIASTOLIC BLOOD PRESSURE: 70 MMHG | WEIGHT: 178 LBS | BODY MASS INDEX: 28.73 KG/M2 | SYSTOLIC BLOOD PRESSURE: 118 MMHG | TEMPERATURE: 98.8 F | HEART RATE: 68 BPM

## 2023-09-28 DIAGNOSIS — T78.40XA ALLERGIC REACTION, INITIAL ENCOUNTER: ICD-10-CM

## 2023-09-28 DIAGNOSIS — N89.8 VAGINAL ODOR: ICD-10-CM

## 2023-09-28 DIAGNOSIS — R10.2 PELVIC PAIN IN FEMALE: Primary | ICD-10-CM

## 2023-09-28 DIAGNOSIS — R39.89 URINARY PROBLEM: ICD-10-CM

## 2023-09-28 LAB
ALBUMIN UR-MCNC: NEGATIVE MG/DL
APPEARANCE UR: CLEAR
BACTERIAL VAGINOSIS VAG-IMP: NEGATIVE
BILIRUB UR QL STRIP: NEGATIVE
CANDIDA DNA VAG QL NAA+PROBE: NOT DETECTED
CANDIDA GLABRATA / CANDIDA KRUSEI DNA: NOT DETECTED
COLOR UR AUTO: NORMAL
GLUCOSE UR STRIP-MCNC: NEGATIVE MG/DL
HGB UR QL STRIP: NEGATIVE
KETONES UR STRIP-MCNC: NEGATIVE MG/DL
LEUKOCYTE ESTERASE UR QL STRIP: NEGATIVE
NITRATE UR QL: NEGATIVE
PH UR STRIP: 7.5 [PH] (ref 5–9)
SP GR UR STRIP: 1.01 (ref 1–1.03)
T VAGINALIS DNA VAG QL NAA+PROBE: NOT DETECTED
UROBILINOGEN UR STRIP-MCNC: NORMAL MG/DL

## 2023-09-28 PROCEDURE — 99214 OFFICE O/P EST MOD 30 MIN: CPT

## 2023-09-28 PROCEDURE — 81003 URINALYSIS AUTO W/O SCOPE: CPT | Mod: ZL

## 2023-09-28 PROCEDURE — 0352U MULTIPLEX VAGINAL PANEL BY PCR: CPT | Mod: ZL

## 2023-09-28 ASSESSMENT — PAIN SCALES - GENERAL: PAINLEVEL: NO PAIN (0)

## 2023-09-28 NOTE — NURSING NOTE
"Chief Complaint   Patient presents with    Follow Up     Concerns related to recent hyst     Patient presents to the clinic for concerns of having a cold with coughing and sneezing and she is worried about the internal stiches from her recent surgery. Patient stated that she is having abnormal vaginal odor. She stated that she is experiencing pain with urination and she is experiencing \"abdominal spasms\"    Mayte Noriega, LPN       FOOD SECURITY SCREENING QUESTIONS:    The next two questions are to help us understand your food security.  If you are feeling you need any assistance in this area, we have resources available to support you today.    Hunger Vital Signs:  Within the past 12 months we worried whether our food would run out before we got money to buy more. Never  Within the past 12 months the food we bought just didn't last and we didn't have money to get more. Never    Food Insecurity: Not on file       "

## 2023-09-28 NOTE — PROGRESS NOTES
CC: Vaginal itching  HPI:  Rafael is a 43 year old female who presents for vaginal itching as well as urinary discomfort.  She was noted to have a procedure last Thursday in which she had a total laparoscopic hysterectomy completed.  She reports that her pain got better but on  she started to notice that her bladder pain and pain in her abdomen had gotten worse due to coughing.  She has been utilizing Tessalon Perles as well as cough syrup during the daytime.  This has been helpful.  She is concerned that she possibly dehisced her vaginal cuff.  She is concerned as it is sometimes painful to urinate as well and she does not know if she needs to go until her bladder is quite full.  She is wondering if she has urinary tract infection with this.  She also has some vaginal irritation in which she would like addressed today.  She reports that she has stopped bleeding following the hysterectomy.  She had previously stopped her medications on  but has noted that the pain has worsened as above.    She was noted to have an allergic reaction to something that was utilized in the surgery.  She reports that it is like a straight pain stripe across her upper abdomen.  She reports at the time of surgery she was wearing a chlorhexidine allergy bracelet and is wondering if this was used surgery.   Patient's last menstrual period was 2023.    OB History    Para Term  AB Living   3 3 3 0 0 4   SAB IAB Ectopic Multiple Live Births   0 0 0 1 4      # Outcome Date GA Lbr Renny/2nd Weight Sex Delivery Anes PTL Lv   3A Term 18 37w0d  2.546 kg (5 lb 9.8 oz) F   N SMOOTH      Name: MYNOR DAY1 RAFAEL      Apgar1: 8  Apgar5: 9   3B Term 18 37w0d  2.909 kg (6 lb 6.6 oz) M   N SMOOTH      Name: MYNOR DAY2 RAFAEL      Apgar1: 9  Apgar5: 9   2 Term 10/16/10 40w0d   M  EPI N SMOOTH      Name: William   1 Term  40w0d   M  None N SMOOTH      Birth Comments: [place]      Name: Kaveh     Past Medical History:    Diagnosis Date    Dichorionic diamniotic twin pregnancy in third trimester 4/4/2018    Disorder of thyroid     hypo    Supervision of elderly multigravida     2/6/2018    Twin pregnancy 2018 EDC 9/2018       Current Outpatient Medications   Medication    acetaminophen (TYLENOL) 325 MG tablet    benzonatate (TESSALON) 100 MG capsule    diltiazem 2% 2% OINT ointment    doxylamine (UNISOM) 25 MG TABS tablet    DULoxetine (CYMBALTA) 20 MG capsule    ibuprofen (ADVIL/MOTRIN) 600 MG tablet    loratadine (CLARITIN) 10 MG tablet    medical cannabis (Patient's own supply)    Melatonin 2.5 MG CHEW    methylcellulose (CITRUCEL) powder    SYNTHROID 75 MCG tablet    dibucaine 1 % OINT rectal ointment    dicyclomine (BENTYL) 10 MG capsule    hydrOXYzine (ATARAX) 25 MG tablet    [START ON 10/4/2023] methylphenidate HCl ER, OSM, (CONCERTA) 54 MG CR tablet    methylphenidate HCl ER, OSM, (CONCERTA) 54 MG CR tablet    methylphenidate HCl ER, OSM, (CONCERTA) 54 MG CR tablet    ondansetron (ZOFRAN) 4 MG tablet    oxyCODONE (ROXICODONE) 5 MG tablet    senna-docusate (SENOKOT-S/PERICOLACE) 8.6-50 MG tablet     No current facility-administered medications for this visit.     Allergies   Allergen Reactions    Chlorhexidine Gluconate Rash     With twin C/S (9/2018)    Seasonal Allergies Itching     /70   Pulse 68   Temp 98.8  F (37.1  C) (Oral)   Wt 80.7 kg (178 lb)   LMP 09/21/2023   BMI 28.73 kg/m      REVIEW OF SYSTEMS  As Per HPI, Otherwise negative.     Exam:  Constitutional: healthy, alert, and no distress  Abdomen: Reddened area noted on abdomen in shape of straight like with straight line running down her sides.   Pelvic: Normal BUSE, vulva appears normal, vaginal is normal with vaginal cuff intact with no concerning findings.       Lab:   Results for orders placed or performed in visit on 09/28/23   UA Macroscopic with reflex to Microscopic and Culture     Status: Normal    Specimen: Urine, Midstream   Result Value  Ref Range    Color Urine Light Yellow Colorless, Straw, Light Yellow, Yellow    Appearance Urine Clear Clear    Glucose Urine Negative Negative mg/dL    Bilirubin Urine Negative Negative    Ketones Urine Negative Negative mg/dL    Specific Gravity Urine 1.009 1.000 - 1.030    Blood Urine Negative Negative    pH Urine 7.5 5.0 - 9.0    Protein Albumin Urine Negative Negative mg/dL    Urobilinogen Urine Normal Normal, 2.0 mg/dL    Nitrite Urine Negative Negative    Leukocyte Esterase Urine Negative Negative    Narrative    Microscopic not indicated       ASSESSMENT/PLAN :    (R10.2) Pelvic pain in female  (primary encounter diagnosis)  Plan: Discussed no tearing in vaginal cuff. This is intact today. We discussed things that will make pain worse and to avoid them . Discussed bracing with a  pillow for cough. No further medication can be used as patient declines codeine for cough. She is reassured that cuff is intact today.     Allergic reaction: Discussed that the area of irritation follows drape pattern from surgery. Patient was reassured as iodine was used for prep and not chloraprep per surgery notes. Discussed likely an adhesive reaction. She continues to take zyrtec for this and this has been helpful.       (N89.8) Vaginal odor  (primary encounter diagnosis)  Plan: Multiplex Vaginal Panel by PCR        MVP collected for vaginal irritation.     (R39.89) Urinary problem  Plan: UA Macroscopic with reflex to Microscopic and         Culture        UA collected for urinary discomfort. Negative today. Discussed likely related to post surgical healing. Discussed this will improve with time as she heals.       Renuka Mercado, FLAVIO CNP  11:25 AM 9/28/2023

## 2023-10-05 ENCOUNTER — OFFICE VISIT (OUTPATIENT)
Dept: OBGYN | Facility: OTHER | Age: 43
End: 2023-10-05
Payer: COMMERCIAL

## 2023-10-05 VITALS
SYSTOLIC BLOOD PRESSURE: 120 MMHG | WEIGHT: 180 LBS | BODY MASS INDEX: 29.05 KG/M2 | TEMPERATURE: 98.9 F | DIASTOLIC BLOOD PRESSURE: 88 MMHG | HEART RATE: 84 BPM

## 2023-10-05 DIAGNOSIS — Z12.31 ENCOUNTER FOR SCREENING MAMMOGRAM FOR BREAST CANCER: ICD-10-CM

## 2023-10-05 DIAGNOSIS — F90.0 ATTENTION DEFICIT HYPERACTIVITY DISORDER (ADHD), PREDOMINANTLY INATTENTIVE TYPE: ICD-10-CM

## 2023-10-05 DIAGNOSIS — Z90.710 S/P TOTAL HYSTERECTOMY: Primary | ICD-10-CM

## 2023-10-05 PROCEDURE — 99024 POSTOP FOLLOW-UP VISIT: CPT

## 2023-10-05 NOTE — NURSING NOTE
Chief Complaint   Patient presents with    Consult     2 wk post op      Patient presents to the clinic for 2 wk post op.     Mayte Noriega LPN

## 2023-10-05 NOTE — PROGRESS NOTES
Postoperative Visit  10/5/2023    S: Maricarmen Ornelas is a 43 year old  here for post-operative visit following TLH on 23. She reports feeling well today much better than previously. She was previously having some vaginal discomfort one week ago and this has  improved.     O:   LMP 2023   Gen:  Well-appearing, NAD  Abd: incision C/D/I  Pelvic: deferred    A/P:   Ms. Maricarmen Ornelas is a 43 year old  two s/p TLH\.  Doing well, no further concerns with pain. Allergic reaction has resolved. She will follow up in 4 weeks.   JIMMY Chance-LUANA  10/5/2023 12:48 PM     102

## 2023-10-09 ENCOUNTER — MYC REFILL (OUTPATIENT)
Dept: FAMILY MEDICINE | Facility: OTHER | Age: 43
End: 2023-10-09
Payer: COMMERCIAL

## 2023-10-09 DIAGNOSIS — F90.0 ATTENTION DEFICIT HYPERACTIVITY DISORDER (ADHD), PREDOMINANTLY INATTENTIVE TYPE: ICD-10-CM

## 2023-10-09 RX ORDER — METHYLPHENIDATE HYDROCHLORIDE 54 MG/1
54 TABLET ORAL DAILY
Qty: 30 TABLET | Refills: 0 | Status: CANCELLED | OUTPATIENT
Start: 2023-10-09

## 2023-10-11 ENCOUNTER — MYC MEDICAL ADVICE (OUTPATIENT)
Dept: FAMILY MEDICINE | Facility: OTHER | Age: 43
End: 2023-10-11
Payer: COMMERCIAL

## 2023-10-11 DIAGNOSIS — F90.0 ATTENTION DEFICIT HYPERACTIVITY DISORDER (ADHD), PREDOMINANTLY INATTENTIVE TYPE: ICD-10-CM

## 2023-10-11 NOTE — TELEPHONE ENCOUNTER
Message from pharmacy:  Patient is out of medication and needing refills. Has appointment in a couple of weeks.    [id number] of [place] sent Rx request for the followin/14/23 0854 Sign Avril Sanders DO Reorder from Order:[id number]     methylphenidate HCl ER, OSM, (CONCERTA) 54 MG CR tablet 30 tablet 0 10/4/2023  No   Sig - Route: Take 1 tablet (54 mg) by mouth daily - Oral   Sent to pharmacy as: Methylphenidate HCl ER (OSM) 54 MG Oral Tablet Extended Release (CONCERTA)   Class: E-Prescribe   Earliest Fill Date: 10/4/2023   Order: [id number]   E-Prescribing Status: Receipt confirmed by pharmacy (2023  8:55 AM CDT)     [id number] - [place], [place] - [address]     Called [facility] and spoke with Seema, pharmacist, after she verified Pt's last name and . She confirmed receipt of prescription and states it can be disregarded. Tracey Castro RN .............. 10/11/2023  4:10 PM

## 2023-10-11 NOTE — TELEPHONE ENCOUNTER
Patient called in to say they are out of their medication for about a week now and need to get their prescription filled  Please assist      Amrik Jarrett on 10/11/2023 at 1:03 PM   detailed exam details…

## 2023-10-12 RX ORDER — METHYLPHENIDATE HYDROCHLORIDE 54 MG/1
54 TABLET ORAL DAILY
Qty: 30 TABLET | Refills: 0 | Status: CANCELLED | OUTPATIENT
Start: 2023-10-12

## 2023-10-12 NOTE — TELEPHONE ENCOUNTER
Pending Prescriptions:                       Disp   Refills    methylphenidate HCl ER (OSM) (CONCERTA) 5*30 tab*0            Sig: Take 1 tablet (54 mg) by mouth daily    Are you willing to fill until patient comes in on 11/2/23?    Xenia Diaz CMA on 10/12/2023 at 10:31 AM

## 2023-10-13 RX ORDER — METHYLPHENIDATE HYDROCHLORIDE 54 MG/1
TABLET ORAL
Qty: 30 TABLET | Refills: 0 | Status: SHIPPED | OUTPATIENT
Start: 2023-10-13 | End: 2023-11-02

## 2023-10-13 NOTE — TELEPHONE ENCOUNTER
[facility] sent Rx request for the following:      Requested Prescriptions   Pending Prescriptions Disp Refills    methylphenidate HCl ER (OSM) (CONCERTA) 54 MG CR tablet [Pharmacy Med Name: METHYLPHENIDATE 54MG ER TABLET] 30 tablet 0     Sig: TAKE 1 TABLET (54 MG) BY MOUTH DAILY EFFECTIVE DATE: 9/3/23       There is no refill protocol information for this order          Last Prescription Date:   9/3/23  Last Fill Qty/Refills:         30, R-0    Last Office Visit:              9/7/23   Future Office visit:           11/2/23    Yumiko Jarrett RN on 10/13/2023 at 6:37 AM

## 2023-10-13 NOTE — TELEPHONE ENCOUNTER
Appointment scheduled on 11/02/23 for med check. Will run out before then.  Rajani Ford LPN 10/13/2023  9:55 AM

## 2023-11-01 ENCOUNTER — OFFICE VISIT (OUTPATIENT)
Dept: OBGYN | Facility: OTHER | Age: 43
End: 2023-11-01
Attending: STUDENT IN AN ORGANIZED HEALTH CARE EDUCATION/TRAINING PROGRAM
Payer: COMMERCIAL

## 2023-11-01 VITALS
HEIGHT: 66 IN | DIASTOLIC BLOOD PRESSURE: 58 MMHG | BODY MASS INDEX: 29.25 KG/M2 | SYSTOLIC BLOOD PRESSURE: 122 MMHG | OXYGEN SATURATION: 99 % | HEART RATE: 84 BPM | TEMPERATURE: 99.4 F | RESPIRATION RATE: 16 BRPM | WEIGHT: 182 LBS

## 2023-11-01 DIAGNOSIS — Z90.710 S/P TOTAL HYSTERECTOMY: Primary | ICD-10-CM

## 2023-11-01 PROCEDURE — 99024 POSTOP FOLLOW-UP VISIT: CPT | Performed by: STUDENT IN AN ORGANIZED HEALTH CARE EDUCATION/TRAINING PROGRAM

## 2023-11-01 ASSESSMENT — PAIN SCALES - GENERAL: PAINLEVEL: NO PAIN (0)

## 2023-11-01 NOTE — NURSING NOTE
"Chief Complaint   Patient presents with    Follow Up     6 week post op        Initial /58   Pulse 84   Temp 99.4  F (37.4  C) (Oral)   Resp 16   Ht 1.676 m (5' 6\")   Wt 82.6 kg (182 lb)   LMP 09/21/2023   SpO2 99%   BMI 29.38 kg/m   Estimated body mass index is 29.38 kg/m  as calculated from the following:    Height as of this encounter: 1.676 m (5' 6\").    Weight as of this encounter: 82.6 kg (182 lb).  Medication Reconciliation: complete    Katie Knight LPN on 11/1/2023 at 11:37 AM     "

## 2023-11-01 NOTE — PROGRESS NOTES
"Postoperative Visit  2023    S: Maricarmen Ornelas is a 43 year old  here for post-operative visit following TLH, BS, right oophorectomy, cystoscopy on 2023. Her immediate postoperative course was complicated by an allergic reaction to where the tape was used on her abdomen, but this has improved and resolved. She also had a cold/cough right after surgery which his improved. She had her vaginal cuff assessed during that time, and everything was healing well at that time as well.     Today she notes she feels so much better. She has noticed a change and improvement in her pelvic discomfort and even more subtle pressure symptoms. She feels \"younger\" and it excited to have had the surgery and this improved feeling.     O:   /58   Pulse 84   Temp 99.4  F (37.4  C) (Oral)   Resp 16   Ht 1.676 m (5' 6\")   Wt 82.6 kg (182 lb)   LMP 2023   SpO2 99%   BMI 29.38 kg/m      Gen:  Well-appearing, NAD  Abd: soft, non-tender, no masses palpated. The incision lines are well-approximated and healed. No sign of erythema, opening or nodularity with these.  Pelvic: Normal external genitalia, vaginal tissue moist, ruggated. Vaginal cuff intact, no sign of opening or bleeding. One suture is present just to the left of midline. On bimanual exam, cuff is intact. No adnexal masses palpated on the left side (right ovary removed during surgery).    A/P:   Ms. Maricarmen Ornelas is a 43 year old  s/p TLH, BS, right oophorecotmy and cystoscopy. Doing well, no concerns  - One suture still notable at the vaginal cuff: discussed a 2 week follow up to ensure it continues to dissolve and to hold off on intercourse until our follow up visit as well. She can begin normal exercise and increasing her lifting.    Dia Morrison MD on 2023 at 12:35 PM        Answers submitted by the patient for this visit:  General Questionnaire (Submitted on 2023)  Chief Complaint: Chronic problems general questions HPI " Form  What is the reason for your visit today? : surgery followup  How many servings of fruits and vegetables do you eat daily?: 4 or more  On average, how many sweetened beverages do you drink each day (Examples: soda, juice, sweet tea, etc.  Do NOT count diet or artificially sweetened beverages)?: 0  How many minutes a day do you exercise enough to make your heart beat faster?: 30 to 60  How many days a week do you exercise enough to make your heart beat faster?: 5  How many days per week do you miss taking your medication?: 0

## 2023-11-02 ENCOUNTER — VIRTUAL VISIT (OUTPATIENT)
Dept: FAMILY MEDICINE | Facility: OTHER | Age: 43
End: 2023-11-02
Attending: FAMILY MEDICINE
Payer: COMMERCIAL

## 2023-11-02 DIAGNOSIS — E03.9 HYPOTHYROIDISM, UNSPECIFIED TYPE: ICD-10-CM

## 2023-11-02 DIAGNOSIS — F90.0 ATTENTION DEFICIT HYPERACTIVITY DISORDER (ADHD), PREDOMINANTLY INATTENTIVE TYPE: Primary | ICD-10-CM

## 2023-11-02 DIAGNOSIS — F41.9 ANXIETY: ICD-10-CM

## 2023-11-02 PROCEDURE — 99212 OFFICE O/P EST SF 10 MIN: CPT | Mod: 93 | Performed by: FAMILY MEDICINE

## 2023-11-02 RX ORDER — METHYLPHENIDATE HYDROCHLORIDE 54 MG/1
54 TABLET ORAL DAILY
Qty: 30 TABLET | Refills: 0 | Status: SHIPPED | OUTPATIENT
Start: 2023-11-11 | End: 2024-01-01

## 2023-11-02 RX ORDER — METHYLPHENIDATE HYDROCHLORIDE 54 MG/1
54 TABLET ORAL DAILY
Qty: 30 TABLET | Refills: 0 | Status: SHIPPED | OUTPATIENT
Start: 2024-01-10 | End: 2024-01-01

## 2023-11-02 RX ORDER — DULOXETIN HYDROCHLORIDE 20 MG/1
20 CAPSULE, DELAYED RELEASE ORAL DAILY
Qty: 90 CAPSULE | Refills: 4 | Status: SHIPPED | OUTPATIENT
Start: 2023-11-02 | End: 2024-01-01

## 2023-11-02 RX ORDER — METHYLPHENIDATE HYDROCHLORIDE 54 MG/1
54 TABLET ORAL DAILY
Qty: 30 TABLET | Refills: 0 | Status: SHIPPED | OUTPATIENT
Start: 2023-12-11 | End: 2023-12-22

## 2023-11-02 RX ORDER — LEVOTHYROXINE SODIUM 75 MCG
75 TABLET ORAL DAILY
Qty: 90 TABLET | Refills: 4 | Status: SHIPPED | OUTPATIENT
Start: 2023-11-02 | End: 2024-01-01

## 2023-11-02 ASSESSMENT — ANXIETY QUESTIONNAIRES
3. WORRYING TOO MUCH ABOUT DIFFERENT THINGS: NOT AT ALL
GAD7 TOTAL SCORE: 0
GAD7 TOTAL SCORE: 0
IF YOU CHECKED OFF ANY PROBLEMS ON THIS QUESTIONNAIRE, HOW DIFFICULT HAVE THESE PROBLEMS MADE IT FOR YOU TO DO YOUR WORK, TAKE CARE OF THINGS AT HOME, OR GET ALONG WITH OTHER PEOPLE: NOT DIFFICULT AT ALL
1. FEELING NERVOUS, ANXIOUS, OR ON EDGE: NOT AT ALL
5. BEING SO RESTLESS THAT IT IS HARD TO SIT STILL: NOT AT ALL
4. TROUBLE RELAXING: NOT AT ALL
2. NOT BEING ABLE TO STOP OR CONTROL WORRYING: NOT AT ALL
6. BECOMING EASILY ANNOYED OR IRRITABLE: NOT AT ALL
7. FEELING AFRAID AS IF SOMETHING AWFUL MIGHT HAPPEN: NOT AT ALL

## 2023-11-02 NOTE — PROGRESS NOTES
"Maricarmen is a 43 year old who is being evaluated via a billable telephone visit.      What phone number would you like to be contacted at? [phone]  How would you like to obtain your AVS? Elizabeth    Distant Location (provider location):  On-site    Assessment & Plan     1. Attention deficit hyperactivity disorder (ADHD), predominantly inattentive type  Chronic, stable.  Rx renewed for regular dosing of 54mg daily.  If this continues to feel to strong for her over the next week or two - after restarting from surgery - she will contact me to drop dose slightly.  Three months provided.  Will be due for contract and Utox at next visit.  - methylphenidate HCl ER, OSM, (CONCERTA) 54 MG CR tablet; Take 1 tablet (54 mg) by mouth daily  Dispense: 30 tablet; Refill: 0  - methylphenidate HCl ER, OSM, (CONCERTA) 54 MG CR tablet; Take 1 tablet (54 mg) by mouth daily  Dispense: 30 tablet; Refill: 0  - methylphenidate HCl ER, OSM, (CONCERTA) 54 MG CR tablet; Take 1 tablet (54 mg) by mouth daily  Dispense: 30 tablet; Refill: 0    2. Hypothyroidism, unspecified type  Chronic, stable.  No acute symptoms, even after recent surgery.  Had labs completed on TSH/free T4. Rx renewed x 75mcg daily.    - SYNTHROID 75 MCG tablet; Take 1 tablet (75 mcg) by mouth daily  Dispense: 90 tablet; Refill: 4    3. Anxiety  Chronic, stable/improved slightly due to feeling overall better and now having her children (particularly the young twins) in school full time now.  Rx for duloxetine renewed at current dosing for now; will continue to evaluate ability for dose reduction prn.  - DULoxetine (CYMBALTA) 20 MG capsule; Take 1 capsule (20 mg) by mouth daily  Dispense: 90 capsule; Refill: 4      MDM:  Prescription drug management      BMI:   Estimated body mass index is 29.38 kg/m  as calculated from the following:    Height as of 11/1/23: 1.676 m (5' 6\").    Weight as of 11/1/23: 82.6 kg (182 lb).   Weight management plan: Discussed healthy diet and " exercise guidelines    Follow up in 3 months.    Avril Sanders,   [facility]      Subjective   Maricarmen is a 43 year old, presenting for the following health issues:  No chief complaint on file.        7/14/2023     8:23 AM   Additional Questions   Roomed by CHETAN Gerard   Accompanied by self       History of Present Illness       Reason for visit:  Surgery followup    She eats 4 or more servings of fruits and vegetables daily.She consumes 0 sweetened beverage(s) daily.She exercises with enough effort to increase her heart rate 30 to 60 minutes per day.  She exercises with enough effort to increase her heart rate 5 days per week.   She is taking medications regularly.       Had hysterectomy since our last visit (for adenomyosis).  Feeling great.  Still had some suture remnant; therefore is on restrictions for another ~2 weeks.  Doing yoga and tolerating activity well otherwise.      Anxiety Follow-Up  How are you doing with your anxiety since your last visit? No change  Are you having other symptoms that might be associated with anxiety? No  Have you had a significant life event? OTHER: recent surgery; kids are in school now more full time.    Are you feeling depressed? No  Do you have any concerns with your use of alcohol or other drugs? No    Social History     Tobacco Use    Smoking status: Never    Smokeless tobacco: Never   Vaping Use    Vaping Use: Never used   Substance Use Topics    Alcohol use: Not Currently    Drug use: Yes     Types: Marijuana     Comment: medical marijuana          7/17/2020     1:36 PM 2/18/2021     2:57 PM 7/14/2023     8:18 AM   MINNIE-7 SCORE   Total Score   14 (moderate anxiety)   Total Score 16 14 14         2/18/2021     2:57 PM 9/8/2022     8:47 AM 7/14/2023     8:18 AM   PHQ   PHQ-9 Total Score 9 8 12   Q9: Thoughts of better off dead/self-harm past 2 weeks Not at all Several days Not at all   F/U: Thoughts of suicide or self-harm  No    F/U: Safety concerns   No        Hypothyroidism Follow-up  Since last visit, patient describes the following symptoms: Weight stable, no hair loss, no skin changes, no constipation, no loose stools      ADHD Follow-Up (Adult)  Concerns: None, the 54mg seemed a little strong after restarting it (took a few days off around time of surgery).  Doing okay.  Does utilize some sleep aids at times, but doesn't seem to be affected if she takes or doesn't take her stimulant.   Changes since last visit: Stable  Taking controlled (daily) medications as prescribed: Yes  Sleep: no new problems  Adult ADHD Self-Reporting form given to patient?:  Yes  Currently in counseling: performs regular meditation, yoga, relaxation exercises    Medication Benefits:   Controlled symptoms: Distractability, Finishing tasks, Impulse control, Frustration tolerance, and Accepting limits  Uncontrolled symptoms:  None    Medication Side Effects:  Reports:  none  Sleep Problems? no  ++++++++++++++++++++++++++++++++++++++++++++++++    Employer Concerns/Feedback: None  Coworker Concerns:   None  Home/Family Concerns: None          Objective     Physical Exam   healthy, alert, and no distress  PSYCH: Alert and oriented times 3; coherent speech, normal   rate and volume, able to articulate logical thoughts, able   to abstract reason, no tangential thoughts, no hallucinations   or delusions  Her affect is normal  RESP: No cough, no audible wheezing, able to talk in full sentences  Remainder of exam unable to be completed due to telephone visits    Phone call duration: 8 minutes  Start: 9:33 AM  Stop: 9:41 AM

## 2023-11-02 NOTE — NURSING NOTE
"Chief Complaint   Patient presents with    Recheck Medication       Initial LMP 09/21/2023  Estimated body mass index is 29.38 kg/m  as calculated from the following:    Height as of 11/1/23: 1.676 m (5' 6\").    Weight as of 11/1/23: 82.6 kg (182 lb).  Medication Review: complete    The next two questions are to help us understand your food security.  If you are feeling you need any assistance in this area, we have resources available to support you today.           No data to display                  Health Care Directive:  Patient does not have a Health Care Directive or Living Will: Discussed advance care planning with patient; however, patient declined at this time.    Rajani Ford LPN      "

## 2023-11-10 ENCOUNTER — ALLIED HEALTH/NURSE VISIT (OUTPATIENT)
Dept: FAMILY MEDICINE | Facility: OTHER | Age: 43
End: 2023-11-10
Attending: FAMILY MEDICINE
Payer: COMMERCIAL

## 2023-11-10 DIAGNOSIS — Z23 NEED FOR PROPHYLACTIC VACCINATION AND INOCULATION AGAINST INFLUENZA: Primary | ICD-10-CM

## 2023-11-10 PROCEDURE — 90686 IIV4 VACC NO PRSV 0.5 ML IM: CPT

## 2023-11-10 PROCEDURE — 90471 IMMUNIZATION ADMIN: CPT

## 2023-11-14 ENCOUNTER — OFFICE VISIT (OUTPATIENT)
Dept: OBGYN | Facility: OTHER | Age: 43
End: 2023-11-14
Attending: STUDENT IN AN ORGANIZED HEALTH CARE EDUCATION/TRAINING PROGRAM
Payer: COMMERCIAL

## 2023-11-14 VITALS
SYSTOLIC BLOOD PRESSURE: 122 MMHG | DIASTOLIC BLOOD PRESSURE: 74 MMHG | WEIGHT: 181 LBS | BODY MASS INDEX: 29.21 KG/M2 | HEART RATE: 85 BPM

## 2023-11-14 DIAGNOSIS — Z90.710 S/P TOTAL HYSTERECTOMY: Primary | ICD-10-CM

## 2023-11-14 PROCEDURE — 99024 POSTOP FOLLOW-UP VISIT: CPT | Performed by: STUDENT IN AN ORGANIZED HEALTH CARE EDUCATION/TRAINING PROGRAM

## 2023-11-14 ASSESSMENT — PAIN SCALES - GENERAL: PAINLEVEL: NO PAIN (0)

## 2023-11-14 NOTE — NURSING NOTE
"Chief Complaint   Patient presents with    Surgical Followup     2 week post op hyst      Patient is here 8 week follow up and doing well with no concerns. Patient is urinating just fine and having BM's without problems.   Initial /74   Pulse 85   Wt 82.1 kg (181 lb)   LMP 09/21/2023   BMI 29.21 kg/m   Estimated body mass index is 29.21 kg/m  as calculated from the following:    Height as of 11/1/23: 1.676 m (5' 6\").    Weight as of this encounter: 82.1 kg (181 lb).  Medication Reconciliation: complete        Dia Gomez LPN    "

## 2023-11-14 NOTE — PROGRESS NOTES
Follow-Up Visit    S: Ms. Maricarmen Ornelas is a 43 year old  here for postoperative follow up TLH, BS, right oophorectomy, cystoscopy on 2023. She still had the vertical V-loc sutures noted on examination two weeks ago fand we opted for an additional two weeks before pelvic restrictions were lifted.    She notes she continues to feel well. She reports it has been nice to do more activity and exercise these past few weeks.     O:  /74   Pulse 85   Wt 82.1 kg (181 lb)   LMP 2023   BMI 29.21 kg/m    Gen: Well-appearing, NAD  Pulm: nonlabored  Abd: Soft, non-tender, non-distended    Pelvic:  Normal appearing external female genitalia. Normal hair distribution. Vagina is without lesions. No vaginal discharge. One suture line remains (the others from last week have since dissolved), the vaginal cuff around the suture and along the entire tract of the cuff is all intact, no sign of separation, oozing, or nodularity. The cuff is palpated and strong with a bimanual examination. No adnexal masses.    A/P:  Ms. Maricarmen Ornelas is a 43 year old  here for postoperative follow up from a TLH, BS, right oophorectomy and cystoscopy.    - Doing well in the recovery period and all restrictions are lifted at this time. Discussed that the vaginal cuff is healed all around the suture and strong/ready for regular activity, but that her partner might feel the suture until it completely dissolves.     Discussed that easing into sexual activity is recommended and to pause if pain develops.    She no longer needs pap smear surveillance as she has had her cervix removed and has not had any history of CIN2-3.     Dia Morrison MD on 11/15/2023 at 9:41 PM          Answers submitted by the patient for this visit:  MINNIE-7 (Submitted on 2023)  MINNIE 7 TOTAL SCORE: 0  General Questionnaire (Submitted on 2023)  Chief Complaint: Chronic problems general questions HPI Form  What is the reason for your visit today?  : surgery followup  How many servings of fruits and vegetables do you eat daily?: 4 or more  On average, how many sweetened beverages do you drink each day (Examples: soda, juice, sweet tea, etc.  Do NOT count diet or artificially sweetened beverages)?: 0  How many minutes a day do you exercise enough to make your heart beat faster?: 30 to 60  How many days a week do you exercise enough to make your heart beat faster?: 5  How many days per week do you miss taking your medication?: 0

## 2023-11-17 ENCOUNTER — ALLIED HEALTH/NURSE VISIT (OUTPATIENT)
Dept: FAMILY MEDICINE | Facility: OTHER | Age: 43
End: 2023-11-17
Attending: FAMILY MEDICINE
Payer: COMMERCIAL

## 2023-11-17 DIAGNOSIS — Z23 HIGH PRIORITY FOR 2019-NCOV VACCINE: Primary | ICD-10-CM

## 2023-11-17 PROCEDURE — 90480 ADMN SARSCOV2 VAC 1/ONLY CMP: CPT

## 2023-11-17 PROCEDURE — 91320 SARSCV2 VAC 30MCG TRS-SUC IM: CPT

## 2023-11-28 ENCOUNTER — THERAPY VISIT (OUTPATIENT)
Dept: PHYSICAL THERAPY | Facility: OTHER | Age: 43
End: 2023-11-28
Attending: FAMILY MEDICINE
Payer: COMMERCIAL

## 2023-11-28 DIAGNOSIS — Z90.710 S/P HYSTERECTOMY: ICD-10-CM

## 2023-11-28 PROCEDURE — 97161 PT EVAL LOW COMPLEX 20 MIN: CPT | Mod: GP

## 2023-11-28 PROCEDURE — 97110 THERAPEUTIC EXERCISES: CPT | Mod: GP

## 2023-11-28 NOTE — PROGRESS NOTES
PHYSICAL THERAPY EVALUATION  Type of Visit: Evaluation    See electronic medical record for Abuse and Falls Screening details.    Subjective       Presenting condition or subjective complaint:  Patient referred to PT following NIA, salpingectomy, right oophorectomy on 23; due to fibroids, endometriosis, pelvic pain. Patient would like to strengthen her pelvic floor and core. Feelign well since surgery, feels younger. No longer dealing with pelvic pain, bloating. Does note some pain when ovulating. Still dealing with fissures, had a regimen for addressing these. Denies issues with incontinence. Feels sexual function has changed since surgery, orgasms are less intense. Excited to get back to exercise.   Date of onset: 23    Relevant medical history:   2 vaginal births, 1  with twins  Dates & types of surgery:      Prior diagnostic imaging/testing results:       Prior therapy history for the same diagnosis, illness or injury:        Prior Level of Function  Transfers: Independent  Ambulation: Independent  ADL: Independent    Living Environment  Social support:   family  Type of home:   house      Employment:    teaches art    Patient goals for therapy:  pelvic floor and core strength    Pain assessment: Pain denied     Objective      PELVIC EVALUATION        Discussed reason for referral regarding pelvic health needs and external/internal pelvic floor muscle examination with patient/guardian.  Opportunity provided to ask questions and verbal consent for assessment and intervention was given.    PAIN: no pain  POSTURE: WNL  LUMBAR SCREEN:  will assess if indicated    PELVIC/SI SCREEN:  WNL     BREATHING SYMMETRY: WNL    PELVIC EXAM  External Visual Inspection:  With voluntary pelvic floor contraction: Perineal elevation  Relaxation of PFM: Yes    Integumentary:   Introitus: Unremarkable    External Digital Palpation per Perineum: no pain    Internal Digital Palpation:  Per Vagina:  Tone:  normal  Digital Muscle Performance: P (Power): 2  E (Endurance): 5  R (Repetitions): 3  F (Fast Twitch): 4  Cough reflex intact     ABDOMINAL ASSESSMENT  Diastasis Rectus Abdominis (ESTHELA):will continue to assess    Abdominal Activation/Strength:  will continue to assess      Assessment & Plan   CLINICAL IMPRESSIONS  Medical Diagnosis: Z90.710 (ICD-10-CM) - S/P hysterectomy    Treatment Diagnosis: muscle weakness   Impression/Assessment: Patient is a 43 year old female with pelvic floor weakness complaints.  The following significant findings have been identified: Decreased strength, Impaired muscle performance, and Decreased activity tolerance. These impairments interfere with their ability to perform self care tasks, recreational activities, and community mobility as compared to previous level of function.     Clinical Decision Making (Complexity):  Clinical Presentation: Stable/Uncomplicated  Clinical Presentation Rationale: based on medical and personal factors listed in PT evaluation  Clinical Decision Making (Complexity): Low complexity    PLAN OF CARE  Treatment Interventions:  Interventions: Manual Therapy, Neuromuscular Re-education, Therapeutic Activity, Therapeutic Exercise, Self-Care/Home Management    Long Term Goals     PT Goal 1  Goal Identifier: MMT  Goal Description: Patient to have 5/5 MMT of pelvic floor with endurance of 10 seconds for correct support of pelvic organs following NIA.  Target Date: 06/03/24  PT Goal 2  Goal Identifier: MMT  Goal Description: Patient to have 5/5 MMT of core/abs for support of spine/pelvic during daily tasks.  Target Date: 06/03/24      Frequency of Treatment: 1-2 times per week  Duration of Treatment: 3 months    Recommended Referrals to Other Professionals:  NA  Education Assessment:   Learner/Method: Patient;No Barriers to Learning    Risks and benefits of evaluation/treatment have been explained.   Patient/Family/caregiver agrees with Plan of Care.     Evaluation  Time:     PT John, Low Complexity Minutes (34258): 15    Signing Clinician: Rose Avalos PT

## 2023-12-05 ENCOUNTER — THERAPY VISIT (OUTPATIENT)
Dept: PHYSICAL THERAPY | Facility: OTHER | Age: 43
End: 2023-12-05
Attending: FAMILY MEDICINE
Payer: COMMERCIAL

## 2023-12-05 DIAGNOSIS — Z90.710 S/P HYSTERECTOMY: Primary | ICD-10-CM

## 2023-12-05 PROCEDURE — 97110 THERAPEUTIC EXERCISES: CPT | Mod: GP

## 2023-12-18 ENCOUNTER — THERAPY VISIT (OUTPATIENT)
Dept: PHYSICAL THERAPY | Facility: OTHER | Age: 43
End: 2023-12-18
Attending: FAMILY MEDICINE
Payer: COMMERCIAL

## 2023-12-18 DIAGNOSIS — Z90.710 S/P HYSTERECTOMY: Primary | ICD-10-CM

## 2023-12-18 PROCEDURE — 97110 THERAPEUTIC EXERCISES: CPT | Mod: GP

## 2023-12-22 ENCOUNTER — VIRTUAL VISIT (OUTPATIENT)
Dept: FAMILY MEDICINE | Facility: OTHER | Age: 43
End: 2023-12-22
Payer: COMMERCIAL

## 2023-12-22 DIAGNOSIS — F90.0 ATTENTION DEFICIT HYPERACTIVITY DISORDER (ADHD), PREDOMINANTLY INATTENTIVE TYPE: ICD-10-CM

## 2023-12-22 DIAGNOSIS — U07.1 INFECTION DUE TO 2019 NOVEL CORONAVIRUS: Primary | ICD-10-CM

## 2023-12-22 PROCEDURE — 99212 OFFICE O/P EST SF 10 MIN: CPT | Mod: 93 | Performed by: NURSE PRACTITIONER

## 2023-12-22 RX ORDER — METHYLPHENIDATE HYDROCHLORIDE 54 MG/1
54 TABLET ORAL DAILY
Qty: 20 TABLET | Refills: 0 | Status: SHIPPED | OUTPATIENT
Start: 2023-12-22 | End: 2024-01-01

## 2023-12-22 ASSESSMENT — ENCOUNTER SYMPTOMS
FEVER: 1
COUGH: 1
CHILLS: 1

## 2023-12-22 NOTE — TELEPHONE ENCOUNTER
Calling regarding concerta, states she is needing the prescription adjusted to get it refilled. Pt is out

## 2023-12-22 NOTE — PATIENT INSTRUCTIONS
For informational purposes only. Not to replace the advice of your health care provider. Copyright   2022 [facility]. All rights reserved. Clinically reviewed by Corrie Lua, PharmD, BCACP. [employer] [id number] - REV 06/23.  COVID-19 Outpatient Treatments  Your care team can help you find the best treatments for COVID-19. Talk to a health care provider or refer to the FDA medicine fact sheets below.  Paxlovid (nirmatrelvir and ritonavir): https://www.paxlovid.[employer]/resources  Molnupiravir (Lagevrio): https://www.fda.gov/media/734754/download  Important: We can only prescribe Paxlovid or Molnupiravir when it can be started within 5 days of first having symptoms.  Paxlovid (nimatrelvir and ritonavir)  How it works  Two medicines (nirmatrelvir and ritonavir) are taken together. They stop the virus from growing. Less amount of virus is easier for your body to fight.  Benefits  Lowers risk of a hospital stay or death from COVID-19.  How to take  Medicine comes in a daily container with both medicine tablets. Take by mouth twice daily (once in the morning, once at night) for 5 days.  The number of tablets to take varies by patient.  Don't chew or break capsules. Swallow whole.  When to take  Take it as soon as possible and within 5 days of your first symptoms.  Who can take it  Patients must be 12 years or older weigh at least 88 pounds. Paxlovid is the preferred treatment for pregnant patients.  Possible side effects  Can cause altered sense of taste, diarrhea (loose, watery stools), high blood pressure, muscle aches.  Medicine conflicts  Some medicines may conflict with Paxlovid and may cause serious side effects.  Tell your care team about all the medicines you take, including prescription and over-the-counter medicines, vitamins, and herbal supplements.  Your care team will review your medicines to make sure that you can safely take Paxlovid.  Cautions  Paxlovid is not advised for patients with severe  kidney or liver disease. If you have kidney or liver problems, the dose may need to be changed.  If you're pregnant or breastfeeding, talk to your care team about your options.  If you take hormonal birth control (such as the Pill), then you or your partner should also use a non-hormonal form of birth control (such as a condom). Keep doing this for 1 menstrual cycle after your last dose of Paxlovid.  Molnupiravir (lagevrio)  How it works  Stops the virus from growing. Less amount of virus is easier for your body to fight.  Benefits  Lowers risk of a hospital stay or death from COVID-19.  How to take  Take 4 capsules by mouth every 12 hours (4 in the morning and 4 at night) for 5 days. Don't chew or break capsules. Swallow whole.  When to take  Take as soon as possible and within 5 days of your first symptoms.  Who can take it  Patients must be 18 years or older.   Possible side effects  Diarrhea (loose, watery stools), nausea (feeling sick to your stomach), dizziness, headaches.  Medicine conflicts  Right now, there are no known conflicts with other drugs. But tell your care team about all medicines you take.  Cautions  This medicine is not advised for patients who are pregnant.  If you are someone who could become pregnant, use trusted birth control until 4 days after your last dose of molnupiravir.  If your partner could become pregnant, you should use trusted birth control until 3 months after your last dose of molnupiravir.      Coping with Life After COVID-19  Being in the hospital because of COVID-19 is scary. Going home can be scary, too. You may face changes to your life, the way you work or what you can eat. It s hard to adjust to change, and it s normal to feel afraid, frustrated or even angry. These feelings usually go away over time. If your feelings don t start to get better, it s called  adjustment disorder.      What signs should I look out for?  Adjustment disorder can happen to anyone in a time of  stress. It makes it hard to cope with daily life. You may feel lonely or fight with loved ones, even if you re glad to be home. Watch for these signs:  Fear or worry  Hard time focusing  Sadness or anger  Trouble talking to family or friends  Feeling like you don t fit in or isolating yourself  Problems with sleep   Drinking alcohol or taking drugs to cope    What can I do?  You can help yourself get better. Feeling you have control helps you move forward. You may wonder if you ll be able to do things you did before. Be patient. Do your best to make the most of every day. Try to build relationships, be as active as you can, eat right and keep a sense of humor. Avoid smoking and drinking too much alcohol. Call your family doctor or clinic if you re not sure what to do. They can guide you to care or other services.    When should I get help?  Think about getting counseling if your sadness or frustration gets worse. Together with a trained counselor, you can talk about your problems adjusting to life after your hospital stay. You can come up with new ways to handle changes that give you more control. Your family doctor or care team can help you find a counselor.     Get help if you re thinking about hurting yourself. If you need help right away, call 911 or go to the nearest emergency room. You can also try the Crisis Text Line.    Crisis Text Line: 364-176 (http://www.crisistextline.org)  The Crisis Text Line serves anyone, in any crisis. It gives free, 24/7 support. Here's how it works:  Text HOME to 531690 from anywhere in the [place], anytime, about any type of crisis.  A live, trained Crisis Counselor will text you back quickly.  The volunteer Crisis Counselor can help you move from a  hot moment  to a  cool moment.  They can also help you work out a safety plan.

## 2023-12-22 NOTE — TELEPHONE ENCOUNTER
S-(situation): Patient needs a revised order for Concerta as [facility] is out of it but does have a generic. Needs a 20 day supply.     B-(background): Patient filled on 12/16 but was only given a 10 day supply.    R-(recommendations): edilson'd order up per [facility] with [facility] number on it.     JOCELIN LARSEN RN on 12/22/2023 at 11:53 AM

## 2023-12-22 NOTE — PROGRESS NOTES
Maricarmen is a 43 year old who is being evaluated via a billable telephone visit.      What phone number would you like to be contacted at? [phone]  How would you like to obtain your AVS? Elizabeth    Distant Location (provider location):  On-site  Visit Duration: [id number]    Assessment & Plan     ICD-10-CM    1. Infection due to 2019 novel coronavirus  U07.1 nirmatrelvir and ritonavir (PAXLOVID) 300 mg/100 mg therapy pack      Patient meets criteria for antiviral therapy. Oxycodone script removed from medication list, as patient is no longer taking. Oxycodone was the only significant medication interaction. Discussed quarantine guidelines.     Discussed signs/ symptoms that would warrant urgent/ emergent follow-up. Patient in agreement with plan. All questions and concerns addressed this visit.       Return if symptoms worsen or fail to improve.    FLAVIO Yates CNP  [facility]      Subjective   Maricarmen is a 43 year old, presenting for the following health issues:  Covid Concern        7/14/2023     8:23 AM   Additional Questions   Roomed by CHETAN Gerard   Accompanied by self              COVID-19 Symptom Review  How many days ago did these symptoms start? 12-21-23    Are any of the following symptoms significant for you?  New or worsening difficulty breathing? No  Worsening cough? Yes, it's a dry cough.   Fever or chills? Yes, I felt feverish or had chills.  Headache: No  Sore throat: No  Chest pain: No  Diarrhea: No  Body aches? No    What treatments has patient tried? NyQuil   Does patient live in a nursing home, group home, or shelter? No  Does patient have a way to get food/medications during quarantined? Yes, I have a friend or family member who can help me.                Review of Systems   Constitutional:  Positive for chills and fever.   Respiratory:  Positive for cough.             Objective    Vitals - Patient Reported  Pain Score: No Pain (0)      Vitals:  No vitals were obtained  today due to virtual visit.    Physical Exam     PSYCH: Alert and oriented times 3; coherent speech, normal   rate and volume, able to articulate logical thoughts, able   to abstract reason, no tangential thoughts, no hallucinations   or delusions  Her affect is normal and pleasant  RESP: No cough, no audible wheezing, able to talk in full sentences  Remainder of exam unable to be completed due to telephone visits              Phone call duration: 7 minutes

## 2024-01-01 NOTE — PROGRESS NOTES
SUBJECTIVE:   Maricarmen is a 43 year old, presenting for the following:  Physical        1/3/2024    10:13 AM   Additional Questions   Roomed by CHETAN Gerard   Accompanied by self       Healthy Habits:     Getting at least 3 servings of Calcium per day:  Yes    Bi-annual eye exam:  Yes    Dental care twice a year:  Yes    Sleep apnea or symptoms of sleep apnea:  None    Diet:  Regular (no restrictions)    Frequency of exercise:  4-5 days/week    Duration of exercise:  30-45 minutes    Taking medications regularly:  Yes    Medication side effects:  None    Additional concerns today:  No    Has had hysterectomy since our last physical for adenomyosis.  Feels overall different and some psychological stress inhibiting her sexual libido.  Continues to work on things.    ADHD Follow-Up (Adult)  Concerns: No concerns, appetite increases in evenings  Changes since last visit: Stable  Taking controlled (daily) medications as prescribed: Yes  Sleep: no problems; is restless without taking Unisom  Adult ADHD Self-Reporting form given to patient?:  No  Currently in counseling: No    Medication Benefits:   Controlled symptoms: Attention span, Distractability, Finishing tasks, Impulse control, Frustration tolerance, and Accepting limits  Uncontrolled symptoms:  None    Medication Side Effects:  Reports:  none  Sleep Problems? no  ++++++++++++++++++++++++++++++++++++++++++++++++    Employer Concerns/Feedback: Stable  Coworker Concerns:   Stable  Home/Family Concerns: Stable    Depression and Anxiety Follow-Up  How are you doing with your depression since your last visit? Stable, doing well on Cymbalta 20mg daily  How are you doing with your anxiety since your last visit?  No change  Are you having other symptoms that might be associated with depression or anxiety? No  Have you had a significant life event? No   Do you have any concerns with your use of alcohol or other drugs? No    Social History     Tobacco Use    Smoking status:  Never    Smokeless tobacco: Never   Vaping Use    Vaping Use: Never used   Substance Use Topics    Alcohol use: Not Currently    Drug use: Yes     Types: Marijuana     Comment: medical marijuana          2/18/2021     2:57 PM 9/8/2022     8:47 AM 7/14/2023     8:18 AM   PHQ   PHQ-9 Total Score 9 8 12   Q9: Thoughts of better off dead/self-harm past 2 weeks Not at all Several days Not at all   F/U: Thoughts of suicide or self-harm  No    F/U: Safety concerns  No          7/14/2023     8:18 AM 11/2/2023     9:13 AM 1/3/2024    10:08 AM   MINNIE-7 SCORE   Total Score 14 (moderate anxiety) 0 (minimal anxiety) 5 (mild anxiety)   Total Score 14 0 5     Hypothyroidism Follow-up  Since last visit, patient describes the following symptoms: Weight increase (since hysterectomy and holidays), no hair loss, no skin changes, no constipation, no loose stools  Last TSH was just monitored 9/2023 and normal.    Have you ever done Advance Care Planning? (For example, a Health Directive, POLST, or a discussion with a medical provider or your loved ones about your wishes): None    Social History     Tobacco Use    Smoking status: Never    Smokeless tobacco: Never   Substance Use Topics    Alcohol use: Not Currently             1/3/2024    10:10 AM   Alcohol Use   Prescreen: >3 drinks/day or >7 drinks/week? No     Reviewed orders with patient.  Reviewed health maintenance and updated orders accordingly - Yes  BP Readings from Last 3 Encounters:   01/03/24 116/78   11/14/23 122/74   11/01/23 122/58    Wt Readings from Last 3 Encounters:   01/03/24 83.7 kg (184 lb 8 oz)   11/14/23 82.1 kg (181 lb)   11/01/23 82.6 kg (182 lb)            Patient Active Problem List   Diagnosis    Attention deficit disorder    Lumbosacral spondylosis without myelopathy    Other staphylococcus infection in conditions classified elsewhere and of unspecified site    Acquired hypothyroidism    Bilateral carpal tunnel syndrome    Adjustment disorder with anxious mood     S/P  section    Anal fissure    S/P hysterectomy     Past Surgical History:   Procedure Laterality Date    APPENDECTOMY OPEN           SECTION N/A 2018    Procedure:  SECTION;   Section - twins;  Surgeon: Woodrow Hurtado MD;  Location:  OR    COLONOSCOPY N/A 7/10/2019    Procedure: COLONOSCOPY, WITH POLYPECTOMY AND BIOPSY;  Surgeon: Viki Villareal MD;  Location:  OR    EXAM UNDER ANESTHESIA ANUS N/A 2023    Procedure: Exam under anesthesia anus;  Surgeon: Viki Villareal MD;  Location:  OR    LAPAROSCOPIC HYSTERECTOMY TOTAL, SALPINGECTOMY BILATERAL N/A 2023    Procedure: Total Laparoscopic Hysterectomy, with Bilateral Salpingectomy, right oophorectomy, cystoscopy;  Surgeon: Dia Morrison MD;  Location:  OR       Social History     Tobacco Use    Smoking status: Never    Smokeless tobacco: Never   Substance Use Topics    Alcohol use: Not Currently     Family History   Problem Relation Age of Onset    Substance Abuse Maternal Grandmother         Alcohol/Drug,Alcohol abuse    Cancer Maternal Grandmother         Cancer, lung cancer    Other - See Comments Maternal Grandfather         Alzheimer's disease    Diabetes Paternal Grandmother         Diabetes,type 1    No Known Problems Sister     No Known Problems Son     No Known Problems Son     Diabetes Maternal Uncle         Diabetes,type 2    No Known Problems Son         2018    No Known Problems Daughter         2018    Heart Disease No family hx of         Heart Disease         Current Outpatient Medications   Medication Sig Dispense Refill    acetaminophen (TYLENOL) 325 MG tablet Take 2 tablets (650 mg) by mouth every 4 hours as needed for other (mild pain) 100 tablet 0    diltiazem 2% 2% OINT ointment Apply 1 g topically 2 times daily as needed (anal fissure) 60 g 1    doxylamine (UNISOM) 25 MG TABS tablet Take 25 mg by mouth At Bedtime      DULoxetine (CYMBALTA) 20 MG capsule Take 1 capsule (20 mg) by mouth  daily 90 capsule 4    hydrOXYzine (ATARAX) 25 MG tablet Take 1 tablet (25 mg) by mouth every 6 hours as needed for itching or anxiety (with pain, moderate pain) 30 tablet 0    ibuprofen (ADVIL/MOTRIN) 600 MG tablet Take 1 tablet (600 mg) by mouth every 6 hours as needed for mild pain 30 tablet 0    loratadine (CLARITIN) 10 MG tablet Take 10 mg by mouth daily as needed for allergies      medical cannabis (Patient's own supply) See Admin Instructions (The purpose of this order is to document that the patient reports taking medical cannabis.  This is not a prescription, and is not used to certify that the patient has a qualifying medical condition.)      Melatonin 2.5 MG CHEW Take 1 tablet by mouth At Bedtime      methylcellulose (CITRUCEL) powder Take 0.3 g (1.05 teaspoonful) by mouth daily 454 g 3    [START ON 2/16/2024] methylphenidate HCl ER, OSM, (CONCERTA) 54 MG CR tablet Take 1 tablet (54 mg) by mouth daily 30 tablet 0    [START ON 3/15/2024] methylphenidate HCl ER, OSM, (CONCERTA) 54 MG CR tablet Take 1 tablet (54 mg) by mouth daily 20 tablet 0    [START ON 1/17/2024] methylphenidate HCl ER, OSM, (CONCERTA) 54 MG CR tablet Take 1 tablet (54 mg) by mouth daily 30 tablet 0    ondansetron (ZOFRAN) 4 MG tablet Take 1 tablet (4 mg) by mouth every 6 hours as needed for nausea 10 tablet 0    psyllium (METAMUCIL/KONSYL) Packet Take 1 packet by mouth daily      senna-docusate (SENOKOT-S/PERICOLACE) 8.6-50 MG tablet Take 1-2 tablets by mouth 2 times daily Take while on oral narcotics to prevent or treat constipation. 30 tablet 0    SYNTHROID 75 MCG tablet Take 1 tablet (75 mcg) by mouth daily 90 tablet 4     Allergies   Allergen Reactions    Adhesive Tape Hives     Rash from surgical drape adhesive     Seasonal Allergies Itching       Breast Cancer Screening:  Any new diagnosis of family breast, ovarian, or bowel cancer? No    FHS-7:       12/3/2021    11:34 AM   Breast CA Risk Assessment (FHS-7)   Did any of your  first-degree relatives have breast or ovarian cancer? No   Did any of your relatives have bilateral breast cancer? No   Did any man in your family have breast cancer? No   Did any woman in your family have breast and ovarian cancer? No   Did any woman in your family have breast cancer before age 50 y? No   Do you have 2 or more relatives with breast and/or ovarian cancer? No   Do you have 2 or more relatives with breast and/or bowel cancer? No       Mammogram Screening - Offered annual screening and updated Health Maintenance for [place] plan based on risk factor consideration  Pertinent mammograms are reviewed under the imaging tab.    History of abnormal Pap smear: Status post benign hysterectomy. Health Maintenance and Surgical History updated.        Latest Ref Rng & Units 10/18/2022     4:29 PM   PAP / HPV   PAP  Negative for Intraepithelial Lesion or Malignancy (NILM)    HPV 16 DNA Negative Negative    HPV 18 DNA Negative Negative    Other HR HPV Negative Negative      Pap: NA; s/p benign hyst  Mammo: 12/3/21, birads1.  DUE.  Dexa: NA/age  Lung: NA/age  Colon: @ 46  Tdap 18, flu yearly - done; Shingrix @ 51; PNA @ [age].  Covid complete  RSV: NA/age.    Reviewed and updated as needed this visit by clinical staff   Tobacco  Allergies  Meds      Soc Hx    Reviewed and updated as needed this visit by Provider                 Past Medical History:   Diagnosis Date    Dichorionic diamniotic twin pregnancy in third trimester 2018    Disorder of thyroid     hypo    Supervision of elderly multigravida     2018    Twin pregnancy 2018      Past Surgical History:   Procedure Laterality Date    APPENDECTOMY OPEN           SECTION N/A 2018    Procedure:  SECTION;   Section - twins;  Surgeon: Woodrow uHrtado MD;  Location:  OR    COLONOSCOPY N/A 7/10/2019    Procedure: COLONOSCOPY, WITH POLYPECTOMY AND BIOPSY;  Surgeon: Viki Villareal MD;  Location:  OR    EXAM  "UNDER ANESTHESIA ANUS N/A 2023    Procedure: Exam under anesthesia anus;  Surgeon: Viki Villareal MD;  Location: [facility] OR    LAPAROSCOPIC HYSTERECTOMY TOTAL, SALPINGECTOMY BILATERAL N/A 2023    Procedure: Total Laparoscopic Hysterectomy, with Bilateral Salpingectomy, right oophorectomy, cystoscopy;  Surgeon: Dia Morrison MD;  Location: [facility] OR     OB History    Para Term  AB Living   3 3 3 0 0 4   SAB IAB Ectopic Multiple Live Births   0 0 0 1 4      # Outcome Date GA Lbr Renny/2nd Weight Sex Delivery Anes PTL Lv   3A Term 18 37w0d  2.546 kg (5 lb 9.8 oz) F   N SMOOTH      Name: MYNOR DAY1 RAFAEL      Apgar1: 8  Apgar5: 9   3B Term 18 37w0d  2.909 kg (6 lb 6.6 oz) M   N SMOOTH      Name: MYNOR DAY2 RAFAEL      Apgar1: 9  Apgar5: 9   2 Term 10/16/10 40w0d   M  EPI N SMOOTH      Name: William   1 Term  40w0d   M  None N SMOOTH      Birth Comments: [place]      Name: Kaveh       Review of Systems   Constitutional:  Negative for chills and fever.   HENT:  Positive for congestion. Negative for ear pain, hearing loss and sore throat.    Eyes:  Negative for pain and visual disturbance.   Respiratory:  Positive for cough. Negative for shortness of breath.    Cardiovascular:  Negative for chest pain, palpitations and peripheral edema.   Gastrointestinal:  Negative for abdominal pain, constipation, diarrhea, heartburn, hematochezia and nausea.   Genitourinary:  Negative for dysuria, frequency, genital sores, hematuria and urgency.   Musculoskeletal:  Negative for arthralgias, joint swelling and myalgias.   Skin:  Negative for rash.   Neurological:  Positive for headaches. Negative for dizziness, weakness and paresthesias.   Psychiatric/Behavioral:  Negative for mood changes. The patient is nervous/anxious.       OBJECTIVE:   /78   Pulse 86   Temp 98.9  F (37.2  C) (Tympanic)   Resp 20   Ht 1.664 m (5' 5.5\")   Wt 83.7 kg (184 lb 8 oz)   LMP 2023   SpO2 98%   BMI 30.24 kg/m  "   Physical Exam  GENERAL: healthy, alert and no distress  NECK: no adenopathy, no asymmetry, masses, or scars and thyroid normal to palpation  RESP: lungs clear to auscultation - no rales, rhonchi or wheezes  CV: regular rate and rhythm, normal S1 S2, no S3 or S4, no murmur, click or rub, no peripheral edema and peripheral pulses strong  ABDOMEN: soft, nontender, no hepatosplenomegaly, no masses and bowel sounds normal  MS: no gross musculoskeletal defects noted, no edema    Diagnostic Test Results:  Results for orders placed or performed in visit on 01/03/24 (from the past 24 hour(s))   Urine Drug Screen    Narrative    The following orders were created for panel order Urine Drug Screen.  Procedure                               Abnormality         Status                     ---------                               -----------         ------                     Urine Drug Screen Panel[[id number]]      Normal              Final result                 Please view results for these tests on the individual orders.   Urine Drug Screen Panel   Result Value Ref Range    Amphetamines Urine Screen Negative Screen Negative    Barbituates Urine Screen Negative Screen Negative    Benzodiazepine Urine Screen Negative Screen Negative    Cannabinoids Urine Screen Negative Screen Negative    Cocaine Urine Screen Negative Screen Negative    Fentanyl Qual Urine Screen Negative Screen Negative    Opiates Urine Screen Negative Screen Negative    PCP Urine Screen Negative Screen Negative       ASSESSMENT/PLAN:   1. Routine history and physical examination of adult    2. Anxiety  Chronic, stable.  Refilled current dosing of Cymbalta for use x 1 year.  Discussed possible weaning to every other day if desires to try going off medication.  - DULoxetine (CYMBALTA) 20 MG capsule; Take 1 capsule (20 mg) by mouth daily  Dispense: 90 capsule; Refill: 4    3. Attention deficit hyperactivity disorder (ADHD), predominantly inattentive type  Chronic,  stable.  Contract updated; UDS collected today.   Three months of Rx sent to pharmacy.  - methylphenidate HCl ER, OSM, (CONCERTA) 54 MG CR tablet; Take 1 tablet (54 mg) by mouth daily  Dispense: 30 tablet; Refill: 0  - methylphenidate HCl ER, OSM, (CONCERTA) 54 MG CR tablet; Take 1 tablet (54 mg) by mouth daily  Dispense: 20 tablet; Refill: 0  - methylphenidate HCl ER, OSM, (CONCERTA) 54 MG CR tablet; Take 1 tablet (54 mg) by mouth daily  Dispense: 30 tablet; Refill: 0  - Urine Drug Screen    4. Hypothyroidism, unspecified type  Chronic, stable.  Recent levels wnl.  Refilled x 1 year.  - SYNTHROID 75 MCG tablet; Take 1 tablet (75 mcg) by mouth daily  Dispense: 90 tablet; Refill: 4      Patient has been advised of split billing requirements and indicates understanding: Yes      COUNSELING:  Reviewed preventive health counseling, as reflected in patient instructions    She reports that she has never smoked. She has never used smokeless tobacco.          Avril Sanders, [facility] AND [facility]

## 2024-01-03 ENCOUNTER — OFFICE VISIT (OUTPATIENT)
Dept: FAMILY MEDICINE | Facility: OTHER | Age: 44
End: 2024-01-03
Attending: FAMILY MEDICINE
Payer: COMMERCIAL

## 2024-01-03 VITALS
SYSTOLIC BLOOD PRESSURE: 116 MMHG | HEIGHT: 66 IN | OXYGEN SATURATION: 98 % | RESPIRATION RATE: 20 BRPM | DIASTOLIC BLOOD PRESSURE: 78 MMHG | TEMPERATURE: 98.9 F | BODY MASS INDEX: 29.65 KG/M2 | HEART RATE: 86 BPM | WEIGHT: 184.5 LBS

## 2024-01-03 DIAGNOSIS — F41.9 ANXIETY: ICD-10-CM

## 2024-01-03 DIAGNOSIS — Z00.00 ROUTINE HISTORY AND PHYSICAL EXAMINATION OF ADULT: Primary | ICD-10-CM

## 2024-01-03 DIAGNOSIS — E03.9 HYPOTHYROIDISM, UNSPECIFIED TYPE: ICD-10-CM

## 2024-01-03 DIAGNOSIS — F90.0 ATTENTION DEFICIT HYPERACTIVITY DISORDER (ADHD), PREDOMINANTLY INATTENTIVE TYPE: ICD-10-CM

## 2024-01-03 LAB
AMPHETAMINES UR QL SCN: NORMAL
BARBITURATES UR QL SCN: NORMAL
BENZODIAZ UR QL SCN: NORMAL
BZE UR QL SCN: NORMAL
CANNABINOIDS UR QL SCN: NORMAL
FENTANYL UR QL: NORMAL
OPIATES UR QL SCN: NORMAL
PCP QUAL URINE (ROCHE): NORMAL

## 2024-01-03 PROCEDURE — 99213 OFFICE O/P EST LOW 20 MIN: CPT | Mod: 25 | Performed by: FAMILY MEDICINE

## 2024-01-03 PROCEDURE — 80307 DRUG TEST PRSMV CHEM ANLYZR: CPT | Mod: ZL | Performed by: FAMILY MEDICINE

## 2024-01-03 PROCEDURE — 99396 PREV VISIT EST AGE 40-64: CPT | Performed by: FAMILY MEDICINE

## 2024-01-03 RX ORDER — DULOXETIN HYDROCHLORIDE 20 MG/1
20 CAPSULE, DELAYED RELEASE ORAL DAILY
Qty: 90 CAPSULE | Refills: 4 | Status: SHIPPED | OUTPATIENT
Start: 2024-01-03 | End: 2024-06-25

## 2024-01-03 RX ORDER — METHYLPHENIDATE HYDROCHLORIDE 54 MG/1
54 TABLET ORAL DAILY
Qty: 20 TABLET | Refills: 0 | Status: SHIPPED | OUTPATIENT
Start: 2024-03-15 | End: 2024-04-12

## 2024-01-03 RX ORDER — LEVOTHYROXINE SODIUM 75 MCG
75 TABLET ORAL DAILY
Qty: 90 TABLET | Refills: 4 | Status: SHIPPED | OUTPATIENT
Start: 2024-01-03

## 2024-01-03 RX ORDER — METHYLPHENIDATE HYDROCHLORIDE 54 MG/1
54 TABLET ORAL DAILY
Qty: 30 TABLET | Refills: 0 | Status: SHIPPED | OUTPATIENT
Start: 2024-01-17 | End: 2024-03-05

## 2024-01-03 RX ORDER — METHYLPHENIDATE HYDROCHLORIDE 54 MG/1
54 TABLET ORAL DAILY
Qty: 30 TABLET | Refills: 0 | Status: SHIPPED | OUTPATIENT
Start: 2024-02-16 | End: 2024-04-12

## 2024-01-03 ASSESSMENT — ENCOUNTER SYMPTOMS
ABDOMINAL PAIN: 0
DIARRHEA: 0
SORE THROAT: 0
NAUSEA: 0
HEARTBURN: 0
HEMATURIA: 0
CONSTIPATION: 0
DYSURIA: 0
HEADACHES: 1
FREQUENCY: 0
WEAKNESS: 0
DIZZINESS: 0
NERVOUS/ANXIOUS: 1
JOINT SWELLING: 0
EYE PAIN: 0
ARTHRALGIAS: 0
CHILLS: 0
PALPITATIONS: 0
PARESTHESIAS: 0
COUGH: 1
FEVER: 0
MYALGIAS: 0
HEMATOCHEZIA: 0
SHORTNESS OF BREATH: 0

## 2024-01-03 ASSESSMENT — ANXIETY QUESTIONNAIRES
6. BECOMING EASILY ANNOYED OR IRRITABLE: SEVERAL DAYS
8. IF YOU CHECKED OFF ANY PROBLEMS, HOW DIFFICULT HAVE THESE MADE IT FOR YOU TO DO YOUR WORK, TAKE CARE OF THINGS AT HOME, OR GET ALONG WITH OTHER PEOPLE?: SOMEWHAT DIFFICULT
2. NOT BEING ABLE TO STOP OR CONTROL WORRYING: SEVERAL DAYS
7. FEELING AFRAID AS IF SOMETHING AWFUL MIGHT HAPPEN: NOT AT ALL
5. BEING SO RESTLESS THAT IT IS HARD TO SIT STILL: NOT AT ALL
4. TROUBLE RELAXING: SEVERAL DAYS
7. FEELING AFRAID AS IF SOMETHING AWFUL MIGHT HAPPEN: NOT AT ALL
1. FEELING NERVOUS, ANXIOUS, OR ON EDGE: SEVERAL DAYS
GAD7 TOTAL SCORE: 5
3. WORRYING TOO MUCH ABOUT DIFFERENT THINGS: SEVERAL DAYS
GAD7 TOTAL SCORE: 5
IF YOU CHECKED OFF ANY PROBLEMS ON THIS QUESTIONNAIRE, HOW DIFFICULT HAVE THESE PROBLEMS MADE IT FOR YOU TO DO YOUR WORK, TAKE CARE OF THINGS AT HOME, OR GET ALONG WITH OTHER PEOPLE: SOMEWHAT DIFFICULT

## 2024-01-03 ASSESSMENT — PAIN SCALES - GENERAL: PAINLEVEL: NO PAIN (0)

## 2024-01-03 NOTE — NURSING NOTE
"Chief Complaint   Patient presents with    Physical       Initial /78   Pulse 86   Temp 98.9  F (37.2  C) (Tympanic)   Resp 20   Ht 1.664 m (5' 5.5\")   Wt 83.7 kg (184 lb 8 oz)   LMP 09/21/2023   SpO2 98%   BMI 30.24 kg/m   Estimated body mass index is 30.24 kg/m  as calculated from the following:    Height as of this encounter: 1.664 m (5' 5.5\").    Weight as of this encounter: 83.7 kg (184 lb 8 oz).  Medication Review: complete    The next two questions are to help us understand your food security.  If you are feeling you need any assistance in this area, we have resources available to support you today.          1/3/2024   SDOH- Food Insecurity   Within the past 12 months, did you worry that your food would run out before you got money to buy more? N   Within the past 12 months, did the food you bought just not last and you didn t have money to get more? N         Health Care Directive:  Patient does not have a Health Care Directive or Living Will: Discussed advance care planning with patient; information given to patient to review.    Rajani Ford LPN      "

## 2024-01-03 NOTE — LETTER

## 2024-01-05 ENCOUNTER — HOSPITAL ENCOUNTER (OUTPATIENT)
Dept: GENERAL RADIOLOGY | Facility: OTHER | Age: 44
Discharge: HOME | End: 2024-01-05
Payer: COMMERCIAL

## 2024-01-05 ENCOUNTER — OFFICE VISIT (OUTPATIENT)
Dept: FAMILY MEDICINE | Facility: OTHER | Age: 44
End: 2024-01-05
Attending: STUDENT IN AN ORGANIZED HEALTH CARE EDUCATION/TRAINING PROGRAM
Payer: COMMERCIAL

## 2024-01-05 VITALS
HEIGHT: 66 IN | BODY MASS INDEX: 30.57 KG/M2 | DIASTOLIC BLOOD PRESSURE: 82 MMHG | TEMPERATURE: 98.6 F | RESPIRATION RATE: 14 BRPM | OXYGEN SATURATION: 99 % | WEIGHT: 190.2 LBS | HEART RATE: 73 BPM | SYSTOLIC BLOOD PRESSURE: 126 MMHG

## 2024-01-05 DIAGNOSIS — M79.641 PAIN OF RIGHT HAND: ICD-10-CM
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
## 2024-01-05 DIAGNOSIS — S69.91XA HAND INJURY, RIGHT, INITIAL ENCOUNTER: Primary | ICD-10-CM

## 2024-01-05 DIAGNOSIS — S69.91XA HAND INJURY, RIGHT, INITIAL ENCOUNTER: ICD-10-CM

## 2024-01-05 PROCEDURE — 99213 OFFICE O/P EST LOW 20 MIN: CPT

## 2024-01-05 PROCEDURE — 73130 X-RAY EXAM OF HAND: CPT | Mod: RT

## 2024-01-05 ASSESSMENT — PAIN SCALES - GENERAL: PAINLEVEL: MODERATE PAIN (4)

## 2024-01-05 NOTE — PROGRESS NOTES
ASSESSMENT/PLAN:    I have reviewed the nursing notes.  I have reviewed the findings, diagnosis, plan and need for follow up with the patient.    1. Fall down stairs, initial encounter  2. Pain of right hand  3. Hand injury, right, initial encounter    - XR Hand Right G/E 3 Views; Future    - Wrist/Arm/Hand Bracking Supplies Order Wrist Brace; Right; with thumb spica.  Keep brace on at all times, may remove for showering.     - RTC in 2 weeks if not improved    - Symptomatic treatment - Rest, ice and elevation    - May use over-the-counter Tylenol and ibuprofen as needed. Take 1000 mg tylenol and 600 mg ibuprofen every 6 hours as needed for pain.  Take food with ibuprofen to prevent GI upset.    - Discussed warning signs/symptoms indicative of need to f/u    - Follow up if symptoms persist or worsen or concerns    - I explained my diagnostic considerations and recommendations to the patient, who voiced understanding and agreement with the treatment plan. All questions were answered. We discussed potential side effects of any prescribed or recommended therapies, as well as expectations for response to treatments.    Tiffany Goodwin, APRN CNP  2024  3:54 PM    HPI:    Maricarmen Ornelas is a 43 year old female  who presents to [facility] today for concerns of a right hand injury that occurred this afternoon.  Pt states that she fell down 4 stairs outside landing on her buttocks and right hand.  Pt states that her buttocks feels like muscular soreness but concerned that she may have fractured her thumb.      Past Medical History:   Diagnosis Date    Dichorionic diamniotic twin pregnancy in third trimester 2018    Disorder of thyroid     hypo    Supervision of elderly multigravida     2018    Twin pregnancy 2018    2018     Past Surgical History:   Procedure Laterality Date    APPENDECTOMY OPEN           SECTION N/A 2018    Procedure:  SECTION;   Section - twins;  Surgeon:  Woodrow Hurtado MD;  Location:  OR    COLONOSCOPY N/A 7/10/2019    Procedure: COLONOSCOPY, WITH POLYPECTOMY AND BIOPSY;  Surgeon: Viki Villareal MD;  Location:  OR    EXAM UNDER ANESTHESIA ANUS N/A 9/21/2023    Procedure: Exam under anesthesia anus;  Surgeon: Viki Villareal MD;  Location:  OR    LAPAROSCOPIC HYSTERECTOMY TOTAL, SALPINGECTOMY BILATERAL N/A 9/21/2023    Procedure: Total Laparoscopic Hysterectomy, with Bilateral Salpingectomy, right oophorectomy, cystoscopy;  Surgeon: Dia Morrison MD;  Location:  OR     Social History     Tobacco Use    Smoking status: Never    Smokeless tobacco: Never   Substance Use Topics    Alcohol use: Not Currently     Current Outpatient Medications   Medication Sig Dispense Refill    acetaminophen (TYLENOL) 325 MG tablet Take 2 tablets (650 mg) by mouth every 4 hours as needed for other (mild pain) 100 tablet 0    diltiazem 2% 2% OINT ointment Apply 1 g topically 2 times daily as needed (anal fissure) 60 g 1    doxylamine (UNISOM) 25 MG TABS tablet Take 25 mg by mouth At Bedtime      DULoxetine (CYMBALTA) 20 MG capsule Take 1 capsule (20 mg) by mouth daily 90 capsule 4    medical cannabis (Patient's own supply) See Admin Instructions (The purpose of this order is to document that the patient reports taking medical cannabis.  This is not a prescription, and is not used to certify that the patient has a qualifying medical condition.)      Melatonin 2.5 MG CHEW Take 1 tablet by mouth At Bedtime      methylcellulose (CITRUCEL) powder Take 0.3 g (1.05 teaspoonful) by mouth daily 454 g 3    [START ON 2/16/2024] methylphenidate HCl ER, OSM, (CONCERTA) 54 MG CR tablet Take 1 tablet (54 mg) by mouth daily 30 tablet 0    [START ON 3/15/2024] methylphenidate HCl ER, OSM, (CONCERTA) 54 MG CR tablet Take 1 tablet (54 mg) by mouth daily 20 tablet 0    [START ON 1/17/2024] methylphenidate HCl ER, OSM, (CONCERTA) 54 MG CR tablet Take 1 tablet (54 mg) by mouth daily 30 tablet 0     "psyllium (METAMUCIL/KONSYL) Packet Take 1 packet by mouth daily      senna-docusate (SENOKOT-S/PERICOLACE) 8.6-50 MG tablet Take 1-2 tablets by mouth 2 times daily Take while on oral narcotics to prevent or treat constipation. 30 tablet 0    SYNTHROID 75 MCG tablet Take 1 tablet (75 mcg) by mouth daily 90 tablet 4     Allergies   Allergen Reactions    Adhesive Tape Hives     Rash from surgical drape adhesive     Seasonal Allergies Itching     Past medical history, past surgical history, current medications and allergies reviewed and accurate to the best of my knowledge.      ROS:  Refer to HPI    /82   Pulse 73   Temp 98.6  F (37  C) (Tympanic)   Resp 14   Ht 1.664 m (5' 5.5\")   Wt 86.3 kg (190 lb 3.2 oz)   LMP 09/21/2023   SpO2 99%   Breastfeeding No   BMI 31.17 kg/m      EXAM:  General Appearance: Well appearing 43 year old female, appropriate appearance for age. No acute distress   Respiratory: normal chest wall and respirations.  Normal effort.  Clear to auscultation bilaterally, no wheezing, crackles or rhonchi.  No increased work of breathing.  No cough appreciated.  Cardiac: RRR with no murmurs  Musculoskeletal:  Pt favoring right hand/thumb area.  Palm and thumb bruised and swollen.  Positive snuff box test.  Good CMS and pulse.  Equal movement of bilateral lower extremities.  Normal gait.    Neuro: Alert and oriented to person, place, and time.     Psychological: normal affect, alert, oriented, and pleasant.     Xray:  Results for orders placed or performed during the hospital encounter of 01/05/24   XR Hand Right G/E 3 Views     Status: None    Narrative    PROCEDURE:  XR HAND RIGHT G/E 3 VIEWS    HISTORY: Fall down stairs, initial encounter; Pain of right hand; Hand  injury, right, initial encounter.    COMPARISON:  None.    TECHNIQUE:  3 views right hand.    FINDINGS:  No fracture or dislocation is identified. The joint spaces  are preserved. No foreign body is seen.       Impression    " IMPRESSION: No acute fracture.      SURY STODADRD MD         SYSTEM ID:  [id number]

## 2024-01-05 NOTE — PATIENT INSTRUCTIONS
It was a pleasure meeting you today, hope you feel better soon!    Please return in two weeks to have again if not improving.

## 2024-01-05 NOTE — NURSING NOTE
"Chief Complaint   Patient presents with    Hand Pain     Right     Fall     Fell on stairs today        Initial /82   Pulse 73   Temp 98.6  F (37  C) (Tympanic)   Resp 14   Ht 1.664 m (5' 5.5\")   Wt 86.3 kg (190 lb 3.2 oz)   LMP 09/21/2023   SpO2 99%   Breastfeeding No   BMI 31.17 kg/m   Estimated body mass index is 31.17 kg/m  as calculated from the following:    Height as of this encounter: 1.664 m (5' 5.5\").    Weight as of this encounter: 86.3 kg (190 lb 3.2 oz).  Medication Review: complete    The next two questions are to help us understand your food security.  If you are feeling you need any assistance in this area, we have resources available to support you today.          1/3/2024   SDOH- Food Insecurity   Within the past 12 months, did you worry that your food would run out before you got money to buy more? N   Within the past 12 months, did the food you bought just not last and you didn t have money to get more? N         Health Care Directive:  Patient does not have a Health Care Directive or Living Will: Discussed advance care planning with patient; however, patient declined at this time.    Xenia Diaz CMA        "

## 2024-01-09 ENCOUNTER — MYC MEDICAL ADVICE (OUTPATIENT)
Dept: FAMILY MEDICINE | Facility: OTHER | Age: 44
End: 2024-01-09
Payer: COMMERCIAL

## 2024-01-09 DIAGNOSIS — J01.90 ACUTE SINUSITIS WITH SYMPTOMS > 10 DAYS: Primary | ICD-10-CM

## 2024-01-09 NOTE — TELEPHONE ENCOUNTER
Per OV from 1/3:    HENT:  Positive for congestion. Negative for ear pain, hearing loss and sore throat.    Eyes:  Negative for pain and visual disturbance.   Respiratory:  Positive for cough. Negative for shortness of breath.      Routing to provider to review and respond.  Eloina Doyle RN on 1/9/2024 at 3:55 PM

## 2024-01-19 ENCOUNTER — HOSPITAL ENCOUNTER (OUTPATIENT)
Dept: GENERAL RADIOLOGY | Facility: OTHER | Age: 44
Discharge: HOME | End: 2024-01-19
Attending: NURSE PRACTITIONER
Payer: COMMERCIAL

## 2024-01-19 ENCOUNTER — OFFICE VISIT (OUTPATIENT)
Dept: FAMILY MEDICINE | Facility: OTHER | Age: 44
End: 2024-01-19
Attending: NURSE PRACTITIONER
Payer: COMMERCIAL

## 2024-01-19 VITALS
TEMPERATURE: 98.4 F | OXYGEN SATURATION: 99 % | BODY MASS INDEX: 29.86 KG/M2 | HEART RATE: 92 BPM | WEIGHT: 185.8 LBS | SYSTOLIC BLOOD PRESSURE: 120 MMHG | HEIGHT: 66 IN | DIASTOLIC BLOOD PRESSURE: 78 MMHG | RESPIRATION RATE: 16 BRPM
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
## 2024-01-19 DIAGNOSIS — M25.561 ACUTE PAIN OF RIGHT KNEE: Primary | ICD-10-CM

## 2024-01-19 DIAGNOSIS — M79.645 PAIN OF LEFT THUMB: ICD-10-CM

## 2024-01-19 DIAGNOSIS — M25.531 RIGHT WRIST PAIN: ICD-10-CM

## 2024-01-19 PROCEDURE — 73140 X-RAY EXAM OF FINGER(S): CPT | Mod: LT

## 2024-01-19 PROCEDURE — 73110 X-RAY EXAM OF WRIST: CPT | Mod: RT

## 2024-01-19 PROCEDURE — 99213 OFFICE O/P EST LOW 20 MIN: CPT | Performed by: NURSE PRACTITIONER

## 2024-01-19 ASSESSMENT — PAIN SCALES - GENERAL: PAINLEVEL: MILD PAIN (3)

## 2024-01-19 NOTE — PROGRESS NOTES
Assessment & Plan   Problem List Items Addressed This Visit    None  Visit Diagnoses       Acute pain of right knee    -  Primary    Relevant Orders    Physical Therapy Referral    Right wrist pain        Relevant Orders    XR Wrist Right G/E 3 Views (Completed)    Pain of left thumb        Relevant Orders    XR Finger Left G/E 2 Views (Completed)    Fall, subsequent encounter        Relevant Orders    Physical Therapy Referral        Acute knee pain after fall, discussed likely soft tissue injury.  Recommend ice and anti-inflammatories for the next week, physical therapy ordered.    Repeat x-ray to right wrist, x-ray to base of left thumb, these are without acute fracture.  Continue symptomatic management.  Consider follow-up with sports medicine if pain is not improving.    Review of the result(s) of each unique test - xray  Ordering of each unique test           No follow-ups on file.      Joshua Hernadez is a 43 year old, presenting for the following health issues:  RECHECK    History of Present Illness       Reason for visit:  Pain in wrists and knee from fall    She eats 2-3 servings of fruits and vegetables daily.She consumes 0 sweetened beverage(s) daily.She exercises with enough effort to increase her heart rate 10 to 19 minutes per day.  She exercises with enough effort to increase her heart rate 4 days per week.   She is taking medications regularly.     She comes in today for follow-up on recent fall.  She was seen in the [facility] clinic a couple weeks ago after falling on icy stairs.  She states she landed on her buttocks and hands.  When she was seen in the rapid clinic she is primarily having pain to her right hand.  X-ray was completed without fractures.  She comes in today with continuing pain to right navicular region, pain to her left thumb at the base as well as right knee pain and swelling.  Right knee feels fill. Not giving out on stairs or with walking. She states pain has been limiting her  "physical activity.         Review of Systems  See above      Objective    /78   Pulse 92   Temp 98.4  F (36.9  C)   Resp 16   Ht 1.664 m (5' 5.5\")   Wt 84.3 kg (185 lb 12.8 oz)   LMP 09/21/2023   SpO2 99%   BMI 30.45 kg/m    Body mass index is 30.45 kg/m .  Physical Exam   GENERAL: alert and no distress  MS: Moderate swelling to right knee, no laxity, no acute tenderness over medial or lateral knee.  Tenderness over right wrist navicular region and base of left thumb with palpation.  No swelling appreciated.  LYMPH: no cervical, supraclavicular, axillary, or inguinal adenopathy    Results for orders placed or performed during the hospital encounter of 01/19/24   XR Wrist Right G/E 3 Views     Status: None    Narrative    PROCEDURE:  XR WRIST RIGHT G/E 3 VIEWS    HISTORY: Right wrist pain.    COMPARISON:  None.    TECHNIQUE:  4 views right wrist.    FINDINGS:  No fracture or dislocation is identified. The joint spaces  are preserved. No foreign body is seen.       Impression    IMPRESSION: No acute fracture.      SURY STODDARD MD         SYSTEM ID:  [id number]   Results for orders placed or performed during the hospital encounter of 01/19/24   XR Finger Left G/E 2 Views     Status: None    Narrative    PROCEDURE:  XR FINGER LEFT G/E 2 VIEWS    HISTORY: Pain of left thumb.    COMPARISON:  None.    TECHNIQUE:  3 views left thumb.    FINDINGS:  No fracture or dislocation is identified. Minimal  osteophytosis is seen at the first CMC joint. No foreign body is seen.        Impression    IMPRESSION: No acute fracture.      SURY STODDARD MD         SYSTEM ID:  [id number]             Signed Electronically by: FLAVIO Tovar CNP    "

## 2024-01-19 NOTE — NURSING NOTE
Patient presents today for follow up on fall. Patient is having knee and bilateral wrist pain.      Medication Reconciliation Complete    Niki Mauricio LPN  1/19/2024 1:39 PM

## 2024-01-27 ENCOUNTER — HOSPITAL ENCOUNTER (EMERGENCY)
Facility: OTHER | Age: 44
Discharge: HOME | End: 2024-01-27
Attending: STUDENT IN AN ORGANIZED HEALTH CARE EDUCATION/TRAINING PROGRAM | Admitting: STUDENT IN AN ORGANIZED HEALTH CARE EDUCATION/TRAINING PROGRAM
Payer: COMMERCIAL

## 2024-01-27 VITALS
HEIGHT: 66 IN | DIASTOLIC BLOOD PRESSURE: 83 MMHG | OXYGEN SATURATION: 99 % | SYSTOLIC BLOOD PRESSURE: 136 MMHG | TEMPERATURE: 98.8 F | HEART RATE: 99 BPM | BODY MASS INDEX: 28.61 KG/M2 | WEIGHT: 178 LBS | RESPIRATION RATE: 16 BRPM

## 2024-01-27 DIAGNOSIS — S66.822A EXTENSOR TENDON LACERATION OF LEFT HAND WITH OPEN WOUND, INITIAL ENCOUNTER: ICD-10-CM

## 2024-01-27 DIAGNOSIS — S61.402A EXTENSOR TENDON LACERATION OF LEFT HAND WITH OPEN WOUND, INITIAL ENCOUNTER: ICD-10-CM

## 2024-01-27 PROCEDURE — 99283 EMERGENCY DEPT VISIT LOW MDM: CPT | Mod: 25 | Performed by: STUDENT IN AN ORGANIZED HEALTH CARE EDUCATION/TRAINING PROGRAM

## 2024-01-27 PROCEDURE — 250N000009 HC RX 250: Performed by: STUDENT IN AN ORGANIZED HEALTH CARE EDUCATION/TRAINING PROGRAM

## 2024-01-27 PROCEDURE — 250N000011 HC RX IP 250 OP 636: Performed by: STUDENT IN AN ORGANIZED HEALTH CARE EDUCATION/TRAINING PROGRAM

## 2024-01-27 PROCEDURE — 90471 IMMUNIZATION ADMIN: CPT | Performed by: STUDENT IN AN ORGANIZED HEALTH CARE EDUCATION/TRAINING PROGRAM

## 2024-01-27 PROCEDURE — 99283 EMERGENCY DEPT VISIT LOW MDM: CPT | Performed by: STUDENT IN AN ORGANIZED HEALTH CARE EDUCATION/TRAINING PROGRAM

## 2024-01-27 PROCEDURE — 90715 TDAP VACCINE 7 YRS/> IM: CPT | Performed by: STUDENT IN AN ORGANIZED HEALTH CARE EDUCATION/TRAINING PROGRAM

## 2024-01-27 RX ORDER — GINSENG 100 MG
500 CAPSULE ORAL ONCE
Status: COMPLETED | OUTPATIENT
Start: 2024-01-27 | End: 2024-01-27

## 2024-01-27 RX ADMIN — BACITRACIN 1 G: 500 OINTMENT TOPICAL at 10:37

## 2024-01-27 RX ADMIN — CLOSTRIDIUM TETANI TOXOID ANTIGEN (FORMALDEHYDE INACTIVATED), CORYNEBACTERIUM DIPHTHERIAE TOXOID ANTIGEN (FORMALDEHYDE INACTIVATED), BORDETELLA PERTUSSIS TOXOID ANTIGEN (GLUTARALDEHYDE INACTIVATED), BORDETELLA PERTUSSIS FILAMENTOUS HEMAGGLUTININ ANTIGEN (FORMALDEHYDE INACTIVATED), BORDETELLA PERTUSSIS PERTACTIN ANTIGEN, AND BORDETELLA PERTUSSIS FIMBRIAE 2/3 ANTIGEN 0.5 ML: 5; 2; 2.5; 5; 3; 5 INJECTION, SUSPENSION INTRAMUSCULAR at 10:12

## 2024-01-27 NOTE — DISCHARGE INSTRUCTIONS
Like we discussed, you appear to have what is called an extensor tendon injury limiting her ability to extend your little finger in your left hand.  This likely will need repair.  Dr. Jiménez is one of the orthopedic surgeons/hand surgeons with [facility] should be in [place] sometime this week and should be able to see you in clinic.  Please call the number provided here for [facility] Monday morning to determine timeframe.  If you develop any significant redness, pain, swelling, or drainage from your wound, return immediately to the ER for evaluation as this will suggest a developing wound infection.

## 2024-01-27 NOTE — ED PROVIDER NOTES
Emergency Department Provider Note  : 1980 Age: 43 year old Sex: female MRN: [id number]    Chief Complaint   Patient presents with    Laceration       Medical Decision Making / Assessment / Plan   43 year old female who presents with a small laceration over the dorsum of her left hand with what appears to be a extensor digit he minimi laceration with quite limited ability to extend the digit which I suspect is due to intact Fourth extensor digitorum tendon.  Laceration is only 1 cm in length and well-approximated already.  Will not repair beyond Steri-Strip given its already well-approximated but will washout thoroughly and update tetanus.  Will touch base with orthopedics regarding timing of follow-up for presumed extensor tendon repair.     10:32 AM spoke with Dr. Echeverria of [facility].  He is informed to have the patient call clinic on Monday to schedule an appointment with Dr. Jiménez for this week in [facility] for reassessment and definitive management.  Discharged with return precautions for signs or symptoms of wound infection.          The patient was informed of the plan and verbalized understanding and agreed with the plan. The patient was given strict return to Emergency Department precautions as well as appropriate follow up instructions. The patient was discharged in stable condition.    New Prescriptions    No medications on file       Final diagnoses:   Extensor tendon laceration of left hand with open wound, initial encounter       Monty Ram MD  2024   Emergency Department    Subjective   Maricarmen is a 43 year old female with no other past medical history presents for evaluation of a laceration sustained from a skate roughly 1 cm proximal to the MCP of the left upper extremity fifth digit dorsally with limited ability to extend her finger.  Fully able to flex and has intact sensation.  Minimal bleeding.  Last Tdap more than 5 years ago.    I have reviewed the  "Medications, Allergies, Past Medical and Surgical History, and Social History in the [facility] and with family.    Review of Systems:  Please see HPI for pertinent positives and negatives. All other systems reviewed and found to be negative.      Objective   BP: 136/83  Pulse: 99  Temp: 98.8  F (37.1  C)  Resp: 16  Height: 166.4 cm (5' 5.5\")  Weight: 80.7 kg (178 lb)  SpO2: 99 %    Physical Exam:   Gen: Comfortable. NAD  HEENT: MMM. AT/NC.  Eye: EOMI.   CV: Well perfused.   Pulm: Nonlabored, equal chest rise  Abd: ND.   Ext: 1 cm horizontally oriented laceration 1 cm proximal to the left MCP of the fifth digit dorsally.  No gaping.  No ongoing bleeding.  Minimal edema.  Significant weakness with extension of the digit and is able to do so to very limited degree essentially from full flexion.  Able to flex without difficulty.  Neuro: Sensation to pain and light touch intact across the entirety of the left hand fifth digit.  Motor weakness presumed secondary to tendon disruption as above.  Psych: Appropriate affect, cognition intact    Procedures / Critical Care   Procedures    Critical Care Time: none         Medical/Surgical History:  Past Medical History:   Diagnosis Date    Dichorionic diamniotic twin pregnancy in third trimester 2018    Disorder of thyroid     hypo    Supervision of elderly multigravida     2018    Twin pregnancy 2018     Past Surgical History:   Procedure Laterality Date    APPENDECTOMY OPEN           SECTION N/A 2018    Procedure:  SECTION;   Section - twins;  Surgeon: Woodrow Hurtado MD;  Location:  OR    COLONOSCOPY N/A 7/10/2019    Procedure: COLONOSCOPY, WITH POLYPECTOMY AND BIOPSY;  Surgeon: Viki Villareal MD;  Location:  OR    EXAM UNDER ANESTHESIA ANUS N/A 2023    Procedure: Exam under anesthesia anus;  Surgeon: Viki Villareal MD;  Location:  OR    LAPAROSCOPIC HYSTERECTOMY TOTAL, SALPINGECTOMY BILATERAL N/A 2023    " Procedure: Total Laparoscopic Hysterectomy, with Bilateral Salpingectomy, right oophorectomy, cystoscopy;  Surgeon: Dia Morrison MD;  Location:  OR       Medications:  Current Facility-Administered Medications   Medication    bacitracin ointment 1 g     Current Outpatient Medications   Medication    acetaminophen (TYLENOL) 325 MG tablet    diltiazem 2% 2% OINT ointment    doxylamine (UNISOM) 25 MG TABS tablet    DULoxetine (CYMBALTA) 20 MG capsule    medical cannabis (Patient's own supply)    Melatonin 2.5 MG CHEW    methylcellulose (CITRUCEL) powder    [START ON 2/16/2024] methylphenidate HCl ER, OSM, (CONCERTA) 54 MG CR tablet    [START ON 3/15/2024] methylphenidate HCl ER, OSM, (CONCERTA) 54 MG CR tablet    methylphenidate HCl ER, OSM, (CONCERTA) 54 MG CR tablet    psyllium (METAMUCIL/KONSYL) Packet    senna-docusate (SENOKOT-S/PERICOLACE) 8.6-50 MG tablet    SYNTHROID 75 MCG tablet       Allergies:  Adhesive tape and Seasonal allergies    Relevant labs, images, EKGs, Epic and outside hospital (if applicable) charts were reviewed. The findings, diagnosis, plan, and need for follow up were discussed with the patient/family. Nursing notes were reviewed.      Monty Ram MD  01/27/24 [id number]

## 2024-01-27 NOTE — ED TRIAGE NOTES
Pt presents to ED with puncture wound to dorsal left hand from Ice skate. Pt has one cm lac to hand. Pt is unable to fully extend of flex 5th finger. Pt reports pain when attempting to move fingers. Last tetanus 6/2018.    Alba Moya RN on 1/27/2024 at 9:38 AM       Triage Assessment (Adult)       Row Name 01/27/24 0933          Triage Assessment    Airway WDL WDL        Respiratory WDL    Respiratory WDL WDL        Skin Circulation/Temperature WDL    Skin Circulation/Temperature WDL X  laceration to left hand        Peripheral/Neurovascular WDL    Peripheral Neurovascular WDL WDL

## 2024-01-31 ENCOUNTER — TRANSFERRED RECORDS (OUTPATIENT)
Dept: HEALTH INFORMATION MANAGEMENT | Facility: OTHER | Age: 44
End: 2024-01-31
Payer: COMMERCIAL

## 2024-02-01 ENCOUNTER — TELEPHONE (OUTPATIENT)
Dept: FAMILY MEDICINE | Facility: OTHER | Age: 44
End: 2024-02-01
Payer: COMMERCIAL

## 2024-02-01 NOTE — TELEPHONE ENCOUNTER
Insurance requires separate visits for pre-operative exams.  Would need new office visit.    Avril Sanders, DO

## 2024-02-01 NOTE — TELEPHONE ENCOUNTER
Patient called 02/01/2024. She was in the ED 01/27/24 and is having surgery with [facility]  She needs a preop and wondering if she can use her previous visit? She had her annual px with [facility] 01/03/2024 but after talking to [facility] [place], operated by [facility] she would need an EKG, and current labs along with Meds List and Allergies added to the note with an approval for surgery.    Please call patient back if her PX can be Addended and used or she will need a current preop exam.    Thank you.    Analilia Rowley on 2/1/2024 at 12:47 PM

## 2024-02-02 ENCOUNTER — OFFICE VISIT (OUTPATIENT)
Dept: FAMILY MEDICINE | Facility: OTHER | Age: 44
End: 2024-02-02
Attending: PHYSICIAN ASSISTANT
Payer: COMMERCIAL

## 2024-02-02 VITALS
DIASTOLIC BLOOD PRESSURE: 78 MMHG | SYSTOLIC BLOOD PRESSURE: 124 MMHG | RESPIRATION RATE: 20 BRPM | OXYGEN SATURATION: 98 % | WEIGHT: 185 LBS | BODY MASS INDEX: 29.73 KG/M2 | HEIGHT: 66 IN | TEMPERATURE: 97.8 F | HEART RATE: 78 BPM

## 2024-02-02 DIAGNOSIS — S61.402A EXTENSOR TENDON LACERATION OF LEFT HAND WITH OPEN WOUND, INITIAL ENCOUNTER: ICD-10-CM

## 2024-02-02 DIAGNOSIS — S66.822A EXTENSOR TENDON LACERATION OF LEFT HAND WITH OPEN WOUND, INITIAL ENCOUNTER: ICD-10-CM

## 2024-02-02 DIAGNOSIS — Z01.818 PREOP GENERAL PHYSICAL EXAM: Primary | ICD-10-CM

## 2024-02-02 LAB
ANION GAP SERPL CALCULATED.3IONS-SCNC: 9 MMOL/L (ref 7–15)
BASOPHILS # BLD AUTO: 0.1 10E3/UL (ref 0–0.2)
BASOPHILS NFR BLD AUTO: 1 %
BUN SERPL-MCNC: 8.5 MG/DL (ref 6–20)
CALCIUM SERPL-MCNC: 9.5 MG/DL (ref 8.6–10)
CHLORIDE SERPL-SCNC: 103 MMOL/L (ref 98–107)
CREAT SERPL-MCNC: 0.71 MG/DL (ref 0.51–0.95)
DEPRECATED HCO3 PLAS-SCNC: 27 MMOL/L (ref 22–29)
EGFRCR SERPLBLD CKD-EPI 2021: >90 ML/MIN/1.73M2
EOSINOPHIL # BLD AUTO: 0.3 10E3/UL (ref 0–0.7)
EOSINOPHIL NFR BLD AUTO: 4 %
ERYTHROCYTE [DISTWIDTH] IN BLOOD BY AUTOMATED COUNT: 14.6 % (ref 10–15)
GLUCOSE SERPL-MCNC: 83 MG/DL (ref 70–99)
HCT VFR BLD AUTO: 40.6 % (ref 35–47)
HGB BLD-MCNC: 13.3 G/DL (ref 11.7–15.7)
IMM GRANULOCYTES # BLD: 0 10E3/UL
IMM GRANULOCYTES NFR BLD: 0 %
LYMPHOCYTES # BLD AUTO: 2.2 10E3/UL (ref 0.8–5.3)
LYMPHOCYTES NFR BLD AUTO: 29 %
MCH RBC QN AUTO: 26.4 PG (ref 26.5–33)
MCHC RBC AUTO-ENTMCNC: 32.8 G/DL (ref 31.5–36.5)
MCV RBC AUTO: 81 FL (ref 78–100)
MONOCYTES # BLD AUTO: 0.5 10E3/UL (ref 0–1.3)
MONOCYTES NFR BLD AUTO: 6 %
NEUTROPHILS # BLD AUTO: 4.5 10E3/UL (ref 1.6–8.3)
NEUTROPHILS NFR BLD AUTO: 60 %
NRBC # BLD AUTO: 0 10E3/UL
NRBC BLD AUTO-RTO: 0 /100
PLATELET # BLD AUTO: 430 10E3/UL (ref 150–450)
POTASSIUM SERPL-SCNC: 4.2 MMOL/L (ref 3.4–5.3)
RBC # BLD AUTO: 5.04 10E6/UL (ref 3.8–5.2)
SODIUM SERPL-SCNC: 139 MMOL/L (ref 135–145)
WBC # BLD AUTO: 7.5 10E3/UL (ref 4–11)

## 2024-02-02 PROCEDURE — 93000 ELECTROCARDIOGRAM COMPLETE: CPT | Performed by: INTERNAL MEDICINE

## 2024-02-02 PROCEDURE — 99214 OFFICE O/P EST MOD 30 MIN: CPT | Performed by: PHYSICIAN ASSISTANT

## 2024-02-02 PROCEDURE — 85004 AUTOMATED DIFF WBC COUNT: CPT | Mod: ZL | Performed by: PHYSICIAN ASSISTANT

## 2024-02-02 PROCEDURE — 36415 COLL VENOUS BLD VENIPUNCTURE: CPT | Mod: ZL | Performed by: PHYSICIAN ASSISTANT

## 2024-02-02 PROCEDURE — 80048 BASIC METABOLIC PNL TOTAL CA: CPT | Mod: ZL | Performed by: PHYSICIAN ASSISTANT

## 2024-02-02 ASSESSMENT — PATIENT HEALTH QUESTIONNAIRE - PHQ9
SUM OF ALL RESPONSES TO PHQ QUESTIONS 1-9: 9
SUM OF ALL RESPONSES TO PHQ QUESTIONS 1-9: 9
10. IF YOU CHECKED OFF ANY PROBLEMS, HOW DIFFICULT HAVE THESE PROBLEMS MADE IT FOR YOU TO DO YOUR WORK, TAKE CARE OF THINGS AT HOME, OR GET ALONG WITH OTHER PEOPLE: SOMEWHAT DIFFICULT

## 2024-02-02 ASSESSMENT — PAIN SCALES - GENERAL: PAINLEVEL: MODERATE PAIN (5)

## 2024-02-02 NOTE — PROGRESS NOTES
Preoperative Evaluation  [facility]  [address]  [place] [address]  Phone: [phone]  Fax: [phone]  Primary Provider: Avril Sanders  Pre-op Performing Provider: KIARRA PANIAGUA  Feb 2, 2024     Maricarmen is a 43 year old, presenting for the following:  Pre-Op Exam        2/2/2024    11:02 AM   Additional Questions   Roomed by blaze culver lpn     Surgical Information  Surgery/Procedure: Tendon Repair of the Left Hand  Surgery Location: [facility]  Surgeon: Dr. Echeverria  Surgery Date: 2-6-24  Time of Surgery: TBD  Where patient plans to recover: At home with family  Fax number for surgical facility: [phone] and [phone]    Assessment & Plan     The proposed surgical procedure is considered INTERMEDIATE risk.       ICD-10-CM    1. Preop general physical exam  Z01.818 CBC and Differential     Basic Metabolic Panel     EKG 12-lead, tracing only (Same Day)      2. Extensor tendon laceration of left hand with open wound, initial encounter  S66.822A     S61.402A         Vital signs are stable. Patient presented for preoperative evaluation prior to upcoming proposed procedure. Lab work updated today including CBC and BMP. CBC - hemoglobin 13.3, hematocrit 40.6. BMP - GFR >90, creatinine 0.71. EKG indicated and ordered. Reviewed hold times as outlined below. Patient is in agreement and understanding of the above treatment plan. All questions and concerns were addressed and answered to patient's satisfaction. AVS reviewed with patient.   Extensor tendon injury - Rational for upcoming proposed procedure. Patient aware of risks and benefits. Surgical soap if applicable was provided. Patient will follow postoperative with surgeon.      - No identified additional risk factors other than previously addressed    Antiplatelet or Anticoagulation Medication Instructions   - Patient is on no antiplatelet or anticoagulation medications.    Additional Medication Instructions  Patient is to  take all scheduled medications on the day of surgery    Recommendation  APPROVAL GIVEN to proceed with proposed procedure, without further diagnostic evaluation.    Subjective     HPI related to upcoming procedure: Maricarmen presents due to extensor tendon laceration to left small finger. Seen in ER on 1/27/24. Injury occurred just prior to arrival - was helping her 5 year old twins put on ice skates, she was putting skates on her daughter and her  was putting her son's on. Her son went to stand up and she was cut by the blade of the skate - she notes the blades were freshly sharpened. She did not have immediate pain, but did have substantial bleeding, prompting ER visit. Orthopedics consulted - recommending to repair with steri strips due to good approximation of wound and send promptly to Dr. Brijesh Jiménez MD. Saw Dr. Jiménez on 1/29/24, recommended surgery to repair tendon as motion of extensor tendon has not improved.         2/2/2024    10:58 AM   Preop Questions   1. Have you ever had a heart attack or stroke? No   2. Have you ever had surgery on your heart or blood vessels, such as a stent placement, a coronary artery bypass, or surgery on an artery in your head, neck, heart, or legs? No   3. Do you have chest pain with activity? No   4. Do you have a history of  heart failure? No   5. Do you currently have a cold, bronchitis or symptoms of other infection? YES - ongoing since COVID in December 2023   6. Do you have a cough, shortness of breath, or wheezing? YES - ongoing, stable   7. Do you or anyone in your family have previous history of blood clots? No   8. Do you or does anyone in your family have a serious bleeding problem such as prolonged bleeding following surgeries or cuts? No   9. Have you ever had problems with anemia or been told to take iron pills? UNKNOWN - none known to patient   10. Have you had any abnormal blood loss such as black, tarry or bloody stools, or abnormal vaginal  bleeding? No   11. Have you ever had a blood transfusion? No   12. Are you willing to have a blood transfusion if it is medically needed before, during, or after your surgery? Yes   13. Have you or any of your relatives ever had problems with anesthesia? No   14. Do you have sleep apnea, excessive snoring or daytime drowsiness? No   15. Do you have any artifical heart valves or other implanted medical devices like a pacemaker, defibrillator, or continuous glucose monitor? No   16. Do you have artificial joints? No   17. Are you allergic to latex? No   18. Is there any chance that you may be pregnant? No     Health Care Directive  Patient does not have a Health Care Directive or Living Will: Discussed advance care planning with patient; information given to patient to review.    Preoperative Review of    reviewed - controlled substances reflected in medication list.    Status of Chronic Conditions:  See problem list for active medical problems.  Problems all longstanding and stable, except as noted/documented.  See ROS for pertinent symptoms related to these conditions.    Patient Active Problem List    Diagnosis Date Noted    S/P hysterectomy 2023     Priority: Medium    Anal fissure 2023     Priority: Medium    S/P  section 2018     Priority: Medium    Bilateral carpal tunnel syndrome 2018     Priority: Medium    Adjustment disorder with anxious mood 2018     Priority: Medium    Acquired hypothyroidism 2018     Priority: Medium    Attention deficit disorder 2018     Priority: Medium    Lumbosacral spondylosis without myelopathy 03/10/2015     Priority: Medium    Other staphylococcus infection in conditions classified elsewhere and of unspecified site 2011     Priority: Medium      Past Medical History:   Diagnosis Date    Dichorionic diamniotic twin pregnancy in third trimester 2018    Disorder of thyroid     hypo    Supervision of elderly  multigravida     2018    Twin pregnancy     EDC 2018     Past Surgical History:   Procedure Laterality Date    APPENDECTOMY OPEN           SECTION N/A 2018    Procedure:  SECTION;   Section - twins;  Surgeon: Woodrow Hurtado MD;  Location:  OR    COLONOSCOPY N/A 7/10/2019    Procedure: COLONOSCOPY, WITH POLYPECTOMY AND BIOPSY;  Surgeon: Viki Villareal MD;  Location:  OR    EXAM UNDER ANESTHESIA ANUS N/A 2023    Procedure: Exam under anesthesia anus;  Surgeon: Viki Villareal MD;  Location:  OR    LAPAROSCOPIC HYSTERECTOMY TOTAL, SALPINGECTOMY BILATERAL N/A 2023    Procedure: Total Laparoscopic Hysterectomy, with Bilateral Salpingectomy, right oophorectomy, cystoscopy;  Surgeon: Dia Morrison MD;  Location:  OR     Current Outpatient Medications   Medication Sig Dispense Refill    acetaminophen (TYLENOL) 325 MG tablet Take 2 tablets (650 mg) by mouth every 4 hours as needed for other (mild pain) 100 tablet 0    diltiazem 2% 2% OINT ointment Apply 1 g topically 2 times daily as needed (anal fissure) 60 g 1    doxylamine (UNISOM) 25 MG TABS tablet Take 25 mg by mouth At Bedtime      DULoxetine (CYMBALTA) 20 MG capsule Take 1 capsule (20 mg) by mouth daily 90 capsule 4    medical cannabis (Patient's own supply) See Admin Instructions (The purpose of this order is to document that the patient reports taking medical cannabis.  This is not a prescription, and is not used to certify that the patient has a qualifying medical condition.)      Melatonin 2.5 MG CHEW Take 1 tablet by mouth At Bedtime      methylcellulose (CITRUCEL) powder Take 0.3 g (1.05 teaspoonful) by mouth daily 454 g 3    [START ON 2024] methylphenidate HCl ER, OSM, (CONCERTA) 54 MG CR tablet Take 1 tablet (54 mg) by mouth daily 30 tablet 0    [START ON 3/15/2024] methylphenidate HCl ER, OSM, (CONCERTA) 54 MG CR tablet Take 1 tablet (54 mg) by mouth daily 20 tablet 0    methylphenidate HCl ER,  "OSM, (CONCERTA) 54 MG CR tablet Take 1 tablet (54 mg) by mouth daily 30 tablet 0    psyllium (METAMUCIL/KONSYL) Packet Take 1 packet by mouth daily      senna-docusate (SENOKOT-S/PERICOLACE) 8.6-50 MG tablet Take 1-2 tablets by mouth 2 times daily Take while on oral narcotics to prevent or treat constipation. 30 tablet 0    SYNTHROID 75 MCG tablet Take 1 tablet (75 mcg) by mouth daily 90 tablet 4       Allergies   Allergen Reactions    Adhesive Tape Hives     Rash from surgical drape adhesive     Seasonal Allergies Itching        Social History     Tobacco Use    Smoking status: Never    Smokeless tobacco: Never   Substance Use Topics    Alcohol use: Not Currently     Family History   Problem Relation Age of Onset    Substance Abuse Maternal Grandmother         Alcohol/Drug,Alcohol abuse    Cancer Maternal Grandmother         Cancer, lung cancer    Other - See Comments Maternal Grandfather         Alzheimer's disease    Diabetes Paternal Grandmother         Diabetes,type 1    No Known Problems Sister     No Known Problems Son     No Known Problems Son     Diabetes Maternal Uncle         Diabetes,type 2    No Known Problems Son         2018    No Known Problems Daughter         2018    Heart Disease No family hx of         Heart Disease     History   Drug Use    Types: Marijuana     Comment: medical marijuana          Review of Systems    Review of Systems  Constitutional, neuro, ENT, endocrine, pulmonary, cardiac, gastrointestinal, genitourinary, musculoskeletal, integument and psychiatric systems are negative, except as otherwise noted.    Objective    /78 (BP Location: Right arm, Patient Position: Sitting, Cuff Size: Adult Regular)   Pulse 78   Temp 97.8  F (36.6  C) (Tympanic)   Resp 20   Ht 1.664 m (5' 5.5\")   Wt 83.9 kg (185 lb)   LMP 09/21/2023   SpO2 98%   BMI 30.32 kg/m     Estimated body mass index is 30.32 kg/m  as calculated from the following:    Height as of this encounter: 1.664 m (5' " "5.5\").    Weight as of this encounter: 83.9 kg (185 lb).  Physical Exam  GENERAL: alert and no distress  EYES: Eyes grossly normal to inspection, PERRL and conjunctivae and sclerae normal  HENT: ear canals and TM's normal, nose and mouth without ulcers or lesions  NECK: no adenopathy, no asymmetry, masses, or scars  RESP: lungs clear to auscultation - no rales, rhonchi or wheezes  CV: regular rate and rhythm, normal S1 S2, no S3 or S4, no murmur, click or rub, no peripheral edema  ABDOMEN: soft, nontender, no hepatosplenomegaly, no masses and bowel sounds normal  MS: bandage overlying left hand  SKIN: no suspicious lesions or rashes  PSYCH: mentation appears normal, affect normal/bright  LYMPH: normal ant/post cervical, supraclavicular nodes    Recent Labs   Lab Test 09/21/23  0747 09/04/23  1119   HGB 12.0 12.2    370   NA  --  137   POTASSIUM  --  4.3   CR  --  0.64      Diagnostics  No results found for this or any previous visit (from the past 24 hour(s)).   EKG: appears normal, NSR, normal axis, normal intervals, no acute ST/T changes c/w ischemia, no LVH by voltage criteria, unchanged from previous tracings    Revised Cardiac Risk Index (RCRI)  The patient has the following serious cardiovascular risks for perioperative complications:   - No serious cardiac risks = 0 points     RCRI Interpretation: 0 points: Class I (very low risk - 0.4% complication rate)    Signed Electronically by: Yakelin Mcgraw PA-C  Copy of this evaluation report is provided to requesting physician.     Answers submitted by the patient for this visit:  Patient Health Questionnaire (Submitted on 2/2/2024)  If you checked off any problems, how difficult have these problems made it for you to do your work, take care of things at home, or get along with other people?: Somewhat difficult  PHQ9 TOTAL SCORE: 9    "

## 2024-02-02 NOTE — Clinical Note
HIM: Please send a copy of my H&P/note, last EKG scan and report. Surgery 2/6/24  Yakelin Mcgraw PA-C

## 2024-02-02 NOTE — NURSING NOTE
"Patient presents to the clinic for pre-op exam.    FOOD SECURITY SCREENING QUESTIONS:    The next two questions are to help us understand your food security.  If you are feeling you need any assistance in this area, we have resources available to support you today.    Hunger Vital Signs:  Within the past 12 months we worried whether our food would run out before we got money to buy more. Never  Within the past 12 months the food we bought just didn't last and we didn't have money to get more. Never    Advance Care Directive on file? no  Advance Care Directive provided to patient? Declined-has forms at home.    Chief Complaint   Patient presents with    Pre-Op Exam       Initial /78 (BP Location: Right arm, Patient Position: Sitting, Cuff Size: Adult Regular)   Pulse 78   Temp 97.8  F (36.6  C) (Tympanic)   Resp 20   Ht 1.664 m (5' 5.5\")   Wt 83.9 kg (185 lb)   LMP 09/21/2023   SpO2 98%   BMI 30.32 kg/m   Estimated body mass index is 30.32 kg/m  as calculated from the following:    Height as of this encounter: 1.664 m (5' 5.5\").    Weight as of this encounter: 83.9 kg (185 lb).  Medication Reconciliation: complete        Katarina Escobar LPN     "

## 2024-02-02 NOTE — PATIENT INSTRUCTIONS
Preparing for Your Surgery  Getting started  A nurse will call you to review your health history and instructions. They will give you an arrival time based on your scheduled surgery time. Please be ready to share:  Your doctor's clinic name and phone number  Your medical, surgical, and anesthesia history  A list of allergies and sensitivities  A list of medicines, including herbal treatments and over-the-counter drugs  Whether the patient has a legal guardian (ask how to send us the papers in advance)  Please tell us if you're pregnant--or if there's any chance you might be pregnant. Some surgeries may injure a fetus (unborn baby), so they require a pregnancy test. Surgeries that are safe for a fetus don't always need a test, and you can choose whether to have one.   If you have a child who's having surgery, please ask for a copy of Preparing for Your Child's Surgery.    Preparing for surgery  Within 10 to 30 days of surgery: Have a pre-op exam (sometimes called an H&P, or History and Physical). This can be done at a clinic or pre-operative center.  If you're having a , you may not need this exam. Talk to your care team.  At your pre-op exam, talk to your care team about all medicines you take. If you need to stop any medicines before surgery, ask when to start taking them again.  We do this for your safety. Many medicines can make you bleed too much during surgery. Some change how well surgery (anesthesia) drugs work.  Call your insurance company to let them know you're having surgery. (If you don't have insurance, call [phone].)  Call your clinic if there's any change in your health. This includes signs of a cold or flu (sore throat, runny nose, cough, rash, fever). It also includes a scrape or scratch near the surgery site.  If you have questions on the day of surgery, call your hospital or surgery center.  Eating and drinking guidelines  For your safety: Unless your surgeon tells you otherwise,  follow the guidelines below.  Eat and drink as usual until 8 hours before you arrive for surgery. After that, no food or milk.  Drink clear liquids until 2 hours before you arrive. These are liquids you can see through, like water, Gatorade, and Propel Water. They also include plain black coffee and tea (no cream or milk), candy, and breath mints. You can spit out gum when you arrive.  If you drink alcohol: Stop drinking it the night before surgery.  If your care team tells you to take medicine on the morning of surgery, it's okay to take it with a sip of water.  Preventing infection  Shower or bathe the night before and morning of your surgery. Follow the instructions your clinic gave you. (If no instructions, use regular soap.)  Don't shave or clip hair near your surgery site. We'll remove the hair if needed.  Don't smoke or vape the morning of surgery. You may chew nicotine gum up to 2 hours before surgery. A nicotine patch is okay.  Note: Some surgeries require you to completely quit smoking and nicotine. Check with your surgeon.  Your care team will make every effort to keep you safe from infection. We will:  Clean our hands often with soap and water (or an alcohol-based hand rub).  Clean the skin at your surgery site with a special soap that kills germs.  Give you a special gown to keep you warm. (Cold raises the risk of infection.)  Wear special hair covers, masks, gowns and gloves during surgery.  Give antibiotic medicine, if prescribed. Not all surgeries need antibiotics.  What to bring on the day of surgery  Photo ID and insurance card  Copy of your health care directive, if you have one  Glasses and hearing aids (bring cases)  You can't wear contacts during surgery  Inhaler and eye drops, if you use them (tell us about these when you arrive)  CPAP machine or breathing device, if you use them  A few personal items, if spending the night  If you have . . .  A pacemaker, ICD (cardiac defibrillator) or other  implant: Bring the ID card.  An implanted stimulator: Bring the remote control.  A legal guardian: Bring a copy of the certified (court-stamped) guardianship papers.  Please remove any jewelry, including body piercings. Leave jewelry and other valuables at home.  If you're going home the day of surgery  You must have a responsible adult drive you home. They should stay with you overnight as well.  If you don't have someone to stay with you, and you aren't safe to go home alone, we may keep you overnight. Insurance often won't pay for this.  After surgery  If it's hard to control your pain or you need more pain medicine, please call your surgeon's office.  Questions?   If you have any questions for your care team, list them here: _________________________________________________________________________________________________________________________________________________________________________ ____________________________________ ____________________________________ ____________________________________  For informational purposes only. Not to replace the advice of your health care provider. Copyright   2003, 2019 [employer]. All rights reserved. Clinically reviewed by Elizabeth Vergara MD. [employer] 242054 - REV 12/22.    How to Take Your Medication Before Surgery  Continue: Cymbalta/duloxetine, methylphenidate/Concerta and Synthroid.    Hold vitamins and supplements for 10 to 14 days prior to surgery.  Hold NSAIDs such as ibuprofen, naproxen and/or Aleve for 7 days prior to surgery.

## 2024-02-03 LAB
ATRIAL RATE - MUSE: 78 BPM
DIASTOLIC BLOOD PRESSURE - MUSE: NORMAL MMHG
INTERPRETATION ECG - MUSE: NORMAL
P AXIS - MUSE: 48 DEGREES
PR INTERVAL - MUSE: 120 MS
QRS DURATION - MUSE: 92 MS
QT - MUSE: 414 MS
QTC - MUSE: 471 MS
R AXIS - MUSE: 62 DEGREES
SYSTOLIC BLOOD PRESSURE - MUSE: NORMAL MMHG
T AXIS - MUSE: 38 DEGREES
VENTRICULAR RATE- MUSE: 78 BPM

## 2024-02-07 ENCOUNTER — THERAPY VISIT (OUTPATIENT)
Dept: PHYSICAL THERAPY | Facility: OTHER | Age: 44
End: 2024-02-07
Attending: NURSE PRACTITIONER
Payer: COMMERCIAL

## 2024-02-07 DIAGNOSIS — M25.561 ACUTE PAIN OF RIGHT KNEE: ICD-10-CM
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
## 2024-02-07 PROCEDURE — 97161 PT EVAL LOW COMPLEX 20 MIN: CPT | Mod: GP

## 2024-02-07 PROCEDURE — 97110 THERAPEUTIC EXERCISES: CPT | Mod: GP

## 2024-02-07 NOTE — PROGRESS NOTES
PHYSICAL THERAPY EVALUATION  Type of Visit: Evaluation    See electronic medical record for Abuse and Falls Screening details.    Subjective       Presenting condition or subjective complaint: Fell down icy stairs in early January and injured her right knee. Feels that her knees have not been very strong and stable lately and injures herself more. Does work out with aerobics and the past 2 months even with not pushing it/jumping feels her knees are not good afterward. Had a hysterectomy in September and was not active for several months after. Feels unstable.  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, OA)   Dates & types of surgery: hysterectomy, Csection, appendectomy    Prior diagnostic imaging/testing results:     none  Prior therapy history for the same diagnosis, illness or injury:    none    Prior Level of Function  Independent    Living Environment  Social support: With family members , 4 children- 2 teens and 6 yo twins  Type of home: House   Stairs to enter the home: Yes 5     Ramp:     Stairs inside the home: Yes 0 (2 flights) Is there a railing: Yes   Help at home: None  Equipment owned:       Employment: Yes self employed, artist and meditation teacher  Hobbies/Interests:      Patient goals for therapy: squats, exercising, sitting cross legged, some yoga poses, and going up stairs (kneecaps)    Pain assessment: See objective evaluation for additional pain details     Objective   KNEE EVALUATION  PAIN: Pain Level at Rest: 0/10  Pain Level with Use: 8/10  Pain Location: knee  Pain Quality: Sharp and Shooting  Pain is Exacerbated By: twisting, up stairs, squatting  Pain is Relieved By: cold and stretch  POSTURE: WNL  GAIT:  Gait Deviations: WNL  BALANCE/PROPRIOCEPTION:   WEIGHTBEARING ALIGNMENT: no concerns noted  NON-WEIGHTBEARING ALIGNMENT: no concerns noted  ROM: full  STRENGTH:   Pain: - none + mild ++ moderate +++ severe  Strength Scale: 0-5/5 Left Right    Knee Flexion 5- 4+   Knee Extension 5- 5-   Hip flexion 5- 5-   Hip abduction 4/5 4/5   Clam 4-/5 w/ overuse of hip flexors 4-/5 w/ overuse of hip flexors     FLEXIBILITY: Decreased adductors L, Decreased hip flexors L, Decreased IT band L, Decreased quadriceps L, Decreased hamstrings L, Decreased adductors R, Decreased hip flexors R, Decreased IT band R, Decreased quadriceps R, Decreased hamstrings R  SPECIAL TESTS:    Left Right   Apley's (Meniscus)     Svetlana's (Meniscus) Negative  Negative    Remigio's (ITB/TFL) Negative  Negative    Patellar Apprehension Test Positive Positive   Patella Tracking Lateral patellar tilt Lateral patellar tilt   Ligamentous Stability Negative  Negative    Anterior Drawer (ACL) Negative,   Negative,     Posterior Drawer (PCL) Negative,   Negative,     Prone Dial Test at 30 Deg and 90 Deg (PCL/PLC)     Valgus Stress Testing at 0 Deg and 30 Deg Negative,   Negative,     Varus Stress Testing at 0 Deg and 30 Deg  Negative,   Negative,       FUNCTIONAL TESTS: Double Leg Squat: Improper use of glutes/hips and Able to perform full deep squat with pain  PALPATION:  tender at adductors proximal to pes anserine, ITB insertion/distal ITB  JOINT MOBILITY:  decreased medial patellar tilt bilaterally    Assessment & Plan   CLINICAL IMPRESSIONS  Medical Diagnosis: Acute pain of right knee (M25.561), Fall, subsequent encounter (W19.XXXD)    Treatment Diagnosis: impaired strength and flexibility, patellofemoral pain   Impression/Assessment: Patient is a 43 year old female with bilateral knee pain complaints.  The following significant findings have been identified: Pain, Decreased ROM/flexibility, Decreased joint mobility, Decreased strength, Impaired muscle performance, and Decreased activity tolerance. These impairments interfere with their ability to perform recreational activities, household chores, household mobility, and exercise routine  as compared to previous level of function.     Clinical  Decision Making (Complexity):  Clinical Presentation: Stable/Uncomplicated  Clinical Presentation Rationale: based on medical and personal factors listed in PT evaluation  Clinical Decision Making (Complexity): Low complexity    PLAN OF CARE  Treatment Interventions:  Modalities: Cryotherapy, E-stim, Hot Pack, Iontophoresis, Ultrasound, Vasoneumatic Device (Iontophoresis w/ 4% dexamethasone)  Interventions: Gait Training, Manual Therapy, Neuromuscular Re-education, Therapeutic Activity, Therapeutic Exercise, Self-Care/Home Management, Aquatic Therapy    Long Term Goals     PT Goal 1  Goal Identifier: flexibility  Goal Description: Pt will have improved soft tissue mobility throughout lower extremities to allow sitting crosslegged and performing yoga with pain less than 3/10.  Target Date: 04/03/24  PT Goal 2  Goal Identifier: gluteal strength  Goal Description: Pt will have 5/5 MMT of gluteals without evidence of overuse of hip flexors for improved patellar mechanics for ability to perform stairs without increased knee pain.  Target Date: 04/17/24  PT Goal 3  Goal Identifier: HEP  Goal Description: Pt will report consistent performance of home exercises of at least 5/7 days per week for self management of symptoms.  Target Date: 05/01/24      Frequency of Treatment: 1-2 times per week  Duration of Treatment: 3 months    Recommended Referrals to Other Professionals:   Education Assessment:   Learner/Method: Patient  Education Comments: HEP    Risks and benefits of evaluation/treatment have been explained.   Patient/Family/caregiver agrees with Plan of Care.     Evaluation Time:     PT Eval, Low Complexity Minutes (64328): 35       Signing Clinician: Shannen Olmstead PT

## 2024-02-09 ENCOUNTER — THERAPY VISIT (OUTPATIENT)
Dept: PHYSICAL THERAPY | Facility: OTHER | Age: 44
End: 2024-02-09
Attending: NURSE PRACTITIONER
Payer: COMMERCIAL

## 2024-02-09 DIAGNOSIS — Z90.710 S/P HYSTERECTOMY: Primary | ICD-10-CM

## 2024-02-09 PROCEDURE — 97110 THERAPEUTIC EXERCISES: CPT | Mod: GP

## 2024-02-21 ENCOUNTER — THERAPY VISIT (OUTPATIENT)
Dept: PHYSICAL THERAPY | Facility: OTHER | Age: 44
End: 2024-02-21
Attending: NURSE PRACTITIONER
Payer: COMMERCIAL

## 2024-02-21 DIAGNOSIS — M25.561 ACUTE PAIN OF RIGHT KNEE: Primary | ICD-10-CM

## 2024-02-21 PROCEDURE — 97140 MANUAL THERAPY 1/> REGIONS: CPT | Mod: GP

## 2024-02-21 PROCEDURE — 97110 THERAPEUTIC EXERCISES: CPT | Mod: GP

## 2024-02-24 ENCOUNTER — HEALTH MAINTENANCE LETTER (OUTPATIENT)
Age: 44
End: 2024-02-24

## 2024-02-28 ENCOUNTER — THERAPY VISIT (OUTPATIENT)
Dept: PHYSICAL THERAPY | Facility: OTHER | Age: 44
End: 2024-02-28
Attending: NURSE PRACTITIONER
Payer: COMMERCIAL

## 2024-02-28 DIAGNOSIS — M25.561 ACUTE PAIN OF RIGHT KNEE: Primary | ICD-10-CM
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
## 2024-02-28 PROCEDURE — 97110 THERAPEUTIC EXERCISES: CPT | Mod: GP

## 2024-02-28 PROCEDURE — 97140 MANUAL THERAPY 1/> REGIONS: CPT | Mod: GP

## 2024-03-05 ENCOUNTER — TELEPHONE (OUTPATIENT)
Dept: FAMILY MEDICINE | Facility: OTHER | Age: 44
End: 2024-03-05
Payer: COMMERCIAL

## 2024-03-05 DIAGNOSIS — F90.0 ATTENTION DEFICIT HYPERACTIVITY DISORDER (ADHD), PREDOMINANTLY INATTENTIVE TYPE: ICD-10-CM

## 2024-03-05 RX ORDER — METHYLPHENIDATE HYDROCHLORIDE 54 MG/1
54 TABLET ORAL DAILY
Qty: 11 TABLET | Refills: 0 | Status: SHIPPED | OUTPATIENT
Start: 2024-03-05 | End: 2024-03-26

## 2024-03-05 NOTE — TELEPHONE ENCOUNTER
Fax from [facility] states that they were 19 tablets short on her Methylphenidate 54 mg tablets in Februaury. Can they get a new script sent for #19. They do have more in stock now.  Rajani Ford LPN  3/5/2024  1:33 PM

## 2024-03-05 NOTE — TELEPHONE ENCOUNTER
PDMP Review         Value Time User    State PDMP site checked  Yes 3/5/2024  2:25 PM Avril Sanders,            Rx for #11 sent to pharmacy to complete Feb Rx; has Rx to fill 3/15/24.    Avril Sanders,  on 3/5/2024 at 2:26 PM

## 2024-03-06 ENCOUNTER — THERAPY VISIT (OUTPATIENT)
Dept: PHYSICAL THERAPY | Facility: OTHER | Age: 44
End: 2024-03-06
Attending: NURSE PRACTITIONER
Payer: COMMERCIAL

## 2024-03-06 ENCOUNTER — THERAPY VISIT (OUTPATIENT)
Dept: OCCUPATIONAL THERAPY | Facility: OTHER | Age: 44
End: 2024-03-06
Attending: SPECIALIST
Payer: COMMERCIAL
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
## 2024-03-06 DIAGNOSIS — M25.561 ACUTE PAIN OF RIGHT KNEE: Primary | ICD-10-CM

## 2024-03-06 DIAGNOSIS — M79.642 LEFT HAND PAIN: ICD-10-CM

## 2024-03-06 PROCEDURE — 97110 THERAPEUTIC EXERCISES: CPT | Mod: GO

## 2024-03-06 PROCEDURE — 97165 OT EVAL LOW COMPLEX 30 MIN: CPT | Mod: GO

## 2024-03-06 PROCEDURE — 97110 THERAPEUTIC EXERCISES: CPT | Mod: GP

## 2024-03-06 PROCEDURE — 97035 APP MDLTY 1+ULTRASOUND EA 15: CPT | Mod: GO

## 2024-03-06 PROCEDURE — 97140 MANUAL THERAPY 1/> REGIONS: CPT | Mod: GP

## 2024-03-06 NOTE — PROGRESS NOTES
OCCUPATIONAL THERAPY EVALUATION  Type of Visit: Evaluation    D.O.S was  2/6/2024    See electronic medical record for Abuse and Falls Screening details.    Subjective      Presenting condition or subjective complaint: Fell down icy stairs in early January and injured her right knee. Feels that her knees have not been very strong and stable lately and injures herself more. Does work out with aerobics and the past 2 months even with not pushing it/jumping feels her knees are not good afterward. Had a hysterectomy in September and was not active for several months after. Feels unstable.  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, OA)   Dates & types of surgery: hysterectomy, Csection, appendectomy    Prior diagnostic imaging/testing results:       Prior therapy history for the same diagnosis, illness or injury:        Prior Level of Function  Transfers: Independent  Ambulation: Independent  ADL: Independent  IADL: Childcare, Driving, Finances, Housekeeping, Laundry, Meal preparation, Medication management, Work    Living Environment  Social support: With family members   Type of home: House   Stairs to enter the home: Yes 5     Ramp:     Stairs inside the home: Yes 0 (2 flights) Is there a railing: Yes   Help at home: None  Equipment owned:       Employment: Yes self employed, artist and meditation teacher  Hobbies/Interests:      Patient goals for therapy: squats, exercising, sitting cross legged, some yoga poses, and going up stairs (kneecaps)    Pain assessment:  Patient reports more of a ache in her hand vs. Pain.      Objective   Cognitive Status Examination  Orientation: Oriented to person, place and time   Level of Consciousness: Alert  Follows Commands and Answers Questions: 100% of the time  Personal Safety and Judgement: Intact  Memory: Intact  Attention: No deficits identified  Organization/Problem Solving: No deficits identified  Executive Function: No deficits  identified    SENSATION:  Numb only around incision areas. Fingers are not numb.     POSTURE: WNL  RANGE OF MOTION:  Left wrist and fingers are stiff. But unable to formally assess ROM at this time.   STRENGTH:  Unable to assess   MUSCLE TONE: WNL  COORDINATION: WNL  BALANCE: WNL    FUNCTIONAL MOBILITY  Assistive Device(s): None  Ambulation: Independent   Wheelchair: NA    BED MOBILITY: Independent    TRANSFERS: Independent    BATHING: Independent    UPPER BODY DRESSING: Independent, but difficult to do one handed.     LOWER BODY DRESSING: Independent    TOILETING: Independent    GROOMING: Independent    EATING/SELF FEEDING: Independent       ACTIVITY TOLERANCE: good     INSTRUMENTAL ACTIVITIES OF DAILY LIVING (IADL): Independent but difficult with one hand.         Assessment & Plan   CLINICAL IMPRESSIONS  Medical Diagnosis: Extensor tendon repair    Treatment Diagnosis: Hand stiffness, weakness, and pain    Impression/Assessment: Pt is a 43 year old female presenting to Occupational Therapy due to extensor tendon repair.  The following significant findings have been identified: Impaired ROM, Impaired sensation, Impaired strength, Pain, and Post-surgical precautions.  These identified deficits interfere with their ability to perform self care tasks, work tasks, recreational activities, household chores, and meal planning and preparation as compared to previous level of function.     Clinical Decision Making (Complexity):  Assessment of Occupational Performance: 1-3 Performance Deficits  Occupational Performance Limitations: child rearing, meal preparation and cleanup, work, and leisure activities  Clinical Decision Making (Complexity): Low complexity    PLAN OF CARE  Treatment Interventions:  Modalities: Hot Pack, Iontophoresis, Paraffin, Ultrasound  Ionto with 4% dex  Interventions: Self-Care/Home Management, Therapeutic Activity, Therapeutic Exercise, Manual Therapy, Orthotic Fitting/Training    Long Term Goals    OT Goal 1  Goal Identifier: HEP  Goal Description: Patient will complete HEP daily and will demonstrate correct techniques of exercises given during therapy session.  Rationale: In order to maximize safety and independence with performance of self-care activities  Target Date: 05/16/24  OT Goal 2  Goal Identifier: ROM  Goal Description: When appropriate per protocol patient will be able to make a full fist, touching all finger tips to palm.  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  Rationale: In order to maximize safety and independence with performance of self-care activities  Target Date: 05/16/24      Frequency of Treatment: 0-2x/week  Duration of Treatment: 10 weeks     Recommended Referrals to Other Professionals:  NA  Education Assessment: Learner/Method: Patient     Risks and benefits of evaluation/treatment have been explained.   Patient/Family/caregiver agrees with Plan of Care.     Evaluation Time:    OT Eval, Low Complexity Minutes (83009): 15    Signing Clinician: RUBINA Chavez

## 2024-03-13 ENCOUNTER — THERAPY VISIT (OUTPATIENT)
Dept: PHYSICAL THERAPY | Facility: OTHER | Age: 44
End: 2024-03-13
Attending: NURSE PRACTITIONER
Payer: COMMERCIAL
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
## 2024-03-13 DIAGNOSIS — M25.561 ACUTE PAIN OF RIGHT KNEE: Primary | ICD-10-CM

## 2024-03-13 PROCEDURE — 97110 THERAPEUTIC EXERCISES: CPT | Mod: GP

## 2024-03-14 ENCOUNTER — THERAPY VISIT (OUTPATIENT)
Dept: OCCUPATIONAL THERAPY | Facility: OTHER | Age: 44
End: 2024-03-14
Attending: NURSE PRACTITIONER
Payer: COMMERCIAL

## 2024-03-14 DIAGNOSIS — M79.642 LEFT HAND PAIN: Primary | ICD-10-CM

## 2024-03-14 PROCEDURE — 97110 THERAPEUTIC EXERCISES: CPT | Mod: GO | Performed by: OCCUPATIONAL THERAPIST

## 2024-03-14 PROCEDURE — 97140 MANUAL THERAPY 1/> REGIONS: CPT | Mod: GO | Performed by: OCCUPATIONAL THERAPIST

## 2024-03-14 PROCEDURE — 97010 HOT OR COLD PACKS THERAPY: CPT | Mod: GO | Performed by: OCCUPATIONAL THERAPIST

## 2024-03-14 PROCEDURE — 97035 APP MDLTY 1+ULTRASOUND EA 15: CPT | Mod: GO | Performed by: OCCUPATIONAL THERAPIST

## 2024-03-20 ENCOUNTER — THERAPY VISIT (OUTPATIENT)
Dept: OCCUPATIONAL THERAPY | Facility: OTHER | Age: 44
End: 2024-03-20
Attending: SPECIALIST
Payer: COMMERCIAL

## 2024-03-20 DIAGNOSIS — M79.642 LEFT HAND PAIN: Primary | ICD-10-CM

## 2024-03-20 PROCEDURE — 97140 MANUAL THERAPY 1/> REGIONS: CPT | Mod: GO | Performed by: OCCUPATIONAL THERAPIST

## 2024-03-20 PROCEDURE — 97010 HOT OR COLD PACKS THERAPY: CPT | Mod: GO | Performed by: OCCUPATIONAL THERAPIST

## 2024-03-20 PROCEDURE — 97035 APP MDLTY 1+ULTRASOUND EA 15: CPT | Mod: GO | Performed by: OCCUPATIONAL THERAPIST

## 2024-03-20 PROCEDURE — 97110 THERAPEUTIC EXERCISES: CPT | Mod: GO | Performed by: OCCUPATIONAL THERAPIST

## 2024-03-22 ENCOUNTER — THERAPY VISIT (OUTPATIENT)
Dept: OCCUPATIONAL THERAPY | Facility: OTHER | Age: 44
End: 2024-03-22
Attending: NURSE PRACTITIONER
Payer: COMMERCIAL

## 2024-03-22 DIAGNOSIS — M79.642 LEFT HAND PAIN: Primary | ICD-10-CM

## 2024-03-22 PROCEDURE — 97140 MANUAL THERAPY 1/> REGIONS: CPT | Mod: GO | Performed by: OCCUPATIONAL THERAPIST

## 2024-03-22 PROCEDURE — 97010 HOT OR COLD PACKS THERAPY: CPT | Mod: GO | Performed by: OCCUPATIONAL THERAPIST

## 2024-03-22 PROCEDURE — 97110 THERAPEUTIC EXERCISES: CPT | Mod: GO | Performed by: OCCUPATIONAL THERAPIST

## 2024-03-22 PROCEDURE — 97035 APP MDLTY 1+ULTRASOUND EA 15: CPT | Mod: GO | Performed by: OCCUPATIONAL THERAPIST

## 2024-03-26 ENCOUNTER — MYC MEDICAL ADVICE (OUTPATIENT)
Dept: FAMILY MEDICINE | Facility: OTHER | Age: 44
End: 2024-03-26
Payer: COMMERCIAL

## 2024-03-26 DIAGNOSIS — L98.9 SKIN LESION: Primary | ICD-10-CM

## 2024-03-26 DIAGNOSIS — F90.0 ATTENTION DEFICIT HYPERACTIVITY DISORDER (ADHD), PREDOMINANTLY INATTENTIVE TYPE: ICD-10-CM

## 2024-03-26 RX ORDER — METHYLPHENIDATE HYDROCHLORIDE 54 MG/1
54 TABLET ORAL DAILY
Qty: 10 TABLET | Refills: 0 | Status: CANCELLED | OUTPATIENT
Start: 2024-03-26

## 2024-03-26 RX ORDER — METHYLPHENIDATE HYDROCHLORIDE 54 MG/1
54 TABLET ORAL DAILY
Qty: 11 TABLET | Refills: 0 | Status: SHIPPED | OUTPATIENT
Start: 2024-04-01 | End: 2024-04-12

## 2024-03-26 NOTE — TELEPHONE ENCOUNTER
3/15 Methlphenidate script was only for #20/0.  I can't find where this would be a partial fill (as the previous one was #11/0)        Patient has already filled 3/15 script for #20.      Future OV with PCP on 4/12.  Sampson'd up #10/0 to completed March Rx and sending to PCP.      Also sampson'd up plastic surgery referral for Dr. Navarro (but unsure how to associate).     Kim Aguirre RN on 3/26/2024 at 11:43 AM

## 2024-03-27 ENCOUNTER — THERAPY VISIT (OUTPATIENT)
Dept: OCCUPATIONAL THERAPY | Facility: OTHER | Age: 44
End: 2024-03-27
Attending: SPECIALIST
Payer: COMMERCIAL

## 2024-03-27 DIAGNOSIS — M79.642 LEFT HAND PAIN: Primary | ICD-10-CM

## 2024-03-27 PROCEDURE — 97140 MANUAL THERAPY 1/> REGIONS: CPT | Mod: GO | Performed by: OCCUPATIONAL THERAPIST

## 2024-03-27 PROCEDURE — 97110 THERAPEUTIC EXERCISES: CPT | Mod: GO | Performed by: OCCUPATIONAL THERAPIST

## 2024-03-27 PROCEDURE — 97010 HOT OR COLD PACKS THERAPY: CPT | Mod: GO | Performed by: OCCUPATIONAL THERAPIST

## 2024-03-28 ENCOUNTER — THERAPY VISIT (OUTPATIENT)
Dept: OCCUPATIONAL THERAPY | Facility: OTHER | Age: 44
End: 2024-03-28
Attending: SPECIALIST
Payer: COMMERCIAL

## 2024-03-28 DIAGNOSIS — M79.642 LEFT HAND PAIN: Primary | ICD-10-CM

## 2024-03-28 PROCEDURE — 97035 APP MDLTY 1+ULTRASOUND EA 15: CPT | Mod: GO | Performed by: OCCUPATIONAL THERAPIST

## 2024-03-28 PROCEDURE — 97010 HOT OR COLD PACKS THERAPY: CPT | Mod: GO | Performed by: OCCUPATIONAL THERAPIST

## 2024-03-28 PROCEDURE — 97110 THERAPEUTIC EXERCISES: CPT | Mod: GO | Performed by: OCCUPATIONAL THERAPIST

## 2024-04-02 ENCOUNTER — THERAPY VISIT (OUTPATIENT)
Dept: OCCUPATIONAL THERAPY | Facility: OTHER | Age: 44
End: 2024-04-02
Attending: SPECIALIST
Payer: COMMERCIAL

## 2024-04-02 DIAGNOSIS — M79.642 LEFT HAND PAIN: Primary | ICD-10-CM

## 2024-04-02 PROCEDURE — 97140 MANUAL THERAPY 1/> REGIONS: CPT | Mod: GO | Performed by: OCCUPATIONAL THERAPIST

## 2024-04-02 PROCEDURE — 97110 THERAPEUTIC EXERCISES: CPT | Mod: GO | Performed by: OCCUPATIONAL THERAPIST

## 2024-04-04 ENCOUNTER — THERAPY VISIT (OUTPATIENT)
Dept: OCCUPATIONAL THERAPY | Facility: OTHER | Age: 44
End: 2024-04-04
Attending: NURSE PRACTITIONER
Payer: COMMERCIAL

## 2024-04-04 DIAGNOSIS — M79.642 LEFT HAND PAIN: Primary | ICD-10-CM

## 2024-04-04 PROCEDURE — 97110 THERAPEUTIC EXERCISES: CPT | Mod: GO | Performed by: OCCUPATIONAL THERAPIST

## 2024-04-04 PROCEDURE — 97035 APP MDLTY 1+ULTRASOUND EA 15: CPT | Mod: GO | Performed by: OCCUPATIONAL THERAPIST

## 2024-04-04 PROCEDURE — 97140 MANUAL THERAPY 1/> REGIONS: CPT | Mod: GO | Performed by: OCCUPATIONAL THERAPIST

## 2024-04-10 ENCOUNTER — THERAPY VISIT (OUTPATIENT)
Dept: OCCUPATIONAL THERAPY | Facility: OTHER | Age: 44
End: 2024-04-10
Attending: SPECIALIST
Payer: COMMERCIAL

## 2024-04-10 DIAGNOSIS — M79.642 LEFT HAND PAIN: Primary | ICD-10-CM

## 2024-04-10 PROCEDURE — 97140 MANUAL THERAPY 1/> REGIONS: CPT | Mod: GO | Performed by: OCCUPATIONAL THERAPIST

## 2024-04-10 PROCEDURE — 97110 THERAPEUTIC EXERCISES: CPT | Mod: GO | Performed by: OCCUPATIONAL THERAPIST

## 2024-04-10 PROCEDURE — 97035 APP MDLTY 1+ULTRASOUND EA 15: CPT | Mod: GO | Performed by: OCCUPATIONAL THERAPIST

## 2024-04-10 NOTE — PROGRESS NOTES
DISCHARGE  Reason for Discharge: Patient chooses to discontinue therapy.    Equipment Issued: AN    Discharge Plan: Patient to continue home program.    Referring Provider:  Avril Sanders         02/09/24 0500   Appointment Info   Signing clinician's name / credentials Rose Avalos, PT, CLT, CAPP   Visits Used 4   Medical Diagnosis Z90.710 (ICD-10-CM) - S/P hysterectomy   PT Tx Diagnosis muscle weakness   Quick Adds Certification;Pelvic Consent   Progress Note/Certification   Start of Care Date 11/28/23   Onset of illness/injury or Date of Surgery 09/21/23   Therapy Frequency 1-2 times per week   Predicted Duration 3 months   GOALS   PT Goals 2   PT Goal 1   Goal Identifier MMT   Goal Description Patient to have 5/5 MMT of pelvic floor with endurance of 10 seconds for correct support of pelvic organs following NIA.   Target Date 06/03/24   PT Goal 2   Goal Identifier MMT   Goal Description Patient to have 5/5 MMT of core/abs for support of spine/pelvic during daily tasks.   Target Date 06/03/24   Subjective Report   Subjective Report Bad month, fell on steps due to ice, extensor digiti minimi laceration due to ice skate had surgery this week. In general, strengthening was going well , gaining with abs, up until surgery. Doing a lot of yoga. No leakage issues.   Treatment Interventions (PT)   Interventions Therapeutic Procedure/Exercise   Therapeutic Procedure/Exercise   Therapeutic Procedures: strength, endurance, ROM, flexibility minutes (13902) 30   Ther Proc 1 education, strenthening   Ther Proc 1 - Details TA retraining- arms, legs, hips ab/ad, opposite arm/leg; 4 point with arms, legs, opposite arm and legs; standing TA use with PFM with arm reach, then opposite arm to knee- will continue with this. Noted left superior pubic shear, corrected with MET, was then able to engage left TA with greater ease   Skilled Intervention v/t cues   Education   Learner/Method Patient;No Barriers to Learning   Plan    Home program PFM use 5x5x5 and with sit to stand   Plan for next session will keep chart open two months, patient will notify PT if she needs to return.   Comments   Pelvic Health Informed Consent Statement Discussed with patient/guardian reason for referral regarding pelvic health needs and external/internal pelvic floor muscle examination.  Opportunity provided to ask questions and verbal consent for assessment and intervention was given.   Total Session Time   Timed Code Treatment Minutes 30   Total Treatment Time (sum of timed and untimed services) 30

## 2024-04-11 DIAGNOSIS — F90.0 ATTENTION DEFICIT HYPERACTIVITY DISORDER (ADHD), PREDOMINANTLY INATTENTIVE TYPE: ICD-10-CM

## 2024-04-11 NOTE — PROGRESS NOTES
Assessment & Plan     1. Attention deficit hyperactivity disorder (ADHD), predominantly inattentive type  Chronic, stable on current Concerta.  Contract and Utox UTD.  Refills x 3 months sent.  - methylphenidate HCl ER, OSM, (CONCERTA) 54 MG CR tablet; Take 1 tablet (54 mg) by mouth daily  Dispense: 30 tablet; Refill: 0  - methylphenidate HCl ER, OSM, (CONCERTA) 54 MG CR tablet; Take 1 tablet (54 mg) by mouth daily  Dispense: 30 tablet; Refill: 0  - methylphenidate HCl ER, OSM, (CONCERTA) 54 MG CR tablet; Take 1 tablet (54 mg) by mouth daily  Dispense: 30 tablet; Refill: 0    2. Adjustment disorder with anxious mood  Chronic, doing well with Duloxetine dose; has refills available at the pharmacy.  No dosage change today.  Continues to work on healthful lifestyle.    3. Acquired hypothyroidism  Chronic, stable symptoms.  Last thyroid studies were 9/2023; and stable.  Has refills available at the pharmacy.  Will recheck with next visit.      MDM:  Prescription drug management    Follow up: 3 months.      Joshua Hernadez is a 43 year old, presenting for the following health issues:  Recheck Medication        4/12/2024     1:07 PM   Additional Questions   Roomed by CHETAN Gerard   Accompanied by self     History of Present Illness       Reason for visit:  Med check    She eats 4 or more servings of fruits and vegetables daily.She consumes 0 sweetened beverage(s) daily.She exercises with enough effort to increase her heart rate 30 to 60 minutes per day.  She exercises with enough effort to increase her heart rate 3 or less days per week.   She is taking medications regularly.       ADHD Follow-Up (Adult)  Concerns: None; dose is effective  Changes since last visit: Stable  Taking controlled (daily) medications as prescribed: Yes  Sleep: no new problems  Adult ADHD Self-Reporting form given to patient?:  No  Currently in counseling: No; does meditation, etc on her own.    Medication Benefits:   Controlled symptoms:  "Impulse control and Accepting limits  Uncontrolled symptoms:  Attention span, Distractability, and Frustration tolerance - at times.    Medication Side Effects:  Reports:  none  Sleep Problems? no  ++++++++++++++++++++++++++++++++++++++++++++++++    Employer Concerns/Feedback: Stable/NA  Coworker Concerns:   Stable/NA  Home/Family Concerns: Stable/NA      Anxiety   How are you doing with your anxiety since your last visit? Improved on current dose of duloxetine; hopeful to wean off over summer months.  Are you having other symptoms that might be associated with anxiety? No  Have you had a significant life event? No   Are you feeling depressed? No  Do you have any concerns with your use of alcohol or other drugs? No    Social History     Tobacco Use    Smoking status: Never    Smokeless tobacco: Never   Vaping Use    Vaping status: Never Used   Substance Use Topics    Alcohol use: Not Currently    Drug use: Yes     Types: Marijuana     Comment: medical marijuana          11/2/2023     9:13 AM 1/3/2024    10:08 AM 4/12/2024     8:36 AM   MINNIE-7 SCORE   Total Score 0 (minimal anxiety) 5 (mild anxiety) 7 (mild anxiety)   Total Score 0 5 7         9/8/2022     8:47 AM 7/14/2023     8:18 AM 2/2/2024    10:58 AM   PHQ   PHQ-9 Total Score 8 12 9   Q9: Thoughts of better off dead/self-harm past 2 weeks Several days Not at all Not at all   F/U: Thoughts of suicide or self-harm No     F/U: Safety concerns No         Hypothyroidism Follow-up    Since last visit, patient describes the following symptoms: Weight stable, no hair loss, no skin changes, no constipation, no loose stools  Initially gained some weight after her hysterectomy that she has been unable to take off.  Hopeful to increase activity without causing physical strain over the warmer months.        Objective    /78   Pulse 89   Temp 98.3  F (36.8  C) (Tympanic)   Resp 16   Ht 1.664 m (5' 5.5\")   Wt 84.8 kg (187 lb)   LMP 09/21/2023   SpO2 98%   BMI " 30.65 kg/m    Body mass index is 30.65 kg/m .  Physical Exam   GENERAL: alert and no distress  NECK: no adenopathy, no asymmetry, masses, or scars  RESP: lungs clear to auscultation - no rales, rhonchi or wheezes  CV: regular rate and rhythm, normal S1 S2, no S3 or S4, no murmur, click or rub, no peripheral edema  SKIN: no suspicious lesions or rashes  PSYCH: mentation appears normal, affect normal/bright    No results found for any visits on 04/12/24.        Signed Electronically by: Avril Sanders DO

## 2024-04-12 ENCOUNTER — THERAPY VISIT (OUTPATIENT)
Dept: OCCUPATIONAL THERAPY | Facility: OTHER | Age: 44
End: 2024-04-12
Attending: SPECIALIST
Payer: COMMERCIAL

## 2024-04-12 ENCOUNTER — OFFICE VISIT (OUTPATIENT)
Dept: FAMILY MEDICINE | Facility: OTHER | Age: 44
End: 2024-04-12
Attending: FAMILY MEDICINE
Payer: COMMERCIAL

## 2024-04-12 VITALS
HEIGHT: 66 IN | DIASTOLIC BLOOD PRESSURE: 78 MMHG | TEMPERATURE: 98.3 F | BODY MASS INDEX: 30.05 KG/M2 | HEART RATE: 89 BPM | RESPIRATION RATE: 16 BRPM | SYSTOLIC BLOOD PRESSURE: 116 MMHG | WEIGHT: 187 LBS | OXYGEN SATURATION: 98 %

## 2024-04-12 DIAGNOSIS — F90.0 ATTENTION DEFICIT HYPERACTIVITY DISORDER (ADHD), PREDOMINANTLY INATTENTIVE TYPE: Primary | ICD-10-CM

## 2024-04-12 DIAGNOSIS — F43.22 ADJUSTMENT DISORDER WITH ANXIOUS MOOD: ICD-10-CM

## 2024-04-12 DIAGNOSIS — E03.9 ACQUIRED HYPOTHYROIDISM: ICD-10-CM

## 2024-04-12 DIAGNOSIS — M79.642 LEFT HAND PAIN: Primary | ICD-10-CM

## 2024-04-12 PROCEDURE — 97110 THERAPEUTIC EXERCISES: CPT | Mod: GO | Performed by: OCCUPATIONAL THERAPIST

## 2024-04-12 PROCEDURE — 99214 OFFICE O/P EST MOD 30 MIN: CPT | Performed by: FAMILY MEDICINE

## 2024-04-12 PROCEDURE — 97140 MANUAL THERAPY 1/> REGIONS: CPT | Mod: GO | Performed by: OCCUPATIONAL THERAPIST

## 2024-04-12 PROCEDURE — 97035 APP MDLTY 1+ULTRASOUND EA 15: CPT | Mod: GO | Performed by: OCCUPATIONAL THERAPIST

## 2024-04-12 RX ORDER — METHYLPHENIDATE HYDROCHLORIDE 54 MG/1
54 TABLET ORAL DAILY
Qty: 30 TABLET | Refills: 0 | Status: SHIPPED | OUTPATIENT
Start: 2024-04-12 | End: 2024-09-11 | Stop reason: ALTCHOICE

## 2024-04-12 RX ORDER — METHYLPHENIDATE HYDROCHLORIDE 54 MG/1
TABLET ORAL
Qty: 11 TABLET | Refills: 0 | OUTPATIENT
Start: 2024-04-12

## 2024-04-12 RX ORDER — METHYLPHENIDATE HYDROCHLORIDE 54 MG/1
54 TABLET ORAL DAILY
Qty: 30 TABLET | Refills: 0 | Status: SHIPPED | OUTPATIENT
Start: 2024-05-10 | End: 2024-06-25

## 2024-04-12 RX ORDER — METHYLPHENIDATE HYDROCHLORIDE 54 MG/1
54 TABLET ORAL DAILY
Qty: 30 TABLET | Refills: 0 | Status: SHIPPED | OUTPATIENT
Start: 2024-06-11 | End: 2024-09-11 | Stop reason: ALTCHOICE

## 2024-04-12 ASSESSMENT — ANXIETY QUESTIONNAIRES
GAD7 TOTAL SCORE: 7
2. NOT BEING ABLE TO STOP OR CONTROL WORRYING: SEVERAL DAYS
GAD7 TOTAL SCORE: 7
4. TROUBLE RELAXING: SEVERAL DAYS
3. WORRYING TOO MUCH ABOUT DIFFERENT THINGS: SEVERAL DAYS
5. BEING SO RESTLESS THAT IT IS HARD TO SIT STILL: SEVERAL DAYS
IF YOU CHECKED OFF ANY PROBLEMS ON THIS QUESTIONNAIRE, HOW DIFFICULT HAVE THESE PROBLEMS MADE IT FOR YOU TO DO YOUR WORK, TAKE CARE OF THINGS AT HOME, OR GET ALONG WITH OTHER PEOPLE: SOMEWHAT DIFFICULT
6. BECOMING EASILY ANNOYED OR IRRITABLE: SEVERAL DAYS
7. FEELING AFRAID AS IF SOMETHING AWFUL MIGHT HAPPEN: SEVERAL DAYS
8. IF YOU CHECKED OFF ANY PROBLEMS, HOW DIFFICULT HAVE THESE MADE IT FOR YOU TO DO YOUR WORK, TAKE CARE OF THINGS AT HOME, OR GET ALONG WITH OTHER PEOPLE?: SOMEWHAT DIFFICULT
7. FEELING AFRAID AS IF SOMETHING AWFUL MIGHT HAPPEN: SEVERAL DAYS
1. FEELING NERVOUS, ANXIOUS, OR ON EDGE: SEVERAL DAYS

## 2024-04-12 ASSESSMENT — PAIN SCALES - GENERAL: PAINLEVEL: NO PAIN (0)

## 2024-04-12 NOTE — NURSING NOTE
"Chief Complaint   Patient presents with    Recheck Medication       Initial /78   Pulse 89   Temp 98.3  F (36.8  C) (Tympanic)   Resp 16   Ht 1.664 m (5' 5.5\")   Wt 84.8 kg (187 lb)   LMP 09/21/2023   SpO2 98%   BMI 30.65 kg/m   Estimated body mass index is 30.65 kg/m  as calculated from the following:    Height as of this encounter: 1.664 m (5' 5.5\").    Weight as of this encounter: 84.8 kg (187 lb).  Medication Review: complete    The next two questions are to help us understand your food security.  If you are feeling you need any assistance in this area, we have resources available to support you today.          1/3/2024   SDOH- Food Insecurity   Within the past 12 months, did you worry that your food would run out before you got money to buy more? N   Within the past 12 months, did the food you bought just not last and you didn t have money to get more? N         Health Care Directive:  Patient does not have a Health Care Directive or Living Will: Discussed advance care planning with patient; however, patient declined at this time.    Rajani Ford LPN      "

## 2024-04-12 NOTE — PROGRESS NOTES
PLAN  Continue therapy per current plan of care.  See modified goals    Beginning/End Dates of Progress Note Reporting Period:    3/7/2024 to 04/12/2024    Referring Provider:  Chris Echeverria       04/12/24 0500   Appointment Info   Treating Provider Kim Contreras OTR/L   Visits Used 10   Medical Diagnosis Extensor tendon repair   OT Tx Diagnosis Hand stiffness, weakness, and pain   Progress Note/Certification   Onset of Illness/Injury or Date of Surgery 01/27/24   Therapy Frequency 0-2x/week   Predicted Duration 10 weeks   OT Goal 1   Goal Identifier HEP   Goal Description Patient will complete HEP daily and will demonstrate correct techniques of exercises given during therapy session.   Rationale In order to maximize safety and independence with performance of self-care activities   Goal Progress Pt reports I wit HEP and asks clarifying questions as needed. goal ongoing.   Target Date 05/16/24   OT Goal 2   Goal Identifier ROM   Goal Description When appropriate per protocol patient will be able to make a full fist, touching all finger tips to palm.   Target Date 05/16/24   Goal Progress Reports weakness and not quite full composite flexion of  LF. 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   Rationale In order to maximize safety and independence with performance of self-care activities   Target Date 05/16/24   Goal Progress Tammy reports tighness in the 4th web space. `There is minor swelling and tightness that limits efficient movement in the LF. contniue goal   Subjective Report   Subjective Report Mobility and strength are doing well. The hand was sore and achy after raking. along the ulnar side of the wrist and into the RDQ .   Objective Measure 1   Objective Measure Wrist ROM   Details Flex/ext: 86/77 UD/RD  33/23    Objective Measure 2   Objective Measure Finger ROM   Details kapanji3/10   Objective Measure 3   Objective Measure hand strength   Details : Rt: 35  Lt: 59   Hot/Cold Packs   Treatment Detail Wrapped heat pack in pillow case and drapped around the hand to prior to ROM exercises.   Ultrasound   Ultrasound, Minutes (30180) 10 Minutes   Ultrasound -Type (does not include 3-5 min prep/cleanup time) Continuous   Intensity 0.6 cm/W2   Frequency 3 MHz   Location Incision areas   Positioning sitting   Patient Response/Progress Patient tolerated well.   Therapeutic Procedure/Exercise   Therapeutic Procedure: strength, endurance, ROM, flexibillity minutes (88218) 16   Skilled Intervention Educated patient in anatomy of the hand and precautions/protocol. cues for proper techniques during exercises, soreness and pain leves. expectations for healing and return of strength. Prt reports hand fatgiue and noticed muscle straing iwth extercies states this is toelrable.   Patient Response/Progress reports tighness in little figner an d4th web space that decreaed after treatment.   Manual Therapy   Manual Therapy Minutes (35132) 17   Manual Therapy 1 - Details IASTM to proximal and distal scars, 4th  interossi muscle,   Skilled Intervention Education, tissue remodeling, scar management   Patient Response/Progress scar tissue is less dense on distal scar. proximal scar is slightly raised. recommend continueing with scar pad.   Education   Learner/Method Patient   Plan   Home program ROM exercsies, scar massage, scar pads for night use, heat,Access Code: [email or web address]  URL: [email or web address]  Date: 03/14/2024  Prepared by: Kim Simpson    Exercises  - Wrist Extension AROM  - 1 x daily - 7 x weekly - 3 sets - 10 reps  - Wrist Flexion AROM  - 1 x daily - 7 x weekly - 3 sets - 10 reps  - Wrist AROM Radial Ulnar Deviation  - 1 x daily - 7 x weekly - 3 sets - 10 reps  - Wrist Tendon Gliding  - 1 x daily - 7 x weekly -  3 sets - 10 reps  - Finger Extension or EDC Glides: Pen Roll From Fist to Hook Fist  - 1 x daily - 7 x weekly - 3 sets - 10 reps Digit blocking of LF PIP joint.   Updates to plan of care Access Code: [email or web address]  URL: [email or web address]  Date: 03/27/2024  Prepared by: Kim Simpson    Exercises  - Wrist Extension with Dumbbell  - 1 x daily - 7 x weekly - 3 sets - 10 reps  - Wrist Flexion with Dumbbell  - 1 x daily - 7 x weekly - 3 sets - 10 reps  - Seated Wrist Radial Deviation with Dumbbell  - 1 x daily - 7 x weekly - 3 sets - 10 reps  - Forearm Pronation and Supination with Hammer  - 1 x daily - 7 x weekly - 3 sets - 10 reps  - Resisted Finger Abduction - Index and Small  - 1 x daily - 7 x weekly - 3 sets - 10 reps   Plan for next session Ultrasound, ROM, upgrade HEP if appropriate, [facility]   Total Session Time   Timed Code Treatment Minutes 43   Total Treatment Time (sum of timed and untimed services) 43

## 2024-04-16 ENCOUNTER — THERAPY VISIT (OUTPATIENT)
Dept: OCCUPATIONAL THERAPY | Facility: OTHER | Age: 44
End: 2024-04-16
Attending: SPECIALIST
Payer: COMMERCIAL

## 2024-04-16 DIAGNOSIS — M79.642 LEFT HAND PAIN: Primary | ICD-10-CM

## 2024-04-16 PROCEDURE — 97110 THERAPEUTIC EXERCISES: CPT | Mod: GO | Performed by: OCCUPATIONAL THERAPIST

## 2024-04-16 NOTE — PROGRESS NOTES
04/16/24 0500   Appointment Info   Treating Provider Kim Contreras OTR/L   Visits Used 11   Medical Diagnosis Extensor tendon repair   OT Tx Diagnosis Hand stiffness, weakness, and pain   Progress Note/Certification   Onset of Illness/Injury or Date of Surgery 01/27/24   Therapy Frequency 0-2x/week   Predicted Duration 10 weeks   Progress Note Completed Date 04/12/24   OT Goal 1   Goal Identifier HEP   Goal Description Patient will complete HEP daily and will demonstrate correct techniques of exercises given during therapy session.   Rationale In order to maximize safety and independence with performance of self-care activities   Goal Progress Goal met Pt reports I with HEP and asks clarifying questions as needed. goal ongoing.   Target Date 05/16/24   OT Goal 2   Goal Identifier ROM   Goal Description When appropriate per protocol patient will be able to make a full fist, touching all finger tips to palm.   Goal Progress Goal met ableto perform full composite flexion today.   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   Rationale In order to maximize safety and independence with performance of self-care activities   Goal Progress Tammy reports tighness in the 4th web space. `There is minor swelling and tightness that limits efficient movement in the LF. contniue goal   Target Date 05/16/24   Subjective Report   Subjective Report Pt reports she has been carrying heavy rocks., raking, and cleaning using her left hand. No increase in pain.   Objective Measure 1   Objective Measure Wrist ROM   Details Flex/ext: 86/77 UD/RD  33/23   Objective Measure 2   Objective Measure Finger ROM   Details dane  10/10   Objective Measure 3   Objective Measure hand strength   Details : Rt: 65  Lt: 46   Hot/Cold Packs   Treatment Detail Wrapped heat pack in  pillow case and drapped around the hand to prior to ROM exercises.   Ultrasound   Ultrasound -Type (does not include 3-5 min prep/cleanup time) Continuous   Intensity 0.6 cm/W2   Frequency 3 MHz   Location Incision areas   Positioning sitting   Patient Response/Progress Patient tolerated well.   Therapeutic Procedure/Exercise   Therapeutic Procedure: strength, endurance, ROM, flexibillity minutes (54387) 25   Ther Proc 1 - Details increase putty resistiance by adding some green to the pink . educate and preformed resistive joelle flex/extension with putty, tripod  sterngthening. and lumbrical strengthening .   Skilled Intervention Educated patient in anatomy of the hand and precautions/protocol. cues for proper techniques during exercises, soreness and pain leves. expectations for healing and return of strength. Prt reports hand fatgiue and noticed muscle straing iwth extercies states this is toelrable.   Patient Response/Progress reports tighness in little figner an d4th web space that decreaed after treatment.   Manual Therapy   Manual Therapy 1 - Details IASTM to proximal and distal scars, 4th  interossi muscle,   Skilled Intervention Education, tissue remodeling, scar management   Patient Response/Progress scar tissue is less dense on distal scar. proximal scar is slightly raised. recommend continueing with scar pad.   Education   Learner/Method Patient   Plan   Home program ROM exercsies, scar massage, scar pads for night use, heat,Access Code: [email or web address]  URL: [email or web address]  Date: 03/14/2024  Prepared by: Kim Simpson    Exercises  - Wrist Extension AROM  - 1 x daily - 7 x weekly - 3 sets - 10 reps  - Wrist Flexion AROM  - 1 x daily - 7 x weekly - 3 sets - 10 reps  - Wrist AROM Radial Ulnar Deviation  - 1 x daily - 7 x weekly - 3 sets - 10 reps  - Wrist Tendon Gliding  - 1 x daily - 7 x weekly - 3 sets - 10 reps  - Finger Extension or EDC Glides: Pen Roll From Fist to Hook Fist  -  1 x daily - 7 x weekly - 3 sets - 10 reps Digit blocking of LF PIP joint.   Updates to plan of care Access Code: [email or web address]  URL: [email or web address]  Date: 03/27/2024  Prepared by: Kim Simpson    Exercises  - Wrist Extension with Dumbbell  - 1 x daily - 7 x weekly - 3 sets - 10 reps  - Wrist Flexion with Dumbbell  - 1 x daily - 7 x weekly - 3 sets - 10 reps  - Seated Wrist Radial Deviation with Dumbbell  - 1 x daily - 7 x weekly - 3 sets - 10 reps  - Forearm Pronation and Supination with Hammer  - 1 x daily - 7 x weekly - 3 sets - 10 reps  - Resisted Finger Abduction - Index and Small  - 1 x daily - 7 x weekly - 3 sets - 10 reps   Plan for next session Ultrasound, ROM, upgrade HEP if appropriate, [facility]   Comments   Comments Pt is in agreement with cancelling the last 2 scheduled  sessions. She is encouraged to continue to use her had at previous level and contact or schedule a session if there are any problems. Chart will be left opn for 1 month.   Total Session Time   Timed Code Treatment Minutes 25   Total Treatment Time (sum of timed and untimed services) 25

## 2024-05-08 ENCOUNTER — MYC MEDICAL ADVICE (OUTPATIENT)
Dept: FAMILY MEDICINE | Facility: OTHER | Age: 44
End: 2024-05-08
Payer: COMMERCIAL

## 2024-05-08 ENCOUNTER — TELEPHONE (OUTPATIENT)
Dept: FAMILY MEDICINE | Facility: OTHER | Age: 44
End: 2024-05-08
Payer: COMMERCIAL

## 2024-05-08 DIAGNOSIS — Z92.89 HISTORY OF ADVERSE REACTION TO ANESTHESIA: ICD-10-CM

## 2024-05-08 DIAGNOSIS — N62 HYPERTROPHY OF BREAST: Primary | ICD-10-CM

## 2024-05-08 NOTE — TELEPHONE ENCOUNTER
[email or web address] message sent to get additional information from patient.  Rajani Ford LPN  5/8/2024  1:40 PM

## 2024-05-08 NOTE — TELEPHONE ENCOUNTER
Patient called to request referral for an allergist for surgery; as well as for a breast reduction. Please call with any questions.     Corrie Tam on 5/8/2024 at 11:36 AM

## 2024-05-17 ENCOUNTER — THERAPY VISIT (OUTPATIENT)
Dept: OCCUPATIONAL THERAPY | Facility: OTHER | Age: 44
End: 2024-05-17
Attending: SPECIALIST
Payer: COMMERCIAL

## 2024-05-17 DIAGNOSIS — M79.642 LEFT HAND PAIN: Primary | ICD-10-CM

## 2024-05-17 PROCEDURE — 97110 THERAPEUTIC EXERCISES: CPT | Mod: GO | Performed by: OCCUPATIONAL THERAPIST

## 2024-05-17 PROCEDURE — 97140 MANUAL THERAPY 1/> REGIONS: CPT | Mod: GO | Performed by: OCCUPATIONAL THERAPIST

## 2024-05-17 PROCEDURE — 97035 APP MDLTY 1+ULTRASOUND EA 15: CPT | Mod: GO | Performed by: OCCUPATIONAL THERAPIST

## 2024-06-13 ENCOUNTER — HOSPITAL ENCOUNTER (OUTPATIENT)
Dept: MAMMOGRAPHY | Facility: OTHER | Age: 44
Discharge: HOME | End: 2024-06-13
Payer: COMMERCIAL

## 2024-06-13 DIAGNOSIS — Z12.31 ENCOUNTER FOR SCREENING MAMMOGRAM FOR BREAST CANCER: ICD-10-CM

## 2024-06-13 PROCEDURE — 77063 BREAST TOMOSYNTHESIS BI: CPT

## 2024-06-18 ENCOUNTER — TELEPHONE (OUTPATIENT)
Dept: FAMILY MEDICINE | Facility: OTHER | Age: 44
End: 2024-06-18
Payer: COMMERCIAL

## 2024-06-18 DIAGNOSIS — Z92.89 HISTORY OF ADVERSE REACTION TO ANESTHESIA: Primary | ICD-10-CM

## 2024-06-18 NOTE — TELEPHONE ENCOUNTER
SMS referral for the allergist was used and they stated she should be referred to see a dermatologist    Please call and advise    Thank You    Greta Tony on 6/18/2024 at 11:21 AM

## 2024-06-19 ENCOUNTER — TELEPHONE (OUTPATIENT)
Dept: FAMILY MEDICINE | Facility: OTHER | Age: 44
End: 2024-06-19
Payer: COMMERCIAL

## 2024-06-19 ENCOUNTER — HOSPITAL ENCOUNTER (OUTPATIENT)
Dept: MAMMOGRAPHY | Facility: OTHER | Age: 44
Discharge: HOME | End: 2024-06-19
Payer: COMMERCIAL

## 2024-06-19 ENCOUNTER — HOSPITAL ENCOUNTER (OUTPATIENT)
Dept: ULTRASOUND IMAGING | Facility: OTHER | Age: 44
Discharge: HOME | End: 2024-06-19
Payer: COMMERCIAL

## 2024-06-19 DIAGNOSIS — R92.8 ABNORMAL FINDING ON BREAST IMAGING: ICD-10-CM

## 2024-06-19 PROCEDURE — 76642 ULTRASOUND BREAST LIMITED: CPT | Mod: LT

## 2024-06-19 PROCEDURE — 77065 DX MAMMO INCL CAD UNI: CPT | Mod: LT

## 2024-06-19 NOTE — TELEPHONE ENCOUNTER
Received a message yesterday stating the referral for the allergist was used and they stated she should be referred to see Dermatology. Below it states allergy referral was denied. It appears allergist says to see dermatology and dermatology says to see an allergist. How should we proceed with referral?     Xenia Diaz, GENO on 6/19/2024 at 2:04 PM

## 2024-06-19 NOTE — TELEPHONE ENCOUNTER
Writer contacted [facility] where referral was originally sent and asked why they wouldn't see a patient that has had a reaction to anesthesia- wouldn't that be considered an allergy to it? Writer was informed patient would see an allergist for this. Please re-send allergy referral placed on 5/9/24. Dermatology referral can be disregarded.     Xenia Diaz, CMA on 6/19/2024 at 2:26 PM

## 2024-06-19 NOTE — TELEPHONE ENCOUNTER
I spoke to a nurse at [facility], and she thought the patients referral for History of adverse reaction to anesthesia would be better for an allergist, so patient is hoping to get an allergy referral as well. Thank you!  Brigid Mosqueda on 6/19/2024 at 1:59 PM

## 2024-06-25 ENCOUNTER — MYC MEDICAL ADVICE (OUTPATIENT)
Dept: FAMILY MEDICINE | Facility: OTHER | Age: 44
End: 2024-06-25

## 2024-06-25 ENCOUNTER — OFFICE VISIT (OUTPATIENT)
Dept: FAMILY MEDICINE | Facility: OTHER | Age: 44
End: 2024-06-25
Attending: FAMILY MEDICINE
Payer: COMMERCIAL

## 2024-06-25 VITALS
WEIGHT: 191.13 LBS | HEIGHT: 66 IN | RESPIRATION RATE: 18 BRPM | SYSTOLIC BLOOD PRESSURE: 130 MMHG | HEART RATE: 73 BPM | OXYGEN SATURATION: 100 % | BODY MASS INDEX: 30.72 KG/M2 | DIASTOLIC BLOOD PRESSURE: 76 MMHG | TEMPERATURE: 98.1 F

## 2024-06-25 DIAGNOSIS — R63.5 WEIGHT GAIN: Primary | ICD-10-CM

## 2024-06-25 DIAGNOSIS — R45.4 FEELING IRRITABLE: ICD-10-CM

## 2024-06-25 DIAGNOSIS — F90.0 ATTENTION DEFICIT HYPERACTIVITY DISORDER (ADHD), PREDOMINANTLY INATTENTIVE TYPE: Primary | ICD-10-CM

## 2024-06-25 DIAGNOSIS — Z79.890 HORMONE REPLACEMENT THERAPY: ICD-10-CM

## 2024-06-25 DIAGNOSIS — E16.2 HYPOGLYCEMIA: ICD-10-CM

## 2024-06-25 DIAGNOSIS — Z78.0 MENOPAUSE: ICD-10-CM

## 2024-06-25 DIAGNOSIS — R63.5 WEIGHT GAIN: ICD-10-CM

## 2024-06-25 DIAGNOSIS — E03.9 ACQUIRED HYPOTHYROIDISM: ICD-10-CM

## 2024-06-25 DIAGNOSIS — G56.03 BILATERAL CARPAL TUNNEL SYNDROME: ICD-10-CM

## 2024-06-25 LAB
ALBUMIN SERPL BCG-MCNC: 4.5 G/DL (ref 3.5–5.2)
ALP SERPL-CCNC: 98 U/L (ref 40–150)
ALT SERPL W P-5'-P-CCNC: 16 U/L (ref 0–50)
ANION GAP SERPL CALCULATED.3IONS-SCNC: 10 MMOL/L (ref 7–15)
AST SERPL W P-5'-P-CCNC: 15 U/L (ref 0–45)
BASOPHILS # BLD AUTO: 0.1 10E3/UL (ref 0–0.2)
BASOPHILS NFR BLD AUTO: 1 %
BILIRUB SERPL-MCNC: <0.2 MG/DL
BUN SERPL-MCNC: 11.8 MG/DL (ref 6–20)
CALCIUM SERPL-MCNC: 9.8 MG/DL (ref 8.6–10)
CHLORIDE SERPL-SCNC: 101 MMOL/L (ref 98–107)
CREAT SERPL-MCNC: 0.66 MG/DL (ref 0.51–0.95)
DEPRECATED HCO3 PLAS-SCNC: 27 MMOL/L (ref 22–29)
EGFRCR SERPLBLD CKD-EPI 2021: >90 ML/MIN/1.73M2
EOSINOPHIL # BLD AUTO: 0.3 10E3/UL (ref 0–0.7)
EOSINOPHIL NFR BLD AUTO: 3 %
ERYTHROCYTE [DISTWIDTH] IN BLOOD BY AUTOMATED COUNT: 13.9 % (ref 10–15)
ESTRADIOL SERPL-MCNC: 9 PG/ML
FSH SERPL IRP2-ACNC: 52.4 MIU/ML
GLUCOSE SERPL-MCNC: 89 MG/DL (ref 70–99)
HBA1C MFR BLD: 5.4 % (ref 4–6.2)
HCT VFR BLD AUTO: 41.8 % (ref 35–47)
HGB BLD-MCNC: 13.4 G/DL (ref 11.7–15.7)
IMM GRANULOCYTES # BLD: 0 10E3/UL
IMM GRANULOCYTES NFR BLD: 0 %
LYMPHOCYTES # BLD AUTO: 2.2 10E3/UL (ref 0.8–5.3)
LYMPHOCYTES NFR BLD AUTO: 27 %
MCH RBC QN AUTO: 26.8 PG (ref 26.5–33)
MCHC RBC AUTO-ENTMCNC: 32.1 G/DL (ref 31.5–36.5)
MCV RBC AUTO: 84 FL (ref 78–100)
MONOCYTES # BLD AUTO: 0.5 10E3/UL (ref 0–1.3)
MONOCYTES NFR BLD AUTO: 6 %
NEUTROPHILS # BLD AUTO: 5.1 10E3/UL (ref 1.6–8.3)
NEUTROPHILS NFR BLD AUTO: 63 %
NRBC # BLD AUTO: 0 10E3/UL
NRBC BLD AUTO-RTO: 0 /100
PLATELET # BLD AUTO: 417 10E3/UL (ref 150–450)
POTASSIUM SERPL-SCNC: 4 MMOL/L (ref 3.4–5.3)
PROT SERPL-MCNC: 7.8 G/DL (ref 6.4–8.3)
RBC # BLD AUTO: 5 10E6/UL (ref 3.8–5.2)
SODIUM SERPL-SCNC: 138 MMOL/L (ref 135–145)
TSH SERPL DL<=0.005 MIU/L-ACNC: 1.57 UIU/ML (ref 0.3–4.2)
WBC # BLD AUTO: 8.1 10E3/UL (ref 4–11)

## 2024-06-25 PROCEDURE — 36415 COLL VENOUS BLD VENIPUNCTURE: CPT | Mod: ZL | Performed by: FAMILY MEDICINE

## 2024-06-25 PROCEDURE — 84443 ASSAY THYROID STIM HORMONE: CPT | Mod: ZL | Performed by: FAMILY MEDICINE

## 2024-06-25 PROCEDURE — 84155 ASSAY OF PROTEIN SERUM: CPT | Mod: ZL | Performed by: FAMILY MEDICINE

## 2024-06-25 PROCEDURE — G2211 COMPLEX E/M VISIT ADD ON: HCPCS | Performed by: FAMILY MEDICINE

## 2024-06-25 PROCEDURE — 99214 OFFICE O/P EST MOD 30 MIN: CPT | Performed by: FAMILY MEDICINE

## 2024-06-25 PROCEDURE — 82670 ASSAY OF TOTAL ESTRADIOL: CPT | Mod: ZL | Performed by: FAMILY MEDICINE

## 2024-06-25 PROCEDURE — 85025 COMPLETE CBC W/AUTO DIFF WBC: CPT | Mod: ZL | Performed by: FAMILY MEDICINE

## 2024-06-25 PROCEDURE — 83036 HEMOGLOBIN GLYCOSYLATED A1C: CPT | Mod: ZL | Performed by: FAMILY MEDICINE

## 2024-06-25 PROCEDURE — 83001 ASSAY OF GONADOTROPIN (FSH): CPT | Mod: ZL | Performed by: FAMILY MEDICINE

## 2024-06-25 RX ORDER — LISDEXAMFETAMINE DIMESYLATE 60 MG/1
60 CAPSULE ORAL EVERY MORNING
Qty: 15 CAPSULE | Refills: 0 | Status: SHIPPED | OUTPATIENT
Start: 2024-06-25 | End: 2024-09-11

## 2024-06-25 RX ORDER — LISDEXAMFETAMINE DIMESYLATE 50 MG/1
50 CAPSULE ORAL EVERY MORNING
Qty: 15 CAPSULE | Refills: 0 | Status: SHIPPED | OUTPATIENT
Start: 2024-06-25 | End: 2024-09-11

## 2024-06-25 RX ORDER — METHYLPHENIDATE HYDROCHLORIDE 54 MG/1
54 TABLET ORAL DAILY
Qty: 30 TABLET | Refills: 0 | Status: SHIPPED | OUTPATIENT
Start: 2024-07-12 | End: 2024-09-11 | Stop reason: ALTCHOICE

## 2024-06-25 RX ORDER — METHYLPHENIDATE HYDROCHLORIDE 54 MG/1
54 TABLET ORAL DAILY
Qty: 30 TABLET | Refills: 0 | Status: CANCELLED | OUTPATIENT
Start: 2024-06-25

## 2024-06-25 ASSESSMENT — ANXIETY QUESTIONNAIRES
IF YOU CHECKED OFF ANY PROBLEMS ON THIS QUESTIONNAIRE, HOW DIFFICULT HAVE THESE PROBLEMS MADE IT FOR YOU TO DO YOUR WORK, TAKE CARE OF THINGS AT HOME, OR GET ALONG WITH OTHER PEOPLE: SOMEWHAT DIFFICULT
GAD7 TOTAL SCORE: 12
7. FEELING AFRAID AS IF SOMETHING AWFUL MIGHT HAPPEN: SEVERAL DAYS
7. FEELING AFRAID AS IF SOMETHING AWFUL MIGHT HAPPEN: SEVERAL DAYS
1. FEELING NERVOUS, ANXIOUS, OR ON EDGE: NEARLY EVERY DAY
6. BECOMING EASILY ANNOYED OR IRRITABLE: NEARLY EVERY DAY
4. TROUBLE RELAXING: SEVERAL DAYS
GAD7 TOTAL SCORE: 12
8. IF YOU CHECKED OFF ANY PROBLEMS, HOW DIFFICULT HAVE THESE MADE IT FOR YOU TO DO YOUR WORK, TAKE CARE OF THINGS AT HOME, OR GET ALONG WITH OTHER PEOPLE?: SOMEWHAT DIFFICULT
5. BEING SO RESTLESS THAT IT IS HARD TO SIT STILL: SEVERAL DAYS
2. NOT BEING ABLE TO STOP OR CONTROL WORRYING: MORE THAN HALF THE DAYS
3. WORRYING TOO MUCH ABOUT DIFFERENT THINGS: SEVERAL DAYS

## 2024-06-25 ASSESSMENT — PAIN SCALES - GENERAL: PAINLEVEL: NO PAIN (0)

## 2024-06-25 NOTE — NURSING NOTE
"Chief Complaint   Patient presents with    Recheck Medication    Hand Problem     Numbness in hands     Weight Problem       Initial /76   Pulse 73   Temp 98.1  F (36.7  C) (Tympanic)   Resp 18   Ht 1.664 m (5' 5.5\")   Wt 86.7 kg (191 lb 2 oz)   LMP 09/21/2023   SpO2 100%   BMI 31.32 kg/m   Estimated body mass index is 31.32 kg/m  as calculated from the following:    Height as of this encounter: 1.664 m (5' 5.5\").    Weight as of this encounter: 86.7 kg (191 lb 2 oz).  Medication Review: complete    The next two questions are to help us understand your food security.  If you are feeling you need any assistance in this area, we have resources available to support you today.          1/3/2024   SDOH- Food Insecurity   Within the past 12 months, did you worry that your food would run out before you got money to buy more? N   Within the past 12 months, did the food you bought just not last and you didn t have money to get more? N            Health Care Directive:  Patient does not have a Health Care Directive or Living Will: Discussed advance care planning with patient; however, patient declined at this time.    Rajani Ford LPN      "

## 2024-06-25 NOTE — PROGRESS NOTES
Assessment & Plan     1. Attention deficit hyperactivity disorder (ADHD), predominantly inattentive type  Chronic, stable but having some difficulty obtaining medication due to shortages.  Would be interested in trying Vyvanse.  Rx for 50mg x 15 days; 60mg x 15 days.  And she will reach out to me once she has tried both doses.  Suspect she would need Vyvanse 70mg daily.  Did Rx Concerta x 1 month in the interim due to likely PA process being required which may delay ability to obtain Vyvanse.  Further Rx for next 2 months will be sent based on efficacy of Vyvanse dosing.  - methylphenidate HCl ER, OSM, (CONCERTA) 54 MG CR tablet; Take 1 tablet (54 mg) by mouth daily  Dispense: 30 tablet; Refill: 0  - lisdexamfetamine (VYVANSE) 50 MG capsule; Take 1 capsule (50 mg) by mouth every morning  Dispense: 15 capsule; Refill: 0  - lisdexamfetamine (VYVANSE) 60 MG capsule; Take 1 capsule (60 mg) by mouth every morning  Dispense: 15 capsule; Refill: 0    2. Hypoglycemia  Suspect, but not confirmed.  Rx for glucometer sent; or can purchase OTC if further concerns persist.  Suspect spectrum of possible menopausal symptoms - including irritability, weight gain, brain fog, etc.  Will obtain labs today - to evaluate blood sugar, thyroid, metabolic panel, A1c, and female hormones (as she is s/p hysterectomy).  - Comprehensive Metabolic Panel; Future  - Hemoglobin A1c; Future  - blood glucose monitoring (NO BRAND SPECIFIED) meter device kit; Use to test blood sugar 5 times daily or as directed.  Dispense: 1 kit; Refill: 0  - blood glucose (NO BRAND SPECIFIED) test strip; Use to test blood sugar 5 times daily or as directed.  Dispense: 100 strip; Refill: 1  - blood glucose (NO BRAND SPECIFIED) lancets standard; Use to test blood sugar 5 times daily or as directed.  Dispense: 100 each; Refill: 1  - Comprehensive Metabolic Panel  - Hemoglobin A1c    3. Weight gain  4. Feeling irritable  As above; see #2  - TSH Reflex GH; Future  -  "Estradiol; Future  - FSH; Future  - Comprehensive Metabolic Panel; Future  - CBC and Differential; Future  - Hemoglobin A1c; Future  - TSH Reflex GH  - Estradiol  - FSH  - Comprehensive Metabolic Panel  - CBC and Differential  - Hemoglobin A1c    5. Bilateral carpal tunnel syndrome  Chronic, worsening.  Discussed effect of weight on hand symptoms.  Planning to do a retreat in [facility] upcoming and motivated for weight reduction.     6. Acquired hypothyroidism  Labs as above.  Will adjust dose prn.      MDM:  Ordering of each unique test  Prescription drug management    The longitudinal plan of care for the diagnosis(es)/condition(s) as documented were addressed during this visit. Due to the added complexity in care, I will continue to support Maricarmen in the subsequent management and with ongoing continuity of care.      BMI  Estimated body mass index is 31.32 kg/m  as calculated from the following:    Height as of this encounter: 1.664 m (5' 5.5\").    Weight as of this encounter: 86.7 kg (191 lb 2 oz).   Weight management plan: Discussed healthy diet and exercise guidelines    Follow up: 3 months      Subjective   Maricarmen is a 44 year old, presenting for the following health issues:  Recheck Medication, Hand Problem (Numbness in hands ), and Weight Problem        6/25/2024     1:48 PM   Additional Questions   Roomed by CHETAN Gerard   Accompanied by self     History of Present Illness       Reason for visit:  Numbness in arms, weight gain  Symptom onset:  More than a month  Symptoms include:  Numbness in hands  Symptom intensity:  Moderate  Symptom progression:  Staying the same  Had these symptoms before:  No  What makes it worse:  Certain positions  What makes it better:  Particular positions    She eats 4 or more servings of fruits and vegetables daily.She consumes 0 sweetened beverage(s) daily.She exercises with enough effort to increase her heart rate 30 to 60 minutes per day.  She exercises with enough effort to " increase her heart rate 4 days per week.   She is taking medications regularly.       Last visit: 4/12/24.  ADHD Follow-Up (Adult)  Concerns: None; dose is effective.  However having difficulty with medication shortages/availability.  More irritable and having difficulty being around her children this summer (with school being out).  Changes since last visit: Stable  Taking controlled (daily) medications as prescribed: Yes  Sleep: no new problems  Adult ADHD Self-Reporting form given to patient?:  No  Currently in counseling: No; does meditation, etc on her own.     Medication Benefits:   Controlled symptoms: Impulse control and Accepting limits  Uncontrolled symptoms:  Attention span, Distractability, and Frustration tolerance - at times.     Medication Side Effects:  Reports:  none  Sleep Problems? no  ++++++++++++++++++++++++++++++++++++++++++++++++     Employer Concerns/Feedback: Stable/NA  Coworker Concerns:   Stable/NA  Home/Family Concerns: worse    Contract: 1/3/24  UDS: 1/3/24      ANESTHESIA reaction:  Has been referred to allergy re: reaction to anesthesia.  This was clarified and referral in process now.    HAND NUMBNESS:  Worse in the last 2 months.  Has had continued weight gain in that time frame. Occurring often; doesn't always have to be position related, but is worse with arms up and in front of her.  Worried about new compression bras she just switched to; but feels like it is more elbow and down.   Occurring every night as well.  Was L>R at first (had hand surgery); but now both are bothering her.  Splint is not helpful.    WEIGHT/MOOD:  Wondering about getting FSH/estradiol labs.  Hot flashes are almost gone.  Irritable (kids and ), brain fog, weight gain.  Would also like thyroid level checked.  Has had shaky episodes even after 1-2 hours after eating.  Snacking more.  Feels like her blood sugars could be affected.  Does not check them.    Coming up: [place].  Yoga school x 3  "weeks.  Was planning to have mole on face removed; but does not want to have surgery completed at this time - until allergy referral and maybe when kids are back in school or she is feeling more settled.          Objective    /76   Pulse 73   Temp 98.1  F (36.7  C) (Tympanic)   Resp 18   Ht 1.664 m (5' 5.5\")   Wt 86.7 kg (191 lb 2 oz)   LMP 09/21/2023   SpO2 100%   BMI 31.32 kg/m    Body mass index is 31.32 kg/m .  Physical Exam   GENERAL: alert and no distress  NECK: no adenopathy, no asymmetry, masses, or scars  RESP: lungs clear to auscultation - no rales, rhonchi or wheezes  CV: regular rate and rhythm, normal S1 S2, no S3 or S4, no murmur, click or rub, no peripheral edema  MS: no gross musculoskeletal defects noted, no edema.  + tinel/phalen test  LYMPH: no cervical, supraclavicular, axillary, or inguinal adenopathy    No results found for any visits on 06/25/24.        Signed Electronically by: Avril Sanders DO    "

## 2024-06-26 NOTE — PROGRESS NOTES
DISCHARGE  Reason for Discharge: Patient has failed to schedule further appointments.    Equipment Issued: n/a    Discharge Plan: Patient to continue home program.    Referring Provider:  Peri Interiano    03/13/24 0500   Appointment Info   Signing clinician's name / credentials Shannen Olmstead DPT   Total/Authorized Visits 78   Visits Used 5 (9/75)   Medical Diagnosis Acute pain of right knee (M25.561), Fall, subsequent encounter (W19.XXXD)   PT Tx Diagnosis impaired strength and flexibility, patellofemoral pain   Other pertinent information non weight bearing through L upper extremity s/p surgery   Progress Note/Certification   Start of Care Date 02/07/24   Onset of illness/injury or Date of Surgery 01/01/24   Therapy Frequency 1-2 times per week   Predicted Duration 3 months   GOALS   PT Goals 2;3   PT Goal 1   Goal Identifier flexibility   Goal Description Pt will have improved soft tissue mobility throughout lower extremities to allow sitting crosslegged and performing yoga with pain less than 3/10.   Target Date 04/03/24   PT Goal 2   Goal Identifier gluteal strength   Goal Description Pt will have 5/5 MMT of gluteals without evidence of overuse of hip flexors for improved patellar mechanics for ability to perform stairs without increased knee pain.   Target Date 04/17/24   PT Goal 3   Goal Identifier HEP   Goal Description Pt will report consistent performance of home exercises of at least 5/7 days per week for self management of symptoms.   Target Date 05/01/24   Subjective Report   Subjective Report Maricarmen states she has been dealing with some family issues this last week so not doing too much with exercises. Noted some pain in her kneecaps with kneeling positions during yoga. Some issues towards the end of the day with stairs.   Objective Measures   Objective Measures Objective Measure 1   Objective Measure 1   Objective Measure segmental mobility   Details good   Treatment Interventions (PT)  "  Interventions Manual Therapy   Therapeutic Procedure/Exercise   Therapeutic Procedures: strength, endurance, ROM, flexibility minutes (72017) 30   Ther Proc 1 ther ex   Ther Proc 1 - Details bike seat 5 level 5 x4 mins- caused some LBP; sidestepping 5% incline TM x2 mins B; wedge calf stretches; monster walk w/ red TB; SL stance w/ mini squat using TRX straps for assist BLE to fatigue and cues for knee positioning and form; supine HS and gluteal stretching; PF mobs B; NEXT VISIT: discuss/trial med x equipment for use at gym DEFERRED: step up and over 6\" step w/ cues for knee alignment and engaging gluteals; static lunge w/ cues for form and modifying how deep she drops to mitgate knee pain; hip hike off stair BLE to fatigue; supine bridging w/ cues for engaging core and not raising as high to prevent back strain; discussed wall squats as an alternative to squatting for a prolonged time for strengthening; prone hip extension w/ bent knee (fatigue R>L) DEFERRED: RLE long adductor hold relax stretching;   Skilled Intervention cues for form, education   Patient Response/Progress verbalized agreement, stretching and fatigue reported.   Manual Therapy   Manual Therapy: Mobilization, MFR, MLD, friction massage minutes (70522) 5   Manual Therapy Manual Therapy 2   Manual Therapy 1 muscle energy techniques   Manual Therapy 2 soft tissue mobility   Manual Therapy 2 - Details STM w/ roller stick B ITB and hip flexors in sidelying B; DEFERRED: manual hip flexor stretch   Education   Learner/Method Patient   Education Comments HEP   Plan   Home program Access Code: [email or web address] butterfly and side lunge adductor stretches, kneeling hip flexor stretch w/ side bend; clams; gluteal isometrics against wall   Plan for next session assess running   Total Session Time   Timed Code Treatment Minutes 35   Total Treatment Time (sum of timed and untimed services) 35       "

## 2024-06-26 NOTE — TELEPHONE ENCOUNTER
"Per comment on Labs from 6/25/24:    You have had the \"flip\" where your estradiol level decreases (to single digits typically) and your FSH level increases.  Therefore, I would call you \"menopausal\" having had gone through menopause at this stage.  DO Eloina Foster RN on 6/26/2024 at 10:53 AM    "

## 2024-06-27 RX ORDER — ESTRADIOL 1 MG/1
1 TABLET ORAL DAILY
Qty: 90 TABLET | Refills: 1 | Status: SHIPPED | OUTPATIENT
Start: 2024-06-27 | End: 2024-08-12

## 2024-07-03 ENCOUNTER — MYC MEDICAL ADVICE (OUTPATIENT)
Dept: FAMILY MEDICINE | Facility: OTHER | Age: 44
End: 2024-07-03
Payer: COMMERCIAL

## 2024-07-03 DIAGNOSIS — F90.0 ATTENTION DEFICIT HYPERACTIVITY DISORDER (ADHD), PREDOMINANTLY INATTENTIVE TYPE: ICD-10-CM

## 2024-07-03 RX ORDER — LISDEXAMFETAMINE DIMESYLATE 60 MG/1
60 CAPSULE ORAL DAILY
Qty: 30 CAPSULE | Refills: 0 | Status: SHIPPED | OUTPATIENT
Start: 2024-09-01 | End: 2024-09-11

## 2024-07-03 RX ORDER — LISDEXAMFETAMINE DIMESYLATE 60 MG/1
60 CAPSULE ORAL DAILY
Qty: 30 CAPSULE | Refills: 0 | Status: SHIPPED | OUTPATIENT
Start: 2024-08-02 | End: 2024-09-01

## 2024-07-03 RX ORDER — LISDEXAMFETAMINE DIMESYLATE 60 MG/1
60 CAPSULE ORAL DAILY
Qty: 30 CAPSULE | Refills: 0 | Status: SHIPPED | OUTPATIENT
Start: 2024-07-03 | End: 2024-08-02

## 2024-07-03 NOTE — TELEPHONE ENCOUNTER
[id number] of [place] sent Rx request for the following:      Requested Prescriptions   Pending Prescriptions Disp Refills    lisdexamfetamine (VYVANSE) 60 MG capsule 30 capsule 0     Sig: Take 1 capsule (60 mg) by mouth daily for 30 days       There is no refill protocol information for this order       lisdexamfetamine (VYVANSE) 60 MG capsule 30 capsule 0     Sig: Take 1 capsule (60 mg) by mouth daily for 30 days       There is no refill protocol information for this order       lisdexamfetamine (VYVANSE) 60 MG capsule 30 capsule 0     Sig: Take 1 capsule (60 mg) by mouth daily for 30 days       There is no refill protocol information for this order          Last Prescription Date:   6/25/24  Last Fill Qty/Refills:         15, R-0    Last Office Visit:              6/25/24   Future Office visit:           None    Routing to provider for review. Quick note that the vyvance is working well. Per my chart message: Can you send the rest of the prescription?  The prescription I just filled was for only 14 pills.      Also, I m noticing some possible helpful changes with the estradiol, but my brain fog cognitive issues are still happening. Will the med take awhile to kick in fully or can I raise the dose to a higher amount??  Pati Dominguez RN on 7/3/2024 at 4:19 PM

## 2024-07-24 ENCOUNTER — MYC MEDICAL ADVICE (OUTPATIENT)
Dept: FAMILY MEDICINE | Facility: OTHER | Age: 44
End: 2024-07-24
Payer: COMMERCIAL

## 2024-07-25 NOTE — TELEPHONE ENCOUNTER
6/25/24  LOV with Marilyn for ADHD.    Started on Estradiol 1mg daily.  Due to estradiol/FSH labs.     Patient is asking for an increase.      Future OV with Marilyn on 9/12/24.     Kim Aguirre RN on 7/25/2024 at 7:47 AM

## 2024-08-12 ENCOUNTER — MYC MEDICAL ADVICE (OUTPATIENT)
Dept: FAMILY MEDICINE | Facility: OTHER | Age: 44
End: 2024-08-12
Payer: COMMERCIAL

## 2024-08-12 DIAGNOSIS — R63.5 WEIGHT GAIN: ICD-10-CM

## 2024-08-12 DIAGNOSIS — Z78.0 MENOPAUSE: ICD-10-CM

## 2024-08-12 DIAGNOSIS — R45.4 FEELING IRRITABLE: ICD-10-CM

## 2024-08-12 DIAGNOSIS — Z79.890 HORMONE REPLACEMENT THERAPY: ICD-10-CM

## 2024-08-13 RX ORDER — ESTRADIOL 2 MG/1
2 TABLET ORAL DAILY
Qty: 90 TABLET | Refills: 1 | Status: SHIPPED | OUTPATIENT
Start: 2024-08-13

## 2024-09-11 NOTE — PROGRESS NOTES
Assessment & Plan     1. Attention deficit hyperactivity disorder (ADHD), predominantly inattentive type  Chronic, stable.  Renewed x3 months.  - lisdexamfetamine (VYVANSE) 60 MG capsule; Take 1 capsule (60 mg) by mouth every morning.  Dispense: 30 capsule; Refill: 0  - lisdexamfetamine (VYVANSE) 60 MG capsule; Take 1 capsule (60 mg) by mouth every morning.  Dispense: 30 capsule; Refill: 0  - lisdexamfetamine (VYVANSE) 60 MG capsule; Take 1 capsule (60 mg) by mouth daily.  Dispense: 30 capsule; Refill: 0    2. Upper back pain  Chronic, 2/2 to pendulous breasts.  Trial NSAID x 6 weeks and will follow up.  Supportive bra; continue with yoga and stretching to help with musculature/strengthening.    3. Travel advice encounter  Upcoming travel to [place].  Reviewed [employer] travel website - no malaria risk/needs.  Encouraged [employer], at least #1 prior to travel.  Will return to receive.      MDM:  Prescription drug management    Follow up: 6 weeks for back pain; 3 months for controlled medications.      Joshua Hernadez is a 44 year old, presenting for the following health issues:  Back Pain and Recheck Medication        9/12/2024     2:06 PM   Additional Questions   Roomed by CHETAN Gerard   Accompanied by self     History of Present Illness       Back Pain:  She presents for follow up of back pain. Patient's back pain is a chronic problem.  Location of back pain:  Right upper back, left upper back, right side of neck, left side of neck, right shoulder, left shoulder and other  Description of back pain: cramping, dull ache and other  Back pain spreads: right shoulder, left shoulder, right side of neck and left side of neck    Since patient first noticed back pain, pain is: unchanged  Does back pain interfere with her job:  Yes      She is taking medications regularly.    Back pain:  Upper.  Going on for years.  Seems to be worsening.  Is active with yoga.  Typically worse with previous bra - neck strain/back pain.    "  ADHD Follow-Up (Adult)  Concerns: None; dose is okay  Changes since last visit: Improving, believes the estrogen dosing has been extremely helpful  Taking controlled (daily) medications as prescribed: Yes  Sleep: no problems  Adult ADHD Self-Reporting form given to patient?:  No  Currently in counseling: No; participates in self-directed mindfulness/meditation    Medication Benefits:   Controlled symptoms: Hyperactivity - motor restlessness, Attention span, Distractability, Finishing tasks, Impulse control, Frustration tolerance, and Accepting limits  Uncontrolled symptoms:  None    Medication Side Effects:  Reports:  none  Sleep Problems? no  ++++++++++++++++++++++++++++++++++++++++++++++++    Employer Concerns/Feedback: Stable  Coworker Concerns:   Stable  Home/Family Concerns: Stable      Increased estradiol dose since last visit, now on 2mg daily.  Doing great with dosage.    Upcoming travel to [place]; wondering about vaccines.          Objective    /82   Pulse 108   Temp 98.3  F (36.8  C) (Tympanic)   Resp 16   Ht 1.664 m (5' 5.5\")   Wt 83.1 kg (183 lb 4 oz)   LMP 09/21/2023   SpO2 98%   BMI 30.03 kg/m    Body mass index is 30.03 kg/m .  Physical Exam   GENERAL: alert and no distress  NECK: no adenopathy, no asymmetry, masses, or scars  RESP: lungs clear to auscultation - no rales, rhonchi or wheezes  CV: regular rate and rhythm, normal S1 S2, no S3 or S4, no murmur, click or rub, no peripheral edema  SKIN: no suspicious lesions or rashes  NEURO: Normal strength and tone, mentation intact and speech normal  BACK:  Muscular tenderness to palpation in traps b/l.  No bony abnormalities or tenderness.  CHEST: pendulous breasts  PSYCH: mentation appears normal, affect normal/bright    No results found for any visits on 09/12/24.        Signed Electronically by: Avril Sanders DO    "

## 2024-09-12 ENCOUNTER — OFFICE VISIT (OUTPATIENT)
Dept: FAMILY MEDICINE | Facility: OTHER | Age: 44
End: 2024-09-12
Attending: FAMILY MEDICINE
Payer: COMMERCIAL

## 2024-09-12 VITALS
SYSTOLIC BLOOD PRESSURE: 128 MMHG | WEIGHT: 183.25 LBS | DIASTOLIC BLOOD PRESSURE: 82 MMHG | RESPIRATION RATE: 16 BRPM | BODY MASS INDEX: 29.45 KG/M2 | HEIGHT: 66 IN | OXYGEN SATURATION: 98 % | HEART RATE: 108 BPM | TEMPERATURE: 98.3 F

## 2024-09-12 DIAGNOSIS — M54.9 UPPER BACK PAIN: ICD-10-CM

## 2024-09-12 DIAGNOSIS — Z71.84 TRAVEL ADVICE ENCOUNTER: ICD-10-CM

## 2024-09-12 DIAGNOSIS — F90.0 ATTENTION DEFICIT HYPERACTIVITY DISORDER (ADHD), PREDOMINANTLY INATTENTIVE TYPE: Primary | ICD-10-CM

## 2024-09-12 PROCEDURE — 99214 OFFICE O/P EST MOD 30 MIN: CPT | Performed by: FAMILY MEDICINE

## 2024-09-12 RX ORDER — LISDEXAMFETAMINE DIMESYLATE 60 MG/1
60 CAPSULE ORAL EVERY MORNING
Qty: 30 CAPSULE | Refills: 0 | Status: SHIPPED | OUTPATIENT
Start: 2024-10-31

## 2024-09-12 RX ORDER — LISDEXAMFETAMINE DIMESYLATE 60 MG/1
60 CAPSULE ORAL EVERY MORNING
Qty: 30 CAPSULE | Refills: 0 | Status: SHIPPED | OUTPATIENT
Start: 2024-10-01

## 2024-09-12 RX ORDER — LISDEXAMFETAMINE DIMESYLATE 60 MG/1
60 CAPSULE ORAL DAILY
Qty: 30 CAPSULE | Refills: 0 | Status: SHIPPED | OUTPATIENT
Start: 2024-11-29

## 2024-09-12 ASSESSMENT — PAIN SCALES - GENERAL: PAINLEVEL: MILD PAIN (3)

## 2024-09-12 ASSESSMENT — ANXIETY QUESTIONNAIRES
GAD7 TOTAL SCORE: 5
GAD7 TOTAL SCORE: 5
7. FEELING AFRAID AS IF SOMETHING AWFUL MIGHT HAPPEN: NOT AT ALL
GAD7 TOTAL SCORE: 5
8. IF YOU CHECKED OFF ANY PROBLEMS, HOW DIFFICULT HAVE THESE MADE IT FOR YOU TO DO YOUR WORK, TAKE CARE OF THINGS AT HOME, OR GET ALONG WITH OTHER PEOPLE?: SOMEWHAT DIFFICULT

## 2024-09-12 ASSESSMENT — PATIENT HEALTH QUESTIONNAIRE - PHQ9
SUM OF ALL RESPONSES TO PHQ QUESTIONS 1-9: 3
SUM OF ALL RESPONSES TO PHQ QUESTIONS 1-9: 3
10. IF YOU CHECKED OFF ANY PROBLEMS, HOW DIFFICULT HAVE THESE PROBLEMS MADE IT FOR YOU TO DO YOUR WORK, TAKE CARE OF THINGS AT HOME, OR GET ALONG WITH OTHER PEOPLE: NOT DIFFICULT AT ALL

## 2024-09-12 NOTE — NURSING NOTE
"Chief Complaint   Patient presents with    Back Pain    Recheck Medication       Initial /82   Pulse 108   Temp 98.3  F (36.8  C) (Tympanic)   Resp 16   Ht 1.664 m (5' 5.5\")   Wt 83.1 kg (183 lb 4 oz)   LMP 09/21/2023   SpO2 98%   BMI 30.03 kg/m   Estimated body mass index is 30.03 kg/m  as calculated from the following:    Height as of this encounter: 1.664 m (5' 5.5\").    Weight as of this encounter: 83.1 kg (183 lb 4 oz).  Medication Review: complete    The next two questions are to help us understand your food security.  If you are feeling you need any assistance in this area, we have resources available to support you today.          1/3/2024   SDOH- Food Insecurity   Within the past 12 months, did you worry that your food would run out before you got money to buy more? N   Within the past 12 months, did the food you bought just not last and you didn t have money to get more? N            Health Care Directive:  Patient does not have a Health Care Directive or Living Will: Discussed advance care planning with patient; information given to patient to review.    Rajani Ford LPN      "

## 2024-09-13 DIAGNOSIS — F90.0 ATTENTION DEFICIT HYPERACTIVITY DISORDER (ADHD), PREDOMINANTLY INATTENTIVE TYPE: Primary | ICD-10-CM

## 2024-09-13 NOTE — TELEPHONE ENCOUNTER
Per pharmacy: out of generic vyvanse and unsure when it will be in stock. Have brand name in stock. We would need a new prescription sent in with a NOAH 1 in order to dispense those.     Called pharmacy and they state the generic is going on a long term back order and would like the current future prescriptions changed to brand name.     Sandy Dooley RN on 9/13/2024 at 11:29 AM

## 2024-09-16 ENCOUNTER — ALLIED HEALTH/NURSE VISIT (OUTPATIENT)
Dept: FAMILY MEDICINE | Facility: OTHER | Age: 44
End: 2024-09-16
Attending: FAMILY MEDICINE
Payer: COMMERCIAL

## 2024-09-16 VITALS — TEMPERATURE: 98.7 F

## 2024-09-16 DIAGNOSIS — Z23 ENCOUNTER FOR IMMUNIZATION: Primary | ICD-10-CM

## 2024-09-16 PROCEDURE — 90472 IMMUNIZATION ADMIN EACH ADD: CPT

## 2024-09-16 PROCEDURE — 90471 IMMUNIZATION ADMIN: CPT

## 2024-09-16 PROCEDURE — 90636 HEP A/HEP B VACC ADULT IM: CPT

## 2024-09-16 PROCEDURE — 90691 TYPHOID VACCINE IM: CPT

## 2024-09-16 NOTE — PROGRESS NOTES
Prior to immunization administration, verified patients identity using patient s name and date of birth. Please see Immunization Activity for additional information.     Is the patient's temperature normal (100.5 or less)? Yes     Patient MEETS CRITERIA. PROCEED with vaccine administration.          9/16/2024   Hepatitis B   Have you had a serious reaction to a hepatitis B vaccine or to something in a hepatitis B vaccine, including yeast)? No   Are you getting kidney dialysis (either peritoneal or hemodialysis)? No   Do you have an allergy to latex? No            Patient MEETS CRITERIA. PROCEED with vaccine administration.        Patient instructed to remain in clinic for 15 minutes afterwards, and to report any adverse reactions.      Educated patient to stay 15 minute for any reaction, Patient left AMA.    Link to Ancillary Visit Immunization Standing Orders SmartSet     Screening performed by Kaveh Sun RN on 9/16/2024 at 1:53 PM.

## 2024-09-18 RX ORDER — LISDEXAMFETAMINE DIMESYLATE 60 MG/1
60 CAPSULE ORAL DAILY
Qty: 30 CAPSULE | Refills: 0 | Status: SHIPPED | OUTPATIENT
Start: 2024-10-01 | End: 2024-10-31

## 2024-09-18 RX ORDER — LISDEXAMFETAMINE DIMESYLATE 60 MG/1
60 CAPSULE ORAL DAILY
Qty: 30 CAPSULE | Refills: 0 | Status: SHIPPED | OUTPATIENT
Start: 2024-10-31 | End: 2024-11-30

## 2024-09-18 RX ORDER — LISDEXAMFETAMINE DIMESYLATE 60 MG/1
60 CAPSULE ORAL DAILY
Qty: 30 CAPSULE | Refills: 0 | Status: SHIPPED | OUTPATIENT
Start: 2024-11-29 | End: 2024-12-29

## 2024-09-18 NOTE — TELEPHONE ENCOUNTER
[id number] sent Rx request for the following:      Requested Prescriptions   Pending Prescriptions Disp Refills    VYVANSE 60 MG capsule 30 capsule 0     Sig: Take 1 capsule (60 mg) by mouth daily.       There is no refill protocol information for this order     Last Prescription Date:   10/1/24  Last Fill Qty/Refills:         30, R-0            VYVANSE 60 MG capsule 30 capsule 0     Sig: Take 1 capsule (60 mg) by mouth daily.       There is no refill protocol information for this order     Last Prescription Date:   10/31/24  Last Fill Qty/Refills:         30, R-0          VYVANSE 60 MG capsule 30 capsule 0     Sig: Take 1 capsule (60 mg) by mouth daily.       There is no refill protocol information for this order          Last Prescription Date:   11/29/24  Last Fill Qty/Refills:         30, R-0    Last Office Visit:              9/12/24   Future Office visit:           none    Routing refill request to provider for review/approval because:  Drug not on [place] [facility] [place]     Ana Shanks RN on 9/18/2024 at 9:22 AM

## 2024-09-27 ENCOUNTER — HOSPITAL ENCOUNTER (OUTPATIENT)
Dept: BONE DENSITY | Facility: OTHER | Age: 44
Discharge: HOME | End: 2024-09-27
Attending: FAMILY MEDICINE | Admitting: FAMILY MEDICINE
Payer: COMMERCIAL

## 2024-09-27 DIAGNOSIS — Z79.890 HORMONE REPLACEMENT THERAPY: ICD-10-CM

## 2024-09-27 DIAGNOSIS — Z78.0 MENOPAUSE: ICD-10-CM

## 2024-09-27 PROCEDURE — 77080 DXA BONE DENSITY AXIAL: CPT

## 2024-11-19 NOTE — MR AVS SNAPSHOT
After Visit Summary   9/4/2018    Maricarmen Ornelas    MRN: [id number]           Patient Information     Date Of Birth          1980        Visit Information        Provider Department      9/4/2018 12:45 PM Woodrow Hurtado MD [facility]        Today's Diagnoses     Elderly multigravida in third trimester    -  1    Dichorionic diamniotic twin pregnancy in third trimester           Follow-ups after your visit        Your next 10 appointments already scheduled     Sep 05, 2018   Procedure with Woodrow Hurtado MD   [facility] ([facility])    [address]  [place] [address]   [phone]            Sep 20, 2018  9:30 AM CDT   SHORT with Woodrow Hurtado MD   [facility] ([facility])    [address]  [facility] [address]   [phone]            Oct 18, 2018  9:15 AM CDT   Post Partum with Woodrow Hurtado MD   [facility] ([facility])    [address]  [facility] [address]   [phone]              Who to contact     If you have questions or need follow up information about today's clinic visit or your schedule please contact [facility] directly at [phone].  Normal or non-critical lab and imaging results will be communicated to you by MyChart, letter or phone within 4 business days after the clinic has received the results. If you do not hear from us within 7 days, please contact the clinic through MyChart or phone. If you have a critical or abnormal lab result, we will notify you by phone as soon as possible.  Submit refill requests through [employer] or call your pharmacy and they will forward the refill request to us. Please allow 3 business days for your refill to be completed.          Additional Information About Your Visit        Care EveryWhere ID     This is your Care EveryWhere ID.  This could be used by other organizations to access your [place] medical records  [id number]        Your Vitals Were     Temperature Last Period BMI (Body Mass Index)             98.3  F (36.8  C) 12/17/2017 36.38 kg/m2          Blood Pressure from Last 3 Encounters:   09/04/18 128/86   08/28/18 119/87   08/27/18 (!) 146/92    Weight from Last 3 Encounters:   09/04/18 102.2 kg (225 lb 6.4 oz)   08/28/18 101.6 kg (224 lb)   08/27/18 101.6 kg (224 lb)              We Performed the Following     FETAL NON-STRESS TEST     UA reflex to Microscopic and Culture        Primary Care Provider Office Phone # Fax #    Analy CRAFT Siva Bang [phone] [address] [facility] [address]        Equal Access to Services     [facility] : Hadii lizbeth jovel Sojuliette, waaxda luqadaha, qaybta kaalmada flako, joey chavez . So [facility] [phone].    ATENCIÓN: Si habla español, tiene a jiang disposición servicios gratuitos de asistencia lingüística. Llame al [phone].    We comply with applicable federal civil rights laws and [place] laws. We do not discriminate on the basis of race, color, national origin, age, disability, sex, sexual orientation, or gender identity.            Thank you!     Thank you for choosing [facility] AND [facility]  for your care. Our goal is always to provide you with excellent care. Hearing back from our patients is one way we can continue to improve our services. Please take a few minutes to complete the written survey that you may receive in the mail after your visit with us. Thank you!             Your Updated Medication List - Protect others around you: Learn how to safely use, store and throw away your medicines at www.disposemymeds.org.          This list is accurate as of 9/4/18  1:07 PM.  Always use your most recent med list.                   Brand Name Dispense Instructions for use Diagnosis    CLARITIN 10 MG  tablet   Generic drug:  loratadine     90 tablet    Take 1 tablet (10 mg) by mouth daily        [facility] PRENATAL 28-0.8 MG Tabs      Take 1 tablet by mouth        ferrous gluconate 324 (38 Fe) MG tablet    FERGON    100 tablet    Take 1 tablet (324 mg) by mouth 2 times daily (with meals)    Anemia affecting pregnancy in second trimester       order for DME     1 Device    Equipment being ordered: Blood pressure cuff    Dichorionic diamniotic twin pregnancy in second trimester       order for DME     1 Device    Equipment being ordered: maternity support belt    Elderly multigravida in second trimester, Dichorionic diamniotic twin pregnancy in second trimester       ranitidine 150 MG/10ML syrup    Zantac     Take 4 mg/kg/day by mouth 2 times daily        SYNTHROID 75 MCG tablet   Generic drug:  levothyroxine           UNISOM 25 MG Tabs tablet   Generic drug:  doxylamine     14 each    Take 2 tablets (50 mg) by mouth nightly as needed           Adult

## 2024-11-29 NOTE — TELEPHONE ENCOUNTER
Patient Information     Patient Name MRN Maricarmen Arteaga [id number] Female 1980      Telephone Encounter by Malathi Luz RN at 2018  9:19 AM     Author:  Malathi Luz RN Service:  (none) Author Type:  NURS- Registered Nurse     Filed:  2018  9:23 AM Encounter Date:  2018 Status:  Signed     :  Malathi Luz RN (NURS- Registered Nurse)            Hypothyroidism  Office visit in the past 12 months or per provider note.  Last visit with NONA MEYERS was on: 2017 in [facility]  Next visit with NONA MEYERS is on: No future appointment listed with this provider  Next visit with Family Practice is on: No future appointment listed in this department  Lab testing requirements:  TSH annually  TSH (uIU/mL)    Date Value   2017 1.81   Max refill for 12 months from last office visit or per provider note.  Prescription refilled per RN Medication Refill Policy.................... Malathi Luz RN ....................  2018   9:22 AM               unknown

## 2024-12-01 ENCOUNTER — MYC MEDICAL ADVICE (OUTPATIENT)
Dept: FAMILY MEDICINE | Facility: OTHER | Age: 44
End: 2024-12-01
Payer: COMMERCIAL

## 2024-12-01 DIAGNOSIS — F90.0 ATTENTION DEFICIT HYPERACTIVITY DISORDER (ADHD), PREDOMINANTLY INATTENTIVE TYPE: ICD-10-CM

## 2024-12-02 RX ORDER — LISDEXAMFETAMINE DIMESYLATE 60 MG/1
60 CAPSULE ORAL DAILY
Qty: 30 CAPSULE | Refills: 0 | Status: SHIPPED | OUTPATIENT
Start: 2024-12-02

## 2024-12-02 NOTE — TELEPHONE ENCOUNTER
As she does have follow up with me next week I will refill today.   FLAVIO ROMERO CNP on 12/2/2024 at 10:39 AM

## 2024-12-02 NOTE — TELEPHONE ENCOUNTER
Pt is requesting Rx for Vyvanse be sent in to [facility]. She has about a week left.    Vyvanse Rx to start on 11/29 was sent to [facility].     Pt has upcoming appt on 12/101 for med check (synthroid, Vyvanse, Estradiol)    Sampson'd up order for Vyvanse to be sent to [facility]. Recommend waiting until upcoming appt for further med refills.     Routing to provider to review and respond.  Eloina Doyle RN on 12/2/2024 at 9:43 AM

## 2024-12-02 NOTE — TELEPHONE ENCOUNTER
Can consider sending this request to ANN Jarquin as she is seeing her on 12/10 but I do not know the patient and being it is a controlled substance I am going to decline this one. If she is without this medication for a couple of days, that is not an emergency situation .     Xenia Goodwin NP on 12/2/2024 at 10:06 AM

## 2024-12-02 NOTE — TELEPHONE ENCOUNTER
Has current prescription that was to start 11/29. It needs to move to [facility].     I cannot move it without a signature from a provider.     Would you be willing to send in? She is seeing you on 12/10.    Routing to provider to review and respond.  Eloina Doyle RN on 12/2/2024 at 10:09 AM

## 2024-12-10 ENCOUNTER — OFFICE VISIT (OUTPATIENT)
Dept: FAMILY MEDICINE | Facility: OTHER | Age: 44
End: 2024-12-10
Payer: COMMERCIAL

## 2024-12-10 VITALS
HEIGHT: 66 IN | HEART RATE: 65 BPM | BODY MASS INDEX: 28.51 KG/M2 | OXYGEN SATURATION: 99 % | WEIGHT: 177.4 LBS | DIASTOLIC BLOOD PRESSURE: 78 MMHG | TEMPERATURE: 96.9 F | RESPIRATION RATE: 12 BRPM | SYSTOLIC BLOOD PRESSURE: 128 MMHG

## 2024-12-10 DIAGNOSIS — F90.0 ATTENTION DEFICIT HYPERACTIVITY DISORDER (ADHD), PREDOMINANTLY INATTENTIVE TYPE: Primary | ICD-10-CM

## 2024-12-10 DIAGNOSIS — Z78.0 MENOPAUSE: ICD-10-CM

## 2024-12-10 DIAGNOSIS — E03.9 HYPOTHYROIDISM, UNSPECIFIED TYPE: ICD-10-CM

## 2024-12-10 DIAGNOSIS — Z79.890 HORMONE REPLACEMENT THERAPY: ICD-10-CM

## 2024-12-10 RX ORDER — ESTRADIOL 2 MG/1
2 TABLET ORAL DAILY
Qty: 90 TABLET | Refills: 1 | Status: CANCELLED | OUTPATIENT
Start: 2024-12-10

## 2024-12-10 RX ORDER — ESTRADIOL 2 MG/1
2 TABLET ORAL DAILY
Qty: 90 TABLET | Refills: 1 | Status: SHIPPED | OUTPATIENT
Start: 2024-12-10 | End: 2024-12-11

## 2024-12-10 RX ORDER — LEVOTHYROXINE SODIUM 75 MCG
75 TABLET ORAL DAILY
Qty: 90 TABLET | Refills: 4 | Status: CANCELLED | OUTPATIENT
Start: 2024-12-10

## 2024-12-10 RX ORDER — LEVOTHYROXINE SODIUM 75 MCG
75 TABLET ORAL DAILY
Qty: 90 TABLET | Refills: 4 | Status: SHIPPED | OUTPATIENT
Start: 2024-12-10 | End: 2024-12-11

## 2024-12-10 RX ORDER — LISDEXAMFETAMINE DIMESYLATE 60 MG/1
60 CAPSULE ORAL EVERY MORNING
Qty: 30 CAPSULE | Refills: 0 | Status: SHIPPED | OUTPATIENT
Start: 2024-12-10

## 2024-12-10 ASSESSMENT — PAIN SCALES - GENERAL: PAINLEVEL_OUTOF10: NO PAIN (0)

## 2024-12-10 NOTE — NURSING NOTE
"Patient presents to clinic for medication refills. Patient has no other concerns today.    Chief Complaint   Patient presents with    Medication Refill       FOOD SECURITY SCREENING QUESTIONS  Hunger Vital Signs:  Within the past 12 months we worried whether our food would run out before we got money to buy more. Never  Within the past 12 months the food we bought just didn't last and we didn't have money to get more. Never  Tracey Dearmon 12/10/2024 11:40 AM      Initial BP (!) 140/90 (BP Location: Left arm, Patient Position: Sitting, Cuff Size: Adult Regular)   Pulse 65   Temp 96.9  F (36.1  C) (Temporal)   Resp 12   Ht 1.664 m (5' 5.5\")   Wt 80.5 kg (177 lb 6.4 oz)   LMP 09/21/2023   SpO2 99%   BMI 29.07 kg/m   Estimated body mass index is 29.07 kg/m  as calculated from the following:    Height as of this encounter: 1.664 m (5' 5.5\").    Weight as of this encounter: 80.5 kg (177 lb 6.4 oz).  Medication Reconciliation: complete    Tracey Dearmon  "

## 2024-12-10 NOTE — PROGRESS NOTES
Assessment & Plan   Problem List Items Addressed This Visit       Attention deficit disorder - Primary    Relevant Medications    lisdexamfetamine (VYVANSE) 60 MG capsule    lisdexamfetamine (VYVANSE) 60 MG capsule     Other Visit Diagnoses       Menopause        Relevant Medications    estradiol (ESTRACE) 2 MG tablet    Hormone replacement therapy        Relevant Medications    estradiol (ESTRACE) 2 MG tablet    Hypothyroidism, unspecified type        Relevant Medications    SYNTHROID 75 MCG tablet        Patient presents for medication check.  She is on controlled substance Vyvanse for ADD.  Currently on 60 mg, she reports that this has been working well for her, denies any side effects.  She has been stable.  Would like to continue current dosing.  PDMP reviewed.  Refill sent to the pharmacy.    She has been on estradiol which has been helping with menopausal symptoms, stable, she needs refill.  Sent to pharmacy.    Requesting refill of Synthroid, TSH is have been stable at current dosing of 75 mcg.  Refill sent.      Return if symptoms worsen or fail to improve.      Joshua Hernadez is a 44 year old, presenting for the following health issues:  Medication Refill      12/10/2024    11:35 AM   Additional Questions   Roomed by Tracey HUSSEIN LPN     Medication Refill    History of Present Illness       Reason for visit:  Med check    She eats 4 or more servings of fruits and vegetables daily.She consumes 1 sweetened beverage(s) daily.She exercises with enough effort to increase her heart rate 30 to 60 minutes per day.  She exercises with enough effort to increase her heart rate 6 days per week.   She is taking medications regularly.     ADHD Follow-Up (Adult)  Concerns: None; dose is okay  Changes since last visit: Improving, believes the estrogen dosing has been extremely helpful  Taking controlled (daily) medications as prescribed: Yes  Sleep: no problems  Adult ADHD Self-Reporting form given to patient?:   "No  Currently in counseling: No; participates in self-directed mindfulness/meditation     Medication Benefits:   Controlled symptoms: Attention span, Distractability, Finishing tasks, Impulse control, Frustration tolerance, and Accepting limits  Uncontrolled symptoms:  None     Medication Side Effects:  Reports:  none  Sleep Problems? No    Employer Concerns/Feedback: Stable  Coworker Concerns:   Stable  Home/Family Concerns: Stable      Review of Systems  Constitutional, HEENT, cardiovascular, pulmonary, GI, , musculoskeletal, neuro, skin, endocrine and psych systems are negative, except as otherwise noted.      Objective    BP (!) 140/90 (BP Location: Left arm, Patient Position: Sitting, Cuff Size: Adult Regular)   Pulse 65   Temp 96.9  F (36.1  C) (Temporal)   Resp 12   Ht 1.664 m (5' 5.5\")   Wt 80.5 kg (177 lb 6.4 oz)   LMP 09/21/2023   SpO2 99%   BMI 29.07 kg/m    Body mass index is 29.07 kg/m .  Physical Exam   GENERAL: alert and no distress  EYES: Eyes grossly normal to inspection, PERRL and conjunctivae and sclerae normal  HENT: ear canals and TM's normal, nose and mouth without ulcers or lesions  NECK: no adenopathy, no asymmetry, masses, or scars  RESP: lungs clear to auscultation - no rales, rhonchi or wheezes  CV: regular rate and rhythm, normal S1 S2, no S3 or S4, no murmur, click or rub, no peripheral edema  ABDOMEN: soft, nontender, no hepatosplenomegaly, no masses and bowel sounds normal  MS: no gross musculoskeletal defects noted, no edema  SKIN: no suspicious lesions or rashes  NEURO: Normal strength and tone, mentation intact and speech normal  PSYCH: mentation appears normal, affect normal/bright        Signed Electronically by: FLAVIO ROMERO CNP    "

## 2024-12-11 ENCOUNTER — TELEPHONE (OUTPATIENT)
Dept: FAMILY MEDICINE | Facility: OTHER | Age: 44
End: 2024-12-11
Payer: COMMERCIAL

## 2024-12-11 DIAGNOSIS — E03.9 HYPOTHYROIDISM, UNSPECIFIED TYPE: ICD-10-CM

## 2024-12-11 DIAGNOSIS — Z79.890 HORMONE REPLACEMENT THERAPY: ICD-10-CM

## 2024-12-11 DIAGNOSIS — Z78.0 MENOPAUSE: ICD-10-CM

## 2024-12-11 RX ORDER — LEVOTHYROXINE SODIUM 75 MCG
75 TABLET ORAL DAILY
Qty: 90 TABLET | Refills: 4 | Status: SHIPPED | OUTPATIENT
Start: 2024-12-11

## 2024-12-11 RX ORDER — ESTRADIOL 2 MG/1
2 TABLET ORAL DAILY
Qty: 90 TABLET | Refills: 1 | Status: SHIPPED | OUTPATIENT
Start: 2024-12-11

## 2024-12-11 NOTE — TELEPHONE ENCOUNTER
Can you sign for these meds to be sent to the local pharmacy per pt request. In Mame Jarquin's  NP absence?  The meds are pended with the correct pharmacy.  Sugey Sanz LPN on 12/11/2024 at 10:56 AM

## 2024-12-11 NOTE — TELEPHONE ENCOUNTER
[facility] mail service has been notified to [facility] the meds.  Pt notified of meds sent to the preferred pharmacy.  Sugey Sanz LPN on 12/11/2024 at 1:25 PM

## 2024-12-11 NOTE — TELEPHONE ENCOUNTER
Patient calling in stating medications were sent to the wrong pharmacy.They were sent to her mail order pharmacy. She would like these to be cancelled right away so she can get them at the local [facility]. Meds to be cancelled from mail order pharmacy:   Synthroid and estradiol.    Coco Arias on 12/11/2024 at 10:19 AM

## 2025-01-28 ENCOUNTER — OFFICE VISIT (OUTPATIENT)
Dept: FAMILY MEDICINE | Facility: OTHER | Age: 45
End: 2025-01-28
Attending: FAMILY MEDICINE
Payer: COMMERCIAL

## 2025-01-28 VITALS
BODY MASS INDEX: 29.09 KG/M2 | HEART RATE: 78 BPM | TEMPERATURE: 97.5 F | DIASTOLIC BLOOD PRESSURE: 76 MMHG | WEIGHT: 181 LBS | HEIGHT: 66 IN | SYSTOLIC BLOOD PRESSURE: 128 MMHG | OXYGEN SATURATION: 98 % | RESPIRATION RATE: 16 BRPM

## 2025-01-28 DIAGNOSIS — F90.0 ATTENTION DEFICIT HYPERACTIVITY DISORDER (ADHD), PREDOMINANTLY INATTENTIVE TYPE: ICD-10-CM

## 2025-01-28 DIAGNOSIS — Z00.00 ROUTINE HISTORY AND PHYSICAL EXAMINATION OF ADULT: Primary | ICD-10-CM

## 2025-01-28 DIAGNOSIS — E03.9 HYPOTHYROIDISM, UNSPECIFIED TYPE: ICD-10-CM

## 2025-01-28 DIAGNOSIS — M54.9 UPPER BACK PAIN: ICD-10-CM

## 2025-01-28 DIAGNOSIS — Z12.31 VISIT FOR SCREENING MAMMOGRAM: ICD-10-CM

## 2025-01-28 DIAGNOSIS — Z79.890 HORMONE REPLACEMENT THERAPY: ICD-10-CM

## 2025-01-28 DIAGNOSIS — N62 HYPERTROPHY OF BREAST: ICD-10-CM

## 2025-01-28 DIAGNOSIS — Z78.0 MENOPAUSE: ICD-10-CM

## 2025-01-28 RX ORDER — LISDEXAMFETAMINE DIMESYLATE 60 MG/1
60 CAPSULE ORAL EVERY MORNING
Qty: 30 CAPSULE | Refills: 0 | Status: SHIPPED | OUTPATIENT
Start: 2025-03-28

## 2025-01-28 RX ORDER — LISDEXAMFETAMINE DIMESYLATE 60 MG/1
60 CAPSULE ORAL EVERY MORNING
Qty: 30 CAPSULE | Refills: 0 | Status: SHIPPED | OUTPATIENT
Start: 2025-02-27

## 2025-01-28 SDOH — HEALTH STABILITY: PHYSICAL HEALTH: ON AVERAGE, HOW MANY DAYS PER WEEK DO YOU ENGAGE IN MODERATE TO STRENUOUS EXERCISE (LIKE A BRISK WALK)?: 7 DAYS

## 2025-01-28 ASSESSMENT — ANXIETY QUESTIONNAIRES
GAD7 TOTAL SCORE: 7
GAD7 TOTAL SCORE: 7
1. FEELING NERVOUS, ANXIOUS, OR ON EDGE: SEVERAL DAYS
6. BECOMING EASILY ANNOYED OR IRRITABLE: SEVERAL DAYS
2. NOT BEING ABLE TO STOP OR CONTROL WORRYING: SEVERAL DAYS
7. FEELING AFRAID AS IF SOMETHING AWFUL MIGHT HAPPEN: SEVERAL DAYS
8. IF YOU CHECKED OFF ANY PROBLEMS, HOW DIFFICULT HAVE THESE MADE IT FOR YOU TO DO YOUR WORK, TAKE CARE OF THINGS AT HOME, OR GET ALONG WITH OTHER PEOPLE?: SOMEWHAT DIFFICULT
3. WORRYING TOO MUCH ABOUT DIFFERENT THINGS: SEVERAL DAYS
5. BEING SO RESTLESS THAT IT IS HARD TO SIT STILL: SEVERAL DAYS
GAD7 TOTAL SCORE: 7
IF YOU CHECKED OFF ANY PROBLEMS ON THIS QUESTIONNAIRE, HOW DIFFICULT HAVE THESE PROBLEMS MADE IT FOR YOU TO DO YOUR WORK, TAKE CARE OF THINGS AT HOME, OR GET ALONG WITH OTHER PEOPLE: SOMEWHAT DIFFICULT
4. TROUBLE RELAXING: SEVERAL DAYS
7. FEELING AFRAID AS IF SOMETHING AWFUL MIGHT HAPPEN: SEVERAL DAYS

## 2025-01-28 ASSESSMENT — PAIN SCALES - GENERAL: PAINLEVEL_OUTOF10: NO PAIN (0)

## 2025-01-28 ASSESSMENT — SOCIAL DETERMINANTS OF HEALTH (SDOH): HOW OFTEN DO YOU GET TOGETHER WITH FRIENDS OR RELATIVES?: ONCE A WEEK

## 2025-01-28 NOTE — NURSING NOTE
"Chief Complaint   Patient presents with    Physical       Initial /76   Pulse 78   Temp 97.5  F (36.4  C) (Tympanic)   Resp 16   Ht 1.664 m (5' 5.5\")   Wt 82.1 kg (181 lb)   LMP 09/21/2023   SpO2 98%   BMI 29.66 kg/m   Estimated body mass index is 29.66 kg/m  as calculated from the following:    Height as of this encounter: 1.664 m (5' 5.5\").    Weight as of this encounter: 82.1 kg (181 lb).  Medication Review: complete    The next two questions are to help us understand your food security.  If you are feeling you need any assistance in this area, we have resources available to support you today.          1/28/2025   SDOH- Food Insecurity   Within the past 12 months, did you worry that your food would run out before you got money to buy more? N   Within the past 12 months, did the food you bought just not last and you didn t have money to get more? N         Health Care Directive:  Patient does not have a Health Care Directive: Discussed advance care planning with patient; information given to patient to review.    Rajani Ford LPN      "

## 2025-01-28 NOTE — PROGRESS NOTES
"Preventive Care Visit  [facility] AND [facility]  Avril Sanders DO, Family Medicine  Jan 28, 2025      Assessment & Plan     1. Routine history and physical examination of adult (Primary)    2. Attention deficit hyperactivity disorder (ADHD), predominantly inattentive type  Chronic, stable.  Is officially certified for  and has regular classes scheduled now - which is extremely helpful for mood/energy.  However, continues to benefit from Vyvanse for day to day activities, managing children's schedules - all much more successfully.  This success helps her mental health and overall functioning.  Refilled x 3 months.  - lisdexamfetamine (VYVANSE) 60 MG capsule; Take 1 capsule (60 mg) by mouth every morning.  Dispense: 30 capsule; Refill: 0  - lisdexamfetamine (VYVANSE) 60 MG capsule; Take 1 capsule (60 mg) by mouth every morning.  Dispense: 30 capsule; Refill: 0  - lisdexamfetamine (VYVANSE) 60 MG capsule; Take 1 capsule (60 mg) by mouth every morning.  Dispense: 30 capsule; Refill: 0    3. Visit for screening mammogram  Will order for summer.  - MA Screen Bilateral w/Brendon; Future    4. Hypertrophy of breast  5. Upper back pain  Chronic, unchanged with weight reduction, increased in low impact exercises, NSAIDs.  Would be interested in future to be evaluated for breast reduction options - will hold off for now due to her work schedule.    6. Hormone replacement therapy  Chronic, stable.  No changes; continues on estradiol 2mg daily.    7. Hypothyroidism, unspecified type  Chronic, recent labs in June 2024 without concerns for dosing.   Updated # of refills today.      Patient has been advised of split billing requirements and indicates understanding: Yes        BMI  Estimated body mass index is 29.66 kg/m  as calculated from the following:    Height as of this encounter: 1.664 m (5' 5.5\").    Weight as of this encounter: 82.1 kg (181 lb).   Weight management plan: Discussed healthy diet and " exercise guidelines  Improving    Counseling  Appropriate preventive services were addressed with this patient via screening, questionnaire, or discussion as appropriate for fall prevention, nutrition, physical activity, Tobacco-use cessation, social engagement, weight loss and cognition.  Checklist reviewing preventive services available has been given to the patient.  Reviewed patient's diet, addressing concerns and/or questions.   She is at risk for psychosocial distress and has been provided with information to reduce risk.     Follow up: by end of May for ADHD refills.      Joshua Hernadez is a 44 year old, presenting for the following:  Physical        1/28/2025     3:33 PM   Additional Questions   Roomed by CHETAN Gerard   Accompanied by self          HPI    Back pain:  Upper.  Pulling, sore/achy.  Occasional gripping or spasm like - but not as frequent as the achy pain.  Going on for years.  Seems to be worsening overall.  Is active with yoga; and now doing it regularly since she has become certified as a  since our last visit.  Typically worse with previous bra - neck strain/back pain.  Improved with new bras, but not looking forward to summer (they will be hot as they are thick).  Has also tried regular NSAID therapy without improvement.  Breast size contributing.       ADHD Follow-Up (Adult)  Concerns: None; dose is okay  Changes since last visit: Doing well  Taking controlled (daily) medications as prescribed: Yes  Sleep: no problems  Adult ADHD Self-Reporting form given to patient?:  No  Currently in counseling: No; participates in self-directed mindfulness/meditation     Medication Benefits:   Controlled symptoms: Hyperactivity - motor restlessness, Attention span, Distractability, Finishing tasks, Impulse control, Frustration tolerance, and Accepting limits  Uncontrolled symptoms:  None     Medication Side Effects:  Reports:  none  Sleep Problems?  no  ++++++++++++++++++++++++++++++++++++++++++++++++     Employer Concerns/Feedback: Stable  Coworker Concerns:   Stable  Home/Family Concerns: Stable    Health Care Directive  Patient does not have a Health Care Directive: Discussed advance care planning with patient; information given to patient to review.      1/28/2025   General Health   How would you rate your overall physical health? Good   Feel stress (tense, anxious, or unable to sleep) Rather much   (!) STRESS CONCERN      1/28/2025   Nutrition   Three or more servings of calcium each day? Yes   Diet: Vegetarian/vegan   How many servings of fruit and vegetables per day? 4 or more   How many sweetened beverages each day? 0-1         1/28/2025   Exercise   Days per week of moderate/strenous exercise 7 days         1/28/2025   Social Factors   Frequency of gathering with friends or relatives Once a week   Worry food won't last until get money to buy more No   Food not last or not have enough money for food? No   Do you have housing? (Housing is defined as stable permanent housing and does not include staying ouside in a car, in a tent, in an abandoned building, in an overnight shelter, or couch-surfing.) Yes   Are you worried about losing your housing? No   Lack of transportation? No   Unable to get utilities (heat,electricity)? No         1/28/2025   Dental   Dentist two times every year? Yes         1/28/2025   TB Screening   Were you born outside of the [place]? No         Today's PHQ-2 Score:       1/28/2025     3:13 PM   PHQ-2 ( 1999 [employer])   Q1: Little interest or pleasure in doing things 1   Q2: Feeling down, depressed or hopeless 1   PHQ-2 Score 2    Q1: Little interest or pleasure in doing things Several days   Q2: Feeling down, depressed or hopeless Several days   PHQ-2 Score 2       Patient-reported           1/28/2025   Substance Use   Alcohol more than 3/day or more than 7/wk Not Applicable   Do you use any other substances recreationally? No      Social History     Tobacco Use    Smoking status: Never    Smokeless tobacco: Never   Vaping Use    Vaping status: Never Used   Substance Use Topics    Alcohol use: Not Currently    Drug use: Yes     Types: Marijuana     Comment: medical marijuana, CBD drinks occasionally           6/19/2024   LAST FHS-7 RESULTS   1st degree relative breast or ovarian cancer No   Any relative bilateral breast cancer No   Any male have breast cancer No   Any ONE woman have BOTH breast AND ovarian cancer No   Any woman with breast cancer before 50yrs No   2 or more relatives with breast AND/OR ovarian cancer No   2 or more relatives with breast AND/OR bowel cancer No        Mammogram Screening - Mammogram every 1-2 years updated in Health Maintenance based on mutual decision making        1/28/2025   STI Screening   New sexual partner(s) since last STI/HIV test? No     History of abnormal Pap smear: Status post hysterectomy with removal of cervix and no history of CIN2 or greater or cervical cancer. Health Maintenance and Surgical History updated.        Latest Ref Rng & Units 10/18/2022     4:29 PM   PAP / HPV   PAP  Negative for Intraepithelial Lesion or Malignancy (NILM)    HPV 16 DNA Negative Negative    HPV 18 DNA Negative Negative    Other HR HPV Negative Negative      ASCVD Risk   The 10-year ASCVD risk score (Anneliese VIDES, et al., 2019) is: 0.5%    Values used to calculate the score:      Age: 44 years      Sex: Female      Is Non- : No      Diabetic: No      Tobacco smoker: No      Systolic Blood Pressure: 128 mmHg      Is BP treated: No      HDL Cholesterol: 57 mg/dL      Total Cholesterol: 178 mg/dL       Reviewed and updated as needed this visit by Provider   Tobacco  Allergies  Meds  Problems  Med Hx  Surg Hx  Fam Hx            LMP/Periods/Contraception: NA; s/p hyst 9/2023 (adenomyosis) - L ovary remains  Risk for STI?: No  Last pap: 10/18/22, Neg co-testing. Now s/p hyst, no  longer required.  Any hx of abnormal paps:  NA  FH of early CA?: No  Cholesterol/DM concerns/screening: No; normal in .  Tobacco?: No  Calcium intake: No  DEXA: 24, normal.  10yr.  Last mammo: 24, birads1.  Yearly.  Colon screening: @ 45  Tdap 24; flu yearly; Shingrix @ 50; RSV @ 60; PNA @ 65.  Covid x5 complete; candidate for boosters.  Due for #2 of Twinrix.    Past Medical History:   Diagnosis Date    Dichorionic diamniotic twin pregnancy in third trimester 2018    Disorder of thyroid     hypo    Supervision of elderly multigravida     2018    Twin pregnancy 2018     Past Surgical History:   Procedure Laterality Date    APPENDECTOMY OPEN           SECTION N/A 2018    Procedure:  SECTION;   Section - twins;  Surgeon: Woodorw Hurtado MD;  Location:  OR    COLONOSCOPY N/A 7/10/2019    Procedure: COLONOSCOPY, WITH POLYPECTOMY AND BIOPSY;  Surgeon: Viki Villareal MD;  Location:  OR    EXAM UNDER ANESTHESIA ANUS N/A 2023    Procedure: Exam under anesthesia anus;  Surgeon: Viki Villareal MD;  Location: [facility] OR    LAPAROSCOPIC HYSTERECTOMY TOTAL, SALPINGECTOMY BILATERAL N/A 2023    Procedure: Total Laparoscopic Hysterectomy, with Bilateral Salpingectomy, right oophorectomy, cystoscopy;  Surgeon: Dia Morrison MD;  Location: [facility] OR     OB History    Para Term  AB Living   3 3 3 0 0 4   SAB IAB Ectopic Multiple Live Births   0 0 0 1 4      # Outcome Date GA Lbr Renny/2nd Weight Sex Type Anes PTL Lv   3A Term 18 37w0d  2.546 kg (5 lb 9.8 oz) F   N SMOOTH      Name: MYNOR DAY1 RAFAEL      Apgar1: 8  Apgar5: 9   3B Term 18 37w0d  2.909 kg (6 lb 6.6 oz) M   N SMOOTH      Name: MYNOR DAY2 RAFAEL      Apgar1: 9  Apgar5: 9   2 Term 10/16/10 40w0d   M  EPI N SMOOTH      Name: William   1 Term  40w0d   M  None N SMOOTH      Birth Comments: [place]      Name: Kaveh     BP Readings from Last 3 Encounters:   25  128/76   12/10/24 128/78   24 128/82    Wt Readings from Last 3 Encounters:   25 82.1 kg (181 lb)   12/10/24 80.5 kg (177 lb 6.4 oz)   24 83.1 kg (183 lb 4 oz)                  Patient Active Problem List   Diagnosis    Attention deficit disorder    Lumbosacral spondylosis without myelopathy    Other staphylococcus infection in conditions classified elsewhere and of unspecified site    Acquired hypothyroidism    Bilateral carpal tunnel syndrome    Adjustment disorder with anxious mood    S/P  section    Anal fissure    S/P hysterectomy    Acute pain of right knee    Fall, subsequent encounter    Left hand pain     Past Surgical History:   Procedure Laterality Date    APPENDECTOMY OPEN           SECTION N/A 2018    Procedure:  SECTION;   Section - twins;  Surgeon: Woodrow Hurtado MD;  Location:  OR    COLONOSCOPY N/A 7/10/2019    Procedure: COLONOSCOPY, WITH POLYPECTOMY AND BIOPSY;  Surgeon: Viki Villareal MD;  Location:  OR    EXAM UNDER ANESTHESIA ANUS N/A 2023    Procedure: Exam under anesthesia anus;  Surgeon: Viki Villareal MD;  Location:  OR    LAPAROSCOPIC HYSTERECTOMY TOTAL, SALPINGECTOMY BILATERAL N/A 2023    Procedure: Total Laparoscopic Hysterectomy, with Bilateral Salpingectomy, right oophorectomy, cystoscopy;  Surgeon: Dia Morrison MD;  Location:  OR       Social History     Tobacco Use    Smoking status: Never    Smokeless tobacco: Never   Substance Use Topics    Alcohol use: Not Currently     Family History   Problem Relation Age of Onset    Substance Abuse Maternal Grandmother         Alcohol/Drug,Alcohol abuse    Cancer Maternal Grandmother         Cancer, lung cancer    Other - See Comments Maternal Grandfather         Alzheimer's disease    Diabetes Paternal Grandmother         Diabetes,type 1    No Known Problems Sister     No Known Problems Son     No Known Problems Son     Diabetes Maternal Uncle         Diabetes,type 2  "   No Known Problems Son         2018    No Known Problems Daughter         2018    Heart Disease No family hx of         Heart Disease         Current Outpatient Medications   Medication Sig Dispense Refill    acetaminophen (TYLENOL) 325 MG tablet Take 2 tablets (650 mg) by mouth every 4 hours as needed for other (mild pain) 100 tablet 0    diltiazem 2% 2% OINT ointment Apply 1 g topically 2 times daily as needed (anal fissure) 60 g 1    doxylamine (UNISOM) 25 MG TABS tablet Take 25 mg by mouth At Bedtime      estradiol (ESTRACE) 2 MG tablet Take 1 tablet (2 mg) by mouth daily. 90 tablet 1    lisdexamfetamine (VYVANSE) 60 MG capsule Take 1 capsule (60 mg) by mouth every morning. 30 capsule 0    lisdexamfetamine (VYVANSE) 60 MG capsule Take 1 capsule (60 mg) by mouth every morning. 30 capsule 0    lisdexamfetamine (VYVANSE) 60 MG capsule Take 1 capsule (60 mg) by mouth daily. 30 capsule 0    medical cannabis (Patient's own supply) See Admin Instructions (The purpose of this order is to document that the patient reports taking medical cannabis.  This is not a prescription, and is not used to certify that the patient has a qualifying medical condition.)      Melatonin 2.5 MG CHEW Take 1 tablet by mouth At Bedtime      methylcellulose (CITRUCEL) powder Take 0.3 g (1.05 teaspoonful) by mouth daily 454 g 3    psyllium (METAMUCIL/KONSYL) Packet Take 1 packet by mouth daily      SYNTHROID 75 MCG tablet Take 1 tablet (75 mcg) by mouth daily. 90 tablet 4     Allergies   Allergen Reactions    Adhesive Tape Hives     Rash from surgical drape adhesive     Seasonal Allergies Itching          Objective    Exam  /76   Pulse 78   Temp 97.5  F (36.4  C) (Tympanic)   Resp 16   Ht 1.664 m (5' 5.5\")   Wt 82.1 kg (181 lb)   LMP 09/21/2023   SpO2 98%   BMI 29.66 kg/m     Estimated body mass index is 29.66 kg/m  as calculated from the following:    Height as of this encounter: 1.664 m (5' 5.5\").    Weight as of this " encounter: 82.1 kg (181 lb).    Physical Exam  GENERAL: alert and no distress  EYES: Eyes grossly normal to inspection, PERRL and conjunctivae and sclerae normal  HENT: ear canals and TM's normal, nose and mouth without ulcers or lesions  NECK: no adenopathy, no asymmetry, masses, or scars  RESP: lungs clear to auscultation - no rales, rhonchi or wheezes  CV: regular rate and rhythm, normal S1 S2, no S3 or S4, no murmur, click or rub, no peripheral edema  ABDOMEN: soft, nontender, no hepatosplenomegaly, no masses and bowel sounds normal  MS: no gross musculoskeletal defects noted, no edema  SKIN: macule - face (forehead)  NEURO: Normal strength and tone, mentation intact and speech normal  PSYCH: mentation appears normal, affect normal/bright      Signed Electronically by: Avril Sanders DO    Answers submitted by the patient for this visit:  Patient Health Questionnaire (G7) (Submitted on 1/28/2025)  MINNIE 7 TOTAL SCORE: 7

## 2025-01-29 ENCOUNTER — TELEPHONE (OUTPATIENT)
Dept: FAMILY MEDICINE | Facility: OTHER | Age: 45
End: 2025-01-29
Payer: COMMERCIAL

## 2025-01-29 DIAGNOSIS — F90.0 ATTENTION DEFICIT HYPERACTIVITY DISORDER (ADHD), PREDOMINANTLY INATTENTIVE TYPE: ICD-10-CM

## 2025-01-29 RX ORDER — LEVOTHYROXINE SODIUM 75 MCG
75 TABLET ORAL DAILY
Qty: 90 TABLET | Refills: 4 | Status: SHIPPED | OUTPATIENT
Start: 2025-01-29

## 2025-01-29 RX ORDER — ESTRADIOL 2 MG/1
2 TABLET ORAL DAILY
Qty: 90 TABLET | Refills: 4 | Status: SHIPPED | OUTPATIENT
Start: 2025-01-29

## 2025-01-29 RX ORDER — ESTRADIOL 2 MG/1
2 TABLET ORAL DAILY
Qty: 90 TABLET | Refills: 4 | Status: SHIPPED | OUTPATIENT
Start: 2025-01-29 | End: 2025-01-29

## 2025-01-29 RX ORDER — LISDEXAMFETAMINE DIMESYLATE 60 MG/1
60 CAPSULE ORAL EVERY MORNING
Qty: 30 CAPSULE | Refills: 0 | Status: SHIPPED | OUTPATIENT
Start: 2025-04-25

## 2025-01-29 RX ORDER — LISDEXAMFETAMINE DIMESYLATE 60 MG/1
60 CAPSULE ORAL DAILY
Qty: 30 CAPSULE | Refills: 0 | Status: SHIPPED | OUTPATIENT
Start: 2025-01-29

## 2025-01-29 NOTE — TELEPHONE ENCOUNTER
Patient stated that the [facility] mail order pharmacy stated that all of the prescription for the lisdeamfetamine 60MG caps were discontinued please send new prescription to pharmacy. Please call patient with any questions.    Corrie Tam on 1/29/2025 at 4:24 PM

## 2025-01-29 NOTE — TELEPHONE ENCOUNTER
Chart reviewed.  I do not see any active Rx for lisdexamfetamine at current time.  Lisdexamfetamine ordered to start 12/2/24 for 30 tabs, next start is 2/27/25 for 30 capsules, next is 3/28 for 30 tabs, and the last is starting 4/25 for 30 tabs.        Routing to Dr. Sanders for review  Sandi Brenner RN on 1/29/2025 at 4:56 PM

## 2025-01-29 NOTE — TELEPHONE ENCOUNTER
"Sent new Rx; may have gotten cancelled in the \"refill\" process.  I have just sent a new Rx.    They should also have active Rx for 2/27/25, 3/28/25 and 4/25/25.    Avril Sanders, DO on 1/29/2025 at 5:08 PM     "

## 2025-02-13 ENCOUNTER — MYC MEDICAL ADVICE (OUTPATIENT)
Dept: FAMILY MEDICINE | Facility: OTHER | Age: 45
End: 2025-02-13
Payer: COMMERCIAL

## 2025-02-13 DIAGNOSIS — F90.0 ATTENTION DEFICIT HYPERACTIVITY DISORDER (ADHD), PREDOMINANTLY INATTENTIVE TYPE: Primary | ICD-10-CM

## 2025-02-13 RX ORDER — METHYLPHENIDATE HYDROCHLORIDE 54 MG/1
54 TABLET ORAL DAILY
Qty: 30 TABLET | Refills: 0 | Status: SHIPPED | OUTPATIENT
Start: 2025-04-14 | End: 2025-05-14

## 2025-02-13 RX ORDER — METHYLPHENIDATE HYDROCHLORIDE 54 MG/1
54 TABLET ORAL DAILY
Qty: 30 TABLET | Refills: 0 | Status: SHIPPED | OUTPATIENT
Start: 2025-03-15 | End: 2025-04-14

## 2025-02-13 RX ORDER — METHYLPHENIDATE HYDROCHLORIDE 54 MG/1
54 TABLET ORAL DAILY
Qty: 30 TABLET | Refills: 0 | Status: SHIPPED | OUTPATIENT
Start: 2025-02-13 | End: 2025-03-15

## 2025-02-13 NOTE — TELEPHONE ENCOUNTER
Wondering about switching back to Methylphenidate 54mg from the Vyvanse.     Prefers the Vyvanse but too difficult to get from [facility]- where she needs to get it from.     Can I edilson up this change?     Kim Aguirre RN on 2/13/2025 at 12:48 PM

## 2025-02-13 NOTE — TELEPHONE ENCOUNTER
I will get that sent to [facility]; with fill dates starting as of today.  Please cancel future Rx for vyvanse. Thank you,  Avril Sanders, DO

## 2025-02-13 NOTE — TELEPHONE ENCOUNTER
Avril,    If you discontinue Vyvanse in her chart- they will discontinue at Pharmacy.     I'm happy to call pharm to confirm cancellation but can you remove them from med list first?     Kim Aguirre RN on 2/13/2025 at 1:25 PM

## 2025-04-13 ENCOUNTER — OFFICE VISIT (OUTPATIENT)
Dept: FAMILY MEDICINE | Facility: OTHER | Age: 45
End: 2025-04-13
Payer: COMMERCIAL

## 2025-04-13 ENCOUNTER — HOSPITAL ENCOUNTER (OUTPATIENT)
Dept: GENERAL RADIOLOGY | Facility: OTHER | Age: 45
Discharge: HOME | End: 2025-04-13
Payer: COMMERCIAL

## 2025-04-13 VITALS
BODY MASS INDEX: 30.05 KG/M2 | DIASTOLIC BLOOD PRESSURE: 78 MMHG | HEIGHT: 66 IN | HEART RATE: 93 BPM | SYSTOLIC BLOOD PRESSURE: 132 MMHG | WEIGHT: 187 LBS | OXYGEN SATURATION: 97 % | RESPIRATION RATE: 16 BRPM | TEMPERATURE: 97 F

## 2025-04-13 DIAGNOSIS — R05.1 ACUTE COUGH: ICD-10-CM

## 2025-04-13 DIAGNOSIS — J22 LOWER RESPIRATORY INFECTION: Primary | ICD-10-CM

## 2025-04-13 DIAGNOSIS — R06.02 SOB (SHORTNESS OF BREATH): ICD-10-CM

## 2025-04-13 PROCEDURE — 3078F DIAST BP <80 MM HG: CPT

## 2025-04-13 PROCEDURE — 1125F AMNT PAIN NOTED PAIN PRSNT: CPT

## 2025-04-13 PROCEDURE — 71046 X-RAY EXAM CHEST 2 VIEWS: CPT

## 2025-04-13 PROCEDURE — 3075F SYST BP GE 130 - 139MM HG: CPT

## 2025-04-13 PROCEDURE — 71046 X-RAY EXAM CHEST 2 VIEWS: CPT | Mod: 26 | Performed by: RADIOLOGY

## 2025-04-13 PROCEDURE — 99213 OFFICE O/P EST LOW 20 MIN: CPT

## 2025-04-13 RX ORDER — AZITHROMYCIN 250 MG/1
TABLET, FILM COATED ORAL
Qty: 6 TABLET | Refills: 0 | Status: SHIPPED | OUTPATIENT
Start: 2025-04-13 | End: 2025-04-18

## 2025-04-13 RX ORDER — PREDNISONE 20 MG/1
40 TABLET ORAL DAILY
Qty: 10 TABLET | Refills: 0 | Status: SHIPPED | OUTPATIENT
Start: 2025-04-13 | End: 2025-04-18

## 2025-04-13 ASSESSMENT — PAIN SCALES - GENERAL: PAINLEVEL_OUTOF10: MILD PAIN (2)

## 2025-04-13 ASSESSMENT — ENCOUNTER SYMPTOMS
FATIGUE: 1
NAUSEA: 0
DIARRHEA: 0
SINUS PAIN: 0
WHEEZING: 1
COUGH: 1
FEVER: 0
SHORTNESS OF BREATH: 1
CARDIOVASCULAR NEGATIVE: 1
SORE THROAT: 0
VOMITING: 0

## 2025-04-13 NOTE — PROGRESS NOTES
Maricarmen Ornelas  1980    ASSESSMENT/PLAN      1. Lower respiratory infection (Primary)  - azithromycin (ZITHROMAX) 250 MG tablet; Take 2 tablets (500 mg) by mouth daily for 1 day, THEN 1 tablet (250 mg) daily for 4 days.  Dispense: 6 tablet; Refill: 0  - predniSONE (DELTASONE) 20 MG tablet; Take 2 tablets (40 mg) by mouth daily for 5 days.  Dispense: 10 tablet; Refill: 0  2. SOB (shortness of breath)  - XR Chest 2 Views  3. Acute cough  - XR Chest 2 Views    Chest x-ray completed  Per radiology: No acute cardiopulmonary disease.   - Due to severity of illness, azithromycin sent to pharmacy to cover for atypical infections.  - Prednisone once daily for 5 days provided for shortness of breath. Side effects reviewed.  - Symptomatic treatment - Encouraged fluids, salt water gargles, honey, humidifier, saline nasal spray, lozenges, tea, soup, smoothies, popsicles, topical vapor rub, rest, etc   - May use over-the-counter Tylenol or ibuprofen PRN  - Follow up as needed for new or worsening symptoms          *Explanation of diagnosis, treatment options and risk and benefits of medications reviewed with patient. Patient agrees with plan of care.  *All questions were answered.    *Red flags symptoms were discussed and patient was advised when they should return for reevaluation or for prompt emergency evaluation.   *Patient was given verbal and written instructions on plan of care. Instructions were printed or are available on [facility] on electronic AVS.   *We discussed potential side effects of any prescribed or recommended therapies, as well as expectations for response to treatments.  *Patient discharged in stable condition    Gustavo Espinosa, FAY, APRN, FNP-C  [facility]    SUBJECTIVE  CHIEF COMPLAINT/ REASON FOR VISIT  Patient presents with:  Cough  Chest Pain  Ear Problem     HISTORY OF PRESENT ILLNESS  Maricarmen Ornelas is a pleasant 45 year old female presents to [facility] clinic today with shortness of  "breath and cough.  Over the last week and a half patient has had a productive cough, shortness of breath, chest congestion, but denies any sore throat, nausea, vomiting or diarrhea.  Patient has not used any over-the-counter treatment remedies.  Patient did do an at-home COVID test which is negative.  Patient reports no known exposures, however was recently on an airplane flight.    History provided by patient      I have reviewed the nursing notes.  I have reviewed allergies, medication list, problem list, and past medical history.    REVIEW OF SYSTEMS  Review of Systems   Constitutional:  Positive for fatigue. Negative for fever.   HENT:  Negative for congestion, ear pain, sinus pain and sore throat.    Respiratory:  Positive for cough, shortness of breath and wheezing.    Cardiovascular: Negative.    Gastrointestinal:  Negative for diarrhea, nausea and vomiting.   Skin: Negative.         VITAL SIGNS  Vitals:    04/13/25 0929   BP: 132/78   BP Location: Right arm   Patient Position: Sitting   Cuff Size: Adult Large   Pulse: 93   Resp: 16   Temp: 97  F (36.1  C)   TempSrc: Tympanic   SpO2: 97%   Weight: 84.8 kg (187 lb)   Height: 1.665 m (5' 5.55\")      Body mass index is 30.6 kg/m .      OBJECTIVE  PHYSICAL EXAM  Physical Exam  Vitals and nursing note reviewed.   Constitutional:       General: She is not in acute distress.     Appearance: Normal appearance. She is normal weight. She is not ill-appearing, toxic-appearing or diaphoretic.   HENT:      Head: Normocephalic and atraumatic.      Right Ear: Tympanic membrane, ear canal and external ear normal. There is no impacted cerumen.      Left Ear: Tympanic membrane, ear canal and external ear normal. There is no impacted cerumen.      Nose: Nose normal. No congestion or rhinorrhea.      Mouth/Throat:      Mouth: Mucous membranes are moist.      Pharynx: Oropharynx is clear. No oropharyngeal exudate or posterior oropharyngeal erythema.   Eyes:      Extraocular " Movements: Extraocular movements intact.      Conjunctiva/sclera: Conjunctivae normal.      Pupils: Pupils are equal, round, and reactive to light.   Cardiovascular:      Rate and Rhythm: Normal rate and regular rhythm.      Pulses: Normal pulses.      Heart sounds: Normal heart sounds.   Pulmonary:      Effort: Pulmonary effort is normal. No respiratory distress.      Breath sounds: Normal breath sounds. No wheezing or rhonchi.   Musculoskeletal:      Cervical back: Normal range of motion. No rigidity or tenderness.   Lymphadenopathy:      Cervical: No cervical adenopathy.   Neurological:      Mental Status: She is alert.            DIAGNOSTICS  Results for orders placed or performed in visit on 04/13/25   XR Chest 2 Views     Status: None    Narrative    EXAM: XR CHEST 2 VIEWS  LOC[facility]  DATE: 04/13/2025    INDICATION: Shortness of breath. Acute cough.  COMPARISON: Chest x-ray 10/02/2018.      Impression    IMPRESSION: No acute cardiopulmonary disease.

## 2025-04-13 NOTE — NURSING NOTE
"Chief Complaint   Patient presents with    Cough    Chest Pain    Ear Problem       FOOD SECURITY SCREENING QUESTIONS  Hunger Vital Signs:  Within the past 12 months we worried whether our food would run out before we got money to buy more. Never  Within the past 12 months the food we bought just didn't last and we didn't have money to get more. Never  Deysi Pace RN 4/13/2025 9:31 AM      Initial /78 (BP Location: Right arm, Patient Position: Sitting, Cuff Size: Adult Large)   Pulse 93   Temp 97  F (36.1  C) (Tympanic)   Resp 16   Ht 1.665 m (5' 5.55\")   Wt 84.8 kg (187 lb)   LMP 09/21/2023   SpO2 97%   BMI 30.60 kg/m   Estimated body mass index is 30.6 kg/m  as calculated from the following:    Height as of this encounter: 1.665 m (5' 5.55\").    Weight as of this encounter: 84.8 kg (187 lb).  Medication Reconciliation: complete    Deysi Pace RN    "

## 2025-04-24 ENCOUNTER — OFFICE VISIT (OUTPATIENT)
Dept: FAMILY MEDICINE | Facility: OTHER | Age: 45
End: 2025-04-24
Payer: COMMERCIAL

## 2025-04-24 VITALS
OXYGEN SATURATION: 100 % | RESPIRATION RATE: 18 BRPM | WEIGHT: 184.8 LBS | BODY MASS INDEX: 29.7 KG/M2 | HEART RATE: 72 BPM | SYSTOLIC BLOOD PRESSURE: 133 MMHG | TEMPERATURE: 97.2 F | DIASTOLIC BLOOD PRESSURE: 87 MMHG | HEIGHT: 66 IN

## 2025-04-24 DIAGNOSIS — S05.01XA ABRASION OF RIGHT CORNEA, INITIAL ENCOUNTER: ICD-10-CM

## 2025-04-24 DIAGNOSIS — S05.91XA RIGHT EYE INJURY, INITIAL ENCOUNTER: Primary | ICD-10-CM

## 2025-04-24 RX ORDER — POLYMYXIN B SULFATE AND TRIMETHOPRIM 1; 10000 MG/ML; [USP'U]/ML
2 SOLUTION OPHTHALMIC 4 TIMES DAILY
Qty: 10 ML | Refills: 0 | Status: SHIPPED | OUTPATIENT
Start: 2025-04-24

## 2025-04-24 ASSESSMENT — ENCOUNTER SYMPTOMS
PSYCHIATRIC NEGATIVE: 1
RESPIRATORY NEGATIVE: 1
HEMATOLOGIC/LYMPHATIC NEGATIVE: 1
CONSTITUTIONAL NEGATIVE: 1
EYE PAIN: 1
GASTROINTESTINAL NEGATIVE: 1
ENDOCRINE NEGATIVE: 1
NEUROLOGICAL NEGATIVE: 1
CARDIOVASCULAR NEGATIVE: 1
EYE REDNESS: 1
MUSCULOSKELETAL NEGATIVE: 1

## 2025-04-24 ASSESSMENT — PAIN SCALES - GENERAL: PAINLEVEL_OUTOF10: MILD PAIN (2)

## 2025-04-24 NOTE — PROGRESS NOTES
Maricarmen Ornelas  1980    ASSESSMENT/PLAN      Presents to [facility] with right eye pain after a stick punctured it while gardening today.  Patient has had no vision changes, no blurry vision but feels as though her eyes been scratched.  Patient does wear contacts and was wearing contacts during the injury.  Patient is currently wearing glasses. Patient's vitals are stable and she appears nontoxic.        1. Right eye injury, initial encounter (Primary)  2. Abrasion of right cornea, initial encounter    - polymixin b-trimethoprim (POLYTRIM) 81526-5.1 UNIT/ML-% ophthalmic solution; Place 2 drops into the right eye 4 times daily.  Dispense: 10 mL; Refill: 0  - Avoid contacts while being treated with antibiotic eyedrops  - Follow-up with ophthalmology if not improving after 5-7 days of treatment  - May use over-the-counter Tylenol or ibuprofen PRN  - Follow up as needed for new or worsening symptoms        *A shared decision making model was used.   *Patient and/or associated parties understood and were agreeable to treatment plan.   *Plan and all results were discussed.   *Explanation of diagnosis, treatment options and risk and benefits of medications reviewed with patient.    *Time was taken to answer all questions.   *Red flags symptoms were discussed and patient was advised when they should return for reevaluation or for prompt emergency evaluation.   *Patient was given verbal and written instructions on plan of care. Instructions were printed or are available on Mychart on electronic AVS.   *We discussed potential side effects of any prescribed or recommended therapies, as well as expectations for response to treatments.  *Patient discharged in stable condition    Tosha Andrade, CNP  [facility]    SUBJECTIVE  CHIEF COMPLAINT/ REASON FOR VISIT  Patient presents with:  Eye Problem     HISTORY OF PRESENT ILLNESS  Maricarmen Ornelas is a pleasant 45 year old female presents to [facility] clinic  "today with right eye pain after a stick punctured it while gardening today.  Patient has had no vision changes, no blurry vision but feels as though her eyes been scratched.  Patient does wear contacts and was wearing contacts during the injury.  Patient is currently wearing glasses.        I have reviewed the nursing notes.  I have reviewed allergies, medication list, problem list, and past medical history.    REVIEW OF SYSTEMS  Review of Systems   Constitutional: Negative.    HENT: Negative.     Eyes:  Positive for pain and redness.   Respiratory: Negative.     Cardiovascular: Negative.    Gastrointestinal: Negative.    Endocrine: Negative.    Genitourinary: Negative.    Musculoskeletal: Negative.    Skin: Negative.    Neurological: Negative.    Hematological: Negative.    Psychiatric/Behavioral: Negative.     All other systems reviewed and are negative.       VITAL SIGNS  Vitals:    04/24/25 1806   BP: 133/87   BP Location: Right arm   Patient Position: Sitting   Cuff Size: Adult Regular   Pulse: 72   Resp: 18   Temp: 97.2  F (36.2  C)   TempSrc: Temporal   SpO2: 100%   Weight: 83.8 kg (184 lb 12.8 oz)   Height: 1.664 m (5' 5.5\")      Body mass index is 30.28 kg/m .      OBJECTIVE  PHYSICAL EXAM  Physical Exam  Vitals and nursing note reviewed.   Constitutional:       Appearance: Normal appearance.   HENT:      Head: Normocephalic.      Nose: Nose normal.      Mouth/Throat:      Mouth: Mucous membranes are moist.   Eyes:      General:         Right eye: No discharge.         Left eye: No discharge.      Extraocular Movements: Extraocular movements intact.      Conjunctiva/sclera: Conjunctivae normal.      Pupils: Pupils are equal, round, and reactive to light.   Cardiovascular:      Rate and Rhythm: Normal rate.      Pulses: Normal pulses.   Pulmonary:      Effort: Pulmonary effort is normal.   Abdominal:      Palpations: Abdomen is soft.   Musculoskeletal:         General: Normal range of motion.   Skin:     " General: Skin is warm.      Capillary Refill: Capillary refill takes less than 2 seconds.   Neurological:      General: No focal deficit present.      Mental Status: She is alert.

## 2025-04-24 NOTE — NURSING NOTE
"Chief Complaint   Patient presents with    Eye Problem   [facility] for concerns of a right eye that was poked earlier today. Patient states she was gardening and she poked her eye.     Initial /87 (BP Location: Right arm, Patient Position: Sitting, Cuff Size: Adult Regular)   Pulse 72   Temp 97.2  F (36.2  C) (Temporal)   Resp 18   Ht 1.664 m (5' 5.5\")   Wt 83.8 kg (184 lb 12.8 oz)   LMP 09/21/2023   SpO2 100%   BMI 30.28 kg/m   Estimated body mass index is 30.28 kg/m  as calculated from the following:    Height as of this encounter: 1.664 m (5' 5.5\").    Weight as of this encounter: 83.8 kg (184 lb 12.8 oz).  Medication Review: complete    The next two questions are to help us understand your food security.  If you are feeling you need any assistance in this area, we have resources available to support you today.          4/24/2025   SDOH- Food Insecurity   Within the past 12 months, did you worry that your food would run out before you got money to buy more? N   Within the past 12 months, did the food you bought just not last and you didn t have money to get more? N         Health Care Directive:  Patient does not have a Health Care Directive: Discussed advance care planning with patient; however, patient declined at this time.    Tim Steward      "

## 2025-05-09 ENCOUNTER — MYC MEDICAL ADVICE (OUTPATIENT)
Dept: FAMILY MEDICINE | Facility: OTHER | Age: 45
End: 2025-05-09
Payer: COMMERCIAL

## 2025-05-09 DIAGNOSIS — F90.0 ATTENTION DEFICIT HYPERACTIVITY DISORDER (ADHD), PREDOMINANTLY INATTENTIVE TYPE: ICD-10-CM

## 2025-05-12 DIAGNOSIS — F90.0 ATTENTION DEFICIT HYPERACTIVITY DISORDER (ADHD), PREDOMINANTLY INATTENTIVE TYPE: ICD-10-CM

## 2025-05-12 RX ORDER — METHYLPHENIDATE HYDROCHLORIDE 54 MG/1
54 TABLET ORAL DAILY
Qty: 30 TABLET | Refills: 0 | Status: CANCELLED | OUTPATIENT
Start: 2025-05-12

## 2025-05-12 NOTE — TELEPHONE ENCOUNTER
Requesting bridge of Methylphenidate 54mg to her her to 6/19 appt with Marilyn.      I DID work her in to see Yenni Baldwin this Thursday for worsening anxiety.      Willing to bridge or should Methylphenidate be addressed by Yenni at anxiety appt on Thursday?      ____________________________________________________________________________    Called [facility] to schedule work-in appt with     Followed up on MyChart.     1/28/25  LOV with Marilyn for Physical.      Kim Aguirre RN on 5/12/2025 at 11:57 AM

## 2025-05-13 RX ORDER — METHYLPHENIDATE HYDROCHLORIDE 54 MG/1
54 TABLET ORAL DAILY
Qty: 30 TABLET | Refills: 0 | Status: SHIPPED | OUTPATIENT
Start: 2025-06-12

## 2025-05-13 RX ORDER — METHYLPHENIDATE HYDROCHLORIDE 54 MG/1
TABLET ORAL
Qty: 30 TABLET | Refills: 0 | OUTPATIENT
Start: 2025-05-13

## 2025-05-13 RX ORDER — METHYLPHENIDATE HYDROCHLORIDE 54 MG/1
54 TABLET ORAL DAILY
Qty: 30 TABLET | Refills: 0 | Status: SHIPPED | OUTPATIENT
Start: 2025-05-13

## 2025-05-13 NOTE — PROGRESS NOTES
Assessment & Plan     Anxiety  Significantly increasing anxiety secondary to increase stressors.  Discussed options in detail.  Patient is in agreement with starting daily SSRI as outlined below.  Start with 1/2 tablet daily x 1 week then increase to 1 full tablet daily going forward.  Patient will follow-up in 4 weeks for recheck or sooner if needed.  Consider establishing with therapist in the future.  - citalopram (CELEXA) 10 MG tablet; Start with 1/2 tablet daily X 1 week then increase to 1 full tablet daily going forward          Joshua Hernadez is a 45 year old, presenting for the following health issues:  Anxiety    History of Present Illness       Mental Health Follow-up:  Patient presents to follow-up on Depression & Anxiety.Patient's depression since last visit has been:  Worse  The patient is having other symptoms associated with depression.  Patient's anxiety since last visit has been:  Worse  The patient is having other symptoms associated with anxiety.  Any significant life events: relationship concerns, job concerns, financial concerns, grief or loss and other  Patient is feeling anxious or having panic attacks.  Patient has no concerns about alcohol or drug use.   She is taking medications regularly.      Here for discussion regarding anxiety concerns.  Patient reports over the last several months she has been dealing with increasing anxiety as well as increased stress.   is a  still is dealing with stress of potentially losing his job due to the current state of the government and the world.  This has put significant strain in the relationship.  She has also been busy with working as well as taking care of her 4 children.  Stress anxiety is increasing to the point that she is noticing increased blood pressure compared to her baseline bodily symptoms including aches and pains, forgetfulness.  She does continue on methylphenidate for management of ADHD.  She feels like her  attention and focus is also being affected by her anxiety.  Has not previously been on anxiety medication.  She did discuss options with her physician sister and would like to discuss possible Lexapro or Celexa for management.      PAST MEDICAL HISTORY:   Past Medical History:   Diagnosis Date    Dichorionic diamniotic twin pregnancy in third trimester 2018    Disorder of thyroid     hypo    Supervision of elderly multigravida     2018    Twin pregnancy 2018       PAST SURGICAL HISTORY:   Past Surgical History:   Procedure Laterality Date    APPENDECTOMY OPEN           SECTION N/A 2018    Procedure:  SECTION;   Section - twins;  Surgeon: Woodrow Hurtado MD;  Location:  OR    COLONOSCOPY N/A 7/10/2019    Procedure: COLONOSCOPY, WITH POLYPECTOMY AND BIOPSY;  Surgeon: Viki Villareal MD;  Location:  OR    EXAM UNDER ANESTHESIA ANUS N/A 2023    Procedure: Exam under anesthesia anus;  Surgeon: Viki Villareal MD;  Location:  OR    LAPAROSCOPIC HYSTERECTOMY TOTAL, SALPINGECTOMY BILATERAL N/A 2023    Procedure: Total Laparoscopic Hysterectomy, with Bilateral Salpingectomy, right oophorectomy, cystoscopy;  Surgeon: Dia Morrison MD;  Location:  OR       FAMILY HISTORY:   Family History   Problem Relation Age of Onset    Substance Abuse Maternal Grandmother         Alcohol/Drug,Alcohol abuse    Cancer Maternal Grandmother         Cancer, lung cancer    Other - See Comments Maternal Grandfather         Alzheimer's disease    Diabetes Paternal Grandmother         Diabetes,type 1    No Known Problems Sister     No Known Problems Son     No Known Problems Son     Diabetes Maternal Uncle         Diabetes,type 2    No Known Problems Son         2018    No Known Problems Daughter         2018    Heart Disease No family hx of         Heart Disease       SOCIAL HISTORY:   Social History     Tobacco Use    Smoking status: Never    Smokeless tobacco: Never   Substance  Use Topics    Alcohol use: Not Currently        Allergies   Allergen Reactions    Adhesive Tape Hives     Rash from surgical drape adhesive     Seasonal Allergies Itching     Current Outpatient Medications   Medication Sig Dispense Refill    citalopram (CELEXA) 10 MG tablet Start with 1/2 tablet daily X 1 week then increase to 1 full tablet daily going forward 90 tablet 1    doxylamine (UNISOM) 25 MG TABS tablet Take 25 mg by mouth At Bedtime      estradiol (ESTRACE) 2 MG tablet Take 1 tablet (2 mg) by mouth daily. 90 tablet 4    medical cannabis (Patient's own supply) See Admin Instructions (The purpose of this order is to document that the patient reports taking medical cannabis.  This is not a prescription, and is not used to certify that the patient has a qualifying medical condition.)      Melatonin 2.5 MG CHEW Take 1 tablet by mouth At Bedtime      methylcellulose (CITRUCEL) powder Take 0.3 g (1.05 teaspoonful) by mouth daily 454 g 3    methylphenidate HCl ER, OSM, (CONCERTA) 54 MG CR tablet Take 1 tablet (54 mg) by mouth daily. 30 tablet 0    psyllium (METAMUCIL/KONSYL) Packet Take 1 packet by mouth daily      acetaminophen (TYLENOL) 325 MG tablet Take 2 tablets (650 mg) by mouth every 4 hours as needed for other (mild pain) (Patient not taking: Reported on 5/15/2025) 100 tablet 0    diltiazem 2% 2% OINT ointment Apply 1 g topically 2 times daily as needed (anal fissure) (Patient not taking: Reported on 5/15/2025) 60 g 1    [START ON 6/12/2025] methylphenidate HCl ER, OSM, (CONCERTA) 54 MG CR tablet Take 1 tablet (54 mg) by mouth daily. 30 tablet 0    polymixin b-trimethoprim (POLYTRIM) 38503-8.1 UNIT/ML-% ophthalmic solution Place 2 drops into the right eye 4 times daily. (Patient not taking: Reported on 5/15/2025) 10 mL 0    SYNTHROID 75 MCG tablet Take 1 tablet (75 mcg) by mouth daily. 90 tablet 4     No current facility-administered medications for this visit.           Objective    /84   Pulse 80    Temp 98.2  F (36.8  C) (Tympanic)   Resp 18   Wt 82.2 kg (181 lb 3.2 oz)   LMP 09/21/2023   SpO2 97%   BMI 29.69 kg/m    Body mass index is 29.69 kg/m .  Physical Exam   General: Pleasant, in no apparent distress.  Eyes: Sclera are white and conjunctiva are clear bilaterally. Lacrimal apparatus free of erythema, edema, and discharge bilaterally.  Ears: External ears without erythema or edema.   Nose: External nose is symmetrical and free of lesions and deformities.   Skin: No jaundice, pallor, rashes, or lesions.  Psych: Appropriate mood and affect.      Signed Electronically by: Yenni Baldwin PA-C

## 2025-05-13 NOTE — TELEPHONE ENCOUNTER
PDMP Review         Value Time User    State PDMP site checked  Yes 5/13/2025  9:37 AM Avril Sanders, DO          Rx for May and June renewed; will see her in the office in June for meds and follow up on mood.    Avril Sanders, DO

## 2025-05-15 ENCOUNTER — OFFICE VISIT (OUTPATIENT)
Dept: FAMILY MEDICINE | Facility: OTHER | Age: 45
End: 2025-05-15
Attending: PHYSICIAN ASSISTANT
Payer: COMMERCIAL

## 2025-05-15 VITALS
OXYGEN SATURATION: 97 % | DIASTOLIC BLOOD PRESSURE: 84 MMHG | HEART RATE: 80 BPM | RESPIRATION RATE: 18 BRPM | WEIGHT: 181.2 LBS | TEMPERATURE: 98.2 F | SYSTOLIC BLOOD PRESSURE: 130 MMHG | BODY MASS INDEX: 29.69 KG/M2

## 2025-05-15 DIAGNOSIS — F41.9 ANXIETY: Primary | ICD-10-CM

## 2025-05-15 RX ORDER — CITALOPRAM HYDROBROMIDE 10 MG/1
TABLET ORAL
Qty: 90 TABLET | Refills: 1 | Status: SHIPPED | OUTPATIENT
Start: 2025-05-15

## 2025-05-15 ASSESSMENT — ANXIETY QUESTIONNAIRES
6. BECOMING EASILY ANNOYED OR IRRITABLE: MORE THAN HALF THE DAYS
5. BEING SO RESTLESS THAT IT IS HARD TO SIT STILL: NEARLY EVERY DAY
IF YOU CHECKED OFF ANY PROBLEMS ON THIS QUESTIONNAIRE, HOW DIFFICULT HAVE THESE PROBLEMS MADE IT FOR YOU TO DO YOUR WORK, TAKE CARE OF THINGS AT HOME, OR GET ALONG WITH OTHER PEOPLE: VERY DIFFICULT
7. FEELING AFRAID AS IF SOMETHING AWFUL MIGHT HAPPEN: NEARLY EVERY DAY
3. WORRYING TOO MUCH ABOUT DIFFERENT THINGS: NEARLY EVERY DAY
7. FEELING AFRAID AS IF SOMETHING AWFUL MIGHT HAPPEN: NEARLY EVERY DAY
GAD7 TOTAL SCORE: 20
8. IF YOU CHECKED OFF ANY PROBLEMS, HOW DIFFICULT HAVE THESE MADE IT FOR YOU TO DO YOUR WORK, TAKE CARE OF THINGS AT HOME, OR GET ALONG WITH OTHER PEOPLE?: VERY DIFFICULT
1. FEELING NERVOUS, ANXIOUS, OR ON EDGE: NEARLY EVERY DAY
GAD7 TOTAL SCORE: 20
GAD7 TOTAL SCORE: 20
4. TROUBLE RELAXING: NEARLY EVERY DAY
2. NOT BEING ABLE TO STOP OR CONTROL WORRYING: NEARLY EVERY DAY

## 2025-05-15 ASSESSMENT — PAIN SCALES - GENERAL: PAINLEVEL_OUTOF10: NO PAIN (0)

## 2025-05-15 NOTE — NURSING NOTE
"Chief Complaint   Patient presents with    Anxiety       Initial /84   Pulse 80   Temp 98.2  F (36.8  C) (Tympanic)   Resp 18   Wt 82.2 kg (181 lb 3.2 oz)   LMP 09/21/2023   SpO2 97%   BMI 29.69 kg/m   Estimated body mass index is 29.69 kg/m  as calculated from the following:    Height as of 4/24/25: 1.664 m (5' 5.5\").    Weight as of this encounter: 82.2 kg (181 lb 3.2 oz).  Medication Review: complete    The next two questions are to help us understand your food security.  If you are feeling you need any assistance in this area, we have resources available to support you today.          4/24/2025   SDOH- Food Insecurity   Within the past 12 months, did you worry that your food would run out before you got money to buy more? N   Within the past 12 months, did the food you bought just not last and you didn t have money to get more? N         Health Care Directive:  Patient does not have a Health Care Directive: Discussed advance care planning with patient; however, patient declined at this time.    Holli Rossi LPN      "

## 2025-05-15 NOTE — PATIENT INSTRUCTIONS
We are starting a new medication called citalopram for your anxiety. You will take this medication once daily. We are going to have you start with 1/2 tablet daily X 1 week then increase to 1 full tablet daily going forward. Let's follow up in 3-4 weeks to see how things are doing. If you are struggling quite a bit in the meantime please feel free to send me a Koogame message sooner.

## 2025-05-19 ENCOUNTER — RESULTS FOLLOW-UP (OUTPATIENT)
Dept: FAMILY MEDICINE | Facility: OTHER | Age: 45
End: 2025-05-19

## 2025-05-19 ENCOUNTER — HOSPITAL ENCOUNTER (OUTPATIENT)
Dept: GENERAL RADIOLOGY | Facility: OTHER | Age: 45
Discharge: HOME | End: 2025-05-19
Attending: NURSE PRACTITIONER | Admitting: RADIOLOGY
Payer: COMMERCIAL

## 2025-05-19 DIAGNOSIS — M79.641 PAIN OF RIGHT HAND: ICD-10-CM

## 2025-05-19 PROCEDURE — 73130 X-RAY EXAM OF HAND: CPT | Mod: 26 | Performed by: RADIOLOGY

## 2025-05-19 PROCEDURE — 73130 X-RAY EXAM OF HAND: CPT | Mod: RT

## 2025-07-07 ENCOUNTER — THERAPY VISIT (OUTPATIENT)
Dept: OCCUPATIONAL THERAPY | Facility: OTHER | Age: 45
End: 2025-07-07
Attending: NURSE PRACTITIONER
Payer: COMMERCIAL

## 2025-07-07 DIAGNOSIS — M79.641 PAIN OF RIGHT HAND: ICD-10-CM

## 2025-07-07 PROCEDURE — 97140 MANUAL THERAPY 1/> REGIONS: CPT | Mod: GO

## 2025-07-07 PROCEDURE — 97165 OT EVAL LOW COMPLEX 30 MIN: CPT | Mod: GO

## 2025-07-07 PROCEDURE — 97010 HOT OR COLD PACKS THERAPY: CPT | Mod: GO

## 2025-07-07 PROCEDURE — 97110 THERAPEUTIC EXERCISES: CPT | Mod: GO

## 2025-07-14 NOTE — PROGRESS NOTES
[facility]                                                                                   OUTPATIENT OCCUPATIONAL THERAPY    PLAN OF TREATMENT FOR OUTPATIENT REHABILITATION   Patient's Last Name, First Name, Maricarmen Shaffer YOB: 1980   Provider's Name   [facility]   Medical Record No.  [id number]     Onset Date:  5/16/25 Start of Care Date: 07/07/25     Medical Diagnosis:  M79.641 (ICD-10-CM) - Pain of right hand      OT Treatment Diagnosis:  bilateral hand pain Plan of Treatment  Frequency/Duration:1x week/8 weeks    Certification date from 07/07/25   To 09/01/25        4% Dexamethasone with Iontophoresis as needed for pain/inflammation control  See note for additional plan of treatment details and functional goals     Tammy Yu OT                         I CERTIFY THE NEED FOR THESE SERVICES FURNISHED UNDER        THIS PLAN OF TREATMENT AND WHILE UNDER MY CARE     (Physician attestation of this document indicates review and certification of the therapy plan).              Referring Provider:  Xenia Goodwin    Initial Assessment  See Epic Evaluation- 07/07/25            OCCUPATIONAL THERAPY EVALUATION  Type of Visit: Evaluation       Fall Risk Screen:  Have you fallen 2 or more times in the past year?: No  Have you fallen and had an injury in the past year?: No  Is patient receiving Physical Therapy Services?: No    Subjective     Patient arrives for OT evaluation with bilateral thumb pain, with R being worse than left.  Pain is at the base of the thumb.  She also reports pain in right pinky.  Maricarmen reports that it started a while ago, but has recently increased in pain.  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         Presenting condition or subjective complaint: bilateral thumb/hand pain  Date of onset:      Relevant medical history:   see chart review  Dates & types of surgery:  extensor tendon laceration repair (left hand) 2024    Prior diagnostic imaging/testing results: X-ray     Prior therapy history for the same diagnosis, illness or injury: No      Living Environment  Social support: With a significant other or spouse   Type of home: House     Employment: Yes ,     Patient goals for therapy:  Use hands without pain    Pain assessment: Pain present     Objective   ADDITIONAL HISTORY:  Right hand dominant  Patient reports symptoms of pain, stiffness/loss of motion, and weakness/loss of strength  Transportation: drives  Currently working in normal job without restrictions    PAIN:  Pain Level at Rest: 2/10  Pain Level with Use: 6/10  Pain Location: wrist, thumb, and small finger  Pain Quality: Aching and Dull  Pain Frequency: intermittent  Pain is Worst: nighttime  Pain is Relieved By: rest    POSTURE: Normal     EDEMA: Mild     SENSATION: WNL throughout all nerve distributions; per patient report       STRENGTH:     Measured in pounds 7/14/2025 7/14/2025    Left Right   Average 45 30     Lateral Pinch  Measured in pounds 7/14/2025 7/14/2025    Left Right   Average 16 14     3 Point Pinch  Measured in pounds 7/14/2025 7/14/2025    Left Right   Average 10  9       Assessment & Plan   CLINICAL IMPRESSIONS  Medical Diagnosis: M79.641 (ICD-10-CM) - Pain of right hand    Treatment Diagnosis: bilateral hand pain    Impression/Assessment: Pt is a 45 year old female presenting to Occupational Therapy due to bilateral cmc pain.  The following significant findings have been identified: Impaired ROM, Impaired strength, and Pain.  These identified deficits interfere with their ability to perform self care tasks, work tasks, recreational activities, household chores, and  yard work as compared to  previous level of function.   Patient's limitations or Problem List includes: Pain, Decreased ROM/motion, Increased edema, Decreased , Decreased pinch, Tightness in musculature, and Adherence in connective tissue of the right wrist, hand, thumb, and small finger which interferes with the patient's ability to perform Self Care Tasks (dressing), Work Tasks, Recreational Activities, and Household Chores as compared to previous level of function.    Clinical Decision Making (Complexity):  Assessment of Occupational Performance: 1-3 Performance Deficits  Occupational Performance Limitations: hygiene and grooming, health management and maintenance, home establishment and management, meal preparation and cleanup, work, and leisure activities  Clinical Decision Making (Complexity): Low complexity    PLAN OF CARE  Treatment Interventions:  Modalities: Cryotherapy, Cupping, E-stim, Hot Pack, Iontophoresis, Paraffin, Ultrasound, Vasoneumatic Device  Interventions: Self-Care/Home Management, Therapeutic Activity, Therapeutic Exercise, Manual Therapy, Orthotic Fitting/Training    Long Term Goals   OT Goal 1  Goal Identifier:  strength  Goal Description: Patient will improve right  strength by at least 7# without pain  Rationale: In order to maximize safety and independence with ADL/IADLs  Target Date: 09/01/25  OT Goal 2  Goal Identifier: HEP  Goal Description: Patient will demonstrate proper techniques with exercises and report at least 2 successful pain management recommendations during OT sessions  Rationale: In order to maximize safety and independence with performance of self-care activities  Target Date: 09/01/25  OT Goal 3  Goal Identifier: meal prep  Goal Description: Patient will be able to  pot and pans with <2/10 pain for at least 1 week consistently  Rationale: In order to maximize safety and independence with ADL/IADLs  Target Date: 09/01/25      Frequency of Treatment: 1x week  Duration of  Treatment: 8 weeks     Recommended Referrals to Other Professionals: None  Education Assessment: Learner/Method: Patient;Demonstration;Pictures/Video;No Barriers to Learning     Risks and benefits of evaluation/treatment have been explained.   Patient/Family/caregiver agrees with Plan of Care.     Evaluation Time:    OT Eval, Low Complexity Minutes (46380): 15       Signing Clinician: Tammy Yu OT

## 2025-07-17 DIAGNOSIS — F90.0 ATTENTION DEFICIT HYPERACTIVITY DISORDER (ADHD), PREDOMINANTLY INATTENTIVE TYPE: ICD-10-CM

## 2025-07-21 NOTE — TELEPHONE ENCOUNTER
[id number] sent Rx request for the following:      Requested Prescriptions   Pending Prescriptions Disp Refills    methylphenidate HCl ER (OSM) (CONCERTA) 54 MG CR tablet [Pharmacy Med Name: METHYLPHENIDATE 54MG ER TABLET] 30 tablet 0     Si25 TAKE 1 TABLET BY MOUTH DAILY       Rx Protocol Controlled Substance Failed - 2025  2:32 PM        Failed - Medication is active on med list and the sig matches        Failed - Urine drug screeen results on file in past 12 months     [unfilled]           Failed - Controlled Substance Agreement on file in last 12 months     Please review last Controlled Substance Pain agreement document.   CSA -- Encounter Level on 2022:     [Media Unavailable] Controlled Substance Agreement - Scan on 2022  8:30 AM: [facility] CONTROLLED MEDICATION AGREEMENT       CSA -- Encounter Level on 2022:     [Media Unavailable] Controlled Substance Agreement - Scan on 3/17/2022  1:53 PM: CONTROLLED MEDICATION AGREEMENT       CSA -- Encounter Level on 2022:     [Media Unavailable] Controlled Substance Agreement - Scan on 2/3/2021  7:20 AM: CONTROLLED SUBSTANCE AGREEMENT       CSA -- Patient Level:     [Media Unavailable] Controlled Substance Agreement - Opioid - Scan on 1/3/2024 11:28 AM               Failed - Auto Fail - Please forward to Provider          Last Prescription Date:   25  Last Fill Qty/Refills:         30, R-0    Last Office Visit:              25   Future Office visit:           25    Per LOV: Follow up: by end of May for ADHD refills     Routing refill request to provider for review/approval because:  Drug not on the [facility] refill protocol

## 2025-07-22 RX ORDER — METHYLPHENIDATE HYDROCHLORIDE 54 MG/1
TABLET ORAL
Qty: 30 TABLET | Refills: 0 | Status: SHIPPED | OUTPATIENT
Start: 2025-07-22

## 2025-07-22 NOTE — TELEPHONE ENCOUNTER
PDMP Review         Value Time User    State PDMP site checked  Yes 7/22/2025  5:19 AM Avril Sanders DO          Rx renewed; has appointment within this week.  Avril Sanders DO on 7/22/2025 at 5:19 AM

## 2025-07-23 ENCOUNTER — THERAPY VISIT (OUTPATIENT)
Dept: OCCUPATIONAL THERAPY | Facility: OTHER | Age: 45
End: 2025-07-23
Payer: COMMERCIAL

## 2025-07-23 DIAGNOSIS — M79.641 PAIN OF RIGHT HAND: Primary | ICD-10-CM

## 2025-07-23 DIAGNOSIS — M79.642 LEFT HAND PAIN: ICD-10-CM

## 2025-07-23 PROCEDURE — 97140 MANUAL THERAPY 1/> REGIONS: CPT | Mod: GO | Performed by: OCCUPATIONAL THERAPIST

## 2025-07-23 PROCEDURE — 97110 THERAPEUTIC EXERCISES: CPT | Mod: GO | Performed by: OCCUPATIONAL THERAPIST

## 2025-07-23 PROCEDURE — 97010 HOT OR COLD PACKS THERAPY: CPT | Mod: GO | Performed by: OCCUPATIONAL THERAPIST

## 2025-07-25 ENCOUNTER — OFFICE VISIT (OUTPATIENT)
Dept: FAMILY MEDICINE | Facility: OTHER | Age: 45
End: 2025-07-25
Attending: FAMILY MEDICINE
Payer: COMMERCIAL

## 2025-07-25 VITALS
WEIGHT: 181 LBS | TEMPERATURE: 98 F | OXYGEN SATURATION: 99 % | SYSTOLIC BLOOD PRESSURE: 116 MMHG | DIASTOLIC BLOOD PRESSURE: 66 MMHG | HEIGHT: 66 IN | RESPIRATION RATE: 16 BRPM | BODY MASS INDEX: 29.09 KG/M2 | HEART RATE: 88 BPM

## 2025-07-25 DIAGNOSIS — Z11.59 ENCOUNTER FOR HEPATITIS C SCREENING TEST FOR LOW RISK PATIENT: ICD-10-CM

## 2025-07-25 DIAGNOSIS — Z13.1 DIABETES MELLITUS SCREENING: ICD-10-CM

## 2025-07-25 DIAGNOSIS — Z00.00 ROUTINE HISTORY AND PHYSICAL EXAMINATION OF ADULT: Primary | ICD-10-CM

## 2025-07-25 DIAGNOSIS — Z12.31 VISIT FOR SCREENING MAMMOGRAM: ICD-10-CM

## 2025-07-25 DIAGNOSIS — K29.70 GASTRITIS WITHOUT BLEEDING, UNSPECIFIED CHRONICITY, UNSPECIFIED GASTRITIS TYPE: ICD-10-CM

## 2025-07-25 DIAGNOSIS — E03.9 HYPOTHYROIDISM, UNSPECIFIED TYPE: ICD-10-CM

## 2025-07-25 DIAGNOSIS — R10.13 ABDOMINAL PAIN, EPIGASTRIC: ICD-10-CM

## 2025-07-25 DIAGNOSIS — Z13.220 LIPID SCREENING: ICD-10-CM

## 2025-07-25 DIAGNOSIS — F90.0 ATTENTION DEFICIT HYPERACTIVITY DISORDER (ADHD), PREDOMINANTLY INATTENTIVE TYPE: ICD-10-CM

## 2025-07-25 DIAGNOSIS — Z79.890 HORMONE REPLACEMENT THERAPY: ICD-10-CM

## 2025-07-25 DIAGNOSIS — F41.9 ANXIETY: ICD-10-CM

## 2025-07-25 DIAGNOSIS — Z78.0 MENOPAUSE: ICD-10-CM

## 2025-07-25 LAB
ALBUMIN SERPL BCG-MCNC: 4 G/DL (ref 3.5–5.2)
ALP SERPL-CCNC: 74 U/L (ref 40–150)
ALT SERPL W P-5'-P-CCNC: 12 U/L (ref 0–50)
ANION GAP SERPL CALCULATED.3IONS-SCNC: 9 MMOL/L (ref 7–15)
AST SERPL W P-5'-P-CCNC: 17 U/L (ref 0–45)
BILIRUB SERPL-MCNC: <0.2 MG/DL
BUN SERPL-MCNC: 10.4 MG/DL (ref 6–20)
CALCIUM SERPL-MCNC: 9.2 MG/DL (ref 8.8–10.4)
CHLORIDE SERPL-SCNC: 101 MMOL/L (ref 98–107)
CHOLEST SERPL-MCNC: 198 MG/DL
CREAT SERPL-MCNC: 0.68 MG/DL (ref 0.51–0.95)
CREAT UR-MCNC: 13 MG/DL
EGFRCR SERPLBLD CKD-EPI 2021: >90 ML/MIN/1.73M2
FASTING STATUS PATIENT QL REPORTED: ABNORMAL
FASTING STATUS PATIENT QL REPORTED: NORMAL
GLUCOSE SERPL-MCNC: 76 MG/DL (ref 70–99)
HCO3 SERPL-SCNC: 26 MMOL/L (ref 22–29)
HDLC SERPL-MCNC: 58 MG/DL
LDLC SERPL CALC-MCNC: 111 MG/DL
LIPASE SERPL-CCNC: 40 U/L (ref 13–60)
NONHDLC SERPL-MCNC: 140 MG/DL
POTASSIUM SERPL-SCNC: 4.1 MMOL/L (ref 3.4–5.3)
PROT SERPL-MCNC: 7.4 G/DL (ref 6.4–8.3)
SODIUM SERPL-SCNC: 136 MMOL/L (ref 135–145)
TRIGL SERPL-MCNC: 144 MG/DL
TSH SERPL DL<=0.005 MIU/L-ACNC: 1.36 UIU/ML (ref 0.3–4.2)

## 2025-07-25 PROCEDURE — 3049F LDL-C 100-129 MG/DL: CPT | Performed by: FAMILY MEDICINE

## 2025-07-25 PROCEDURE — 82310 ASSAY OF CALCIUM: CPT | Mod: ZL | Performed by: FAMILY MEDICINE

## 2025-07-25 PROCEDURE — 3078F DIAST BP <80 MM HG: CPT | Performed by: FAMILY MEDICINE

## 2025-07-25 PROCEDURE — 90636 HEP A/HEP B VACC ADULT IM: CPT | Performed by: FAMILY MEDICINE

## 2025-07-25 PROCEDURE — 1126F AMNT PAIN NOTED NONE PRSNT: CPT | Performed by: FAMILY MEDICINE

## 2025-07-25 PROCEDURE — 36415 COLL VENOUS BLD VENIPUNCTURE: CPT | Mod: ZL | Performed by: FAMILY MEDICINE

## 2025-07-25 PROCEDURE — 99396 PREV VISIT EST AGE 40-64: CPT | Mod: 25 | Performed by: FAMILY MEDICINE

## 2025-07-25 PROCEDURE — 83690 ASSAY OF LIPASE: CPT | Mod: ZL | Performed by: FAMILY MEDICINE

## 2025-07-25 PROCEDURE — 90471 IMMUNIZATION ADMIN: CPT | Performed by: FAMILY MEDICINE

## 2025-07-25 PROCEDURE — 99214 OFFICE O/P EST MOD 30 MIN: CPT | Mod: 25 | Performed by: FAMILY MEDICINE

## 2025-07-25 PROCEDURE — 3074F SYST BP LT 130 MM HG: CPT | Performed by: FAMILY MEDICINE

## 2025-07-25 PROCEDURE — 80357 KETAMINE AND NORKETAMINE: CPT | Mod: ZL | Performed by: FAMILY MEDICINE

## 2025-07-25 PROCEDURE — 83718 ASSAY OF LIPOPROTEIN: CPT | Mod: ZL | Performed by: FAMILY MEDICINE

## 2025-07-25 PROCEDURE — 84443 ASSAY THYROID STIM HORMONE: CPT | Mod: ZL | Performed by: FAMILY MEDICINE

## 2025-07-25 PROCEDURE — 86803 HEPATITIS C AB TEST: CPT | Mod: ZL | Performed by: FAMILY MEDICINE

## 2025-07-25 RX ORDER — SUCRALFATE 1 G/1
1 TABLET ORAL 4 TIMES DAILY
Qty: 120 TABLET | Refills: 0 | Status: SHIPPED | OUTPATIENT
Start: 2025-07-25 | End: 2025-08-24

## 2025-07-25 RX ORDER — CITALOPRAM HYDROBROMIDE 10 MG/1
10 TABLET ORAL DAILY
Qty: 90 TABLET | Refills: 4 | Status: SHIPPED | OUTPATIENT
Start: 2025-07-25

## 2025-07-25 RX ORDER — METHYLPHENIDATE HYDROCHLORIDE 54 MG/1
54 TABLET ORAL EVERY MORNING
Qty: 30 TABLET | Refills: 0 | Status: SHIPPED | OUTPATIENT
Start: 2025-09-23

## 2025-07-25 RX ORDER — METHYLPHENIDATE HYDROCHLORIDE 54 MG/1
54 TABLET ORAL EVERY MORNING
Qty: 30 TABLET | Refills: 0 | Status: SHIPPED | OUTPATIENT
Start: 2025-08-23

## 2025-07-25 RX ORDER — METHYLPHENIDATE HYDROCHLORIDE 54 MG/1
54 TABLET ORAL EVERY MORNING
Qty: 30 TABLET | Refills: 0 | Status: SHIPPED | OUTPATIENT
Start: 2025-07-25

## 2025-07-25 RX ORDER — OMEPRAZOLE 40 MG/1
40 CAPSULE, DELAYED RELEASE ORAL DAILY
Qty: 30 CAPSULE | Refills: 0 | Status: SHIPPED | OUTPATIENT
Start: 2025-07-25 | End: 2025-08-24

## 2025-07-25 ASSESSMENT — ANXIETY QUESTIONNAIRES
3. WORRYING TOO MUCH ABOUT DIFFERENT THINGS: NEARLY EVERY DAY
4. TROUBLE RELAXING: SEVERAL DAYS
5. BEING SO RESTLESS THAT IT IS HARD TO SIT STILL: SEVERAL DAYS
2. NOT BEING ABLE TO STOP OR CONTROL WORRYING: MORE THAN HALF THE DAYS
7. FEELING AFRAID AS IF SOMETHING AWFUL MIGHT HAPPEN: NEARLY EVERY DAY
IF YOU CHECKED OFF ANY PROBLEMS ON THIS QUESTIONNAIRE, HOW DIFFICULT HAVE THESE PROBLEMS MADE IT FOR YOU TO DO YOUR WORK, TAKE CARE OF THINGS AT HOME, OR GET ALONG WITH OTHER PEOPLE: SOMEWHAT DIFFICULT
6. BECOMING EASILY ANNOYED OR IRRITABLE: SEVERAL DAYS
GAD7 TOTAL SCORE: 14
7. FEELING AFRAID AS IF SOMETHING AWFUL MIGHT HAPPEN: NEARLY EVERY DAY
1. FEELING NERVOUS, ANXIOUS, OR ON EDGE: NEARLY EVERY DAY
GAD7 TOTAL SCORE: 14
8. IF YOU CHECKED OFF ANY PROBLEMS, HOW DIFFICULT HAVE THESE MADE IT FOR YOU TO DO YOUR WORK, TAKE CARE OF THINGS AT HOME, OR GET ALONG WITH OTHER PEOPLE?: SOMEWHAT DIFFICULT
GAD7 TOTAL SCORE: 14

## 2025-07-25 ASSESSMENT — PAIN SCALES - GENERAL: PAINLEVEL_OUTOF10: NO PAIN (0)

## 2025-07-25 ASSESSMENT — PATIENT HEALTH QUESTIONNAIRE - PHQ9
10. IF YOU CHECKED OFF ANY PROBLEMS, HOW DIFFICULT HAVE THESE PROBLEMS MADE IT FOR YOU TO DO YOUR WORK, TAKE CARE OF THINGS AT HOME, OR GET ALONG WITH OTHER PEOPLE: SOMEWHAT DIFFICULT
SUM OF ALL RESPONSES TO PHQ QUESTIONS 1-9: 9
SUM OF ALL RESPONSES TO PHQ QUESTIONS 1-9: 9

## 2025-07-25 NOTE — PROGRESS NOTES
"  Assessment & Plan     ***    MDM:  {MDM 2021 Documentation (Optional):391762}  {2021 E&M time (Optional):173130}    The longitudinal plan of care for the diagnosis(es)/condition(s) as documented were addressed during this visit. Due to the added complexity in care, I will continue to support Maricarmen in the subsequent management and with ongoing continuity of care.    BMI  Estimated body mass index is 29.44 kg/m  as calculated from the following:    Height as of this encounter: 1.67 m (5' 5.75\").    Weight as of this encounter: 82.1 kg (181 lb).   Weight management plan: Discussed healthy diet and exercise guidelines    Follow up: 3 months, controlled medication review.    Subjective   Maricarmen is a 45 year old, presenting for the following health issues:  Recheck Medication        7/25/2025     1:12 PM   Additional Questions   Roomed by CHETAN Gerard   Accompanied by self     History of Present Illness       Reason for visit:  Annual physical   She is taking medications regularly.        {SUPERLIST (Optional):322688}  {additonal problems for provider to add (Optional):508170}    ADHD Follow-Up (Adult)  Concerns: ***  Changes since last visit: {:503775}  Taking controlled (daily) medications as prescribed: { :837695}  Sleep: {:915838}  Adult ADHD Self-Reporting form given to patient?:  {YES / NO:512890::\"Yes\"}  Currently in counseling: {YES / NO:197403::\"Yes\"}    Medication Benefits:   Controlled symptoms: {:192520}  Uncontrolled symptoms:  {:900358}    Medication Side Effects:  Reports:  {side effects:160793}  Sleep Problems? { :857932}  ++++++++++++++++++++++++++++++++++++++++++++++++    Employer Concerns/Feedback: {:889151}  Coworker Concerns:   {:764699}  Home/Family Concerns: {:857469}      HRT: on estrogen since hysterectomy.  Doing well.    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    Having difficulty relaxing.  Mid afternoon - riled up to not being able to eat anything.  + nausea.  Sharp stomach pain; and even cramping in suprapubic region.     Has been on Cymbalta,         Objective    /66   Pulse 88   Temp 98  F (36.7  C) (Tympanic)   Resp 16   Ht 1.67 m (5' 5.75\")   Wt 82.1 kg (181 lb)   LMP 09/21/2023   SpO2 99%   BMI 29.44 kg/m    Body mass index is 29.44 kg/m .  Physical Exam   {Brief/partially selected :516843}    No results found for any visits on 07/25/25.        Signed Electronically by: Avril Sanders DO  {Email feedback regarding this note to [email or web address]   :038674}  "

## 2025-07-25 NOTE — NURSING NOTE
"Chief Complaint   Patient presents with    Recheck Medication       Initial /66   Pulse 88   Temp 98  F (36.7  C) (Tympanic)   Resp 16   Ht 1.67 m (5' 5.75\")   Wt 82.1 kg (181 lb)   LMP 09/21/2023   SpO2 99%   BMI 29.44 kg/m   Estimated body mass index is 29.44 kg/m  as calculated from the following:    Height as of this encounter: 1.67 m (5' 5.75\").    Weight as of this encounter: 82.1 kg (181 lb).  Medication Review: complete    The next two questions are to help us understand your food security.  If you are feeling you need any assistance in this area, we have resources available to support you today.          4/24/2025   SDOH- Food Insecurity   Within the past 12 months, did you worry that your food would run out before you got money to buy more? N   Within the past 12 months, did the food you bought just not last and you didn t have money to get more? N         Health Care Directive:  Patient does not have a Health Care Directive: Discussed advance care planning with patient; however, patient declined at this time.    Rajani Ford LPN      "

## 2025-07-26 LAB — HCV AB SERPL QL IA: NONREACTIVE

## 2025-07-29 LAB
ME-PHENIDATE UR CFM-MCNC: 147 NG/ML
ME-PHENIDATE UR CFM-MCNC: 4780 NG/ML
ME-PHENIDATE/CREAT UR: 1131 NG/MG {CREAT}
ME-PHENIDATE/CREAT UR: ABNORMAL NG/MG {CREAT}

## 2025-07-29 NOTE — PROGRESS NOTES
Preventive Care Visit  [facility]  Avril Sanders DO, Family Medicine  Jul 25, 2025      Assessment & Plan     1. Routine history and physical examination of adult (Primary)  - Hepatitis C Screen Reflex to HCV RNA Quant and Genotype; Future  - Comprehensive Metabolic Panel; Future  - Lipase; Future  - Hepatitis C Screen Reflex to HCV RNA Quant and Genotype  - Comprehensive Metabolic Panel  - Lipase    2. Lipid screening  3. Diabetes mellitus screening  Screening labs obtained.  - Lipid Panel; Future  - Lipid Panel  - Comprehensive Metabolic Panel; Future  - Comprehensive Metabolic Panel    4. Hormone replacement therapy  Chronic, doing well; continue on same dosing.    5. Hypothyroidism, unspecified type  Chronic, due for monitoring level and will adjust medications accordingly if required.  - TSH Reflex GH; Future  - TSH Reflex GH    6. Menopause  Vs perimenopause.    7. Attention deficit hyperactivity disorder (ADHD), predominantly inattentive type  Chronic, stable.  Renewed current dosing; UDS today.  MN  is stable.  3 months sent to pharmacy.  - methylphenidate HCl ER, OSM, (CONCERTA) 54 MG CR tablet; Take 1 tablet (54 mg) by mouth every morning.  Dispense: 30 tablet; Refill: 0  - methylphenidate HCl ER, OSM, (CONCERTA) 54 MG CR tablet; Take 1 tablet (54 mg) by mouth every morning.  Dispense: 30 tablet; Refill: 0  - methylphenidate HCl ER, OSM, (CONCERTA) 54 MG CR tablet; Take 1 tablet (54 mg) by mouth every morning.  Dispense: 30 tablet; Refill: 0  - Drug Confirmation Panel Urine with Creat    8. Anxiety  Chronic, waxes and wanes; currently worse with increased stress about 's job.  - citalopram (CELEXA) 10 MG tablet; Take 1 tablet (10 mg) by mouth daily.  Dispense: 90 tablet; Refill: 4    9. Gastritis without bleeding, unspecified chronicity, unspecified gastritis type  Chronic, worsened recently.  Discussed possibility of hyperemesis cannabinoid syndrome as she has increased  her marijuana use as well.   Will trial treating with PPI and Carafate x 30 days; and monitor for improvement.  Consideration for cessation of marijuana x 3 months and monitor for improvement.  - omeprazole (PRILOSEC) 40 MG DR capsule; Take 1 capsule (40 mg) by mouth daily.  Dispense: 30 capsule; Refill: 0  - sucralfate (CARAFATE) 1 GM tablet; Take 1 tablet (1 g) by mouth 4 times daily.  Dispense: 120 tablet; Refill: 0    10. Visit for screening mammogram  Ordered to be updated.  - MA Screen Bilateral w/Brendon; Future    11. Encounter for hepatitis C screening test for low risk patient  Screening preventative lab obtained in light of other blood work today.  - Hepatitis C Screen Reflex to HCV RNA Quant and Genotype; Future  - Hepatitis C Screen Reflex to HCV RNA Quant and Genotype    12. Abdominal pain, epigastric  As above; add labs to evaluate - but gastritis treatment as above.  - Comprehensive Metabolic Panel; Future  - Lipase; Future  - Comprehensive Metabolic Panel  - Lipase      Follow up: 3 months; controlled medication renewals.      Joshua Hernadez is a 45 year old, presenting for the following:  Recheck Medication        7/25/2025     1:12 PM   Additional Questions   Roomed by CHETAN Gerard   Accompanied by self          History of Present Illness       Reason for visit:  Annual physical   She is taking medications regularly.       ADHD Follow-Up (Adult)  Concerns: None  Changes since last visit: None  Taking controlled (daily) medications as prescribed: Yes  Sleep: no problems  Adult ADHD Self-Reporting form given to patient?:  No  Currently in counseling: No    Medication Benefits:   Controlled symptoms: Impulse control, Frustration tolerance, and Accepting limits  Uncontrolled symptoms:  Attention span at times    Medication Side Effects:  Reports:  none  Sleep Problems? no  ++++++++++++++++++++++++++++++++++++++++++++++++    Employer Concerns/Feedback: None  Coworker Concerns:   None  Home/Family  Concerns: Worse- as below.  Increased stress with 's job and kids home for summer.      HRT: on estrogen since hysterectomy.  Doing well.    Thyroid: uncertain if any symptoms.    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    Having difficulty relaxing.  Mid afternoon - riled up to not being able to eat anything.  + nausea.  Sharp stomach pain; and even cramping in suprapubic region.     Advance Care Planning    Discussed advance care planning with patient; informed AVS has link to [employer].        1/28/2025   General Health   How would you rate your overall physical health? Good   Feel stress (tense, anxious, or unable to sleep) Rather much         1/28/2025   Nutrition   Three or more servings of calcium each day? Yes   Diet: Vegetarian/vegan   How many servings of fruit and vegetables per day? 4 or more   How many sweetened beverages each day? 0-1         1/28/2025   Exercise   Days per week of moderate/strenous exercise 7 days         1/28/2025   Social Factors   Frequency of gathering with friends or relatives Once a week   Worry food won't last until get money to buy more No   Food not last or not have enough money for food? No   Do you have housing? (Housing is defined as stable permanent housing and does not include staying outside in a car, in a tent, in an abandoned building, in an overnight shelter, or couch-surfing.) Yes   Are you worried about losing your housing? No   Lack of transportation? No   Unable to get utilities (heat,electricity)? No         1/28/2025   Dental   Dentist two times every year? Yes       Today's PHQ-9 Score:       7/25/2025     1:05 PM   PHQ-9 SCORE   PHQ-9 Total Score MyChart 9 (Mild depression)   PHQ-9 Total Score 9        Patient-reported         1/28/2025   Substance Use   Alcohol  more than 3/day or more than 7/wk Not Applicable   Do you use any other substances recreationally? No     Social History     Tobacco Use    Smoking status: Never    Smokeless tobacco: Never   Vaping Use    Vaping status: Never Used   Substance Use Topics    Alcohol use: Not Currently    Drug use: Yes     Types: Marijuana     Comment: medical marijuana, CBD drinks occasionally           6/19/2024   LAST FHS-7 RESULTS   1st degree relative breast or ovarian cancer No   Any relative bilateral breast cancer No   Any male have breast cancer No   Any ONE woman have BOTH breast AND ovarian cancer No   Any woman with breast cancer before 50yrs No   2 or more relatives with breast AND/OR ovarian cancer No   2 or more relatives with breast AND/OR bowel cancer No        Mammogram Screening - Mammogram every 1-2 years updated in Health Maintenance based on mutual decision making      History of abnormal Pap smear: Status post hysterectomy with removal of cervix and no history of CIN2 or greater or cervical cancer. Health Maintenance and Surgical History updated.        Latest Ref Rng & Units 10/18/2022     4:29 PM   PAP / HPV   PAP  Negative for Intraepithelial Lesion or Malignancy (NILM)    HPV 16 DNA Negative Negative    HPV 18 DNA Negative Negative    Other HR HPV Negative Negative      ASCVD Risk   The 10-year ASCVD risk score (Anneliese VIDES, et al., 2019) is: 0.6%    Values used to calculate the score:      Age: 45 years      Sex: Female      Is Non- : No      Diabetic: No      Tobacco smoker: No      Systolic Blood Pressure: 116 mmHg      Is BP treated: No      HDL Cholesterol: 58 mg/dL      Total Cholesterol: 198 mg/dL       Reviewed and updated as needed this visit by Provider   Tobacco  Allergies  Meds  Problems  Med Hx  Surg Hx  Fam Hx          LMP/Periods/Contraception: NA; s/p hyst 9/2023 (adenomyosis) - L ovary remains  Risk for STI?: No  Last pap: 10/18/22, Neg co-testing. Now  s/p hyst, no longer required.  Any hx of abnormal paps:  NA  FH of early CA?: No  Cholesterol/DM concerns/screening: No; normal in .  Tobacco?: No  Calcium intake: No  DEXA: 24, normal.  10yr.  Last mammo: 24, birads1.  Ordered to be scheduled.  Colon screening: @ 45  Tdap 24; flu yearly; Shingrix @ 50; RSV @ 60; PNA @ 65.  Covid x5 complete; candidate for boosters.    Past Medical History:   Diagnosis Date    Dichorionic diamniotic twin pregnancy in third trimester 2018    Disorder of thyroid     hypo    Supervision of elderly multigravida     2018    Twin pregnancy 2018     Past Surgical History:   Procedure Laterality Date    APPENDECTOMY OPEN           SECTION N/A 2018    Procedure:  SECTION;   Section - twins;  Surgeon: Woodrow Hurtado MD;  Location:  OR    COLONOSCOPY N/A 7/10/2019    Procedure: COLONOSCOPY, WITH POLYPECTOMY AND BIOPSY;  Surgeon: Viki Villareal MD;  Location:  OR    EXAM UNDER ANESTHESIA ANUS N/A 2023    Procedure: Exam under anesthesia anus;  Surgeon: Viki Villareal MD;  Location: [facility] OR    LAPAROSCOPIC HYSTERECTOMY TOTAL, SALPINGECTOMY BILATERAL N/A 2023    Procedure: Total Laparoscopic Hysterectomy, with Bilateral Salpingectomy, right oophorectomy, cystoscopy;  Surgeon: Dia Morrison MD;  Location: [facility] OR     OB History    Para Term  AB Living   3 3 3 0 0 4   SAB IAB Ectopic Multiple Live Births   0 0 0 1 4      # Outcome Date GA Lbr Renny/2nd Weight Sex Type Anes PTL Lv   3A Term 18 37w0d  2.546 kg (5 lb 9.8 oz) F   N SMOOTH      Name: MYNOR DAY1 RAFAEL      Apgar1: 8  Apgar5: 9   3B Term 18 37w0d  2.909 kg (6 lb 6.6 oz) M   N SMOOTH      Name: MYNOR DAY2 RAFAEL      Apgar1: 9  Apgar5: 9   2 Term 10/16/10 40w0d   M  EPI N SMOOTH      Name: William   1 Term  40w0d   M  None N SMOOTH      Birth Comments: [place]      Name: Kaveh     BP Readings from Last 3 Encounters:   25  116/66   05/15/25 130/84   25 133/87    Wt Readings from Last 3 Encounters:   25 82.1 kg (181 lb)   05/15/25 82.2 kg (181 lb 3.2 oz)   25 83.8 kg (184 lb 12.8 oz)                  Patient Active Problem List   Diagnosis    Attention deficit disorder    Lumbosacral spondylosis without myelopathy    Other staphylococcus infection in conditions classified elsewhere and of unspecified site    Acquired hypothyroidism    Bilateral carpal tunnel syndrome    Adjustment disorder with anxious mood    S/P  section    Anal fissure    S/P hysterectomy    Acute pain of right knee    Fall, subsequent encounter    Left hand pain    Pain of right hand     Past Surgical History:   Procedure Laterality Date    APPENDECTOMY OPEN           SECTION N/A 2018    Procedure:  SECTION;   Section - twins;  Surgeon: Woodrow Hurtado MD;  Location:  OR    COLONOSCOPY N/A 7/10/2019    Procedure: COLONOSCOPY, WITH POLYPECTOMY AND BIOPSY;  Surgeon: Viki Villareal MD;  Location:  OR    EXAM UNDER ANESTHESIA ANUS N/A 2023    Procedure: Exam under anesthesia anus;  Surgeon: Viki Villareal MD;  Location:  OR    LAPAROSCOPIC HYSTERECTOMY TOTAL, SALPINGECTOMY BILATERAL N/A 2023    Procedure: Total Laparoscopic Hysterectomy, with Bilateral Salpingectomy, right oophorectomy, cystoscopy;  Surgeon: Dia Morrison MD;  Location:  OR       Social History     Tobacco Use    Smoking status: Never    Smokeless tobacco: Never   Substance Use Topics    Alcohol use: Not Currently     Family History   Problem Relation Age of Onset    Substance Abuse Maternal Grandmother         Alcohol/Drug,Alcohol abuse    Cancer Maternal Grandmother         Cancer, lung cancer    Other - See Comments Maternal Grandfather         Alzheimer's disease    Diabetes Paternal Grandmother         Diabetes,type 1    No Known Problems Sister     No Known Problems Son     No Known Problems Son     Diabetes Maternal Uncle   "       Diabetes,type 2    No Known Problems Son         2018    No Known Problems Daughter         2018    Heart Disease No family hx of         Heart Disease         Current Outpatient Medications   Medication Sig Dispense Refill    citalopram (CELEXA) 10 MG tablet Take 1 tablet (10 mg) by mouth daily. 90 tablet 4    doxylamine (UNISOM) 25 MG TABS tablet Take 25 mg by mouth At Bedtime      estradiol (ESTRACE) 2 MG tablet Take 1 tablet (2 mg) by mouth daily. 90 tablet 4    medical cannabis (Patient's own supply) See Admin Instructions (The purpose of this order is to document that the patient reports taking medical cannabis.  This is not a prescription, and is not used to certify that the patient has a qualifying medical condition.)      Melatonin 2.5 MG CHEW Take 1 tablet by mouth At Bedtime      methylcellulose (CITRUCEL) powder Take 0.3 g (1.05 teaspoonful) by mouth daily 454 g 3    methylphenidate HCl ER, OSM, (CONCERTA) 54 MG CR tablet Take 1 tablet (54 mg) by mouth every morning. 30 tablet 0    [START ON 8/23/2025] methylphenidate HCl ER, OSM, (CONCERTA) 54 MG CR tablet Take 1 tablet (54 mg) by mouth every morning. 30 tablet 0    [START ON 9/23/2025] methylphenidate HCl ER, OSM, (CONCERTA) 54 MG CR tablet Take 1 tablet (54 mg) by mouth every morning. 30 tablet 0    omeprazole (PRILOSEC) 40 MG DR capsule Take 1 capsule (40 mg) by mouth daily. 30 capsule 0    sucralfate (CARAFATE) 1 GM tablet Take 1 tablet (1 g) by mouth 4 times daily. 120 tablet 0    SYNTHROID 75 MCG tablet Take 1 tablet (75 mcg) by mouth daily. 90 tablet 4     Allergies   Allergen Reactions    Adhesive Tape Hives     Rash from surgical drape adhesive     Seasonal Allergies Itching        Objective    Exam  /66   Pulse 88   Temp 98  F (36.7  C) (Tympanic)   Resp 16   Ht 1.67 m (5' 5.75\")   Wt 82.1 kg (181 lb)   LMP 09/21/2023   SpO2 99%   BMI 29.44 kg/m     Estimated body mass index is 29.44 kg/m  as calculated from the " "following:    Height as of this encounter: 1.67 m (5' 5.75\").    Weight as of this encounter: 82.1 kg (181 lb).    Physical Exam  GENERAL: alert and no distress  EYES: Eyes grossly normal to inspection, PERRL and conjunctivae and sclerae normal  HENT: ear canals and TM's normal, nose and mouth without ulcers or lesions  NECK: no adenopathy, no asymmetry, masses, or scars  RESP: lungs clear to auscultation - no rales, rhonchi or wheezes  CV: regular rate and rhythm, normal S1 S2, no S3 or S4, no murmur, click or rub, no peripheral edema  ABDOMEN: soft, nontender, no hepatosplenomegaly, no masses and bowel sounds normal  MS: no gross musculoskeletal defects noted, no edema  SKIN: no suspicious lesions or rashes  PSYCH: mentation appears normal, affect normal/bright    Results for orders placed or performed in visit on 07/25/25   Urine Creatinine for Drug Screen Panel     Status: None   Result Value Ref Range    Creatinine Urine for Drug Screen 13 mg/dL   TSH Reflex GH     Status: Normal   Result Value Ref Range    TSH 1.36 0.30 - 4.20 uIU/mL   Lipid Panel     Status: Abnormal   Result Value Ref Range    Cholesterol 198 <200 mg/dL    Triglycerides 144 <150 mg/dL    Direct Measure HDL 58 >=50 mg/dL    LDL Cholesterol Calculated 111 (H) <100 mg/dL    Non HDL Cholesterol 140 (H) <130 mg/dL    Patient Fasting > 8hrs? Unknown     Narrative    Cholesterol  Desirable: < 200 mg/dL  Borderline High: 200 - 239 mg/dL  High: >= 240 mg/dL    Triglycerides  Normal: < 150 mg/dL  Borderline High: 150 - 199 mg/dL  High: 200-499 mg/dL  Very High: >= 500 mg/dL    Direct Measure HDL  Female: >= 50 mg/dL   Male: >= 40 mg/dL    LDL Cholesterol  Desirable: < 100 mg/dL  Above Desirable: 100 - 129 mg/dL   Borderline High: 130 - 159 mg/dL   High:  160 - 189 mg/dL   Very High: >= 190 mg/dL    Non HDL Cholesterol  Desirable: < 130 mg/dL  Above Desirable: 130 - 159 mg/dL  Borderline High: 160 - 189 mg/dL  High: 190 - 219 mg/dL  Very High: >= 220 " mg/dL   Hepatitis C Screen Reflex to HCV RNA Quant and Genotype     Status: Normal   Result Value Ref Range    Hepatitis C Antibody Nonreactive Nonreactive   Comprehensive Metabolic Panel     Status: None   Result Value Ref Range    Sodium 136 135 - 145 mmol/L    Potassium 4.1 3.4 - 5.3 mmol/L    Carbon Dioxide (CO2) 26 22 - 29 mmol/L    Anion Gap 9 7 - 15 mmol/L    Urea Nitrogen 10.4 6.0 - 20.0 mg/dL    Creatinine 0.68 0.51 - 0.95 mg/dL    GFR Estimate >90 >60 mL/min/1.73m2    Calcium 9.2 8.8 - 10.4 mg/dL    Chloride 101 98 - 107 mmol/L    Glucose 76 70 - 99 mg/dL    Alkaline Phosphatase 74 40 - 150 U/L    AST 17 0 - 45 U/L    ALT 12 0 - 50 U/L    Protein Total 7.4 6.4 - 8.3 g/dL    Albumin 4.0 3.5 - 5.2 g/dL    Bilirubin Total <0.2 <=1.2 mg/dL    Patient Fasting > 8hrs? Unknown    Lipase     Status: Normal   Result Value Ref Range    Lipase 40 13 - 60 U/L   Drug Confirmation Panel Urine with Creat     Status: None (In process)    Narrative    The following orders were created for panel order Drug Confirmation Panel Urine with Creat.  Procedure                               Abnormality         Status                     ---------                               -----------         ------                     Urine Drug Confirmation...[[id number]]                      In process                 Urine Creatinine for DrUmair..[[id number]]                      Final result                 Please view results for these tests on the individual orders.           Signed Electronically by: Avril Sanders DO

## 2025-09-02 ENCOUNTER — THERAPY VISIT (OUTPATIENT)
Dept: OCCUPATIONAL THERAPY | Facility: OTHER | Age: 45
End: 2025-09-02
Attending: NURSE PRACTITIONER
Payer: COMMERCIAL

## 2025-09-02 DIAGNOSIS — M79.641 PAIN OF RIGHT HAND: Primary | ICD-10-CM

## 2025-09-02 PROCEDURE — 97035 APP MDLTY 1+ULTRASOUND EA 15: CPT | Mod: GO

## 2025-09-02 PROCEDURE — 97140 MANUAL THERAPY 1/> REGIONS: CPT | Mod: GO

## 2025-09-02 PROCEDURE — 97110 THERAPEUTIC EXERCISES: CPT | Mod: GO

## (undated) DEVICE — SU WND CLOSURE VLOC 180 ABS 2-0 8" VLOCA208L

## (undated) DEVICE — PACK C SECTION SMA15CSFCA

## (undated) DEVICE — SU VICRYL 3-0 CT 36" J356H

## (undated) DEVICE — PAD PERI INDIV WRAP 11" 2022A

## (undated) DEVICE — CATH FOLEY 16FR 5ML SILICONE LUBRI-SIL 175816

## (undated) DEVICE — LIGHT HANDLES PLASTIC

## (undated) DEVICE — PANTIES MESH LG/XLG 2PK 706M2

## (undated) DEVICE — SU CHROMIC 1 CTX 36" 905H

## (undated) DEVICE — ENDO BRUSH CHANNEL MASTER CLEANING 2-4.2MM BW-412T

## (undated) DEVICE — TROCAR KII SLEEVE 5MM X 100MM 12/BX

## (undated) DEVICE — TUBING IRR TUR Y TYPE 2C4041

## (undated) DEVICE — ESU ELEC BLADE 2.75" COATED/INSULATED E1455

## (undated) DEVICE — COVER LIGHT HANDLE LT-F02

## (undated) DEVICE — DECANTER VIAL 2006S

## (undated) DEVICE — SU PDS II 0 CTX 60" Z990G

## (undated) DEVICE — PREP CHLORAPREP 26ML TINTED ORANGE  260815

## (undated) DEVICE — APPLICATOR ENDOSCOPIC 5 SURGICEL POWDER 3123SPEA

## (undated) DEVICE — ESU LIGASURE LAPAROSCPC L-HOOK SEALER/DVDR 5MM-44CM LF5644

## (undated) DEVICE — ENDO TROCAR FIRST ENTRY KII FIOS ADV FIX 12X100MM CFF73

## (undated) DEVICE — ENDO FORCEP ENDOJAW BIOPSY 2.8MMX230CM FB-220U

## (undated) DEVICE — SUCTION MANIFOLD NEPTUNE 2 SYS 4 PORT 0702-020-000

## (undated) DEVICE — SOL WATER 1500ML

## (undated) DEVICE — DEVICE ENDO STITCH APPLIER 10MM 173016

## (undated) DEVICE — RETR ELEV / UTERINE MANIPULATOR V-CARE MED CUP 60-6085-201A

## (undated) DEVICE — GLOVE PROTEXIS POWDER FREE SMT 7.5  2D72PT75X

## (undated) DEVICE — KIT PATIENT POSITIONING PIGAZZI LATEX FREE 40580

## (undated) DEVICE — PAD CHUX UNDERPAD 30X36" P3036C

## (undated) DEVICE — SU VICRYL 0 CTX 36" J370H

## (undated) DEVICE — ESU GROUND PAD ADULT W/CORD E7507

## (undated) DEVICE — JELLY LUBRICATING SURGILUBE 2OZ TUBE

## (undated) DEVICE — SLEEVE COMPRESSION SCD KNEE MED 74022

## (undated) DEVICE — DRSG STERI STRIP 1/2X4" R1547

## (undated) DEVICE — Device

## (undated) DEVICE — GLOVE ESTEEM POWDER FREE SMT 6.5  2D72PT65

## (undated) DEVICE — PACK LAPAROSCOPY LF SBA15LPFCA

## (undated) DEVICE — GLOVE PROTEXIS BLUE W/NEU-THERA 6.5  2D73EB65

## (undated) DEVICE — SUCTION STRYKERFLOW II 250-070-500

## (undated) DEVICE — GLOVE PROTEXIS POWDER FREE SMT 6.5  2D72PT65X

## (undated) DEVICE — SU VICRYL 0 UR-6 27" J603H

## (undated) DEVICE — LUBRICANT INST ELECTROLUBE EL101

## (undated) DEVICE — SYR 10ML LL W/O NDL 302995

## (undated) DEVICE — STPL SKIN SUBCUTICULAR INSORB  2030

## (undated) DEVICE — DRAPE LAVH/LAPAROSCOPY W/POUCH 29474

## (undated) DEVICE — ENDO TROCAR FIRST ENTRY KII FIOS ADV FIX 05X100MM CFF03

## (undated) DEVICE — SU DERMABOND ADVANCED .7ML DNX12

## (undated) DEVICE — ENDO KIT COMPLIANCE DYKENDOCMPLY

## (undated) DEVICE — GLOVE BIOGEL 7 LATEX

## (undated) DEVICE — DRSG MEDIPORE 3 1/2X8" 3570

## (undated) DEVICE — ENDO SCOPE WARMER DUAL LAP TM500D

## (undated) DEVICE — TUBING SUCTION 10'X3/16" N510

## (undated) DEVICE — SU MONOCRYL 3-0 PS-2 27" Y427H

## (undated) DEVICE — TUBING INSUFFLATOR W/FILTER OLYMPUS WA95005A

## (undated) DEVICE — PACK MAJOR LAPAROTOMY LF SBA15MLFCA

## (undated) DEVICE — PREP SKIN SCRUB TRAY 4461A

## (undated) DEVICE — VERRES NEEDLE 120MM DISPOSABLE 12/BX

## (undated) DEVICE — SU VICRYL 3-0 SH 27" UND J416H

## (undated) DEVICE — TUBING SMOKE EVAC FILTER BUFFALO PLUMEPORT PP010CS

## (undated) DEVICE — ESU HOLDER LAP INST DISP PURPLE LONG 330MM H-PRO-330

## (undated) DEVICE — DRSG GAUZE 4X4" 3033

## (undated) RX ORDER — BUPIVACAINE HYDROCHLORIDE 7.5 MG/ML
INJECTION, SOLUTION INTRASPINAL
Status: DISPENSED
Start: 2018-09-05

## (undated) RX ORDER — HYDROMORPHONE HYDROCHLORIDE 2 MG/ML
INJECTION, SOLUTION INTRAMUSCULAR; INTRAVENOUS; SUBCUTANEOUS
Status: DISPENSED
Start: 2023-09-21

## (undated) RX ORDER — FENTANYL CITRATE 50 UG/ML
INJECTION, SOLUTION INTRAMUSCULAR; INTRAVENOUS
Status: DISPENSED
Start: 2023-09-21

## (undated) RX ORDER — KETOROLAC TROMETHAMINE 30 MG/ML
INJECTION, SOLUTION INTRAMUSCULAR; INTRAVENOUS
Status: DISPENSED
Start: 2023-09-21

## (undated) RX ORDER — GINSENG 100 MG
CAPSULE ORAL
Status: DISPENSED
Start: 2024-01-27

## (undated) RX ORDER — PROPOFOL 10 MG/ML
INJECTION, EMULSION INTRAVENOUS
Status: DISPENSED
Start: 2019-07-10

## (undated) RX ORDER — BETAMETHASONE SODIUM PHOSPHATE AND BETAMETHASONE ACETATE 3; 3 MG/ML; MG/ML
INJECTION, SUSPENSION INTRA-ARTICULAR; INTRALESIONAL; INTRAMUSCULAR; SOFT TISSUE
Status: DISPENSED
Start: 2018-08-27

## (undated) RX ORDER — CEFAZOLIN SODIUM/WATER 2 G/20 ML
SYRINGE (ML) INTRAVENOUS
Status: DISPENSED
Start: 2023-09-21

## (undated) RX ORDER — PROPOFOL 10 MG/ML
INJECTION, EMULSION INTRAVENOUS
Status: DISPENSED
Start: 2023-09-21

## (undated) RX ORDER — OXYTOCIN 10 [USP'U]/ML
INJECTION, SOLUTION INTRAMUSCULAR; INTRAVENOUS
Status: DISPENSED
Start: 2018-09-05

## (undated) RX ORDER — PHENYLEPHRINE HCL IN 0.9% NACL 1 MG/10 ML
SYRINGE (ML) INTRAVENOUS
Status: DISPENSED
Start: 2018-09-05

## (undated) RX ORDER — LIDOCAINE HYDROCHLORIDE 20 MG/ML
INJECTION, SOLUTION EPIDURAL; INFILTRATION; INTRACAUDAL; PERINEURAL
Status: DISPENSED
Start: 2019-07-10

## (undated) RX ORDER — CEFAZOLIN SODIUM 1 G/3ML
INJECTION, POWDER, FOR SOLUTION INTRAMUSCULAR; INTRAVENOUS
Status: DISPENSED
Start: 2018-09-05

## (undated) RX ORDER — LIDOCAINE HYDROCHLORIDE AND EPINEPHRINE 10; 10 MG/ML; UG/ML
INJECTION, SOLUTION INFILTRATION; PERINEURAL
Status: DISPENSED
Start: 2023-09-21

## (undated) RX ORDER — PHENAZOPYRIDINE HYDROCHLORIDE 100 MG/1
TABLET, FILM COATED ORAL
Status: DISPENSED
Start: 2023-09-21

## (undated) RX ORDER — ACETAMINOPHEN 325 MG/1
TABLET ORAL
Status: DISPENSED
Start: 2023-09-21

## (undated) RX ORDER — MORPHINE SULFATE 1 MG/ML
INJECTION, SOLUTION EPIDURAL; INTRATHECAL; INTRAVENOUS
Status: DISPENSED
Start: 2018-09-05

## (undated) RX ORDER — ONDANSETRON 2 MG/ML
INJECTION INTRAMUSCULAR; INTRAVENOUS
Status: DISPENSED
Start: 2023-09-21

## (undated) RX ORDER — BUPIVACAINE HYDROCHLORIDE 2.5 MG/ML
INJECTION, SOLUTION EPIDURAL; INFILTRATION; INTRACAUDAL
Status: DISPENSED
Start: 2023-09-21

## (undated) RX ORDER — SODIUM CHLORIDE, SODIUM LACTATE, POTASSIUM CHLORIDE, CALCIUM CHLORIDE 600; 310; 30; 20 MG/100ML; MG/100ML; MG/100ML; MG/100ML
INJECTION, SOLUTION INTRAVENOUS
Status: DISPENSED
Start: 2023-09-21

## (undated) RX ORDER — SODIUM CHLORIDE, SODIUM LACTATE, POTASSIUM CHLORIDE, CALCIUM CHLORIDE 600; 310; 30; 20 MG/100ML; MG/100ML; MG/100ML; MG/100ML
INJECTION, SOLUTION INTRAVENOUS
Status: DISPENSED
Start: 2018-09-05

## (undated) RX ORDER — DEXAMETHASONE SODIUM PHOSPHATE 4 MG/ML
INJECTION, SOLUTION INTRA-ARTICULAR; INTRALESIONAL; INTRAMUSCULAR; INTRAVENOUS; SOFT TISSUE
Status: DISPENSED
Start: 2023-09-21